# Patient Record
Sex: FEMALE | Race: BLACK OR AFRICAN AMERICAN | NOT HISPANIC OR LATINO | Employment: OTHER | URBAN - METROPOLITAN AREA
[De-identification: names, ages, dates, MRNs, and addresses within clinical notes are randomized per-mention and may not be internally consistent; named-entity substitution may affect disease eponyms.]

---

## 2017-11-21 ENCOUNTER — OFFICE VISIT (OUTPATIENT)
Dept: INTERNAL MEDICINE | Facility: CLINIC | Age: 67
End: 2017-11-21
Payer: MEDICARE

## 2017-11-21 ENCOUNTER — HOSPITAL ENCOUNTER (OUTPATIENT)
Dept: RADIOLOGY | Facility: HOSPITAL | Age: 67
Discharge: HOME | End: 2017-11-21
Attending: NURSE PRACTITIONER
Payer: MEDICARE

## 2017-11-21 VITALS
DIASTOLIC BLOOD PRESSURE: 98 MMHG | HEART RATE: 108 BPM | BODY MASS INDEX: 44.12 KG/M2 | OXYGEN SATURATION: 97 % | TEMPERATURE: 101 F | WEIGHT: 233.69 LBS | HEIGHT: 61 IN | SYSTOLIC BLOOD PRESSURE: 169 MMHG

## 2017-11-21 DIAGNOSIS — R06.89 ABNORMAL BREATH SOUNDS: ICD-10-CM

## 2017-11-21 DIAGNOSIS — R50.9 FEVER, UNSPECIFIED FEVER CAUSE: ICD-10-CM

## 2017-11-21 DIAGNOSIS — R50.9 FEVER, UNSPECIFIED FEVER CAUSE: Primary | ICD-10-CM

## 2017-11-21 DIAGNOSIS — R05.9 COUGH: ICD-10-CM

## 2017-11-21 DIAGNOSIS — J32.9 SINUSITIS, UNSPECIFIED CHRONICITY, UNSPECIFIED LOCATION: ICD-10-CM

## 2017-11-21 DIAGNOSIS — I10 ESSENTIAL HYPERTENSION: Chronic | ICD-10-CM

## 2017-11-21 LAB
FLUAV AG SPEC QL IA: NEGATIVE
FLUBV AG SPEC QL IA: NEGATIVE
SPECIMEN SOURCE: NORMAL

## 2017-11-21 PROCEDURE — 99999 PR PBB SHADOW E&M-EST. PATIENT-LVL III: CPT | Mod: PBBFAC,,, | Performed by: NURSE PRACTITIONER

## 2017-11-21 PROCEDURE — 99213 OFFICE O/P EST LOW 20 MIN: CPT | Mod: PBBFAC,25,PO | Performed by: NURSE PRACTITIONER

## 2017-11-21 PROCEDURE — 71020 XR CHEST PA AND LATERAL: CPT | Mod: 26,,, | Performed by: RADIOLOGY

## 2017-11-21 PROCEDURE — 71020 XR CHEST PA AND LATERAL: CPT | Mod: TC,PO

## 2017-11-21 PROCEDURE — 87400 INFLUENZA A/B EACH AG IA: CPT | Mod: PO

## 2017-11-21 PROCEDURE — 99214 OFFICE O/P EST MOD 30 MIN: CPT | Mod: S$PBB,,, | Performed by: NURSE PRACTITIONER

## 2017-11-21 RX ORDER — PROMETHAZINE HYDROCHLORIDE AND DEXTROMETHORPHAN HYDROBROMIDE 6.25; 15 MG/5ML; MG/5ML
5 SYRUP ORAL
Qty: 118 ML | Refills: 0 | Status: SHIPPED | OUTPATIENT
Start: 2017-11-21 | End: 2017-12-01

## 2017-11-21 RX ORDER — ALBUTEROL SULFATE 90 UG/1
1-2 AEROSOL, METERED RESPIRATORY (INHALATION) EVERY 6 HOURS PRN
Qty: 1 INHALER | Refills: 0 | Status: SHIPPED | OUTPATIENT
Start: 2017-11-21 | End: 2018-06-11

## 2017-11-21 RX ORDER — FLUTICASONE PROPIONATE 50 MCG
2 SPRAY, SUSPENSION (ML) NASAL DAILY
Qty: 1 BOTTLE | Refills: 0 | Status: SHIPPED | OUTPATIENT
Start: 2017-11-21 | End: 2017-12-05

## 2017-11-21 RX ORDER — ACETAMINOPHEN 500 MG
1000 TABLET ORAL
Status: COMPLETED | OUTPATIENT
Start: 2017-11-21 | End: 2017-11-21

## 2017-11-21 RX ORDER — LOSARTAN POTASSIUM AND HYDROCHLOROTHIAZIDE 25; 100 MG/1; MG/1
1 TABLET ORAL DAILY
Qty: 30 TABLET | Refills: 1 | Status: SHIPPED | OUTPATIENT
Start: 2017-11-21 | End: 2018-03-27 | Stop reason: SDUPTHER

## 2017-11-21 RX ORDER — DOXYCYCLINE 100 MG/1
100 CAPSULE ORAL EVERY 12 HOURS
Qty: 20 CAPSULE | Refills: 0 | Status: SHIPPED | OUTPATIENT
Start: 2017-11-21 | End: 2017-12-01

## 2017-11-21 RX ORDER — LEVOCETIRIZINE DIHYDROCHLORIDE 5 MG/1
5 TABLET, FILM COATED ORAL NIGHTLY
Qty: 30 TABLET | Refills: 0 | COMMUNITY
Start: 2017-11-21 | End: 2018-09-25

## 2017-11-21 RX ORDER — ACETAMINOPHEN 325 MG/1
650 TABLET ORAL
Status: DISCONTINUED | OUTPATIENT
Start: 2017-11-21 | End: 2017-11-21

## 2017-11-21 RX ADMIN — Medication 1000 MG: at 03:11

## 2017-11-21 NOTE — PROGRESS NOTES
"CC:   Chief Complaint   Patient presents with    Chills    Fever    Cough    Sinus Problem    Sore Throat     HPI: This is a new problem.   Yoana Pardo is a 67 y.o. female with a complaint of URI.  The current episode started in the past 5 days.   The problem has been gradually worsening.   Associated symptoms included chills, nasal congestion, rhinorrhea, cough, dyspnea, fatigue.    Pertinent negatives include fever, wheezing, nausea, vomiting, diarrhea   Treatments tried: Tylenol Severe cold has been used and this has provided no relief.     Review of patient's allergies indicates:   Allergen Reactions    Bactrim [sulfamethoxazole-trimethoprim] Itching       Outpatient Medications Prior to Visit   Medication Sig Dispense Refill    aspirin (ECOTRIN) 81 MG EC tablet Take 81 mg by mouth once daily.      furosemide (LASIX) 20 MG tablet TAKE ONE TABLET BY MOUTH TWICE DAILY AS NEEDED 60 tablet 0    levocetirizine (XYZAL) 5 MG tablet Take 1 tablet (5 mg total) by mouth every evening. 30 tablet 11    losartan-hydrochlorothiazide 100-25 mg (HYZAAR) 100-25 mg per tablet TAKE ONE TABLET BY MOUTH ONCE DAILY 90 tablet 0    cranberry 400 mg Cap Take 1 tablet by mouth 2 (two) times daily.      fish oil-omega-3 fatty acids 300-1,000 mg capsule Take 2 g by mouth once daily.      fluticasone (FLONASE) 50 mcg/actuation nasal spray 2 sprays by Each Nare route once daily. 1 Bottle 11    multivitamin capsule Take 1 capsule by mouth once daily.      POTASSIUM ORAL Take by mouth.      vitamin E 100 UNIT capsule Take 100 Units by mouth once daily.       No facility-administered medications prior to visit.         Physical Exam   BP (!) 169/98 (BP Location: Right arm, Patient Position: Sitting, BP Method: Large (Automatic))   Pulse 108   Temp (!) 101 °F (38.3 °C) (Tympanic)   Ht 5' 1" (1.549 m)   Wt 106 kg (233 lb 11 oz)   SpO2 97%   BMI 44.15 kg/m²   Constitutional: The patient appears well-developed and " well-nourished.   Head: Normocephalic and atraumatic.   Right Ear: Tympanic membrane and ear canal normal. No drainage, swelling or tenderness. Tympanic membrane is not injected, not erythematous and not bulging. Effusion noted.   Left Ear: Ear canal normal. No drainage, swelling or tenderness. Tympanic membrane is not injected, not erythematous and not bulging.   Nose: Mucosal edema and rhinorrhea present.   Mouth/Throat: Uvula is midline. Posterior oropharyngeal erythema present. No oropharyngeal exudate.        THE MUCOSA IS BOGGY AND ERYTHEMATOUS.     Cardiovascular: Normal rate, regular rhythm, S1 normal, S2 normal and normal heart sounds.  Exam reveals no gallop, no S3, no S4 and no friction rub.    No murmur heard.  Pulmonary/Chest: Effort normal and breath sounds course. No stridor. Not tachypneic. No respiratory distress. The patient has wheezes posterior. The patient has no rhonchi. The patient has no rales.   Skin: The patient is not diaphoretic.     Encounter Diagnoses   Name Primary?    Fever, unspecified fever cause Yes    Abnormal breath sounds     Sinusitis, unspecified chronicity, unspecified location     Essential hypertension     Cough        PLAN:    Yoana MILLER was seen today for chills, fever, cough, sinus problem and sore throat.    Diagnoses and all orders for this visit:    Fever, unspecified fever cause  -     Discontinue: acetaminophen tablet 650 mg; Take 2 tablets (650 mg total) by mouth one time.  -     Influenza antigen Nasopharyngeal Swab  -     X-Ray Chest PA And Lateral; Future  -     acetaminophen tablet 1,000 mg; Take 2 tablets (1,000 mg total) by mouth one time.    Abnormal breath sounds  -     X-Ray Chest PA And Lateral; Future  -     doxycycline (VIBRAMYCIN) 100 MG Cap; Take 1 capsule (100 mg total) by mouth every 12 (twelve) hours.  -     albuterol 90 mcg/actuation inhaler; Inhale 1-2 puffs into the lungs every 6 (six) hours as needed for Wheezing (Cough).  -      promethazine-dextromethorphan (PROMETHAZINE-DM) 6.25-15 mg/5 mL Syrp; Take 5 mLs by mouth every 6 to 8 hours as needed (cough).    Sinusitis, unspecified chronicity, unspecified location  -     doxycycline (VIBRAMYCIN) 100 MG Cap; Take 1 capsule (100 mg total) by mouth every 12 (twelve) hours.  -     levocetirizine (XYZAL) 5 MG tablet; Take 1 tablet (5 mg total) by mouth every evening.  -     fluticasone (FLONASE) 50 mcg/actuation nasal spray; 2 sprays by Each Nare route once daily.    Essential hypertension  -     losartan-hydrochlorothiazide 100-25 mg (HYZAAR) 100-25 mg per tablet; Take 1 tablet by mouth once daily.    Cough  -     promethazine-dextromethorphan (PROMETHAZINE-DM) 6.25-15 mg/5 mL Syrp; Take 5 mLs by mouth every 6 to 8 hours as needed (cough).        Medications Ordered This Encounter      acetaminophen tablet 1,000 mg      albuterol 90 mcg/actuation inhaler          Sig: Inhale 1-2 puffs into the lungs every 6 (six) hours as needed for Wheezing (Cough).          Dispense:  1 Inhaler          Refill:  0      doxycycline (VIBRAMYCIN) 100 MG Cap          Sig: Take 1 capsule (100 mg total) by mouth every 12 (twelve) hours.          Dispense:  20 capsule          Refill:  0      fluticasone (FLONASE) 50 mcg/actuation nasal spray          Si sprays by Each Nare route once daily.          Dispense:  1 Bottle          Refill:  0      levocetirizine (XYZAL) 5 MG tablet          Sig: Take 1 tablet (5 mg total) by mouth every evening.          Dispense:  30 tablet          Refill:  0      losartan-hydrochlorothiazide 100-25 mg (HYZAAR) 100-25 mg per tablet          Sig: Take 1 tablet by mouth once daily.          Dispense:  30 tablet          Refill:  1      promethazine-dextromethorphan (PROMETHAZINE-DM) 6.25-15 mg/5 mL Syrp          Sig: Take 5 mLs by mouth every 6 to 8 hours as needed (cough).          Dispense:  118 mL          Refill:  0  Orders Placed This Encounter   Procedures    X-Ray Chest PA  And Lateral     Standing Status:   Future     Standing Expiration Date:   11/21/2018     Order Specific Question:   May the Radiologist modify the order per protocol to meet the clinical needs of the patient?     Answer:   Yes    Influenza antigen Nasopharyngeal Swab     Order Specific Question:   Specimen Source     Answer:   Nasopharyngeal Swab     RTC if symptoms are worsening or changing significantly or if not improved by the end of therapy.      Will notify of lab reports.

## 2017-11-29 ENCOUNTER — OFFICE VISIT (OUTPATIENT)
Dept: INTERNAL MEDICINE | Facility: CLINIC | Age: 67
End: 2017-11-29
Payer: MEDICARE

## 2017-11-29 VITALS
DIASTOLIC BLOOD PRESSURE: 76 MMHG | HEART RATE: 65 BPM | SYSTOLIC BLOOD PRESSURE: 119 MMHG | TEMPERATURE: 97 F | HEIGHT: 61 IN | OXYGEN SATURATION: 97 % | WEIGHT: 236.75 LBS | BODY MASS INDEX: 44.7 KG/M2

## 2017-11-29 DIAGNOSIS — G47.33 OSA (OBSTRUCTIVE SLEEP APNEA): ICD-10-CM

## 2017-11-29 DIAGNOSIS — I10 ESSENTIAL HYPERTENSION: Chronic | ICD-10-CM

## 2017-11-29 DIAGNOSIS — R41.3 MEMORY DEFICITS: ICD-10-CM

## 2017-11-29 DIAGNOSIS — J40 BRONCHITIS: Primary | ICD-10-CM

## 2017-11-29 DIAGNOSIS — H93.13 TINNITUS OF BOTH EARS: ICD-10-CM

## 2017-11-29 DIAGNOSIS — I27.20 PULMONARY HTN: ICD-10-CM

## 2017-11-29 DIAGNOSIS — E66.01 MORBID OBESITY WITH BMI OF 40.0-44.9, ADULT: ICD-10-CM

## 2017-11-29 DIAGNOSIS — J06.9 URTI (ACUTE UPPER RESPIRATORY INFECTION): ICD-10-CM

## 2017-11-29 PROCEDURE — 99214 OFFICE O/P EST MOD 30 MIN: CPT | Mod: PBBFAC,PO,25 | Performed by: FAMILY MEDICINE

## 2017-11-29 PROCEDURE — 99214 OFFICE O/P EST MOD 30 MIN: CPT | Mod: 25,S$PBB,, | Performed by: FAMILY MEDICINE

## 2017-11-29 PROCEDURE — 99999 PR PBB SHADOW E&M-EST. PATIENT-LVL IV: CPT | Mod: PBBFAC,,, | Performed by: FAMILY MEDICINE

## 2017-11-29 PROCEDURE — 96372 THER/PROPH/DIAG INJ SC/IM: CPT | Mod: PBBFAC,PO

## 2017-11-29 RX ORDER — METHYLPREDNISOLONE ACETATE 80 MG/ML
80 INJECTION, SUSPENSION INTRA-ARTICULAR; INTRALESIONAL; INTRAMUSCULAR; SOFT TISSUE
Status: COMPLETED | OUTPATIENT
Start: 2017-11-29 | End: 2017-11-29

## 2017-11-29 RX ADMIN — METHYLPREDNISOLONE ACETATE 80 MG: 80 INJECTION, SUSPENSION INTRALESIONAL; INTRAMUSCULAR; INTRASYNOVIAL; SOFT TISSUE at 03:11

## 2017-11-29 NOTE — PROGRESS NOTES
Subjective:       Patient ID: Yoana Pardo is a 67 y.o. female.    Chief Complaint: Follow-up    67-year-old -American female patient with Patient Active Problem List:     Pulmonary HTN     MVP (mitral valve prolapse)     History of positive PPD, untreated     Hemorrhoids     Essential hypertension     Peripheral neuropathy     Primary osteoarthritis of both knees     Morbid obesity with BMI of 40.0-44.9, adult     [place] (obstructive sleep apnea)     Behaviorally induced insufficient sleep syndrome     Inadequate sleep hygiene     Psychophysiological insomnia  Here with complaint of productive cough, not getting any better in spite of taking antibiotics and cough syrup, was recently seen at urgent care, patient has been checked for flu which has been negative.   Patient denies of any fever, but has been having wheezing for which she has been using albuterol inhaler as needed  Denies of any chest pain or shortness of breath, palpitations.  Secondary to sleep apnea has not been using her CPAP machine.  Due to pulmonary hypertension has been followed by cardiology  Patient reports have been noticing memory loss and ringing sensation to the ears and minimal decreased hearing loss lately.         Review of Systems   Constitutional: Negative for chills, fatigue and fever.   HENT: Positive for congestion, hearing loss and tinnitus. Negative for sore throat.    Eyes: Negative for visual disturbance.   Respiratory: Positive for cough and wheezing. Negative for shortness of breath.    Cardiovascular: Negative for chest pain, palpitations and leg swelling.   Gastrointestinal: Negative for abdominal pain, nausea and vomiting.   Musculoskeletal: Negative for myalgias.   Skin: Negative for rash.   Neurological: Negative for light-headedness and headaches.   Psychiatric/Behavioral: Positive for decreased concentration. Negative for sleep disturbance.         /76 (BP Location: Left arm, Patient Position: Sitting)  "  Pulse 65   Temp 97.3 °F (36.3 °C) (Tympanic)   Ht 5' 1" (1.549 m)   Wt 107.4 kg (236 lb 12.4 oz)   SpO2 97%   BMI 44.74 kg/m²   Objective:      Physical Exam   Constitutional: She is oriented to person, place, and time. She appears well-developed and well-nourished.   HENT:   Head: Normocephalic and atraumatic.   Right Ear: External ear normal.   Left Ear: External ear normal.   Mouth/Throat: Oropharynx is clear and moist.   Cardiovascular: Normal rate, regular rhythm and normal heart sounds.    No murmur heard.  Pulmonary/Chest: Effort normal and breath sounds normal. No respiratory distress. She has no wheezes.   Abdominal: Soft. Bowel sounds are normal. There is no tenderness.   Musculoskeletal: She exhibits no edema.   Neurological: She is alert and oriented to person, place, and time.   Skin: Skin is warm and dry. No rash noted.   Psychiatric: She has a normal mood and affect.         Assessment:       1. URTI (acute upper respiratory infection)    2. Essential hypertension    3. Pulmonary HTN    4. Morbid obesity with BMI of 40.0-44.9, adult    5. [place] (obstructive sleep apnea)    6. Bronchitis    7. Memory deficits    8. Tinnitus of both ears        Plan:   Bronchitis  -     CT Chest Without Contrast; Future; Expected date: 11/29/2017  -     methylPREDNISolone acetate injection 80 mg; Inject 1 mL (80 mg total) into the muscle one time.  -     CBC auto differential; Future; Expected date: 11/29/2017    URTI (acute upper respiratory infection)  -     methylPREDNISolone acetate injection 80 mg; Inject 1 mL (80 mg total) into the muscle one time.  -     CBC auto differential; Future; Expected date: 11/29/2017    Reviewed chest x-ray which was normal.  Secondary to ongoing symptoms we'll get CT chest without contrast.      Depo-Medrol 80 mg IM ×1 given today in the clinic  Finish antibiotics doxycycline and continue promethazine cough syrup for now  Advised to continue using albuterol inhaler as needed for " wheezing  Encouraged to drink adequate fluids  If symptoms continue to persist or worsen patient to see pulmonology    Essential hypertension  -     Basic metabolic panel; Future; Expected date: 11/29/2017  -     TSH; Future; Expected date: 11/29/2017  -     Lipid panel; Future; Expected date: 11/29/2017  Pulmonary HTN  Followed by cardiology .  Blood pressure stable today currently taking losartan hydrochlorothiazide 10/25 mg daily     Morbid obesity with BMI of 40.0-44.9, adult-lifestyle modifications recommended to lose weight with diet and exercise with BMI 44     [place] (obstructive sleep apnea)-has not been using CPAP machine, encouraged to discuss further with pulmonology    Memory deficits  -     Vitamin B12; Future; Expected date: 11/29/2017   patient has been noticing memory deficits, advised to use flash cardts, will check B12 levels    Tinnitus of both ears  -     Ambulatory Referral to ENT  Secondary to tingling sensation to both the ears and minimal decreased hearing loss , will refer to ENT for further evaluation

## 2017-11-30 ENCOUNTER — HOSPITAL ENCOUNTER (OUTPATIENT)
Dept: RADIOLOGY | Facility: HOSPITAL | Age: 67
Discharge: HOME | End: 2017-11-30
Attending: FAMILY MEDICINE
Payer: MEDICARE

## 2017-11-30 DIAGNOSIS — J40 BRONCHITIS: ICD-10-CM

## 2017-11-30 PROCEDURE — 71250 CT THORAX DX C-: CPT | Mod: TC,PO

## 2017-11-30 PROCEDURE — 71250 CT THORAX DX C-: CPT | Mod: 26,,, | Performed by: RADIOLOGY

## 2017-12-05 ENCOUNTER — OFFICE VISIT (OUTPATIENT)
Dept: OPHTHALMOLOGY | Facility: CLINIC | Age: 67
End: 2017-12-05
Payer: MEDICARE

## 2017-12-05 DIAGNOSIS — H52.203 MYOPIA OF BOTH EYES WITH ASTIGMATISM AND PRESBYOPIA: ICD-10-CM

## 2017-12-05 DIAGNOSIS — H25.013 CORTICAL SENILE CATARACT OF BOTH EYES: ICD-10-CM

## 2017-12-05 DIAGNOSIS — I10 ESSENTIAL HYPERTENSION: Primary | ICD-10-CM

## 2017-12-05 DIAGNOSIS — H52.13 MYOPIA OF BOTH EYES WITH ASTIGMATISM AND PRESBYOPIA: ICD-10-CM

## 2017-12-05 DIAGNOSIS — Z13.5 GLAUCOMA SCREENING: ICD-10-CM

## 2017-12-05 DIAGNOSIS — H52.4 MYOPIA OF BOTH EYES WITH ASTIGMATISM AND PRESBYOPIA: ICD-10-CM

## 2017-12-05 PROCEDURE — 92014 COMPRE OPH EXAM EST PT 1/>: CPT | Mod: S$PBB,,, | Performed by: OPTOMETRIST

## 2017-12-05 PROCEDURE — 92015 DETERMINE REFRACTIVE STATE: CPT | Mod: ,,, | Performed by: OPTOMETRIST

## 2017-12-05 PROCEDURE — 99212 OFFICE O/P EST SF 10 MIN: CPT | Mod: PBBFAC,PO | Performed by: OPTOMETRIST

## 2017-12-05 PROCEDURE — 99999 PR PBB SHADOW E&M-EST. PATIENT-LVL II: CPT | Mod: PBBFAC,,, | Performed by: OPTOMETRIST

## 2017-12-05 NOTE — PROGRESS NOTES
HPI     Eye Exam    Additional comments: Yearly           Comments   Last seen by [facility] [place]29/15 for yearly eye exam. Patient here today for   yearly eye exam.   1. Dry Eyes OU  HPI    Any vision changes since last exam: Yes   Eye pain: No  Other ocular symptoms: Dryness    Do you wear currently wear glasses or contacts? Glasses    Interested in contacts today? No    Do you plan on getting new glasses today? Yes         Last edited by Colleen Drummond, OD on 12/5/2017  9:06 AM. (History)            Assessment /Plan     For exam results, see Encounter Report.    Essential hypertension    Cortical senile cataract of both eyes    Glaucoma screening    Myopia of both eyes with astigmatism and presbyopia    No hypertensive retinopathy.  Very early cataract development.  Glaucoma screening and fundus exam normal.  Updated glasses prescription.  Return to clinic 1 yr.

## 2018-03-26 ENCOUNTER — HOSPITAL ENCOUNTER (EMERGENCY)
Facility: HOSPITAL | Age: 68
Discharge: HOME | End: 2018-03-26
Attending: EMERGENCY MEDICINE
Payer: MEDICARE

## 2018-03-26 VITALS
HEIGHT: 61 IN | HEART RATE: 68 BPM | DIASTOLIC BLOOD PRESSURE: 100 MMHG | OXYGEN SATURATION: 100 % | TEMPERATURE: 99 F | SYSTOLIC BLOOD PRESSURE: 166 MMHG | RESPIRATION RATE: 20 BRPM

## 2018-03-26 DIAGNOSIS — R10.10 UPPER ABDOMINAL PAIN: Primary | ICD-10-CM

## 2018-03-26 DIAGNOSIS — R07.9 CHEST PAIN: ICD-10-CM

## 2018-03-26 DIAGNOSIS — K80.20 GALLSTONES: ICD-10-CM

## 2018-03-26 LAB
ALBUMIN SERPL BCP-MCNC: 4 G/DL
ALP SERPL-CCNC: 94 U/L
ALT SERPL W/O P-5'-P-CCNC: 10 U/L
ANION GAP SERPL CALC-SCNC: 10 MMOL/L
AST SERPL-CCNC: 14 U/L
BASOPHILS # BLD AUTO: 0.02 K/UL
BASOPHILS NFR BLD: 0.3 %
BILIRUB SERPL-MCNC: 0.3 MG/DL
BILIRUB UR QL STRIP: NEGATIVE
BNP SERPL-MCNC: 57 PG/ML
BUN SERPL-MCNC: 12 MG/DL
CALCIUM SERPL-MCNC: 9 MG/DL
CHLORIDE SERPL-SCNC: 102 MMOL/L
CLARITY UR: CLEAR
CO2 SERPL-SCNC: 30 MMOL/L
COLOR UR: YELLOW
CREAT SERPL-MCNC: 0.9 MG/DL
DIFFERENTIAL METHOD: NORMAL
EOSINOPHIL # BLD AUTO: 0.1 K/UL
EOSINOPHIL NFR BLD: 1.3 %
ERYTHROCYTE [DISTWIDTH] IN BLOOD BY AUTOMATED COUNT: 13.9 %
EST. GFR  (AFRICAN AMERICAN): >60 ML/MIN/1.73 M^2
EST. GFR  (NON AFRICAN AMERICAN): >60 ML/MIN/1.73 M^2
GLUCOSE SERPL-MCNC: 117 MG/DL
GLUCOSE UR QL STRIP: NEGATIVE
HCT VFR BLD AUTO: 41.1 %
HGB BLD-MCNC: 13.6 G/DL
HGB UR QL STRIP: NEGATIVE
KETONES UR QL STRIP: NEGATIVE
LEUKOCYTE ESTERASE UR QL STRIP: NEGATIVE
LIPASE SERPL-CCNC: 30 U/L
LYMPHOCYTES # BLD AUTO: 2.4 K/UL
LYMPHOCYTES NFR BLD: 33.8 %
MCH RBC QN AUTO: 30.3 PG
MCHC RBC AUTO-ENTMCNC: 33.1 G/DL
MCV RBC AUTO: 92 FL
MONOCYTES # BLD AUTO: 0.4 K/UL
MONOCYTES NFR BLD: 6.3 %
NEUTROPHILS # BLD AUTO: 4.1 K/UL
NEUTROPHILS NFR BLD: 58.3 %
NITRITE UR QL STRIP: NEGATIVE
PH UR STRIP: 8 [PH] (ref 5–8)
PLATELET # BLD AUTO: 269 K/UL
PMV BLD AUTO: 9.4 FL
POTASSIUM SERPL-SCNC: 3.6 MMOL/L
PROT SERPL-MCNC: 8.6 G/DL
PROT UR QL STRIP: NEGATIVE
RBC # BLD AUTO: 4.49 M/UL
SODIUM SERPL-SCNC: 142 MMOL/L
SP GR UR STRIP: 1.02 (ref 1–1.03)
TROPONIN I SERPL DL<=0.01 NG/ML-MCNC: 0.01 NG/ML
TROPONIN I SERPL DL<=0.01 NG/ML-MCNC: 0.01 NG/ML
URN SPEC COLLECT METH UR: NORMAL
UROBILINOGEN UR STRIP-ACNC: NEGATIVE EU/DL
WBC # BLD AUTO: 7.04 K/UL

## 2018-03-26 PROCEDURE — 96375 TX/PRO/DX INJ NEW DRUG ADDON: CPT

## 2018-03-26 PROCEDURE — 84484 ASSAY OF TROPONIN QUANT: CPT | Mod: 91

## 2018-03-26 PROCEDURE — C9113 INJ PANTOPRAZOLE SODIUM, VIA: HCPCS | Performed by: EMERGENCY MEDICINE

## 2018-03-26 PROCEDURE — 81003 URINALYSIS AUTO W/O SCOPE: CPT

## 2018-03-26 PROCEDURE — 96374 THER/PROPH/DIAG INJ IV PUSH: CPT

## 2018-03-26 PROCEDURE — 25500020 PHARM REV CODE 255: Performed by: EMERGENCY MEDICINE

## 2018-03-26 PROCEDURE — 83880 ASSAY OF NATRIURETIC PEPTIDE: CPT

## 2018-03-26 PROCEDURE — 85025 COMPLETE CBC W/AUTO DIFF WBC: CPT

## 2018-03-26 PROCEDURE — 93010 ELECTROCARDIOGRAM REPORT: CPT | Mod: ,,, | Performed by: INTERNAL MEDICINE

## 2018-03-26 PROCEDURE — 25000003 PHARM REV CODE 250: Performed by: EMERGENCY MEDICINE

## 2018-03-26 PROCEDURE — 99285 EMERGENCY DEPT VISIT HI MDM: CPT | Mod: 25

## 2018-03-26 PROCEDURE — 80053 COMPREHEN METABOLIC PANEL: CPT

## 2018-03-26 PROCEDURE — 93005 ELECTROCARDIOGRAM TRACING: CPT

## 2018-03-26 PROCEDURE — 63600175 PHARM REV CODE 636 W HCPCS: Performed by: EMERGENCY MEDICINE

## 2018-03-26 PROCEDURE — 96361 HYDRATE IV INFUSION ADD-ON: CPT

## 2018-03-26 PROCEDURE — 83690 ASSAY OF LIPASE: CPT

## 2018-03-26 RX ORDER — PANTOPRAZOLE SODIUM 40 MG/10ML
40 INJECTION, POWDER, LYOPHILIZED, FOR SOLUTION INTRAVENOUS
Status: DISCONTINUED | OUTPATIENT
Start: 2018-03-26 | End: 2018-03-26

## 2018-03-26 RX ORDER — HYDROCODONE BITARTRATE AND ACETAMINOPHEN 7.5; 325 MG/1; MG/1
1 TABLET ORAL EVERY 6 HOURS PRN
Qty: 15 TABLET | Refills: 0 | Status: ON HOLD | OUTPATIENT
Start: 2018-03-26 | End: 2018-04-04

## 2018-03-26 RX ORDER — ONDANSETRON 4 MG/1
4 TABLET, ORALLY DISINTEGRATING ORAL EVERY 8 HOURS PRN
Qty: 15 TABLET | Refills: 0 | Status: ON HOLD | OUTPATIENT
Start: 2018-03-26 | End: 2018-04-04

## 2018-03-26 RX ORDER — ONDANSETRON 2 MG/ML
4 INJECTION INTRAMUSCULAR; INTRAVENOUS
Status: COMPLETED | OUTPATIENT
Start: 2018-03-26 | End: 2018-03-26

## 2018-03-26 RX ADMIN — SODIUM CHLORIDE 1000 ML: 0.9 INJECTION, SOLUTION INTRAVENOUS at 03:03

## 2018-03-26 RX ADMIN — IOHEXOL 75 ML: 350 INJECTION, SOLUTION INTRAVENOUS at 06:03

## 2018-03-26 RX ADMIN — LIDOCAINE HYDROCHLORIDE 50 ML: 20 SOLUTION ORAL; TOPICAL at 04:03

## 2018-03-26 RX ADMIN — ONDANSETRON 4 MG: 2 INJECTION INTRAMUSCULAR; INTRAVENOUS at 04:03

## 2018-03-26 RX ADMIN — DEXTROSE MONOHYDRATE 40 MG: 5 INJECTION, SOLUTION INTRAVENOUS at 06:03

## 2018-03-26 NOTE — ED PROVIDER NOTES
SCRIBE #1 NOTE: IDereck am scribing for, and in the presence of, Lawrence Doyle Jr., MD. I have scribed the HPI, ROS, and PEX.     SCRIBE #2 NOTE: IKorin, am scribing for, and in the presence of, Wilian Snyder MD.     History      Chief Complaint   Patient presents with    Abdominal Pain     upper abdominal pain. +Nausea       Review of patient's allergies indicates:   Allergen Reactions    Bactrim [sulfamethoxazole-trimethoprim] Itching        HPI   HPI    3/26/2018, 3:08 AM   History obtained from the patient      History of Present Illness: Yoana Pardo is a 67 y.o. female patient who presents to the Emergency Department for abd pain which onset gradually five hours pta. Pt stated sxs started a few minutes after she ate dinner which was rice and gravy. Symptoms are constant and moderate in severity. No mitigating or exacerbating factors reported. No associated sxs reported. Patient denies any fever, chills, n/v/d, constipation, hematochezia, dysuria, hematuria, urinary frequency/urgency, and all other sxs at this time. No prior Tx reported. No further complaints or concerns at this time.         Arrival mode: Personal vehicle    PCP: Lynn Wiggins MD       Past Medical History:  Past Medical History:   Diagnosis Date    GERD (gastroesophageal reflux disease)     Hemorrhoids     History of positive PPD, untreated     Hx of cold sores     Hyperlipidemia     Hypertension     MVP (mitral valve prolapse)     Peripheral neuropathy     Pulmonary HTN     Dr Bailon    Sleep apnea     has  but not using CPAP       Past Surgical History:  Past Surgical History:   Procedure Laterality Date    DILATION AND CURETTAGE OF UTERUS           Family History:  Family History   Problem Relation Age of Onset    Heart disease Mother     Obesity Mother     Kidney disease Father     Hypertension Father     Obesity Father     Heart disease Father     Diabetes Sister     Aneurysm Sister       AAA       Social History:  Social History     Social History Main Topics    Smoking status: Never Smoker    Smokeless tobacco: Never Used    Alcohol use Yes      Comment: rarely    Drug use: No    Sexual activity: Not Currently       ROS   Review of Systems   Constitutional: Negative for fever.   HENT: Negative for sore throat.    Respiratory: Negative for shortness of breath.    Cardiovascular: Negative for chest pain.   Gastrointestinal: Positive for abdominal pain. Negative for blood in stool, constipation, diarrhea, nausea and vomiting.   Genitourinary: Negative for dysuria, frequency, hematuria and urgency.   Musculoskeletal: Negative for back pain.   Skin: Negative for rash.   Neurological: Negative for weakness and numbness.   Hematological: Does not bruise/bleed easily.   All other systems reviewed and are negative.    Physical Exam      Initial Vitals [03/26/18 0230]   BP Pulse Resp Temp SpO2   (!) 181/93 60 18 98.7 °F (37.1 °C) 98 %      MAP       122.33          Physical Exam  Nursing Notes and Vital Signs Reviewed.  Constitutional: Patient is in no acute distress. Well-developed and well-nourished.  Head: Atraumatic. Normocephalic.  Eyes: PERRL. EOM intact. Conjunctivae are not pale. No scleral icterus.  ENT: Mucous membranes are moist. Oropharynx is clear and symmetric.    Neck: Supple. Full ROM. No lymphadenopathy.  Cardiovascular: Regular rate. Regular rhythm. No murmurs, rubs, or gallops. Distal pulses are 2+ and symmetric.  Pulmonary/Chest: No respiratory distress. Clear to auscultation bilaterally. No wheezing or rales.  Abdominal: Soft and non-distended. Epigastric tenderness.  No rebound, guarding, or rigidity. Good bowel sounds.  Musculoskeletal: Moves all extremities. No obvious deformities. No edema. No calf tenderness.  Skin: Warm and dry.  Neurological:  Alert, awake, and appropriate.  Normal speech.  No acute focal neurological deficits are appreciated.  Psychiatric: Normal affect.  "Good eye contact. Appropriate in content.    ED Course    Procedures  ED Vital Signs:  Vitals:    03/26/18 0230 03/26/18 0419 03/26/18 0447 03/26/18 0641   BP: (!) 181/93 (!) 181/79 (!) 163/74 (!) 169/83   Pulse: 60 75 77 75   Resp: 18 18 15 (!) 24   Temp: 98.7 °F (37.1 °C)      TempSrc: Oral      SpO2: 98% 98% 100% 100%   Height: 5' 1" (1.549 m)       03/26/18 0731   BP:    Pulse: 74   Resp:    Temp:    TempSrc:    SpO2:    Height:        Abnormal Lab Results:  Labs Reviewed   COMPREHENSIVE METABOLIC PANEL - Abnormal; Notable for the following:        Result Value    CO2 30 (*)     Glucose 117 (*)     Total Protein 8.6 (*)     All other components within normal limits   CBC W/ AUTO DIFFERENTIAL   LIPASE   URINALYSIS   TROPONIN I   TROPONIN I   B-TYPE NATRIURETIC PEPTIDE      Lab Results:  Results for orders placed or performed during the hospital encounter of 03/26/18   CBC W/ AUTO DIFFERENTIAL   Result Value Ref Range    WBC 7.04 3.90 - 12.70 K/uL    RBC 4.49 4.00 - 5.40 M/uL    Hemoglobin 13.6 12.0 - 16.0 g/dL    Hematocrit 41.1 37.0 - 48.5 %    MCV 92 82 - 98 fL    MCH 30.3 27.0 - 31.0 pg    MCHC 33.1 32.0 - 36.0 g/dL    RDW 13.9 11.5 - 14.5 %    Platelets 269 150 - 350 K/uL    MPV 9.4 9.2 - 12.9 fL    Gran # (ANC) 4.1 1.8 - 7.7 K/uL    Lymph # 2.4 1.0 - 4.8 K/uL    Mono # 0.4 0.3 - 1.0 K/uL    Eos # 0.1 0.0 - 0.5 K/uL    Baso # 0.02 0.00 - 0.20 K/uL    Gran% 58.3 38.0 - 73.0 %    Lymph% 33.8 18.0 - 48.0 %    Mono% 6.3 4.0 - 15.0 %    Eosinophil% 1.3 0.0 - 8.0 %    Basophil% 0.3 0.0 - 1.9 %    Differential Method Automated    Comp. Metabolic Panel   Result Value Ref Range    Sodium 142 136 - 145 mmol/L    Potassium 3.6 3.5 - 5.1 mmol/L    Chloride 102 95 - 110 mmol/L    CO2 30 (H) 23 - 29 mmol/L    Glucose 117 (H) 70 - 110 mg/dL    BUN, Bld 12 8 - 23 mg/dL    Creatinine 0.9 0.5 - 1.4 mg/dL    Calcium 9.0 8.7 - 10.5 mg/dL    Total Protein 8.6 (H) 6.0 - 8.4 g/dL    Albumin 4.0 3.5 - 5.2 g/dL    Total Bilirubin 0.3 " 0.1 - 1.0 mg/dL    Alkaline Phosphatase 94 55 - 135 U/L    AST 14 10 - 40 U/L    ALT 10 10 - 44 U/L    Anion Gap 10 8 - 16 mmol/L    eGFR if African American >60 >60 mL/min/1.73 m^2    eGFR if non African American >60 >60 mL/min/1.73 m^2   Lipase   Result Value Ref Range    Lipase 30 4 - 60 U/L   Urinalysis - Clean Catch   Result Value Ref Range    Specimen UA Urine, Clean Catch     Color, UA Yellow Yellow, Straw, Aylin    Appearance, UA Clear Clear    pH, UA 8.0 5.0 - 8.0    Specific Gravity, UA 1.020 1.005 - 1.030    Protein, UA Negative Negative    Glucose, UA Negative Negative    Ketones, UA Negative Negative    Bilirubin (UA) Negative Negative    Occult Blood UA Negative Negative    Nitrite, UA Negative Negative    Urobilinogen, UA Negative <2.0 EU/dL    Leukocytes, UA Negative Negative   Troponin I #1   Result Value Ref Range    Troponin I 0.010 0.000 - 0.026 ng/mL   Troponin I #2   Result Value Ref Range    Troponin I 0.015 0.000 - 0.026 ng/mL   B-Type natriuretic peptide (BNP)   Result Value Ref Range    BNP 57 0 - 99 pg/mL         Imaging Results:  Imaging Results          X-Ray Abdomen Flat And Erect   Ovular opaque mass in pelvis seen in previous CT on September 2016.             X-Ray Chest AP Portable   NAF            The EKG was ordered, reviewed, and independently interpreted by the ED provider.  Interpretation time: 0348  Rate: 69 BPM  Rhythm: Sinus rhythm with PAC  Interpretation: Nonspecific T wave abnormality. No STEMI.         The Emergency Provider reviewed the vital signs and test results, which are outlined above.    ED Discussion     5:55 AM: Re-evaluated pt. Pt is resting comfortably and is in no acute distress.  Pt states she is still having epigastric abd pain. D/w pt all pertinent results. D/w pt any concerns expressed at this time. Answered all questions. Pt expresses understanding at this time.    6:00 AM: Dr. Doyle transfers care of pt to Dr. Snyder, pending labs results.    8:55  AM: Reassessed pt at this time.  Pt states her condition has improved at this time. Discussed with pt all pertinent ED information and results. Discussed pt dx and plan of tx. Advised pt to f/u outpatient with General Surgery. Gave pt all f/u and return to the ED instructions. All questions and concerns were addressed at this time. Pt expresses understanding of information and instructions, and is comfortable with plan to discharge. Pt is stable for discharge.    I discussed with patient and/or family/caretaker that evaluation in the ED does not suggest any emergent or life threatening medical conditions requiring immediate intervention beyond what was provided in the ED, and I believe patient is safe for discharge.  Regardless, an unremarkable evaluation in the ED does not preclude the development or presence of a serious of life threatening condition. As such, patient was instructed to return immediately for any worsening or change in current symptoms.    Driving or other activities under influence of medications - Patient and/or family/caretaker was given a prescription for, or instructed to use a medicine that may impair ability to drive, operate machinery, or participate in other potentially dangerous activities.  Patient was instructed not to participate in these activities while under the influence of these medications.        ED Medication(s):  Medications   sodium chloride 0.9% bolus 1,000 mL (0 mLs Intravenous Stopped 3/26/18 0603)   ondansetron injection 4 mg (4 mg Intravenous Given 3/26/18 0401)   GI cocktail (mylanta 30 mL, lidocaine 2 % viscous 10 mL, dicyclomine 10 mL) 50 mL (50 mLs Oral Given 3/26/18 0401)   pantoprazole (PROTONIX) 40 mg in dextrose 5 % 100 mL IVPB (40 mg Intravenous Given 3/26/18 0635)   omnipaque 350 iohexol 75 mL (75 mLs Intravenous Given 3/26/18 0655)       New Prescriptions    HYDROCODONE-ACETAMINOPHEN 7.5-325MG (NORCO) 7.5-325 MG PER TABLET    Take 1 tablet by mouth every 6  (six) hours as needed for Pain.    ONDANSETRON (ZOFRAN-ODT) 4 MG TBDL    Take 1 tablet (4 mg total) by mouth every 8 (eight) hours as needed (prn nausea).       Follow-up Information     Evangelista Erickson MD. Call today.    Specialties:  General Surgery, Bariatrics  Why:  Call to schedule appt to see Dr. Erickson or General surgeon of your choice for recheck and to discuss treatment options for gallstones causing pain  Contact information:  [address] [facility] [address] [phone]             [facility] - .    Specialty:  Emergency Medicine  Why:  return here if fever, if your upper abdominal pain returns and persists or if fever or intractabel nausea/vomitnig  Contact information:  [address] [facility] [address] [phone]                   Medical Decision Making    Medical Decision Making:   Clinical Tests:   Lab Tests: Ordered and Reviewed  Radiological Study: Ordered and Reviewed  Medical Tests: Ordered and Reviewed           Scribe Attestation:   Scribe #1: I performed the above scribed service and the documentation accurately describes the services I performed. I attest to the accuracy of the note.    Attending:   Physician Attestation Statement for Scribe #1: I, Lawrence Doyle Jr., MD, personally performed the services described in this documentation, as scribed by Dereck Vargas, in my presence, and it is both accurate and complete.       Scribe Attestation:   Scribe #2: I performed the above scribed service and the documentation accurately describes the services I performed. I attest to the accuracy of the note.    Attending Attestation:           Physician Attestation for Scribe:    Physician Attestation Statement for Scribe #2: I, Wilian Snyder MD, reviewed documentation, as scribed by Korin Jones in my presence, and it is both accurate and complete. I also acknowledge and confirm the content of the note done by Scribe #1.          Clinical Impression        ICD-10-CM ICD-9-CM   1. Upper abdominal pain R10.10 789.09   2. Chest pain R07.9 786.50   3. Gallstones K80.20 574.20       Disposition:   Disposition: Discharged  Condition: Stable         Wilian Snyder Jr., MD  03/26/18 5553

## 2018-03-26 NOTE — ED NOTES
Acknowledge transfer of care of patient. Patient resting in bed with no acute distress noted. Alert and oriented x 3, BUTTS and follows commands. Respirations easy and unlabored.  IV site clear and patent with Protonix infusing. And soft, nontender, bsx4, continues to complain of epigastric pain. 3/10. Able to make needs known, updated on plan of care. Cart in low position, side rails up x 2 and call bell in reach.

## 2018-03-27 ENCOUNTER — OFFICE VISIT (OUTPATIENT)
Dept: INTERNAL MEDICINE | Facility: CLINIC | Age: 68
DRG: 414 | End: 2018-03-27
Payer: MEDICARE

## 2018-03-27 VITALS
DIASTOLIC BLOOD PRESSURE: 82 MMHG | WEIGHT: 238.13 LBS | OXYGEN SATURATION: 98 % | BODY MASS INDEX: 44.96 KG/M2 | HEART RATE: 71 BPM | HEIGHT: 61 IN | SYSTOLIC BLOOD PRESSURE: 136 MMHG | TEMPERATURE: 98 F

## 2018-03-27 DIAGNOSIS — M17.0 PRIMARY OSTEOARTHRITIS OF BOTH KNEES: ICD-10-CM

## 2018-03-27 DIAGNOSIS — K21.9 HIATAL HERNIA WITH GERD: ICD-10-CM

## 2018-03-27 DIAGNOSIS — G47.33 OSA (OBSTRUCTIVE SLEEP APNEA): ICD-10-CM

## 2018-03-27 DIAGNOSIS — J30.2 CHRONIC SEASONAL ALLERGIC RHINITIS, UNSPECIFIED TRIGGER: ICD-10-CM

## 2018-03-27 DIAGNOSIS — E66.01 MORBID OBESITY WITH BMI OF 40.0-44.9, ADULT: ICD-10-CM

## 2018-03-27 DIAGNOSIS — I27.20 PULMONARY HTN: ICD-10-CM

## 2018-03-27 DIAGNOSIS — K80.20 GALL BLADDER STONES: Primary | ICD-10-CM

## 2018-03-27 DIAGNOSIS — I10 ESSENTIAL HYPERTENSION: Chronic | ICD-10-CM

## 2018-03-27 DIAGNOSIS — K44.9 HIATAL HERNIA WITH GERD: ICD-10-CM

## 2018-03-27 DIAGNOSIS — Z12.31 ENCOUNTER FOR SCREENING MAMMOGRAM FOR MALIGNANT NEOPLASM OF BREAST: ICD-10-CM

## 2018-03-27 DIAGNOSIS — I70.0 ATHEROSCLEROSIS OF AORTA: ICD-10-CM

## 2018-03-27 DIAGNOSIS — R41.3 MEMORY DEFICITS: ICD-10-CM

## 2018-03-27 PROCEDURE — 99214 OFFICE O/P EST MOD 30 MIN: CPT | Mod: S$PBB,,, | Performed by: FAMILY MEDICINE

## 2018-03-27 PROCEDURE — 99214 OFFICE O/P EST MOD 30 MIN: CPT | Mod: PBBFAC,PO | Performed by: FAMILY MEDICINE

## 2018-03-27 PROCEDURE — 99999 PR PBB SHADOW E&M-EST. PATIENT-LVL IV: CPT | Mod: PBBFAC,,, | Performed by: FAMILY MEDICINE

## 2018-03-27 RX ORDER — LOSARTAN POTASSIUM AND HYDROCHLOROTHIAZIDE 25; 100 MG/1; MG/1
1 TABLET ORAL DAILY
Qty: 30 TABLET | Refills: 1 | Status: SHIPPED | OUTPATIENT
Start: 2018-03-27 | End: 2018-03-29 | Stop reason: SDUPTHER

## 2018-03-27 NOTE — PROGRESS NOTES
Subjective:       Patient ID: Yonaa Pardo is a 67 y.o. female.    Chief Complaint: Follow-up (pt states having a hearing issue; broke out in red marks) and Medication Refill    67-year-old Afro-American female patient with Patient Active Problem List:     Pulmonary HTN     MVP (mitral valve prolapse)     Hiatal hernia with GERD     History of positive PPD, untreated     Hemorrhoids     Essential hypertension     Peripheral neuropathy     Primary osteoarthritis of both knees     Morbid obesity with BMI of 40.0-44.9, adult     [place] (obstructive sleep apnea)     Inadequate sleep hygiene     Psychophysiological insomnia     Chronic seasonal allergic rhinitis     Atherosclerosis of aorta  Reported that she went to ER yesterday secondary to right upper quadrant abdominal pain and nausea, patient was in significant pain 5/10, which made her blood pressures being elevated.  Patient has been taking her medications regularly.  Has appointment with general surgery next week.   Patient reports that her abdominal pain at this time is 5/10, took pain medication last night, also has minimal discomfort in the epigastric area.  Patient has been taking Zofran as needed  Reports acid reflex symptoms but not regularly.   Has been having minimal pressure to the right ear and would like to get checked.   Secondary to sleep apnea patient does not want to use CPAP machine.  Continues to have minimal sleep disturbances  Denies of any chest pain or difficulty breathing, fever with chills  Recently she had dental work done for which she was placed on penicillin, reported that she had red streaks to her arms but has subsided spontaneously 3 days ago  Reports occasional headaches and has been having decreased memory, would like to discuss further.        Review of Systems   Constitutional: Negative for chills, fatigue and fever.   HENT: Positive for congestion and ear pain.    Eyes: Negative for visual disturbance.   Respiratory:  "Negative for shortness of breath.    Cardiovascular: Negative for chest pain and leg swelling.   Gastrointestinal: Positive for abdominal pain. Negative for constipation, diarrhea, nausea and vomiting.   Genitourinary: Negative for dysuria, frequency and urgency.   Musculoskeletal: Negative for myalgias.   Skin: Negative for rash.   Neurological: Positive for headaches. Negative for light-headedness and numbness.   Psychiatric/Behavioral: Positive for decreased concentration. Negative for sleep disturbance.         /82 (BP Location: Right arm, Patient Position: Sitting)   Pulse 71   Temp 98 °F (36.7 °C) (Tympanic)   Ht 5' 1" (1.549 m)   Wt 108 kg (238 lb 1.6 oz)   SpO2 98%   BMI 44.99 kg/m²   Objective:      Physical Exam   Constitutional: She is oriented to person, place, and time. She appears well-developed and well-nourished.   HENT:   Head: Normocephalic and atraumatic.   Mouth/Throat: Oropharynx is clear and moist.   Positive for minimal dullness in bilateral tympanic membranes, no fluid noted   Cardiovascular: Normal rate, regular rhythm and normal heart sounds.    No murmur heard.  Pulmonary/Chest: Effort normal and breath sounds normal. She has no wheezes.   Abdominal: Soft. Bowel sounds are normal. There is tenderness.   Positive for tenderness in the epigastric and right upper quadrant on palpation   Musculoskeletal: She exhibits no edema.   Neurological: She is alert and oriented to person, place, and time.   Skin: Skin is warm and dry. No rash noted. No erythema.   Psychiatric: She has a normal mood and affect.       Admission on 03/26/2018, Discharged on 03/26/2018   Component Date Value Ref Range Status    WBC 03/26/2018 7.04  3.90 - 12.70 K/uL Final    RBC 03/26/2018 4.49  4.00 - 5.40 M/uL Final    Hemoglobin 03/26/2018 13.6  12.0 - 16.0 g/dL Final    Hematocrit 03/26/2018 41.1  37.0 - 48.5 % Final    MCV 03/26/2018 92  82 - 98 fL Final    MCH 03/26/2018 30.3  27.0 - 31.0 pg Final "    MCHC 03/26/2018 33.1  32.0 - 36.0 g/dL Final    RDW 03/26/2018 13.9  11.5 - 14.5 % Final    Platelets 03/26/2018 269  150 - 350 K/uL Final    MPV 03/26/2018 9.4  9.2 - 12.9 fL Final    Gran # (ANC) 03/26/2018 4.1  1.8 - 7.7 K/uL Final    Lymph # 03/26/2018 2.4  1.0 - 4.8 K/uL Final    Mono # 03/26/2018 0.4  0.3 - 1.0 K/uL Final    Eos # 03/26/2018 0.1  0.0 - 0.5 K/uL Final    Baso # 03/26/2018 0.02  0.00 - 0.20 K/uL Final    Gran% 03/26/2018 58.3  38.0 - 73.0 % Final    Lymph% 03/26/2018 33.8  18.0 - 48.0 % Final    Mono% 03/26/2018 6.3  4.0 - 15.0 % Final    Eosinophil% 03/26/2018 1.3  0.0 - 8.0 % Final    Basophil% 03/26/2018 0.3  0.0 - 1.9 % Final    Differential Method 03/26/2018 Automated   Final    Sodium 03/26/2018 142  136 - 145 mmol/L Final    Potassium 03/26/2018 3.6  3.5 - 5.1 mmol/L Final    Chloride 03/26/2018 102  95 - 110 mmol/L Final    CO2 03/26/2018 30* 23 - 29 mmol/L Final    Glucose 03/26/2018 117* 70 - 110 mg/dL Final    BUN, Bld 03/26/2018 12  8 - 23 mg/dL Final    Creatinine 03/26/2018 0.9  0.5 - 1.4 mg/dL Final    Calcium 03/26/2018 9.0  8.7 - 10.5 mg/dL Final    Total Protein 03/26/2018 8.6* 6.0 - 8.4 g/dL Final    Albumin 03/26/2018 4.0  3.5 - 5.2 g/dL Final    Total Bilirubin 03/26/2018 0.3  0.1 - 1.0 mg/dL Final    Comment: For infants and newborns, interpretation of results should be based  on gestational age, weight and in agreement with clinical  observations.  Premature Infant recommended reference ranges:  Up to 24 hours.............<8.0 mg/dL  Up to 48 hours............<12.0 mg/dL  3-5 days..................<15.0 mg/dL  6-29 days.................<15.0 mg/dL      Alkaline Phosphatase 03/26/2018 94  55 - 135 U/L Final    AST 03/26/2018 14  10 - 40 U/L Final    ALT 03/26/2018 10  10 - 44 U/L Final    Anion Gap 03/26/2018 10  8 - 16 mmol/L Final    eGFR if African American 03/26/2018 >60  >60 mL/min/1.73 m^2 Final    eGFR if non African American  03/26/2018 >60  >60 mL/min/1.73 m^2 Final    Comment: Calculation used to obtain the estimated glomerular filtration  rate (eGFR) is the CKD-EPI equation.       Lipase 03/26/2018 30  4 - 60 U/L Final    Specimen UA 03/26/2018 Urine, Clean Catch   Final    Color, UA 03/26/2018 Yellow  Yellow, Straw, Aylin Final    Appearance, UA 03/26/2018 Clear  Clear Final    pH, UA 03/26/2018 8.0  5.0 - 8.0 Final    Specific Gravity, UA 03/26/2018 1.020  1.005 - 1.030 Final    Protein, UA 03/26/2018 Negative  Negative Final    Comment: Recommend a 24 hour urine protein or a urine   protein/creatinine ratio if globulin induced proteinuria is  clinically suspected.      Glucose, UA 03/26/2018 Negative  Negative Final    Ketones, UA 03/26/2018 Negative  Negative Final    Bilirubin (UA) 03/26/2018 Negative  Negative Final    Occult Blood UA 03/26/2018 Negative  Negative Final    Nitrite, UA 03/26/2018 Negative  Negative Final    Urobilinogen, UA 03/26/2018 Negative  <2.0 EU/dL Final    Leukocytes, UA 03/26/2018 Negative  Negative Final    Troponin I 03/26/2018 0.010  0.000 - 0.026 ng/mL Final    Comment: The reference interval for Troponin I represents the 99th percentile   cutoff   for our facility and is consistent with 3rd generation assay   performance.      Troponin I 03/26/2018 0.015  0.000 - 0.026 ng/mL Final    Comment: The reference interval for Troponin I represents the 99th percentile   cutoff   for our facility and is consistent with 3rd generation assay   performance.      BNP 03/26/2018 57  0 - 99 pg/mL Final       Assessment:       1. Gall bladder stones    2. Hiatal hernia with GERD    3. Essential hypertension    4. Pulmonary HTN    5. Atherosclerosis of aorta    6. [place] (obstructive sleep apnea)    7. Morbid obesity with BMI of 40.0-44.9, adult    8. Encounter for screening mammogram for malignant neoplasm of breast    9. Chronic seasonal allergic rhinitis, unspecified trigger    10. Primary  osteoarthritis of both knees    11. Memory deficits        Plan:   Gall bladder stones  Hiatal hernia with GERD  Reviewed CT scan of the abdomen and pelvis done in the ER yesterday showing  .  The gallbladder is mildly distended and there are gallstones present.  The patient's symptoms be related to gallbladder disease?  Clinical correlation is advised.  2. Negative for acute process involving the lower chest, abdomen or pelvis otherwise.  The appendages.  Normal appendix.  Negative for renal stone disease or hydronephrosis.  3.  Again seen is a large hiatal hernia, without definite evidence for obstruction.  4.  Numerous stable findings as noted above    Patient clinically doing well, has minimal discomfort.   Was advised to discuss further with general surgery regarding cholelithiasis probably requiring elective cholecystectomy for ongoing discomfort in the right upper quadrant from the gallstones  And also discuss about height and hernia repair if needed  Patient was encouraged to eat small frequent meals and avoid spicy diet    Essential hypertension-blood pressure stable today currently on losartan hydrochlorothiazide 100/25 mg daily    Pulmonary HTN  Atherosclerosis of aorta  Stable and asymptomatic    [place] (obstructive sleep apnea)-currently not using CPAP machine    Morbid obesity with BMI of 40.0-44.9, adult-lifestyle modifications recommended to lose weight with BMI 44  Restrict carbohydrates    Encounter for screening mammogram for malignant neoplasm of breast  -     Mammo Digital Screening Bilat with CAD; Future; Expected date: 03/27/2018  Due for mammogram    Chronic seasonal allergic rhinitis, unspecified trigger-stable on Xyzal as needed  Advised to add over-the-counter Mucinex to prevent pressure    Primary osteoarthritis of both knees-stable    Memory deficits  -     Ambulatory referral to Neurology  Patient concerned about memory deficits lately and occasionally causing headaches, reviewed recent  labs showing normal B12.   Advised to try taking over-the-counter B12 supplements 1000 units daily and discuss further with neurology

## 2018-03-29 ENCOUNTER — OFFICE VISIT (OUTPATIENT)
Dept: URGENT CARE | Facility: CLINIC | Age: 68
DRG: 414 | End: 2018-03-29
Payer: MEDICARE

## 2018-03-29 ENCOUNTER — HOSPITAL ENCOUNTER (INPATIENT)
Facility: HOSPITAL | Age: 68
LOS: 6 days | Discharge: HOME | DRG: 414 | End: 2018-04-05
Attending: EMERGENCY MEDICINE | Admitting: HOSPITALIST
Payer: MEDICARE

## 2018-03-29 VITALS
BODY MASS INDEX: 46.75 KG/M2 | HEART RATE: 84 BPM | WEIGHT: 238.13 LBS | OXYGEN SATURATION: 88 % | HEIGHT: 60 IN | RESPIRATION RATE: 20 BRPM | TEMPERATURE: 100 F

## 2018-03-29 DIAGNOSIS — R10.11 RUQ PAIN: ICD-10-CM

## 2018-03-29 DIAGNOSIS — R09.02 HYPOXIA: ICD-10-CM

## 2018-03-29 DIAGNOSIS — R10.11 RUQ PAIN: Primary | ICD-10-CM

## 2018-03-29 DIAGNOSIS — K81.0 CHOLECYSTITIS, ACUTE: ICD-10-CM

## 2018-03-29 DIAGNOSIS — I10 ESSENTIAL HYPERTENSION: Chronic | ICD-10-CM

## 2018-03-29 DIAGNOSIS — K80.10 CALCULUS OF GALLBLADDER WITH CHRONIC CHOLECYSTITIS WITHOUT OBSTRUCTION: ICD-10-CM

## 2018-03-29 DIAGNOSIS — K80.20 GALLSTONES: ICD-10-CM

## 2018-03-29 DIAGNOSIS — I27.20 PULMONARY HTN: ICD-10-CM

## 2018-03-29 DIAGNOSIS — R50.9 FEVER, UNSPECIFIED FEVER CAUSE: Primary | ICD-10-CM

## 2018-03-29 DIAGNOSIS — R06.02 SOB (SHORTNESS OF BREATH): ICD-10-CM

## 2018-03-29 DIAGNOSIS — G47.33 OSA (OBSTRUCTIVE SLEEP APNEA): ICD-10-CM

## 2018-03-29 DIAGNOSIS — R09.02 HYPOXIA: Primary | ICD-10-CM

## 2018-03-29 LAB
ALBUMIN SERPL BCP-MCNC: 3.4 G/DL
ALLENS TEST: ABNORMAL
ALP SERPL-CCNC: 85 U/L
ALT SERPL W/O P-5'-P-CCNC: 8 U/L
ANION GAP SERPL CALC-SCNC: 11 MMOL/L
AST SERPL-CCNC: 14 U/L
BASOPHILS # BLD AUTO: 0.02 K/UL
BASOPHILS NFR BLD: 0.2 %
BILIRUB SERPL-MCNC: 0.6 MG/DL
BNP SERPL-MCNC: 110 PG/ML
BUN SERPL-MCNC: 8 MG/DL
CALCIUM SERPL-MCNC: 8.8 MG/DL
CHLORIDE SERPL-SCNC: 102 MMOL/L
CO2 SERPL-SCNC: 24 MMOL/L
CREAT SERPL-MCNC: 0.7 MG/DL
DELSYS: ABNORMAL
DIFFERENTIAL METHOD: ABNORMAL
EOSINOPHIL # BLD AUTO: 0 K/UL
EOSINOPHIL NFR BLD: 0 %
ERYTHROCYTE [DISTWIDTH] IN BLOOD BY AUTOMATED COUNT: 13.3 %
EST. GFR  (AFRICAN AMERICAN): >60 ML/MIN/1.73 M^2
EST. GFR  (NON AFRICAN AMERICAN): >60 ML/MIN/1.73 M^2
FIO2: 21
GLUCOSE SERPL-MCNC: 117 MG/DL
HCO3 UR-SCNC: 29.5 MMOL/L (ref 24–28)
HCT VFR BLD AUTO: 41.9 %
HGB BLD-MCNC: 13.9 G/DL
LIPASE SERPL-CCNC: 9 U/L
LYMPHOCYTES # BLD AUTO: 1 K/UL
LYMPHOCYTES NFR BLD: 8.2 %
MCH RBC QN AUTO: 29.9 PG
MCHC RBC AUTO-ENTMCNC: 33.2 G/DL
MCV RBC AUTO: 90 FL
MODE: ABNORMAL
MONOCYTES # BLD AUTO: 1.2 K/UL
MONOCYTES NFR BLD: 9.5 %
NEUTROPHILS # BLD AUTO: 10.2 K/UL
NEUTROPHILS NFR BLD: 82.1 %
PCO2 BLDA: 50.5 MMHG (ref 35–45)
PH SMN: 7.37 [PH] (ref 7.35–7.45)
PLATELET # BLD AUTO: 248 K/UL
PMV BLD AUTO: 9.5 FL
PO2 BLDA: 42 MMHG (ref 80–100)
POC BE: 4 MMOL/L
POC SATURATED O2: 75 % (ref 95–100)
POTASSIUM SERPL-SCNC: 3.7 MMOL/L
PROT SERPL-MCNC: 7.8 G/DL
RBC # BLD AUTO: 4.65 M/UL
SAMPLE: ABNORMAL
SITE: ABNORMAL
SODIUM SERPL-SCNC: 137 MMOL/L
TROPONIN I SERPL DL<=0.01 NG/ML-MCNC: 0.01 NG/ML
WBC # BLD AUTO: 12.39 K/UL

## 2018-03-29 PROCEDURE — 80053 COMPREHEN METABOLIC PANEL: CPT

## 2018-03-29 PROCEDURE — 84484 ASSAY OF TROPONIN QUANT: CPT

## 2018-03-29 PROCEDURE — 93010 ELECTROCARDIOGRAM REPORT: CPT | Mod: ,,, | Performed by: INTERNAL MEDICINE

## 2018-03-29 PROCEDURE — G0378 HOSPITAL OBSERVATION PER HR: HCPCS

## 2018-03-29 PROCEDURE — 99213 OFFICE O/P EST LOW 20 MIN: CPT | Mod: PBBFAC,PO,25 | Performed by: FAMILY MEDICINE

## 2018-03-29 PROCEDURE — 96375 TX/PRO/DX INJ NEW DRUG ADDON: CPT

## 2018-03-29 PROCEDURE — 96374 THER/PROPH/DIAG INJ IV PUSH: CPT

## 2018-03-29 PROCEDURE — 99215 OFFICE O/P EST HI 40 MIN: CPT | Mod: S$PBB,,, | Performed by: FAMILY MEDICINE

## 2018-03-29 PROCEDURE — 36415 COLL VENOUS BLD VENIPUNCTURE: CPT

## 2018-03-29 PROCEDURE — 99999 PR PBB SHADOW E&M-EST. PATIENT-LVL III: CPT | Mod: PBBFAC,,, | Performed by: FAMILY MEDICINE

## 2018-03-29 PROCEDURE — 83690 ASSAY OF LIPASE: CPT

## 2018-03-29 PROCEDURE — 36600 WITHDRAWAL OF ARTERIAL BLOOD: CPT

## 2018-03-29 PROCEDURE — 83880 ASSAY OF NATRIURETIC PEPTIDE: CPT

## 2018-03-29 PROCEDURE — 99900035 HC TECH TIME PER 15 MIN (STAT)

## 2018-03-29 PROCEDURE — 85025 COMPLETE CBC W/AUTO DIFF WBC: CPT

## 2018-03-29 PROCEDURE — 82803 BLOOD GASES ANY COMBINATION: CPT

## 2018-03-29 PROCEDURE — 93005 ELECTROCARDIOGRAM TRACING: CPT

## 2018-03-29 PROCEDURE — 63600175 PHARM REV CODE 636 W HCPCS: Performed by: EMERGENCY MEDICINE

## 2018-03-29 PROCEDURE — 99285 EMERGENCY DEPT VISIT HI MDM: CPT | Mod: 25,27

## 2018-03-29 PROCEDURE — 25500020 PHARM REV CODE 255: Performed by: EMERGENCY MEDICINE

## 2018-03-29 RX ORDER — MORPHINE SULFATE 4 MG/ML
6 INJECTION, SOLUTION INTRAMUSCULAR; INTRAVENOUS
Status: COMPLETED | OUTPATIENT
Start: 2018-03-29 | End: 2018-03-29

## 2018-03-29 RX ORDER — ASPIRIN 81 MG/1
81 TABLET ORAL DAILY
Status: DISCONTINUED | OUTPATIENT
Start: 2018-03-30 | End: 2018-04-05 | Stop reason: HOSPADM

## 2018-03-29 RX ORDER — ONDANSETRON 2 MG/ML
4 INJECTION INTRAMUSCULAR; INTRAVENOUS EVERY 8 HOURS PRN
Status: DISCONTINUED | OUTPATIENT
Start: 2018-03-30 | End: 2018-04-05 | Stop reason: HOSPADM

## 2018-03-29 RX ORDER — IBUPROFEN 200 MG
16 TABLET ORAL
Status: DISCONTINUED | OUTPATIENT
Start: 2018-03-30 | End: 2018-04-05 | Stop reason: HOSPADM

## 2018-03-29 RX ORDER — SODIUM CHLORIDE 0.9 % (FLUSH) 0.9 %
5 SYRINGE (ML) INJECTION
Status: DISCONTINUED | OUTPATIENT
Start: 2018-03-30 | End: 2018-04-05 | Stop reason: HOSPADM

## 2018-03-29 RX ORDER — IBUPROFEN 200 MG
24 TABLET ORAL
Status: DISCONTINUED | OUTPATIENT
Start: 2018-03-30 | End: 2018-04-05 | Stop reason: HOSPADM

## 2018-03-29 RX ORDER — GLUCAGON 1 MG
1 KIT INJECTION
Status: DISCONTINUED | OUTPATIENT
Start: 2018-03-30 | End: 2018-04-05 | Stop reason: HOSPADM

## 2018-03-29 RX ORDER — ONDANSETRON 2 MG/ML
4 INJECTION INTRAMUSCULAR; INTRAVENOUS
Status: COMPLETED | OUTPATIENT
Start: 2018-03-29 | End: 2018-03-29

## 2018-03-29 RX ORDER — LOSARTAN POTASSIUM AND HYDROCHLOROTHIAZIDE 25; 100 MG/1; MG/1
1 TABLET ORAL DAILY
Qty: 30 TABLET | Refills: 1 | Status: SHIPPED | OUTPATIENT
Start: 2018-03-29 | End: 2018-09-25 | Stop reason: SDUPTHER

## 2018-03-29 RX ORDER — FUROSEMIDE 10 MG/ML
40 INJECTION INTRAMUSCULAR; INTRAVENOUS
Status: COMPLETED | OUTPATIENT
Start: 2018-03-29 | End: 2018-03-29

## 2018-03-29 RX ORDER — ENOXAPARIN SODIUM 100 MG/ML
40 INJECTION SUBCUTANEOUS EVERY 24 HOURS
Status: DISCONTINUED | OUTPATIENT
Start: 2018-03-30 | End: 2018-04-05 | Stop reason: HOSPADM

## 2018-03-29 RX ORDER — ACETAMINOPHEN 325 MG/1
650 TABLET ORAL
Status: DISCONTINUED | OUTPATIENT
Start: 2018-03-29 | End: 2018-03-29 | Stop reason: HOSPADM

## 2018-03-29 RX ORDER — HYDROCODONE BITARTRATE AND ACETAMINOPHEN 7.5; 325 MG/1; MG/1
1 TABLET ORAL EVERY 6 HOURS PRN
Status: DISPENSED | OUTPATIENT
Start: 2018-03-30 | End: 2018-03-31

## 2018-03-29 RX ORDER — FUROSEMIDE 10 MG/ML
40 INJECTION INTRAMUSCULAR; INTRAVENOUS 2 TIMES DAILY
Status: DISCONTINUED | OUTPATIENT
Start: 2018-03-30 | End: 2018-03-30

## 2018-03-29 RX ADMIN — ACETAMINOPHEN 650 MG: 325 TABLET ORAL at 03:03

## 2018-03-29 RX ADMIN — MORPHINE SULFATE 6 MG: 4 INJECTION INTRAVENOUS at 03:03

## 2018-03-29 RX ADMIN — IOHEXOL 100 ML: 350 INJECTION, SOLUTION INTRAVENOUS at 06:03

## 2018-03-29 RX ADMIN — FUROSEMIDE 40 MG: 10 INJECTION, SOLUTION INTRAMUSCULAR; INTRAVENOUS at 07:03

## 2018-03-29 RX ADMIN — ONDANSETRON 4 MG: 2 INJECTION INTRAMUSCULAR; INTRAVENOUS at 03:03

## 2018-03-29 NOTE — PROGRESS NOTES
Subjective:       Patient ID: Yoana Pardo is a 67 y.o. female.    Chief Complaint: Abdominal Pain    Pulse 84   Temp 99.9 °F (37.7 °C) (Tympanic)   Resp 20   Ht 5' (1.524 m)   Wt 108 kg (238 lb 1.6 oz)   SpO2 (!) 88%   BMI 46.50 kg/m²     HPI  Patient presented with worsening RUQ pain for the past few days. States can't take deep breath due to pain. Low grade fever.   She went to ER 3/26 for same, had abdominal CT and other workups, CT showed gallstones and enlarged gall ballder. Pt is vomiting in the exam room of [facility]  Notes and lab from 3/26 ER visit reviewed.    Review of Systems   Constitutional: Positive for activity change, chills and fever.   Respiratory: Positive for shortness of breath.    Gastrointestinal: Positive for abdominal pain, nausea and vomiting.       Objective:      Physical Exam   Constitutional: She is oriented to person, place, and time. She appears well-developed and well-nourished. She appears distressed.   HENT:   Head: Normocephalic and atraumatic.   Eyes: EOM are normal. Pupils are equal, round, and reactive to light. Right eye exhibits no discharge. Left eye exhibits no discharge.   Neck: Normal range of motion. Neck supple.   Cardiovascular: Normal rate.    Pulmonary/Chest: Effort normal. No respiratory distress. She has no wheezes. She has no rales.   Poor respiratory effort. So2 88%, repeated and verified   Abdominal: Soft.   Soft. Quiet BS. Tender to palpation right subcostal area   Alves's sign (+), (+) rebound   Musculoskeletal: Normal range of motion.   Neurological: She is alert and oriented to person, place, and time. No cranial nerve deficit.   Skin: Skin is warm and dry. Rash noted. She is not diaphoretic.   Nursing note and vitals reviewed.      Assessment:       1. RUQ pain    2. Gallstones    3. Hypoxia        Plan:     Yoana MILLER was seen today for abdominal pain.    Diagnoses and all orders for this visit:    RUQ pain    Gallstones - per CT 3 days  ago in ER    Hypoxia     A/P  To ER for further evaluation, acute cholecystitis rule out sepsis - report gave to Dr. Kerr at [facility].  Will transport via EMS          Discussed with pt/family all information and results pertaining to this visit. Discussed diagnosis and plan of treatment.  All questions and concerns were addressed at this time. Pt/family expresses understanding of information and instructions.  Care and follow up instruction provided.

## 2018-03-29 NOTE — ED PROVIDER NOTES
"SCRIBE #1 NOTE: I, Korin Jones, am scribing for, and in the presence of, Rosa Elena Anderson MD. I have scribed the HPI, ROS, and PEx.    SCRIBE #2 NOTE: I, Dru Dejesus, am scribing for, and in the presence of,  Gary Beard MD. I have scribed the remaining portions of the note not scribed by Scribe #1.     History      Chief Complaint   Patient presents with    Abdominal Pain     " sent [facility]"    Shortness of Breath       Review of patient's allergies indicates:   Allergen Reactions    Bactrim [sulfamethoxazole-trimethoprim] Itching        HPI   HPI    3/29/2018, 3:32 PM   History obtained from the patient      History of Present Illness: Yoana Pardo is a 67 y.o. female patient who presents to the Emergency Department for RUQ abd pain which onset gradually 3 days PTA. Symptoms are worsening and moderate in severity. No mitigating or exacerbating factors reported. Associated sxs include n/v and SOB. Pt was evaluated in the ED on 3/27 for RUQ pain. Pt had a CT abd and pelvis which showed gall stones and mild gallbladder distention. Pt was evaluated at an urgent care clinic PTA where she began vomiting yellow bile and O2 sat was 88%. Pt was referred to the ED for evaluation of acute Cholecystitis and sepsis r/o. Patient denies any fever, chills, diarrhea, CP, BLE edema/pain, dizziness, recent travel/long car rides, cough, constipation, hematochezia, dysuria, hematuria, frequency, vaginal bleeding/discharge, and all other sxs at this time. No further complaints or concerns at this time.       Arrival mode: AASI    PCP: Lynn Wiggins MD       Past Medical History:  Past Medical History:   Diagnosis Date    GERD (gastroesophageal reflux disease)     Hemorrhoids     History of positive PPD, untreated     Hx of cold sores     Hyperlipidemia     Hypertension     MVP (mitral valve prolapse)     Peripheral neuropathy     Pulmonary HTN     Dr Bailon    Sleep apnea     has  but not " using CPAP       Past Surgical History:  Past Surgical History:   Procedure Laterality Date    DILATION AND CURETTAGE OF UTERUS           Family History:  Family History   Problem Relation Age of Onset    Heart disease Mother     Obesity Mother     Kidney disease Father     Hypertension Father     Obesity Father     Heart disease Father     Diabetes Sister     Aneurysm Sister      AAA       Social History:  Social History     Social History Main Topics    Smoking status: Never Smoker    Smokeless tobacco: Never Used    Alcohol use Yes      Comment: rarely    Drug use: No    Sexual activity: Not Currently       ROS   Review of Systems   Constitutional: Negative for chills and fever.   HENT: Negative for sore throat.    Respiratory: Positive for shortness of breath. Negative for cough.    Cardiovascular: Negative for chest pain and leg swelling.   Gastrointestinal: Positive for abdominal pain (RUQ), nausea and vomiting. Negative for blood in stool, constipation and diarrhea.   Genitourinary: Negative for dysuria, frequency, hematuria, vaginal bleeding and vaginal discharge.   Musculoskeletal: Negative for back pain and myalgias.   Skin: Negative for rash.   Neurological: Negative for dizziness, weakness and numbness.   Hematological: Does not bruise/bleed easily.   All other systems reviewed and are negative.    Physical Exam      Initial Vitals   BP Pulse Resp Temp SpO2   -- -- -- -- --      MAP       --          Physical Exam  Nursing Notes and Vital Signs Reviewed.  Constitutional: Patient is in mild distress. Well-developed and well-nourished. Obese.  Head: Atraumatic. Normocephalic.  Eyes: PERRL. EOM intact. Conjunctivae are not pale. No scleral icterus.  ENT: Mucous membranes are moist. Oropharynx is clear and symmetric.    Neck: Supple. Full ROM. No lymphadenopathy.  Cardiovascular: Regular rate. Regular rhythm. No murmurs, rubs, or gallops. Distal pulses are 2+ and symmetric.  Pulmonary/Chest: No  respiratory distress. Clear to auscultation bilaterally. No wheezing or rales.  Abdominal: Soft and non-distended.  There is RUQ tenderness.  No rebound, guarding, or rigidity.   Musculoskeletal: Moves all extremities. No obvious deformities. No edema. No calf tenderness.  Skin: Warm and dry.  Neurological:  Alert, awake, and appropriate.  Normal speech.  No acute focal neurological deficits are appreciated.  Psychiatric: Normal affect. Good eye contact. Appropriate in content.    ED Course    Procedures  ED Vital Signs:  Vitals:    03/29/18 1534 03/29/18 1935 03/29/18 1946 03/29/18 2022   BP: (!) 170/95 (!) 189/77 (!) 175/84 (!) 177/82   Pulse: 88 91 78 73   Resp: 18 19 14 16   Temp: 99.7 °F (37.6 °C)      TempSrc: Oral      SpO2: 95% (!) 83% 98% 100%   Weight: 109.3 kg (241 lb)      Height: 5' (1.524 m)       03/29/18 2051 03/29/18 2141   BP: (!) 165/82    Pulse: 71    Resp: 14    Temp:     TempSrc:     SpO2: 100% (!) 89%   Weight:     Height:         Abnormal Lab Results:  Labs Reviewed   CBC W/ AUTO DIFFERENTIAL - Abnormal; Notable for the following:        Result Value    Gran # (ANC) 10.2 (*)     Mono # 1.2 (*)     Gran% 82.1 (*)     Lymph% 8.2 (*)     All other components within normal limits   COMPREHENSIVE METABOLIC PANEL - Abnormal; Notable for the following:     Glucose 117 (*)     Albumin 3.4 (*)     ALT 8 (*)     All other components within normal limits   B-TYPE NATRIURETIC PEPTIDE - Abnormal; Notable for the following:      (*)     All other components within normal limits   ISTAT PROCEDURE - Abnormal; Notable for the following:     POC PCO2 50.5 (*)     POC PO2 42 (*)     POC HCO3 29.5 (*)     POC SATURATED O2 75 (*)     All other components within normal limits   LIPASE   B-TYPE NATRIURETIC PEPTIDE   TROPONIN I   TROPONIN I   URINALYSIS        All Lab Results:  Results for orders placed or performed during the hospital encounter of 03/29/18   CBC W/ AUTO DIFFERENTIAL   Result Value Ref Range     WBC 12.39 3.90 - 12.70 K/uL    RBC 4.65 4.00 - 5.40 M/uL    Hemoglobin 13.9 12.0 - 16.0 g/dL    Hematocrit 41.9 37.0 - 48.5 %    MCV 90 82 - 98 fL    MCH 29.9 27.0 - 31.0 pg    MCHC 33.2 32.0 - 36.0 g/dL    RDW 13.3 11.5 - 14.5 %    Platelets 248 150 - 350 K/uL    MPV 9.5 9.2 - 12.9 fL    Gran # (ANC) 10.2 (H) 1.8 - 7.7 K/uL    Lymph # 1.0 1.0 - 4.8 K/uL    Mono # 1.2 (H) 0.3 - 1.0 K/uL    Eos # 0.0 0.0 - 0.5 K/uL    Baso # 0.02 0.00 - 0.20 K/uL    Gran% 82.1 (H) 38.0 - 73.0 %    Lymph% 8.2 (L) 18.0 - 48.0 %    Mono% 9.5 4.0 - 15.0 %    Eosinophil% 0.0 0.0 - 8.0 %    Basophil% 0.2 0.0 - 1.9 %    Differential Method Automated    Comp. Metabolic Panel   Result Value Ref Range    Sodium 137 136 - 145 mmol/L    Potassium 3.7 3.5 - 5.1 mmol/L    Chloride 102 95 - 110 mmol/L    CO2 24 23 - 29 mmol/L    Glucose 117 (H) 70 - 110 mg/dL    BUN, Bld 8 8 - 23 mg/dL    Creatinine 0.7 0.5 - 1.4 mg/dL    Calcium 8.8 8.7 - 10.5 mg/dL    Total Protein 7.8 6.0 - 8.4 g/dL    Albumin 3.4 (L) 3.5 - 5.2 g/dL    Total Bilirubin 0.6 0.1 - 1.0 mg/dL    Alkaline Phosphatase 85 55 - 135 U/L    AST 14 10 - 40 U/L    ALT 8 (L) 10 - 44 U/L    Anion Gap 11 8 - 16 mmol/L    eGFR if African American >60 >60 mL/min/1.73 m^2    eGFR if non African American >60 >60 mL/min/1.73 m^2   Lipase   Result Value Ref Range    Lipase 9 4 - 60 U/L   Troponin I   Result Value Ref Range    Troponin I 0.006 0.000 - 0.026 ng/mL   Brain natriuretic peptide   Result Value Ref Range     (H) 0 - 99 pg/mL   ISTAT PROCEDURE   Result Value Ref Range    POC PH 7.374 7.35 - 7.45    POC PCO2 50.5 (H) 35 - 45 mmHg    POC PO2 42 (LL) 80 - 100 mmHg    POC HCO3 29.5 (H) 24 - 28 mmol/L    POC BE 4 -2 to 2 mmol/L    POC SATURATED O2 75 (L) 95 - 100 %    Sample ARTERIAL     Site LB     Allens Test Pass     DelSys Room Air     Mode SPONT     FiO2 21        Imaging Results:  Imaging Results          CTA Chest Non-Coronary - PE Study (Final result)  Result time 03/29/18  18:47:01    Final result by Vinny Tillman MD (03/29/18 18:47:01)                 Impression:         CTA of the chest demonstrates no signs of pulmonary embolism.  Some atelectatic changes are present in the lung bases.  There is a large hiatal hernia.        All CT scans at this facility use dose modulation, iterative reconstruction and/or weight based dosing when appropriate to reduce radiation dose to as low as reasonably achievable.       Electronically signed by: VINNY TILLMAN MD  Date:     03/29/18  Time:    18:47              Narrative:    Procedure: CTA CHEST NON CORONARY, Thursday, March 29, 2018    Clinical history: Shortness of breath. Dyspnea, cardiac origin suspected     Technique: Standard CTA of the chest performed with 100 cc Omnipaque 350 utilizing 3-D MIP reformations.  Comparison is made with previous studies including recent chest x-ray.    Findings: There is moderate enlargement of the heart.  The pulmonary arteries enhance well with no signs of pulmonary embolism.    There is a prominent hiatal hernia present posterior to the heart measuring up to 6.8 cm in diameter.  The mediastinum appears free of any suspicious mass or adenopathy.  There is atherosclerosis of the aorta and coronary vessels.    Some ground glass increased attenuation is seen within the right upper lobe diffusely.  Some minimal consolidation and atelectasis is seen in the right lung base with some atelectatic change along the major fissure on the left.  No pneumothorax or effusion is demonstrated.    Images of the upper abdomen again demonstrate a pattern gallstones with inflammation the gallbladder.                             US Abdomen Limited (Gallbladder) (Final result)  Result time 03/29/18 17:28:32    Final result by Vinny Tillman MD (03/29/18 17:28:32)                 Impression:      Multiple mobile gallstones are present with gallbladder wall thickening or biliary sludge present.      Electronically signed  by: VINNY TILLMAN MD  Date:     03/29/18  Time:    17:28              Narrative:    Exam: US ABDOMEN LIMITED,    Date:  03/29/18 16:47:00    History: Abdominal pain, right upper quadrant.    Technique: Standard technique including Doppler imaging was utilized.    Comparison:  Comparison is made with previous studies including CT scan of 03/26/2018    Findings: The liver is normal in size and configuration with no focal lesion demonstrated.  The gallbladder is distended and contains biliary sludge as well as shadowing, mobile stones.  Alves sign is negative.  Gallbladder wall measures up to 0.36 cm.    The common bile duct measures are 0.56 cm.    Right kidney measures 9.7 cm appears free of focal abnormality.  The main portal vein has hepatofugal flow.  The aorta and inferior vena cava as visualized on these images appear free of acute abnormality.                             X-Ray Chest PA And Lateral (Final result)  Result time 03/29/18 16:44:21    Final result by Woody Cooper MD (03/29/18 16:44:21)                 Impression:       No acute process seen.      Electronically signed by: WOODY COOPER MD  Date:     03/29/18  Time:    16:44              Narrative:    Clinical Data:Shortness of breath    Comparison:  3/26/18    Findings:  Two view of the chest.   Study is limited by poor inspiration.    Cardiac silhouette is normal.  The lungs demonstrate no evidence of active disease.  No evidence of pleural effusion or pneumothorax.  Bones demonstrates moderate spondylosis of the thoracic spine. Moderate hiatal hernia seen within the posterior mediastinum                             The EKG was ordered, reviewed, and independently interpreted by the ED provider.  Interpretation time: 17:01  Rate: 68 BPM  Rhythm: normal sinus rhythm  Interpretation: Minimal voltage criteria for LVH. T wave abnormality. No STEMI.           The Emergency Provider reviewed the vital signs and test results, which are outlined  above.    ED Discussion     7:45 PM: Discussed case with Dr. Castelan ([facility]) who recommends giving pt lasix and rechecking O2 after.     8:00 PM: Dr. Kerr transfers care of pt to Dr. Beard, pending pt's condition after Lasix.     9:42 PM: Discussed case with MARYELLEN Montgomery ([facility]) who agrees with current care and management of pt and accepts admission.   Admitting Service: Hospital medicine   Admitting Physician: Dr. Castelan  Admit to: Observation    9:42 PM: Re-evaluated pt. I have discussed test results, shared treatment plan, and the need for admission with patient and family at bedside. Pt and family express understanding at this time and agree with all information. All questions answered. Pt and family have no further questions or concerns at this time. Pt is ready for admit.        ED Medication(s):  Medications   morphine injection 6 mg (6 mg Intravenous Given 3/29/18 154)   ondansetron injection 4 mg (4 mg Intravenous Given 3/29/18 1548)   omnipaque 350 iohexol 100 mL (100 mLs Intravenous Given 3/29/18 1839)   furosemide injection 40 mg (40 mg Intravenous Given 3/29/18 1958)       New Prescriptions    No medications on file             Medical Decision Making    Medical Decision Making:   History:   Old Medical Records: I decided to obtain old medical records.  Old Records Summarized: records from clinic visits and other records.       <> Summary of Records:    [facility]  [id number] [place] [id number] [phone]     Radiology Result     Name:   :  Patient MRN:  Yoana Pardo 1950 [id number]  Account Number: Room & Bed Accession Number:  [id number]   [id number]  Authorizing Physician: Patient Class: Diagnosis:  [facility]    Procedure:  Exam Date: Reason for Exam:  CT Abdomen Pelvis With Contrast 2018 epigastric abd pain           RESULTS:  CT scan of the abdomen and pelvis with contrast,  multiplanar  reconstructions     History:    epigastric abd pain .     Technique:  Axial images through the abdomen and pelvis were obtained with the use of IV contrast.  Sagittal and coronal reconstructions are provided for review.  Oral contrast was not utilized.     Findings:   Comparisons are made to 09/13/2016.      LUNG BASES:   Lung bases are clear.  Negative for pleural or pericardial effusions. The distal esophagus is normal.  Large hiatal hernia.     ABDOMEN: There are gallstones, and the gallbladder is mildly distended.  Phrygian cap noted.  The liver and spleen appear normal.  The pancreas is unremarkable.  Kidneys and adrenal glands are normal.  The biliary tree is normal.  Mild fatty infiltration of the pancreas.     Negative for adenopathy, free fluid or inflammatory change noted within the abdomen or pelvis.  Vascular calcifications are present without aneurysmal changes. Portal vein is patent.     The bowel appears normal. Normal appendix.     PELVIS: The urinary bladder is unremarkable.  Again seen is a large calcified fibroid in the posterior uterus. The female pelvic organs are otherwise normal. There are pelvic phleboliths.     No significant osseous abnormality is identified.  Negative for significant spinal canal stenosis. There is a similar degree of mild marginal spondylosis throughout the thoracic and lumbar region, moderate degenerative arthropathy at the mid and lower lumbar spine and osteopenia.     Negative for groin adenopathy.     Small fat filled umbilical hernia.  Stable induration of the lateral abdominal wall fat.  The abdominal wall is intact.  IMPRESSION:          1.  The gallbladder is mildly distended and there are gallstones present.  The patient's symptoms be related to gallbladder disease?  Clinical correlation is advised.  2. Negative for acute process involving the lower chest, abdomen or pelvis otherwise.  The appendages.  Normal appendix.  Negative for renal stone disease or  hydronephrosis.  3.  Again seen is a large hiatal hernia, without definite evidence for obstruction.  4.  Numerous stable findings as noted above.     All CT scans at this facility used dose modulation, iterative reconstruction, and/or weight based dosing when appropriate to reduce radiation dose to as low as reasonably achievable.        Electronically signed by: CISCO RILEY MD  Date:                                            03/26/18  Time:                                           08:00                   Signed By:  Cisco Riley MD on 3/26/2018  8:00 AM      Clinical Tests:   Lab Tests: Ordered and Reviewed  Radiological Study: Reviewed and Ordered  Medical Tests: Reviewed and Ordered           Scribe Attestation:   Scribe #1: I performed the above scribed service and the documentation accurately describes the services I performed. I attest to the accuracy of the note.    Attending:   Physician Attestation Statement for Scribe #1: I, oRsa Elena Anderson MD, personally performed the services described in this documentation, as scribed by Korin Jones, in my presence, and it is both accurate and complete.       Scribe Attestation:   Scribe #2: I performed the above scribed service and the documentation accurately describes the services I performed. I attest to the accuracy of the note.    Attending Attestation:           Physician Attestation for Scribe:    Physician Attestation Statement for Scribe #2: I, Gary Beard MD, reviewed documentation, as scribed by Dru Dejesus in my presence, and it is both accurate and complete. I also acknowledge and confirm the content of the note done by Scribe #1.          Clinical Impression       ICD-10-CM ICD-9-CM   1. Hypoxia R09.02 799.02   2. SOB (shortness of breath) R06.02 786.05   3. RUQ pain R10.11 789.01       Disposition:   Disposition: Placed in Observation  Condition: Stable         Gary Beard MD  03/29/18 2146

## 2018-03-30 PROBLEM — D72.829 LEUKOCYTOSIS: Status: ACTIVE | Noted: 2018-03-30

## 2018-03-30 LAB
ALBUMIN SERPL BCP-MCNC: 3.1 G/DL
ANION GAP SERPL CALC-SCNC: 8 MMOL/L
BASOPHILS # BLD AUTO: 0.02 K/UL
BASOPHILS NFR BLD: 0.1 %
BILIRUB UR QL STRIP: NEGATIVE
BUN SERPL-MCNC: 9 MG/DL
CALCIUM SERPL-MCNC: 8.9 MG/DL
CHLORIDE SERPL-SCNC: 101 MMOL/L
CLARITY UR: CLEAR
CO2 SERPL-SCNC: 31 MMOL/L
COLOR UR: YELLOW
CREAT SERPL-MCNC: 0.8 MG/DL
DIFFERENTIAL METHOD: ABNORMAL
EOSINOPHIL # BLD AUTO: 0 K/UL
EOSINOPHIL NFR BLD: 0.1 %
ERYTHROCYTE [DISTWIDTH] IN BLOOD BY AUTOMATED COUNT: 13.4 %
EST. GFR  (AFRICAN AMERICAN): >60 ML/MIN/1.73 M^2
EST. GFR  (NON AFRICAN AMERICAN): >60 ML/MIN/1.73 M^2
GLUCOSE SERPL-MCNC: 118 MG/DL
GLUCOSE UR QL STRIP: NEGATIVE
HCT VFR BLD AUTO: 40.1 %
HGB BLD-MCNC: 13.1 G/DL
HGB UR QL STRIP: ABNORMAL
KETONES UR QL STRIP: NEGATIVE
LACTATE SERPL-SCNC: 1.9 MMOL/L
LEUKOCYTE ESTERASE UR QL STRIP: NEGATIVE
LYMPHOCYTES # BLD AUTO: 2.3 K/UL
LYMPHOCYTES NFR BLD: 13.7 %
MAGNESIUM SERPL-MCNC: 1.8 MG/DL
MCH RBC QN AUTO: 29.8 PG
MCHC RBC AUTO-ENTMCNC: 32.7 G/DL
MCV RBC AUTO: 91 FL
MONOCYTES # BLD AUTO: 2.2 K/UL
MONOCYTES NFR BLD: 12.9 %
NEUTROPHILS # BLD AUTO: 12.4 K/UL
NEUTROPHILS NFR BLD: 73.8 %
NITRITE UR QL STRIP: NEGATIVE
PH UR STRIP: 5 [PH] (ref 5–8)
PHOSPHATE SERPL-MCNC: 3.5 MG/DL
PHOSPHATE SERPL-MCNC: 3.5 MG/DL
PLATELET # BLD AUTO: 258 K/UL
PMV BLD AUTO: 9.4 FL
POTASSIUM SERPL-SCNC: 3.5 MMOL/L
PROCALCITONIN SERPL IA-MCNC: 0.57 NG/ML
PROT UR QL STRIP: NEGATIVE
RBC # BLD AUTO: 4.4 M/UL
SODIUM SERPL-SCNC: 140 MMOL/L
SP GR UR STRIP: 1.01 (ref 1–1.03)
URN SPEC COLLECT METH UR: ABNORMAL
UROBILINOGEN UR STRIP-ACNC: NEGATIVE EU/DL
WBC # BLD AUTO: 17.01 K/UL

## 2018-03-30 PROCEDURE — 36415 COLL VENOUS BLD VENIPUNCTURE: CPT

## 2018-03-30 PROCEDURE — 87040 BLOOD CULTURE FOR BACTERIA: CPT

## 2018-03-30 PROCEDURE — 84145 PROCALCITONIN (PCT): CPT

## 2018-03-30 PROCEDURE — 63600175 PHARM REV CODE 636 W HCPCS: Performed by: HOSPITALIST

## 2018-03-30 PROCEDURE — 80069 RENAL FUNCTION PANEL: CPT

## 2018-03-30 PROCEDURE — 93306 TTE W/DOPPLER COMPLETE: CPT | Mod: 26,,, | Performed by: INTERNAL MEDICINE

## 2018-03-30 PROCEDURE — 25000003 PHARM REV CODE 250: Performed by: NURSE PRACTITIONER

## 2018-03-30 PROCEDURE — C8929 TTE W OR WO FOL WCON,DOPPLER: HCPCS

## 2018-03-30 PROCEDURE — 11000001 HC ACUTE MED/SURG PRIVATE ROOM

## 2018-03-30 PROCEDURE — 85025 COMPLETE CBC W/AUTO DIFF WBC: CPT

## 2018-03-30 PROCEDURE — 83735 ASSAY OF MAGNESIUM: CPT

## 2018-03-30 PROCEDURE — 25000003 PHARM REV CODE 250: Performed by: HOSPITALIST

## 2018-03-30 PROCEDURE — 81003 URINALYSIS AUTO W/O SCOPE: CPT

## 2018-03-30 PROCEDURE — 83605 ASSAY OF LACTIC ACID: CPT

## 2018-03-30 RX ORDER — LOSARTAN POTASSIUM 25 MG/1
25 TABLET ORAL DAILY
Status: DISCONTINUED | OUTPATIENT
Start: 2018-03-30 | End: 2018-03-31

## 2018-03-30 RX ORDER — ACETAMINOPHEN 325 MG/1
650 TABLET ORAL EVERY 8 HOURS PRN
Status: DISCONTINUED | OUTPATIENT
Start: 2018-03-30 | End: 2018-04-01

## 2018-03-30 RX ORDER — FUROSEMIDE 10 MG/ML
40 INJECTION INTRAMUSCULAR; INTRAVENOUS DAILY
Status: DISCONTINUED | OUTPATIENT
Start: 2018-03-31 | End: 2018-04-03

## 2018-03-30 RX ORDER — HYDROCHLOROTHIAZIDE 25 MG/1
25 TABLET ORAL DAILY
Status: DISCONTINUED | OUTPATIENT
Start: 2018-03-30 | End: 2018-03-31

## 2018-03-30 RX ORDER — LOSARTAN POTASSIUM AND HYDROCHLOROTHIAZIDE 25; 100 MG/1; MG/1
1 TABLET ORAL DAILY
Status: DISCONTINUED | OUTPATIENT
Start: 2018-03-30 | End: 2018-03-30

## 2018-03-30 RX ORDER — ONDANSETRON 2 MG/ML
4 INJECTION INTRAMUSCULAR; INTRAVENOUS EVERY 12 HOURS PRN
Status: DISCONTINUED | OUTPATIENT
Start: 2018-03-30 | End: 2018-03-30

## 2018-03-30 RX ORDER — FUROSEMIDE 10 MG/ML
40 INJECTION INTRAMUSCULAR; INTRAVENOUS 2 TIMES DAILY
Status: DISCONTINUED | OUTPATIENT
Start: 2018-03-30 | End: 2018-03-30

## 2018-03-30 RX ORDER — SODIUM CHLORIDE 9 MG/ML
INJECTION, SOLUTION INTRAVENOUS CONTINUOUS
Status: DISCONTINUED | OUTPATIENT
Start: 2018-03-30 | End: 2018-03-30

## 2018-03-30 RX ADMIN — FUROSEMIDE 40 MG: 10 INJECTION, SOLUTION INTRAMUSCULAR; INTRAVENOUS at 10:03

## 2018-03-30 RX ADMIN — ENOXAPARIN SODIUM 40 MG: 100 INJECTION SUBCUTANEOUS at 09:03

## 2018-03-30 RX ADMIN — HYDROCODONE BITARTRATE AND ACETAMINOPHEN 1 TABLET: 7.5; 325 TABLET ORAL at 08:03

## 2018-03-30 RX ADMIN — ASPIRIN 81 MG: 81 TABLET, COATED ORAL at 09:03

## 2018-03-30 NOTE — ED NOTES
Pt received asleep on stretcher. Pt TBA - awaiting bed assignment.  O2 2L/NC in place. Pt cont on monitor with NSR.   at bedside. SR up x 2.  HOB elevated 75.

## 2018-03-30 NOTE — HPI
67F h/o cholelithiasis, pHTN and [place] presents with RUQ ab pain that started 3 days ago.  Intermittent pain to RUQ. Associated with n/v and sob.  US RUQ negative for acute cholecystitis.  Labs wnl.   O2 sat 83% on room air.  I spoke to ER physician and recommended giving patient lasix 40mg IV x 1 for pulmonary htn.  Afterwards, O2 sat improved to 89% on room air.  However, patient is still tachypneic and nauseated.  Hospital medicine was called for observation for hypoxia.   She presented 3 days ago, US RUQ was also negative for acute cholecystitis at that time.  No other exacerbating or alleviating factors.  She reports she has a follow up with general surgery for cholecystectomy on 4/2.

## 2018-03-30 NOTE — H&P
"[facility] Hospital Medicine  History & Physical    Patient Name: Yoana Pardo  MRN: [id number]  Admission Date: 3/29/2018  Attending Physician: Rich Castelan MD  Primary Care Provider: Lynn Wiggins MD         Patient information was obtained from patient, spouse/SO and ER records.     Subjective:     Principal Problem:Hypoxia    Chief Complaint:   Chief Complaint   Patient presents with    Abdominal Pain     " sent from [facility]"    Shortness of Breath        HPI: 67F h/o cholelithiasis, pHTN and [place] presents with RUQ ab pain that started 3 days ago.  Intermittent pain to RUQ. Associated with n/v and sob.  US RUQ negative for acute cholecystitis.  Labs wnl.   O2 sat 83% on room air.  I spoke to ER physician and recommended giving patient lasix 40mg IV x 1 for pulmonary htn.  Afterwards, O2 sat improved to 89% on room air.  However, patient is still tachypneic and nauseated.  Hospital medicine was called for observation for hypoxia.   She presented 3 days ago, US RUQ was also negative for acute cholecystitis at that time.  No other exacerbating or alleviating factors.  She reports she has a follow up with general surgery for cholecystectomy on 4/2.       Past Medical History:   Diagnosis Date    GERD (gastroesophageal reflux disease)     Hemorrhoids     History of positive PPD, untreated     Hx of cold sores     Hyperlipidemia     Hypertension     MVP (mitral valve prolapse)     Peripheral neuropathy     Pulmonary HTN     Dr Bailon    Sleep apnea     has  but not using CPAP       Past Surgical History:   Procedure Laterality Date    DILATION AND CURETTAGE OF UTERUS         Review of patient's allergies indicates:   Allergen Reactions    Bactrim [sulfamethoxazole-trimethoprim] Itching       Current Facility-Administered Medications on File Prior to Encounter   Medication    [COMPLETED] acetaminophen tablet 650 mg     Current Outpatient Prescriptions on File Prior to " Encounter   Medication Sig    albuterol 90 mcg/actuation inhaler Inhale 1-2 puffs into the lungs every 6 (six) hours as needed for Wheezing (Cough).    aspirin (ECOTRIN) 81 MG EC tablet Take 81 mg by mouth once daily.    furosemide (LASIX) 20 MG tablet TAKE ONE TABLET BY MOUTH TWICE DAILY AS NEEDED    hydrocodone-acetaminophen 7.5-325mg (NORCO) 7.5-325 mg per tablet Take 1 tablet by mouth every 6 (six) hours as needed for Pain.    levocetirizine (XYZAL) 5 MG tablet Take 1 tablet (5 mg total) by mouth every evening.    losartan-hydrochlorothiazide 100-25 mg (HYZAAR) 100-25 mg per tablet Take 1 tablet by mouth once daily.    ondansetron (ZOFRAN-ODT) 4 MG TbDL Take 1 tablet (4 mg total) by mouth every 8 (eight) hours as needed (prn nausea).    [DISCONTINUED] losartan-hydrochlorothiazide 100-25 mg (HYZAAR) 100-25 mg per tablet Take 1 tablet by mouth once daily.     Family History     Problem Relation (Age of Onset)    Aneurysm Sister    Diabetes Sister    Heart disease Mother, Father    Hypertension Father    Kidney disease Father    Obesity Mother, Father        Social History Main Topics    Smoking status: Never Smoker    Smokeless tobacco: Never Used    Alcohol use Yes      Comment: rarely    Drug use: No    Sexual activity: Not Currently     Review of Systems   Constitutional: Negative for chills, diaphoresis, fatigue, fever and unexpected weight change.   HENT: Negative for congestion, facial swelling, sore throat and trouble swallowing.    Eyes: Negative for photophobia, redness and visual disturbance.   Respiratory: Positive for shortness of breath. Negative for apnea, cough, chest tightness and wheezing.    Cardiovascular: Negative for chest pain, palpitations and leg swelling.   Gastrointestinal: Positive for abdominal pain, nausea and vomiting. Negative for abdominal distention, blood in stool, constipation and diarrhea.   Endocrine: Negative for polydipsia, polyphagia and polyuria.    Genitourinary: Negative for difficulty urinating, dysuria, flank pain, frequency, hematuria and urgency.   Musculoskeletal: Negative for arthralgias, back pain, joint swelling, myalgias and neck stiffness.   Skin: Negative for pallor and rash.   Allergic/Immunologic: Negative for immunocompromised state.   Neurological: Negative for dizziness, tremors, syncope, weakness, light-headedness and headaches.   Psychiatric/Behavioral: Negative for agitation, confusion and suicidal ideas.   All other systems reviewed and are negative.    Objective:     Vital Signs (Most Recent):  Temp: 99.7 °F (37.6 °C) (03/29/18 1534)  Pulse: 71 (03/29/18 2051)  Resp: 14 (03/29/18 2051)  BP: (!) 165/82 (03/29/18 2051)  SpO2: (!) 89 % (ambulating in bass way) (03/29/18 2141) Vital Signs (24h Range):  Temp:  [99.7 °F (37.6 °C)-99.9 °F (37.7 °C)] 99.7 °F (37.6 °C)  Pulse:  [71-91] 71  Resp:  [14-20] 14  SpO2:  [83 %-100 %] 89 %  BP: (165-189)/(77-95) 165/82     Weight: 109.3 kg (241 lb)  Body mass index is 47.07 kg/m².    Physical Exam   Constitutional: She is oriented to person, place, and time. She appears well-developed and well-nourished. No distress.   HENT:   Head: Normocephalic and atraumatic.   Eyes: Conjunctivae and EOM are normal. Pupils are equal, round, and reactive to light. No scleral icterus.   Neck: Normal range of motion. Neck supple. No JVD present. No thyromegaly present.   Cardiovascular: Normal rate, regular rhythm and intact distal pulses.  Exam reveals no gallop and no friction rub.    No murmur heard.  Pulmonary/Chest: Effort normal and breath sounds normal. No respiratory distress. She has no wheezes. She has no rales. She exhibits no tenderness.   Abdominal: Soft. Bowel sounds are normal. She exhibits no distension and no mass. There is tenderness (mild tenderness of RUQ). There is no guarding.   Musculoskeletal: Normal range of motion. She exhibits no tenderness.   Neurological: She is alert and oriented to  person, place, and time. No cranial nerve deficit.   Skin: Skin is warm and dry. Capillary refill takes 2 to 3 seconds. No rash noted. She is not diaphoretic. No erythema.   Psychiatric: She has a normal mood and affect.   Nursing note and vitals reviewed.        CRANIAL NERVES     CN III, IV, VI   Pupils are equal, round, and reactive to light.  Extraocular motions are normal.        Significant Labs:   ABGs:   Recent Labs  Lab 03/29/18  1939   PH 7.374   PCO2 50.5*   HCO3 29.5*   POCSATURATED 75*   BE 4     Bilirubin:   Recent Labs  Lab 03/26/18  0339 03/29/18  1610   BILITOT 0.3 0.6     Blood Culture: No results for input(s): LABBLOO in the last 48 hours.  BMP:   Recent Labs  Lab 03/29/18  1610   *      K 3.7      CO2 24   BUN 8   CREATININE 0.7   CALCIUM 8.8     CBC:   Recent Labs  Lab 03/29/18  1609   WBC 12.39   HGB 13.9   HCT 41.9        CMP:   Recent Labs  Lab 03/29/18  1610      K 3.7      CO2 24   *   BUN 8   CREATININE 0.7   CALCIUM 8.8   PROT 7.8   ALBUMIN 3.4*   BILITOT 0.6   ALKPHOS 85   AST 14   ALT 8*   ANIONGAP 11   EGFRNONAA >60     Lipid Panel: No results for input(s): CHOL, HDL, LDLCALC, TRIG, CHOLHDL in the last 48 hours.  Magnesium: No results for input(s): MG in the last 48 hours.  TSH:   Recent Labs  Lab 11/30/17  0818   TSH 0.641     Urine Studies: No results for input(s): COLORU, APPEARANCEUA, PHUR, SPECGRAV, PROTEINUA, GLUCUA, KETONESU, BILIRUBINUA, OCCULTUA, NITRITE, UROBILINOGEN, LEUKOCYTESUR, RBCUA, WBCUA, BACTERIA, SQUAMEPITHEL, HYALINECASTS in the last 48 hours.    Invalid input(s): WRIGHTSUR  All pertinent labs within the past 24 hours have been reviewed.    Significant Imaging: I have reviewed and interpreted all pertinent imaging results/findings within the past 24 hours.   Imaging Results          CTA Chest Non-Coronary - PE Study (Final result)  Result time 03/29/18 18:47:01    Final result by Derek Cowart MD (03/29/18 18:47:01)                  Impression:         CTA of the chest demonstrates no signs of pulmonary embolism.  Some atelectatic changes are present in the lung bases.  There is a large hiatal hernia.        All CT scans at this facility use dose modulation, iterative reconstruction and/or weight based dosing when appropriate to reduce radiation dose to as low as reasonably achievable.       Electronically signed by: VINNY TILLMAN MD  Date:     03/29/18  Time:    18:47              Narrative:    Procedure: CTA CHEST NON CORONARY, Thursday, March 29, 2018    Clinical history: Shortness of breath. Dyspnea, cardiac origin suspected     Technique: Standard CTA of the chest performed with 100 cc Omnipaque 350 utilizing 3-D MIP reformations.  Comparison is made with previous studies including recent chest x-ray.    Findings: There is moderate enlargement of the heart.  The pulmonary arteries enhance well with no signs of pulmonary embolism.    There is a prominent hiatal hernia present posterior to the heart measuring up to 6.8 cm in diameter.  The mediastinum appears free of any suspicious mass or adenopathy.  There is atherosclerosis of the aorta and coronary vessels.    Some ground glass increased attenuation is seen within the right upper lobe diffusely.  Some minimal consolidation and atelectasis is seen in the right lung base with some atelectatic change along the major fissure on the left.  No pneumothorax or effusion is demonstrated.    Images of the upper abdomen again demonstrate a pattern gallstones with inflammation the gallbladder.                             US Abdomen Limited (Gallbladder) (Final result)  Result time 03/29/18 17:28:32    Final result by Vinny Tillman MD (03/29/18 17:28:32)                 Impression:      Multiple mobile gallstones are present with gallbladder wall thickening or biliary sludge present.      Electronically signed by: VINNY TILLMAN MD  Date:     03/29/18  Time:    17:28               Narrative:    Exam: US ABDOMEN LIMITED,    Date:  03/29/18 16:47:00    History: Abdominal pain, right upper quadrant.    Technique: Standard technique including Doppler imaging was utilized.    Comparison:  Comparison is made with previous studies including CT scan of 03/26/2018    Findings: The liver is normal in size and configuration with no focal lesion demonstrated.  The gallbladder is distended and contains biliary sludge as well as shadowing, mobile stones.  Alves sign is negative.  Gallbladder wall measures up to 0.36 cm.    The common bile duct measures are 0.56 cm.    Right kidney measures 9.7 cm appears free of focal abnormality.  The main portal vein has hepatofugal flow.  The aorta and inferior vena cava as visualized on these images appear free of acute abnormality.                             X-Ray Chest PA And Lateral (Final result)  Result time 03/29/18 16:44:21    Final result by Woody Cooper MD (03/29/18 16:44:21)                 Impression:       No acute process seen.      Electronically signed by: WOODY COOPER MD  Date:     03/29/18  Time:    16:44              Narrative:    Clinical Data:Shortness of breath    Comparison:  3/26/18    Findings:  Two view of the chest.   Study is limited by poor inspiration.    Cardiac silhouette is normal.  The lungs demonstrate no evidence of active disease.  No evidence of pleural effusion or pneumothorax.  Bones demonstrates moderate spondylosis of the thoracic spine. Moderate hiatal hernia seen within the posterior mediastinum                                Assessment/Plan:     * Hypoxia    2/2 pulmonary htn +/- [place]  O2 sat 83% on room air  CXR negative for vascular congestion/acute process  H/o pulmonary HTN with last echo showing PASP 39mmHg  Is on lasix 20mg PO bid at home but hasn't taken since Sunday  Lasix 40mg IV x 1 given in ER  O2 sat 89% afterwards  Will continue 40mg IV bid  Monitor I/O  Will need outpatient sleep study             Cholelithiasis    US RUQ negative for acute cholecystitis  Keep Npo for now  Advance diet as tolerated  zofran prn nausea  Percocet prn ab pain  Follow up outpatient surgery for cholecystectomy        Essential hypertension    Continue home medications losartan 100mg daily, hctz 25mg daily          Pulmonary HTN    Refer to hypoxia            VTE Risk Mitigation         Ordered     enoxaparin injection 40 mg  Daily     Route:  Subcutaneous        03/29/18 2307     IP VTE HIGH RISK PATIENT  Once      03/29/18 2307             Rich Castelan MD  Department of Hospital Medicine   [facility]

## 2018-03-30 NOTE — NURSING
Patient arrived to room 232 via stretcher from ER accompanied by nurse. Transferred to bed . Patient is aaox4 with no distress noted. Patient has been orientated to room. Heart monitor in place, vital signs taken and stable, no questions or concerns at this time. Personal belongings and call light within reach. Pt reminded to call for assistance. Bed in lowest position and locked in place. Patient rates pain at a 5 out of 10 at this time.

## 2018-03-30 NOTE — PLAN OF CARE
Problem: Patient Care Overview  Goal: Plan of Care Review  Outcome: Ongoing (interventions implemented as appropriate)  Pt AAO x 4   VSS.  Pt remained free of falls this shift.   Plan of care reviewed. Patient verbalizes understanding.    Patient NSR on monitor.   Bed low, side rails up x 2, wheels locked, call light in reach.   Patient instructed to call for assistance.   Hourly rounding completed.   Will continue to monitor.   Pt has gen surgery scheduled for lap sharmila already on 4/2/18. Will need outpt sleep study.   Continue Lasix 40mg BID. Echo pending.

## 2018-03-30 NOTE — PROGRESS NOTES
[facility] Medicine  Progress Note    Patient Name: Yoana Pardo  MRN: [id number]  Patient Class: IP- Inpatient   Admission Date: 3/29/2018  Length of Stay: 0 days  Attending Physician: Jo Ann Moon MD  Primary Care Provider: Lynn Wiggins MD        Subjective:     Principal Problem:Hypoxia    HPI:  67F h/o cholelithiasis, pHTN and [place] presents with RUQ ab pain that started 3 days ago.  Intermittent pain to RUQ. Associated with n/v and sob.  US RUQ negative for acute cholecystitis.  Labs wnl.   O2 sat 83% on room air.  I spoke to ER physician and recommended giving patient lasix 40mg IV x 1 for pulmonary htn.  Afterwards, O2 sat improved to 89% on room air.  However, patient is still tachypneic and nauseated.  Hospital medicine was called for observation for hypoxia.   She presented 3 days ago, US RUQ was also negative for acute cholecystitis at that time.  No other exacerbating or alleviating factors.  She reports she has a follow up with general surgery for cholecystectomy on 4/2.       Hospital Course:  Patient was kept for OBS for hypoxia under the care of Hospital Medicine. CXR showed no acute process, Chest CTA showed CTA of the chest demonstrates no signs of pulmonary embolism. Some atelectatic changes are present in the lung bases. There is a large hiatal hernia. PCo2 50.5, PO2 42, HCO3 29.5  3/30: WBC count increased to 17K today    Interval History: Patient with increase WBC overnight - now elevated temperature. BCX2, Lactic, procalcitonin drawn. Hida scan ordered - NPO after MN - pts PO2 on admit 42 - changed to inpatient. Pt has appt with Gen Surg on Tuesday to discuss surgery options for gall bladder.     Review of Systems   Constitutional: Negative for chills, diaphoresis and fever.   HENT: Negative for congestion, rhinorrhea and sore throat.    Eyes: Negative for pain and visual disturbance.   Respiratory: Negative for cough, shortness of breath and wheezing.     Cardiovascular: Negative for chest pain, palpitations and leg swelling.   Gastrointestinal: Positive for abdominal pain (Ruq). Negative for abdominal distention, blood in stool, constipation, diarrhea, nausea and vomiting.   Endocrine: Negative for polyphagia and polyuria.   Genitourinary: Negative for difficulty urinating, dysuria and hematuria.   Musculoskeletal: Negative for arthralgias, back pain and myalgias.   Skin: Negative for color change and wound.   Allergic/Immunologic: Negative.    Neurological: Negative for dizziness, tremors, syncope, weakness, light-headedness and headaches.   Hematological: Negative.    Psychiatric/Behavioral: Negative for agitation, behavioral problems, confusion and suicidal ideas.   All other systems reviewed and are negative.    Objective:     Vital Signs (Most Recent):  Temp: 100.2 °F (37.9 °C) (recheck after shower) (03/30/18 1657)  Pulse: 75 (03/30/18 1611)  Resp: 18 (03/30/18 1611)  BP: (!) 161/77 (03/30/18 1611)  SpO2: 98 % (03/30/18 1611) Vital Signs (24h Range):  Temp:  [98.6 °F (37 °C)-100.2 °F (37.9 °C)] 100.2 °F (37.9 °C)  Pulse:  [71-91] 75  Resp:  [0-34] 18  SpO2:  [83 %-100 %] 98 %  BP: (141-189)/(77-98) 161/77     Weight: 108.6 kg (239 lb 6.7 oz)  Body mass index is 46.76 kg/m².    Intake/Output Summary (Last 24 hours) at 03/30/18 1828  Last data filed at 03/30/18 1100   Gross per 24 hour   Intake              120 ml   Output              250 ml   Net             -130 ml      Physical Exam   Constitutional: She is oriented to person, place, and time. She appears well-developed and well-nourished. No distress.   HENT:   Head: Normocephalic and atraumatic.   Nose: Nose normal.   Mouth/Throat: Oropharynx is clear and moist.   Eyes: Conjunctivae and EOM are normal. Pupils are equal, round, and reactive to light.   Neck: Normal range of motion. Neck supple. No JVD present.   Cardiovascular: Normal rate, regular rhythm, normal heart sounds and intact distal pulses.     No murmur heard.  Pulmonary/Chest: Effort normal and breath sounds normal. No respiratory distress. She has no wheezes.   Abdominal: Soft. Bowel sounds are normal. She exhibits no distension and no mass. There is tenderness (mild tenderness of RUQ). There is no guarding.   Genitourinary:   Genitourinary Comments: deferred   Musculoskeletal: Normal range of motion. She exhibits no edema.   Neurological: She is alert and oriented to person, place, and time. No cranial nerve deficit. Coordination normal.   Skin: Skin is warm and dry. Capillary refill takes 2 to 3 seconds. No rash noted. She is not diaphoretic. No erythema.   Psychiatric: She has a normal mood and affect. Her behavior is normal.   Nursing note and vitals reviewed.      Significant Labs:   Recent Lab Results       03/30/18  0608 03/29/18  1939      Albumin 3.1(L)      Allens Test  Pass     Anion Gap 8      Appearance, UA Clear      Baso # 0.02      Basophil% 0.1      Bilirubin (UA) Negative      Site  LB     BUN, Bld 9      Calcium 8.9      Chloride 101      CO2 31(H)      Color, UA Yellow      Creatinine 0.8      Dels  Room Air     Differential Method Automated      eGFR if  >60      eGFR if non  >60  Comment:  Calculation used to obtain the estimated glomerular filtration  rate (eGFR) is the CKD-EPI equation.         Eos # 0.0      Eosinophil% 0.1      FiO2  21     Glucose 118(H)      Glucose, UA Negative      Gran # (ANC) 12.4(H)      Gran% 73.8(H)      Hematocrit 40.1      Hemoglobin 13.1      Ketones, UA Negative      Leukocytes, UA Negative      Lymph # 2.3      Lymph% 13.7(L)      Magnesium 1.8      MCH 29.8      MCHC 32.7      MCV 91      Mode  SPONT     Mono # 2.2(H)      Mono% 12.9      MPV 9.4      Nitrite, UA Negative      Occult Blood UA Trace(A)      pH, UA 5.0      Phosphorus 3.5       3.5      Platelets 258      POC BE  4     POC HCO3  29.5(H)     POC PCO2  50.5(H)     POC PH  7.374     POC PO2  42(LL)      POC SATURATED O2  75(L)     Potassium 3.5      Protein, UA Negative  Comment:  Recommend a 24 hour urine protein or a urine   protein/creatinine ratio if globulin induced proteinuria is  clinically suspected.        RBC 4.40      RDW 13.4      Sample  ARTERIAL     Sodium 140      Specific Gravity, UA 1.010      Specimen UA Urine, Clean Catch      Urobilinogen, UA Negative      WBC 17.01(H)          All pertinent labs within the past 24 hours have been reviewed.    Significant Imaging: I have reviewed all pertinent imaging results/findings within the past 24 hours.    Assessment/Plan:      * Hypoxia    2/2 pulmonary htn +/- [place]  O2 sat 83% on room air  CXR negative for vascular congestion/acute process  H/o pulmonary HTN with last echo showing PASP 39mmHg  Is on lasix 20mg PO bid at home but hasn't taken since Sunday  Lasix 40mg IV x 1 given in ER  O2 sat 89% afterwards  Will continue 40mg IV bid  Monitor I/O  Will need outpatient sleep study            Leukocytosis    --likely 2/2 cholecystitis  --BCX2 ordered  --Lactic & procalcitonin pending  --HIDA scan in AM  --no fluids 2/2 pulm htn          Cholelithiasis    US RUQ negative for acute cholecystitis  Keep Npo for now  Advance diet as tolerated  zofran prn nausea  Percocet prn ab pain  Follow up outpatient surgery for cholecystectomy    3/30:  --Hida scan in AM  --monitor cbc (WBC)  --NPO after midnight   --no narcotics after midnight        Essential hypertension    Continue home medications losartan 100mg daily, hctz 25mg daily          Pulmonary HTN    Refer to hypoxia            VTE Risk Mitigation         Ordered     enoxaparin injection 40 mg  Daily     Route:  Subcutaneous        03/29/18 2307     IP VTE HIGH RISK PATIENT  Once      03/30/18 0837     Place sequential compression device  Until discontinued      03/30/18 0837              CHARLENE Santamaria  Department of Hospital Medicine   [facility]

## 2018-03-30 NOTE — SUBJECTIVE & OBJECTIVE
Interval History: Patient with increase WBC overnight - now elevated temperature. BCX2, Lactic, procalcitonin drawn. Hida scan ordered - NPO after MN - pts PO2 on admit 42 - changed to inpatient. Pt has appt with Gen Surg on Tuesday to discuss surgery options for gall bladder.     Review of Systems   Constitutional: Negative for chills, diaphoresis and fever.   HENT: Negative for congestion, rhinorrhea and sore throat.    Eyes: Negative for pain and visual disturbance.   Respiratory: Negative for cough, shortness of breath and wheezing.    Cardiovascular: Negative for chest pain, palpitations and leg swelling.   Gastrointestinal: Positive for abdominal pain (Ruq). Negative for abdominal distention, blood in stool, constipation, diarrhea, nausea and vomiting.   Endocrine: Negative for polyphagia and polyuria.   Genitourinary: Negative for difficulty urinating, dysuria and hematuria.   Musculoskeletal: Negative for arthralgias, back pain and myalgias.   Skin: Negative for color change and wound.   Allergic/Immunologic: Negative.    Neurological: Negative for dizziness, tremors, syncope, weakness, light-headedness and headaches.   Hematological: Negative.    Psychiatric/Behavioral: Negative for agitation, behavioral problems, confusion and suicidal ideas.   All other systems reviewed and are negative.    Objective:     Vital Signs (Most Recent):  Temp: 100.2 °F (37.9 °C) (recheck after shower) (03/30/18 1657)  Pulse: 75 (03/30/18 1611)  Resp: 18 (03/30/18 1611)  BP: (!) 161/77 (03/30/18 1611)  SpO2: 98 % (03/30/18 1611) Vital Signs (24h Range):  Temp:  [98.6 °F (37 °C)-100.2 °F (37.9 °C)] 100.2 °F (37.9 °C)  Pulse:  [71-91] 75  Resp:  [0-34] 18  SpO2:  [83 %-100 %] 98 %  BP: (141-189)/(77-98) 161/77     Weight: 108.6 kg (239 lb 6.7 oz)  Body mass index is 46.76 kg/m².    Intake/Output Summary (Last 24 hours) at 03/30/18 1828  Last data filed at 03/30/18 1100   Gross per 24 hour   Intake              120 ml   Output               250 ml   Net             -130 ml      Physical Exam   Constitutional: She is oriented to person, place, and time. She appears well-developed and well-nourished. No distress.   HENT:   Head: Normocephalic and atraumatic.   Nose: Nose normal.   Mouth/Throat: Oropharynx is clear and moist.   Eyes: Conjunctivae and EOM are normal. Pupils are equal, round, and reactive to light.   Neck: Normal range of motion. Neck supple. No JVD present.   Cardiovascular: Normal rate, regular rhythm, normal heart sounds and intact distal pulses.    No murmur heard.  Pulmonary/Chest: Effort normal and breath sounds normal. No respiratory distress. She has no wheezes.   Abdominal: Soft. Bowel sounds are normal. She exhibits no distension and no mass. There is tenderness (mild tenderness of RUQ). There is no guarding.   Genitourinary:   Genitourinary Comments: deferred   Musculoskeletal: Normal range of motion. She exhibits no edema.   Neurological: She is alert and oriented to person, place, and time. No cranial nerve deficit. Coordination normal.   Skin: Skin is warm and dry. Capillary refill takes 2 to 3 seconds. No rash noted. She is not diaphoretic. No erythema.   Psychiatric: She has a normal mood and affect. Her behavior is normal.   Nursing note and vitals reviewed.      Significant Labs:   Recent Lab Results       03/30/18  0608 03/29/18  1939      Albumin 3.1(L)      Allens Test  Pass     Anion Gap 8      Appearance, UA Clear      Baso # 0.02      Basophil% 0.1      Bilirubin (UA) Negative      Site  LB     BUN, Bld 9      Calcium 8.9      Chloride 101      CO2 31(H)      Color, UA Yellow      Creatinine 0.8      [facility]  Room Air     Differential Method Automated      eGFR if  >60      eGFR if non  >60  Comment:  Calculation used to obtain the estimated glomerular filtration  rate (eGFR) is the CKD-EPI equation.         Eos # 0.0      Eosinophil% 0.1      FiO2  21     Glucose 118(H)       Glucose, UA Negative      Gran # (ANC) 12.4(H)      Gran% 73.8(H)      Hematocrit 40.1      Hemoglobin 13.1      Ketones, UA Negative      Leukocytes, UA Negative      Lymph # 2.3      Lymph% 13.7(L)      Magnesium 1.8      MCH 29.8      MCHC 32.7      MCV 91      Mode  SPONT     Mono # 2.2(H)      Mono% 12.9      MPV 9.4      Nitrite, UA Negative      Occult Blood UA Trace(A)      pH, UA 5.0      Phosphorus 3.5       3.5      Platelets 258      POC BE  4     POC HCO3  29.5(H)     POC PCO2  50.5(H)     POC PH  7.374     POC PO2  42(LL)     POC SATURATED O2  75(L)     Potassium 3.5      Protein, UA Negative  Comment:  Recommend a 24 hour urine protein or a urine   protein/creatinine ratio if globulin induced proteinuria is  clinically suspected.        RBC 4.40      RDW 13.4      Sample  ARTERIAL     Sodium 140      Specific Gravity, UA 1.010      Specimen UA Urine, Clean Catch      Urobilinogen, UA Negative      WBC 17.01(H)          All pertinent labs within the past 24 hours have been reviewed.    Significant Imaging: I have reviewed all pertinent imaging results/findings within the past 24 hours.

## 2018-03-30 NOTE — ED NOTES
Pt placed in hospital bed for comfort. No available beds for admission until after 7am.   at bedside. SR up x 2.  HOB elevated 45.  No complaints at this time.

## 2018-03-30 NOTE — ASSESSMENT & PLAN NOTE
2/2 pulmonary htn +/- [place]  O2 sat 83% on room air  CXR negative for vascular congestion/acute process  H/o pulmonary HTN with last echo showing PASP 39mmHg  Is on lasix 20mg PO bid at home but hasn't taken since Sunday  Lasix 40mg IV x 1 given in ER  O2 sat 89% afterwards  Will continue 40mg IV bid  Monitor I/O  Will need outpatient sleep study

## 2018-03-30 NOTE — ED NOTES
Blood drawn for am lab. Pt cont asleep with no complaints. O2 sat remains % on 2L/nc.  Awaiting bed assignment for admission.   at bedside.

## 2018-03-30 NOTE — PROGRESS NOTES
Patient is drowsy, sleep in bed with spouse at bedside. She is resting comfortably on 2L oxygen via NC with oxygen sat at 98%. Patient continues to receive furosemide 40mg BID IV - 2D echo pending. Discussed need to use cpap at night for [place] and problems that can arise from not using it with pt and spouse. Will continue to diurese and watch for echo results.

## 2018-03-30 NOTE — ASSESSMENT & PLAN NOTE
--likely 2/2 cholecystitis  --BCX2 ordered  --Lactic & procalcitonin pending  --HIDA scan in AM  --no fluids 2/2 pulm htn

## 2018-03-30 NOTE — SUBJECTIVE & OBJECTIVE
Past Medical History:   Diagnosis Date    GERD (gastroesophageal reflux disease)     Hemorrhoids     History of positive PPD, untreated     Hx of cold sores     Hyperlipidemia     Hypertension     MVP (mitral valve prolapse)     Peripheral neuropathy     Pulmonary HTN     Dr Bailon    Sleep apnea     has  but not using CPAP       Past Surgical History:   Procedure Laterality Date    DILATION AND CURETTAGE OF UTERUS         Review of patient's allergies indicates:   Allergen Reactions    Bactrim [sulfamethoxazole-trimethoprim] Itching       Current Facility-Administered Medications on File Prior to Encounter   Medication    [COMPLETED] acetaminophen tablet 650 mg     Current Outpatient Prescriptions on File Prior to Encounter   Medication Sig    albuterol 90 mcg/actuation inhaler Inhale 1-2 puffs into the lungs every 6 (six) hours as needed for Wheezing (Cough).    aspirin (ECOTRIN) 81 MG EC tablet Take 81 mg by mouth once daily.    furosemide (LASIX) 20 MG tablet TAKE ONE TABLET BY MOUTH TWICE DAILY AS NEEDED    hydrocodone-acetaminophen 7.5-325mg (NORCO) 7.5-325 mg per tablet Take 1 tablet by mouth every 6 (six) hours as needed for Pain.    levocetirizine (XYZAL) 5 MG tablet Take 1 tablet (5 mg total) by mouth every evening.    losartan-hydrochlorothiazide 100-25 mg (HYZAAR) 100-25 mg per tablet Take 1 tablet by mouth once daily.    ondansetron (ZOFRAN-ODT) 4 MG TbDL Take 1 tablet (4 mg total) by mouth every 8 (eight) hours as needed (prn nausea).    [DISCONTINUED] losartan-hydrochlorothiazide 100-25 mg (HYZAAR) 100-25 mg per tablet Take 1 tablet by mouth once daily.     Family History     Problem Relation (Age of Onset)    Aneurysm Sister    Diabetes Sister    Heart disease Mother, Father    Hypertension Father    Kidney disease Father    Obesity Mother, Father        Social History Main Topics    Smoking status: Never Smoker    Smokeless tobacco: Never Used    Alcohol use Yes       Comment: rarely    Drug use: No    Sexual activity: Not Currently     Review of Systems   Constitutional: Negative for chills, diaphoresis, fatigue, fever and unexpected weight change.   HENT: Negative for congestion, facial swelling, sore throat and trouble swallowing.    Eyes: Negative for photophobia, redness and visual disturbance.   Respiratory: Positive for shortness of breath. Negative for apnea, cough, chest tightness and wheezing.    Cardiovascular: Negative for chest pain, palpitations and leg swelling.   Gastrointestinal: Positive for abdominal pain, nausea and vomiting. Negative for abdominal distention, blood in stool, constipation and diarrhea.   Endocrine: Negative for polydipsia, polyphagia and polyuria.   Genitourinary: Negative for difficulty urinating, dysuria, flank pain, frequency, hematuria and urgency.   Musculoskeletal: Negative for arthralgias, back pain, joint swelling, myalgias and neck stiffness.   Skin: Negative for pallor and rash.   Allergic/Immunologic: Negative for immunocompromised state.   Neurological: Negative for dizziness, tremors, syncope, weakness, light-headedness and headaches.   Psychiatric/Behavioral: Negative for agitation, confusion and suicidal ideas.   All other systems reviewed and are negative.    Objective:     Vital Signs (Most Recent):  Temp: 99.7 °F (37.6 °C) (03/29/18 1534)  Pulse: 71 (03/29/18 2051)  Resp: 14 (03/29/18 2051)  BP: (!) 165/82 (03/29/18 2051)  SpO2: (!) 89 % (ambulating in bass way) (03/29/18 2141) Vital Signs (24h Range):  Temp:  [99.7 °F (37.6 °C)-99.9 °F (37.7 °C)] 99.7 °F (37.6 °C)  Pulse:  [71-91] 71  Resp:  [14-20] 14  SpO2:  [83 %-100 %] 89 %  BP: (165-189)/(77-95) 165/82     Weight: 109.3 kg (241 lb)  Body mass index is 47.07 kg/m².    Physical Exam   Constitutional: She is oriented to person, place, and time. She appears well-developed and well-nourished. No distress.   HENT:   Head: Normocephalic and atraumatic.   Eyes: Conjunctivae  and EOM are normal. Pupils are equal, round, and reactive to light. No scleral icterus.   Neck: Normal range of motion. Neck supple. No JVD present. No thyromegaly present.   Cardiovascular: Normal rate, regular rhythm and intact distal pulses.  Exam reveals no gallop and no friction rub.    No murmur heard.  Pulmonary/Chest: Effort normal and breath sounds normal. No respiratory distress. She has no wheezes. She has no rales. She exhibits no tenderness.   Abdominal: Soft. Bowel sounds are normal. She exhibits no distension and no mass. There is tenderness (mild tenderness of RUQ). There is no guarding.   Musculoskeletal: Normal range of motion. She exhibits no tenderness.   Neurological: She is alert and oriented to person, place, and time. No cranial nerve deficit.   Skin: Skin is warm and dry. Capillary refill takes 2 to 3 seconds. No rash noted. She is not diaphoretic. No erythema.   Psychiatric: She has a normal mood and affect.   Nursing note and vitals reviewed.        CRANIAL NERVES     CN III, IV, VI   Pupils are equal, round, and reactive to light.  Extraocular motions are normal.        Significant Labs:   ABGs:   Recent Labs  Lab 03/29/18  1939   PH 7.374   PCO2 50.5*   HCO3 29.5*   POCSATURATED 75*   BE 4     Bilirubin:   Recent Labs  Lab 03/26/18  0339 03/29/18  1610   BILITOT 0.3 0.6     Blood Culture: No results for input(s): LABBLOO in the last 48 hours.  BMP:   Recent Labs  Lab 03/29/18  1610   *      K 3.7      CO2 24   BUN 8   CREATININE 0.7   CALCIUM 8.8     CBC:   Recent Labs  Lab 03/29/18  1609   WBC 12.39   HGB 13.9   HCT 41.9        CMP:   Recent Labs  Lab 03/29/18  1610      K 3.7      CO2 24   *   BUN 8   CREATININE 0.7   CALCIUM 8.8   PROT 7.8   ALBUMIN 3.4*   BILITOT 0.6   ALKPHOS 85   AST 14   ALT 8*   ANIONGAP 11   EGFRNONAA >60     Lipid Panel: No results for input(s): CHOL, HDL, LDLCALC, TRIG, CHOLHDL in the last 48 hours.  Magnesium: No  results for input(s): MG in the last 48 hours.  TSH:   Recent Labs  Lab 11/30/17  0818   TSH 0.641     Urine Studies: No results for input(s): COLORU, APPEARANCEUA, PHUR, SPECGRAV, PROTEINUA, GLUCUA, KETONESU, BILIRUBINUA, OCCULTUA, NITRITE, UROBILINOGEN, LEUKOCYTESUR, RBCUA, WBCUA, BACTERIA, SQUAMEPITHEL, HYALINECASTS in the last 48 hours.    Invalid input(s): WRIGHTSUR  All pertinent labs within the past 24 hours have been reviewed.    Significant Imaging: I have reviewed and interpreted all pertinent imaging results/findings within the past 24 hours.   Imaging Results          CTA Chest Non-Coronary - PE Study (Final result)  Result time 03/29/18 18:47:01    Final result by Vinny Tillman MD (03/29/18 18:47:01)                 Impression:         CTA of the chest demonstrates no signs of pulmonary embolism.  Some atelectatic changes are present in the lung bases.  There is a large hiatal hernia.        All CT scans at this facility use dose modulation, iterative reconstruction and/or weight based dosing when appropriate to reduce radiation dose to as low as reasonably achievable.       Electronically signed by: VINNY TILLMAN MD  Date:     03/29/18  Time:    18:47              Narrative:    Procedure: CTA CHEST NON CORONARY, Thursday, March 29, 2018    Clinical history: Shortness of breath. Dyspnea, cardiac origin suspected     Technique: Standard CTA of the chest performed with 100 cc Omnipaque 350 utilizing 3-D MIP reformations.  Comparison is made with previous studies including recent chest x-ray.    Findings: There is moderate enlargement of the heart.  The pulmonary arteries enhance well with no signs of pulmonary embolism.    There is a prominent hiatal hernia present posterior to the heart measuring up to 6.8 cm in diameter.  The mediastinum appears free of any suspicious mass or adenopathy.  There is atherosclerosis of the aorta and coronary vessels.    Some ground glass increased attenuation is seen  within the right upper lobe diffusely.  Some minimal consolidation and atelectasis is seen in the right lung base with some atelectatic change along the major fissure on the left.  No pneumothorax or effusion is demonstrated.    Images of the upper abdomen again demonstrate a pattern gallstones with inflammation the gallbladder.                             US Abdomen Limited (Gallbladder) (Final result)  Result time 03/29/18 17:28:32    Final result by Vinny Tillman MD (03/29/18 17:28:32)                 Impression:      Multiple mobile gallstones are present with gallbladder wall thickening or biliary sludge present.      Electronically signed by: VINNY TILLMAN MD  Date:     03/29/18  Time:    17:28              Narrative:    Exam: US ABDOMEN LIMITED,    Date:  03/29/18 16:47:00    History: Abdominal pain, right upper quadrant.    Technique: Standard technique including Doppler imaging was utilized.    Comparison:  Comparison is made with previous studies including CT scan of 03/26/2018    Findings: The liver is normal in size and configuration with no focal lesion demonstrated.  The gallbladder is distended and contains biliary sludge as well as shadowing, mobile stones.  Alves sign is negative.  Gallbladder wall measures up to 0.36 cm.    The common bile duct measures are 0.56 cm.    Right kidney measures 9.7 cm appears free of focal abnormality.  The main portal vein has hepatofugal flow.  The aorta and inferior vena cava as visualized on these images appear free of acute abnormality.                             X-Ray Chest PA And Lateral (Final result)  Result time 03/29/18 16:44:21    Final result by Woody Redmond MD (03/29/18 16:44:21)                 Impression:       No acute process seen.      Electronically signed by: WOODY REDMOND MD  Date:     03/29/18  Time:    16:44              Narrative:    Clinical Data:Shortness of breath    Comparison:  3/26/18    Findings:  Two view of the chest.    Study is limited by poor inspiration.    Cardiac silhouette is normal.  The lungs demonstrate no evidence of active disease.  No evidence of pleural effusion or pneumothorax.  Bones demonstrates moderate spondylosis of the thoracic spine. Moderate hiatal hernia seen within the posterior mediastinum

## 2018-03-30 NOTE — ASSESSMENT & PLAN NOTE
US RUQ negative for acute cholecystitis  Keep Npo for now  Advance diet as tolerated  zofran prn nausea  Percocet prn ab pain  Follow up outpatient surgery for cholecystectomy

## 2018-03-30 NOTE — ASSESSMENT & PLAN NOTE
US RUQ negative for acute cholecystitis  Keep Npo for now  Advance diet as tolerated  zofran prn nausea  Percocet prn ab pain  Follow up outpatient surgery for cholecystectomy    3/30:  --Hida scan in AM  --monitor cbc (WBC)  --NPO after midnight   --no narcotics after midnight

## 2018-03-30 NOTE — HOSPITAL COURSE
Patient was kept for OBS for hypoxia under the care of [facility]. CXR showed no acute process, Chest CTA showed CTA of the chest demonstrates no signs of pulmonary embolism. Some atelectatic changes are present in the lung bases. There is a large hiatal hernia. PCo2 50.5, PO2 42, HCO3 29.5  3/30: WBC count increased to 17K today  3/31/18: Procalcitonin mildly elevated and low grade temp of 100.6 evening of 3/30/18. Hida scanned discontinued by General Surgery who states if patient declines will proceed with cholecystectomy. Currently, RUQ abdominal pain controlled with hydrocodone, pt has no nausea or vomiting. Hypoxia continues and pt receiving IV lasix and supplemental oxygen - has pulmonary HTN with atelectasis - incentive spirometer ordered. IV Zosyn given.  4/1/18 - Pulmonology saw the patient and feels pulmonary HTN group 3 and sleep apnea not wearing CPAP at night. CPAP was ordered by Dr. Mcintosh. Dr. Erickson reordered HIDA scan for Monday 4/1/2018 - pt NPO and pain meds on hold. She still has RUQ pain that is controlled with Norco. WBC count trending upward.  IS and Acapella in progress.   4/2/18-HIDA scan showed Nonvisualization of the gallbladder consistent with cystic duct obstruction.This is consistent with patient's diagnosis of acute cholecystitis. Pt is S/P Robotic assisted lap sharmila performed 4/2/18 by Dr. Erickson.   Post op day 1 pt is S/P lap sharmila. She states she is feeling better today. Has post surgical abdominal pain - is mildly nauseated. PT/OT noted no functional immobility. IV Zosyn in process.     Continues to improve, Dr. Erickson cleared pt for discharge, she is up and about but her O2 sats were low, hence discharge cancelled. She had resp failure upon presentation, CTA Chest Neg for any PE but had atelectasis, hence discharge put off at present and O2 being arranged. She denies any hx of COPD, pneumonia, smoking, PE, CHF, edema etc. Started on IS and nebs and switched to oral Abx and  corticosteroids given. Condition improved and feeling a little better and doing nebs, acapela and IS. Home oxygen was arranged and delivered to room prior to discharge.  Pt has a documented Sp O2 99 % on 2 L oxygen. Pt to receive CPAP machine at home. She was seen and examined and determined to be safe and stable for discharge. Pt to follow up with General Surgery and Pulmonology. Also, she is to complete a prescription of Augmentin.

## 2018-03-31 LAB
ALBUMIN SERPL BCP-MCNC: 2.9 G/DL
ANION GAP SERPL CALC-SCNC: 13 MMOL/L
BASOPHILS # BLD AUTO: 0.01 K/UL
BASOPHILS NFR BLD: 0.1 %
BUN SERPL-MCNC: 11 MG/DL
CALCIUM SERPL-MCNC: 9 MG/DL
CHLORIDE SERPL-SCNC: 99 MMOL/L
CO2 SERPL-SCNC: 26 MMOL/L
CREAT SERPL-MCNC: 0.7 MG/DL
DIASTOLIC DYSFUNCTION: NO
DIFFERENTIAL METHOD: ABNORMAL
EOSINOPHIL # BLD AUTO: 0.1 K/UL
EOSINOPHIL NFR BLD: 0.7 %
ERYTHROCYTE [DISTWIDTH] IN BLOOD BY AUTOMATED COUNT: 13.4 %
EST. GFR  (AFRICAN AMERICAN): >60 ML/MIN/1.73 M^2
EST. GFR  (NON AFRICAN AMERICAN): >60 ML/MIN/1.73 M^2
ESTIMATED PA SYSTOLIC PRESSURE: 46.51
GLUCOSE SERPL-MCNC: 100 MG/DL
HCT VFR BLD AUTO: 38.9 %
HGB BLD-MCNC: 12.7 G/DL
LYMPHOCYTES # BLD AUTO: 1.8 K/UL
LYMPHOCYTES NFR BLD: 13.5 %
MAGNESIUM SERPL-MCNC: 2 MG/DL
MCH RBC QN AUTO: 29.5 PG
MCHC RBC AUTO-ENTMCNC: 32.6 G/DL
MCV RBC AUTO: 91 FL
MONOCYTES # BLD AUTO: 1.6 K/UL
MONOCYTES NFR BLD: 12.3 %
NEUTROPHILS # BLD AUTO: 9.8 K/UL
NEUTROPHILS NFR BLD: 73.4 %
PHOSPHATE SERPL-MCNC: 2.8 MG/DL
PHOSPHATE SERPL-MCNC: 2.8 MG/DL
PLATELET # BLD AUTO: 230 K/UL
PMV BLD AUTO: 10.5 FL
POTASSIUM SERPL-SCNC: 3.4 MMOL/L
RBC # BLD AUTO: 4.3 M/UL
RETIRED EF AND QEF - SEE NOTES: 60 (ref 55–65)
SODIUM SERPL-SCNC: 138 MMOL/L
WBC # BLD AUTO: 13.32 K/UL

## 2018-03-31 PROCEDURE — 94799 UNLISTED PULMONARY SVC/PX: CPT

## 2018-03-31 PROCEDURE — 25000003 PHARM REV CODE 250: Performed by: INTERNAL MEDICINE

## 2018-03-31 PROCEDURE — 83735 ASSAY OF MAGNESIUM: CPT

## 2018-03-31 PROCEDURE — 80069 RENAL FUNCTION PANEL: CPT

## 2018-03-31 PROCEDURE — 36415 COLL VENOUS BLD VENIPUNCTURE: CPT

## 2018-03-31 PROCEDURE — 63600175 PHARM REV CODE 636 W HCPCS: Performed by: NURSE PRACTITIONER

## 2018-03-31 PROCEDURE — 63600175 PHARM REV CODE 636 W HCPCS: Performed by: INTERNAL MEDICINE

## 2018-03-31 PROCEDURE — 27000221 HC OXYGEN, UP TO 24 HOURS

## 2018-03-31 PROCEDURE — 11000001 HC ACUTE MED/SURG PRIVATE ROOM

## 2018-03-31 PROCEDURE — 99223 1ST HOSP IP/OBS HIGH 75: CPT | Mod: ,,, | Performed by: SURGERY

## 2018-03-31 PROCEDURE — 25000003 PHARM REV CODE 250: Performed by: NURSE PRACTITIONER

## 2018-03-31 PROCEDURE — 85025 COMPLETE CBC W/AUTO DIFF WBC: CPT

## 2018-03-31 RX ORDER — HYDROCODONE BITARTRATE AND ACETAMINOPHEN 7.5; 325 MG/1; MG/1
1 TABLET ORAL ONCE
Status: COMPLETED | OUTPATIENT
Start: 2018-03-31 | End: 2018-03-31

## 2018-03-31 RX ORDER — HYDROCODONE BITARTRATE AND ACETAMINOPHEN 7.5; 325 MG/1; MG/1
1 TABLET ORAL EVERY 4 HOURS PRN
Status: DISCONTINUED | OUTPATIENT
Start: 2018-03-31 | End: 2018-04-01

## 2018-03-31 RX ORDER — LOSARTAN POTASSIUM 50 MG/1
50 TABLET ORAL DAILY
Status: DISCONTINUED | OUTPATIENT
Start: 2018-03-31 | End: 2018-04-05 | Stop reason: HOSPADM

## 2018-03-31 RX ORDER — HYDRALAZINE HYDROCHLORIDE 20 MG/ML
10 INJECTION INTRAMUSCULAR; INTRAVENOUS EVERY 6 HOURS PRN
Status: DISCONTINUED | OUTPATIENT
Start: 2018-03-31 | End: 2018-03-31

## 2018-03-31 RX ORDER — POTASSIUM CHLORIDE 20 MEQ/1
60 TABLET, EXTENDED RELEASE ORAL ONCE
Status: COMPLETED | OUTPATIENT
Start: 2018-03-31 | End: 2018-03-31

## 2018-03-31 RX ORDER — SODIUM CHLORIDE 9 MG/ML
INJECTION, SOLUTION INTRAVENOUS CONTINUOUS
Status: DISCONTINUED | OUTPATIENT
Start: 2018-03-31 | End: 2018-03-31

## 2018-03-31 RX ORDER — DIPHENHYDRAMINE HYDROCHLORIDE 50 MG/ML
25 INJECTION INTRAMUSCULAR; INTRAVENOUS ONCE
Status: COMPLETED | OUTPATIENT
Start: 2018-03-31 | End: 2018-03-31

## 2018-03-31 RX ADMIN — HYDROCODONE BITARTRATE AND ACETAMINOPHEN 1 TABLET: 7.5; 325 TABLET ORAL at 11:03

## 2018-03-31 RX ADMIN — PIPERACILLIN AND TAZOBACTAM 4.5 G: 4; .5 INJECTION, POWDER, LYOPHILIZED, FOR SOLUTION INTRAVENOUS; PARENTERAL at 08:03

## 2018-03-31 RX ADMIN — FUROSEMIDE 40 MG: 10 INJECTION, SOLUTION INTRAMUSCULAR; INTRAVENOUS at 09:03

## 2018-03-31 RX ADMIN — HYDROCODONE BITARTRATE AND ACETAMINOPHEN 1 TABLET: 7.5; 325 TABLET ORAL at 04:03

## 2018-03-31 RX ADMIN — PIPERACILLIN AND TAZOBACTAM 4.5 G: 4; .5 INJECTION, POWDER, LYOPHILIZED, FOR SOLUTION INTRAVENOUS; PARENTERAL at 02:03

## 2018-03-31 RX ADMIN — LOSARTAN POTASSIUM 50 MG: 50 TABLET, FILM COATED ORAL at 09:03

## 2018-03-31 RX ADMIN — HYDRALAZINE HYDROCHLORIDE 10 MG: 20 INJECTION INTRAMUSCULAR; INTRAVENOUS at 04:03

## 2018-03-31 RX ADMIN — SODIUM CHLORIDE: 0.9 INJECTION, SOLUTION INTRAVENOUS at 01:03

## 2018-03-31 RX ADMIN — DIPHENHYDRAMINE HYDROCHLORIDE 25 MG: 50 INJECTION, SOLUTION INTRAMUSCULAR; INTRAVENOUS at 09:03

## 2018-03-31 RX ADMIN — POTASSIUM CHLORIDE 60 MEQ: 1500 TABLET, EXTENDED RELEASE ORAL at 02:03

## 2018-03-31 RX ADMIN — HYDROCODONE BITARTRATE AND ACETAMINOPHEN 1 TABLET: 7.5; 325 TABLET ORAL at 07:03

## 2018-03-31 NOTE — ASSESSMENT & PLAN NOTE
2/2 pulmonary htn +/- [place]  O2 sat 83% on room air  CXR negative for vascular congestion/acute process  H/o pulmonary HTN with last echo showing PASP 39mmHg  Is on lasix 20mg PO bid at home but hasn't taken since Sunday  O2 sat 89% afterwards  IV lasix  Monitor I/O  Will need outpatient sleep study

## 2018-03-31 NOTE — SUBJECTIVE & OBJECTIVE
No current facility-administered medications on file prior to encounter.      Current Outpatient Prescriptions on File Prior to Encounter   Medication Sig    albuterol 90 mcg/actuation inhaler Inhale 1-2 puffs into the lungs every 6 (six) hours as needed for Wheezing (Cough).    aspirin (ECOTRIN) 81 MG EC tablet Take 81 mg by mouth once daily.    furosemide (LASIX) 20 MG tablet TAKE ONE TABLET BY MOUTH TWICE DAILY AS NEEDED    hydrocodone-acetaminophen 7.5-325mg (NORCO) 7.5-325 mg per tablet Take 1 tablet by mouth every 6 (six) hours as needed for Pain.    levocetirizine (XYZAL) 5 MG tablet Take 1 tablet (5 mg total) by mouth every evening.    losartan-hydrochlorothiazide 100-25 mg (HYZAAR) 100-25 mg per tablet Take 1 tablet by mouth once daily.    ondansetron (ZOFRAN-ODT) 4 MG TbDL Take 1 tablet (4 mg total) by mouth every 8 (eight) hours as needed (prn nausea).       Review of patient's allergies indicates:   Allergen Reactions    Bactrim [sulfamethoxazole-trimethoprim] Itching       Past Medical History:   Diagnosis Date    GERD (gastroesophageal reflux disease)     Hemorrhoids     History of positive PPD, untreated     Hx of cold sores     Hyperlipidemia     Hypertension     MVP (mitral valve prolapse)     Peripheral neuropathy     Pulmonary HTN     Dr Bailon    Sleep apnea     has  but not using CPAP     Past Surgical History:   Procedure Laterality Date    DILATION AND CURETTAGE OF UTERUS       Family History     Problem Relation (Age of Onset)    Aneurysm Sister    Diabetes Sister    Heart disease Mother, Father    Hypertension Father    Kidney disease Father    Obesity Mother, Father        Social History Main Topics    Smoking status: Never Smoker    Smokeless tobacco: Never Used    Alcohol use Yes      Comment: rarely    Drug use: No    Sexual activity: Not Currently     Review of Systems   Constitutional: Negative for chills and fever.   HENT: Negative for sore throat and  trouble swallowing.    Eyes: Negative.    Respiratory: Negative for cough and shortness of breath.    Cardiovascular: Negative.    Gastrointestinal: Positive for abdominal pain. Negative for abdominal distention, nausea and vomiting.   Endocrine: Negative.    Genitourinary: Negative.    Musculoskeletal: Negative.    Skin: Negative.    Allergic/Immunologic: Negative.    Neurological: Negative.    Hematological: Does not bruise/bleed easily.   Psychiatric/Behavioral: Negative.      Objective:     Vital Signs (Most Recent):  Temp: 97.9 °F (36.6 °C) (03/31/18 0725)  Pulse: 64 (03/31/18 0725)  Resp: 18 (03/31/18 0725)  BP: (!) 153/72 (03/31/18 0725)  SpO2: 97 % (03/31/18 0800) Vital Signs (24h Range):  Temp:  [97.9 °F (36.6 °C)-100.6 °F (38.1 °C)] 97.9 °F (36.6 °C)  Pulse:  [64-82] 64  Resp:  [16-18] 18  SpO2:  [96 %-99 %] 97 %  BP: (150-165)/(72-83) 153/72     Weight: 108.6 kg (239 lb 6.7 oz)  Body mass index is 46.76 kg/m².    Physical Exam   Constitutional: She is oriented to person, place, and time. She appears well-developed and well-nourished.   HENT:   Head: Normocephalic.   Right Ear: External ear normal.   Left Ear: External ear normal.   Nose: Nose normal.   Eyes: Pupils are equal, round, and reactive to light. No scleral icterus.   Neck: Normal range of motion. Neck supple. No thyromegaly present.   Cardiovascular: Normal rate, regular rhythm and normal heart sounds.    No murmur heard.  Pulmonary/Chest: Effort normal and breath sounds normal.   Abdominal: Soft. Bowel sounds are normal. She exhibits no distension. There is no tenderness. There is no guarding.   Minimal tenderness on the right upper quadrant.   Musculoskeletal: Normal range of motion.   Lymphadenopathy:     She has no cervical adenopathy.   Neurological: She is alert and oriented to person, place, and time.   Skin: Skin is warm and dry.       Significant Labs:  CBC:   Recent Labs  Lab 03/31/18  0500   WBC 13.32*   RBC 4.30   HGB 12.7   HCT 38.9       MCV 91   MCH 29.5   MCHC 32.6     CMP:   Recent Labs  Lab 03/29/18  1610  03/31/18  0500   *  < > 100   CALCIUM 8.8  < > 9.0   ALBUMIN 3.4*  < > 2.9*   PROT 7.8  --   --      < > 138   K 3.7  < > 3.4*   CO2 24  < > 26     < > 99   BUN 8  < > 11   CREATININE 0.7  < > 0.7   ALKPHOS 85  --   --    ALT 8*  --   --    AST 14  --   --    BILITOT 0.6  --   --    < > = values in this interval not displayed.    Significant Diagnostics:  I have reviewed and interpreted all pertinent imaging results/findings within the past 24 hours.

## 2018-03-31 NOTE — HPI
Yoana is 67-year-old -American female patient who is being seen at the request of the hospital medicine service for the evaluation of right upper quadrant abdominal pain.  According to the hospital medicine team, the patient has been admitted 2 days ago with no shortness of breath associated with the right upper quadrant discomfort.  Prior to the admission, the patient was determined to have gallstones and was supposed to be seen by me in my clinic for possible elective gallbladder procedure.  She denies of any fever except right upper quadrant tenderness without any nausea and vomiting episodes.

## 2018-03-31 NOTE — SUBJECTIVE & OBJECTIVE
Review of Systems   Constitutional: Negative for chills, diaphoresis and fever.   HENT: Negative for congestion, rhinorrhea and sore throat.    Eyes: Negative for pain and visual disturbance.   Respiratory: Positive for shortness of breath. Negative for cough and wheezing.    Cardiovascular: Negative for chest pain, palpitations and leg swelling.   Gastrointestinal: Positive for abdominal pain (Ruq). Negative for abdominal distention, blood in stool, constipation, diarrhea, nausea and vomiting.   Endocrine: Negative for polyphagia and polyuria.   Genitourinary: Negative for difficulty urinating, dysuria and hematuria.   Musculoskeletal: Negative for arthralgias, back pain and myalgias.   Skin: Negative for color change and wound.   Allergic/Immunologic: Negative.    Neurological: Negative for dizziness, tremors, syncope, weakness, light-headedness and headaches.   Hematological: Negative.    Psychiatric/Behavioral: Negative for agitation, behavioral problems, confusion and suicidal ideas.   All other systems reviewed and are negative.    Objective:     Vital Signs (Most Recent):  Temp: 98.6 °F (37 °C) (03/31/18 1626)  Pulse: 80 (03/31/18 1626)  Resp: 18 (03/31/18 1626)  BP: (!) 171/77 (03/31/18 1644)  SpO2: (!) 92 % (03/31/18 1626) Vital Signs (24h Range):  Temp:  [97.4 °F (36.3 °C)-100.6 °F (38.1 °C)] 98.6 °F (37 °C)  Pulse:  [64-82] 80  Resp:  [16-18] 18  SpO2:  [84 %-99 %] 92 %  BP: (150-186)/(72-84) 171/77     Weight: 108.6 kg (239 lb 6.7 oz)  Body mass index is 46.76 kg/m².    Intake/Output Summary (Last 24 hours) at 03/31/18 1701  Last data filed at 03/31/18 0433   Gross per 24 hour   Intake                0 ml   Output              250 ml   Net             -250 ml      Physical Exam   Constitutional: She is oriented to person, place, and time. She appears well-developed and well-nourished. No distress.   HENT:   Head: Normocephalic and atraumatic.   Eyes: Conjunctivae are normal.   Neck: Normal range of  motion. Neck supple. No JVD present.   Cardiovascular: Normal rate, regular rhythm, normal heart sounds and intact distal pulses.    No murmur heard.  Pulmonary/Chest: Effort normal. No respiratory distress. She has decreased breath sounds. She has no wheezes.   Abdominal: Soft. Bowel sounds are normal. She exhibits no distension and no mass. There is tenderness (mild tenderness of RUQ). There is no guarding.   Genitourinary:   Genitourinary Comments: deferred   Musculoskeletal: Normal range of motion. She exhibits no edema.   Neurological: She is alert and oriented to person, place, and time. No cranial nerve deficit. Coordination normal.   Skin: Skin is warm and dry. Capillary refill takes 2 to 3 seconds. No rash noted. She is not diaphoretic. No erythema.   Psychiatric: She has a normal mood and affect. Her behavior is normal.   Nursing note and vitals reviewed.      Significant Labs:   CBC:   Recent Labs  Lab 03/30/18  0608 03/31/18  0500   WBC 17.01* 13.32*   HGB 13.1 12.7   HCT 40.1 38.9    230     CMP:   Recent Labs  Lab 03/30/18  0608 03/31/18  0500    138   K 3.5 3.4*    99   CO2 31* 26   * 100   BUN 9 11   CREATININE 0.8 0.7   CALCIUM 8.9 9.0   ALBUMIN 3.1* 2.9*   ANIONGAP 8 13   EGFRNONAA >60 >60     All pertinent labs within the past 24 hours have been reviewed.    Significant Imaging: I have reviewed all pertinent imaging results/findings within the past 24 hours.

## 2018-03-31 NOTE — HOSPITAL COURSE
She was admitted for shortness of breath and observation.  She has shown increase in leukocytosis over a day associated with that upper quadrant tenderness worsening.  The surgical consultation was made.

## 2018-03-31 NOTE — CONSULTS
[facility] -   General Surgery  Consult Note    Patient Name: Yoana Pardo  MRN: [id number]  Code Status: Full Code  Admission Date: 3/29/2018  Hospital Length of Stay: 1 days  Attending Physician: Jo Ann Moon MD  Primary Care Provider: Lynn Wiggins MD    Patient information was obtained from patient, spouse/SO and ER records.     Consults  Subjective:     Principal Problem: Hypoxia    History of Present Illness: Yoana is 67-year-old -American female patient who is being seen at the request of the hospital medicine service for the evaluation of right upper quadrant abdominal pain.  According to the hospital medicine team, the patient has been admitted 2 days ago with no shortness of breath associated with the right upper quadrant discomfort.  Prior to the admission, the patient was determined to have gallstones and was supposed to be seen by me in my clinic for possible elective gallbladder procedure.  She denies of any fever except right upper quadrant tenderness without any nausea and vomiting episodes.    No current facility-administered medications on file prior to encounter.      Current Outpatient Prescriptions on File Prior to Encounter   Medication Sig    albuterol 90 mcg/actuation inhaler Inhale 1-2 puffs into the lungs every 6 (six) hours as needed for Wheezing (Cough).    aspirin (ECOTRIN) 81 MG EC tablet Take 81 mg by mouth once daily.    furosemide (LASIX) 20 MG tablet TAKE ONE TABLET BY MOUTH TWICE DAILY AS NEEDED    hydrocodone-acetaminophen 7.5-325mg (NORCO) 7.5-325 mg per tablet Take 1 tablet by mouth every 6 (six) hours as needed for Pain.    levocetirizine (XYZAL) 5 MG tablet Take 1 tablet (5 mg total) by mouth every evening.    losartan-hydrochlorothiazide 100-25 mg (HYZAAR) 100-25 mg per tablet Take 1 tablet by mouth once daily.    ondansetron (ZOFRAN-ODT) 4 MG TbDL Take 1 tablet (4 mg total) by mouth every 8 (eight) hours as needed (prn nausea).        Review of patient's allergies indicates:   Allergen Reactions    Bactrim [sulfamethoxazole-trimethoprim] Itching       Past Medical History:   Diagnosis Date    GERD (gastroesophageal reflux disease)     Hemorrhoids     History of positive PPD, untreated     Hx of cold sores     Hyperlipidemia     Hypertension     MVP (mitral valve prolapse)     Peripheral neuropathy     Pulmonary HTN     Dr Bailon    Sleep apnea     has  but not using CPAP     Past Surgical History:   Procedure Laterality Date    DILATION AND CURETTAGE OF UTERUS       Family History     Problem Relation (Age of Onset)    Aneurysm Sister    Diabetes Sister    Heart disease Mother, Father    Hypertension Father    Kidney disease Father    Obesity Mother, Father        Social History Main Topics    Smoking status: Never Smoker    Smokeless tobacco: Never Used    Alcohol use Yes      Comment: rarely    Drug use: No    Sexual activity: Not Currently     Review of Systems   Constitutional: Negative for chills and fever.   HENT: Negative for sore throat and trouble swallowing.    Eyes: Negative.    Respiratory: Negative for cough and shortness of breath.    Cardiovascular: Negative.    Gastrointestinal: Positive for abdominal pain. Negative for abdominal distention, nausea and vomiting.   Endocrine: Negative.    Genitourinary: Negative.    Musculoskeletal: Negative.    Skin: Negative.    Allergic/Immunologic: Negative.    Neurological: Negative.    Hematological: Does not bruise/bleed easily.   Psychiatric/Behavioral: Negative.      Objective:     Vital Signs (Most Recent):  Temp: 97.9 °F (36.6 °C) (03/31/18 0725)  Pulse: 64 (03/31/18 0725)  Resp: 18 (03/31/18 0725)  BP: (!) 153/72 (03/31/18 0725)  SpO2: 97 % (03/31/18 0800) Vital Signs (24h Range):  Temp:  [97.9 °F (36.6 °C)-100.6 °F (38.1 °C)] 97.9 °F (36.6 °C)  Pulse:  [64-82] 64  Resp:  [16-18] 18  SpO2:  [96 %-99 %] 97 %  BP: (150-165)/(72-83) 153/72     Weight: 108.6 kg  (239 lb 6.7 oz)  Body mass index is 46.76 kg/m².    Physical Exam   Constitutional: She is oriented to person, place, and time. She appears well-developed and well-nourished.   HENT:   Head: Normocephalic.   Right Ear: External ear normal.   Left Ear: External ear normal.   Nose: Nose normal.   Eyes: Pupils are equal, round, and reactive to light. No scleral icterus.   Neck: Normal range of motion. Neck supple. No thyromegaly present.   Cardiovascular: Normal rate, regular rhythm and normal heart sounds.    No murmur heard.  Pulmonary/Chest: Effort normal and breath sounds normal.   Abdominal: Soft. Bowel sounds are normal. She exhibits no distension. There is no tenderness. There is no guarding.   Minimal tenderness on the right upper quadrant.   Musculoskeletal: Normal range of motion.   Lymphadenopathy:     She has no cervical adenopathy.   Neurological: She is alert and oriented to person, place, and time.   Skin: Skin is warm and dry.       Significant Labs:  CBC:   Recent Labs  Lab 03/31/18  0500   WBC 13.32*   RBC 4.30   HGB 12.7   HCT 38.9      MCV 91   MCH 29.5   MCHC 32.6     CMP:   Recent Labs  Lab 03/29/18  1610  03/31/18  0500   *  < > 100   CALCIUM 8.8  < > 9.0   ALBUMIN 3.4*  < > 2.9*   PROT 7.8  --   --      < > 138   K 3.7  < > 3.4*   CO2 24  < > 26     < > 99   BUN 8  < > 11   CREATININE 0.7  < > 0.7   ALKPHOS 85  --   --    ALT 8*  --   --    AST 14  --   --    BILITOT 0.6  --   --    < > = values in this interval not displayed.    Significant Diagnostics:  I have reviewed and interpreted all pertinent imaging results/findings within the past 24 hours.         Assessment/Plan:     Cholelithiasis    The plan at this time is hydration and pain control.  If the condition worsens over time, we will proceed with emergent cholecystectomy.  However, if this situation is stable, the patient will be discharged and have her follow-up as an outpatient for an elective  cholecystectomy.  This plan was discussed with the patient.          VTE Risk Mitigation         Ordered     enoxaparin injection 40 mg  Daily     Route:  Subcutaneous        03/29/18 2307     IP VTE HIGH RISK PATIENT  Once      03/30/18 0837     Place sequential compression device  Until discontinued      03/30/18 0837          Thank you for your consult. I will follow-up with patient. Please contact us if you have any additional questions.    Evangelista Erickson MD  General Surgery  [facility] [place]

## 2018-03-31 NOTE — PROGRESS NOTES
[facility] Hospital Medicine  Progress Note    Patient Name: Yoana Pardo  MRN: [id number]  Patient Class: IP- Inpatient   Admission Date: 3/29/2018  Length of Stay: 1 days  Attending Physician: Jo Ann Moon MD  Primary Care Provider: Lynn Wiggins MD        Subjective:     Principal Problem:Hypoxia    HPI:  67F h/o cholelithiasis, pHTN and [place] presents with RUQ ab pain that started 3 days ago.  Intermittent pain to RUQ. Associated with n/v and sob.  US RUQ negative for acute cholecystitis.  Labs wnl.   O2 sat 83% on room air.  I spoke to ER physician and recommended giving patient lasix 40mg IV x 1 for pulmonary htn.  Afterwards, O2 sat improved to 89% on room air.  However, patient is still tachypneic and nauseated.  Hospital medicine was called for observation for hypoxia.   She presented 3 days ago, US RUQ was also negative for acute cholecystitis at that time.  No other exacerbating or alleviating factors.  She reports she has a follow up with general surgery for cholecystectomy on 4/2.       Hospital Course:  Patient was kept for OBS for hypoxia under the care of Hospital Medicine. CXR showed no acute process, Chest CTA showed CTA of the chest demonstrates no signs of pulmonary embolism. Some atelectatic changes are present in the lung bases. There is a large hiatal hernia. PCo2 50.5, PO2 42, HCO3 29.5  3/30: WBC count increased to 17K today  3/31/18: Procalcitonin mildly elevated and low grade temp of 100.6 evening of 3/30/18. Hida scanned discontinued by General Surgery who states if patient declines will proceed with cholecystectomy. Currently, RUQ abdominal pain controlled with hydrocodone, pt has no nausea or vomiting. Hypoxia continues and pt receiving IV lasix and supplemental oxygen - has pulmonary HTN with atelectasis - incentive spirometer ordered.         Review of Systems   Constitutional: Negative for chills, diaphoresis and fever.   HENT: Negative for congestion,  rhinorrhea and sore throat.    Eyes: Negative for pain and visual disturbance.   Respiratory: Positive for shortness of breath. Negative for cough and wheezing.    Cardiovascular: Negative for chest pain, palpitations and leg swelling.   Gastrointestinal: Positive for abdominal pain (Ruq). Negative for abdominal distention, blood in stool, constipation, diarrhea, nausea and vomiting.   Endocrine: Negative for polyphagia and polyuria.   Genitourinary: Negative for difficulty urinating, dysuria and hematuria.   Musculoskeletal: Negative for arthralgias, back pain and myalgias.   Skin: Negative for color change and wound.   Allergic/Immunologic: Negative.    Neurological: Negative for dizziness, tremors, syncope, weakness, light-headedness and headaches.   Hematological: Negative.    Psychiatric/Behavioral: Negative for agitation, behavioral problems, confusion and suicidal ideas.   All other systems reviewed and are negative.    Objective:     Vital Signs (Most Recent):  Temp: 98.6 °F (37 °C) (03/31/18 1626)  Pulse: 80 (03/31/18 1626)  Resp: 18 (03/31/18 1626)  BP: (!) 171/77 (03/31/18 1644)  SpO2: (!) 92 % (03/31/18 1626) Vital Signs (24h Range):  Temp:  [97.4 °F (36.3 °C)-100.6 °F (38.1 °C)] 98.6 °F (37 °C)  Pulse:  [64-82] 80  Resp:  [16-18] 18  SpO2:  [84 %-99 %] 92 %  BP: (150-186)/(72-84) 171/77     Weight: 108.6 kg (239 lb 6.7 oz)  Body mass index is 46.76 kg/m².    Intake/Output Summary (Last 24 hours) at 03/31/18 1701  Last data filed at 03/31/18 0433   Gross per 24 hour   Intake                0 ml   Output              250 ml   Net             -250 ml      Physical Exam   Constitutional: She is oriented to person, place, and time. She appears well-developed and well-nourished. No distress.   HENT:   Head: Normocephalic and atraumatic.   Eyes: Conjunctivae are normal.   Neck: Normal range of motion. Neck supple. No JVD present.   Cardiovascular: Normal rate, regular rhythm, normal heart sounds and intact  distal pulses.    No murmur heard.  Pulmonary/Chest: Effort normal. No respiratory distress. She has decreased breath sounds. She has no wheezes.   Abdominal: Soft. Bowel sounds are normal. She exhibits no distension and no mass. There is tenderness (mild tenderness of RUQ). There is no guarding.   Genitourinary:   Genitourinary Comments: deferred   Musculoskeletal: Normal range of motion. She exhibits no edema.   Neurological: She is alert and oriented to person, place, and time. No cranial nerve deficit. Coordination normal.   Skin: Skin is warm and dry. Capillary refill takes 2 to 3 seconds. No rash noted. She is not diaphoretic. No erythema.   Psychiatric: She has a normal mood and affect. Her behavior is normal.   Nursing note and vitals reviewed.      Significant Labs:   CBC:   Recent Labs  Lab 03/30/18  0608 03/31/18  0500   WBC 17.01* 13.32*   HGB 13.1 12.7   HCT 40.1 38.9    230     CMP:   Recent Labs  Lab 03/30/18  0608 03/31/18  0500    138   K 3.5 3.4*    99   CO2 31* 26   * 100   BUN 9 11   CREATININE 0.8 0.7   CALCIUM 8.9 9.0   ALBUMIN 3.1* 2.9*   ANIONGAP 8 13   EGFRNONAA >60 >60     All pertinent labs within the past 24 hours have been reviewed.    Significant Imaging: I have reviewed all pertinent imaging results/findings within the past 24 hours.    Assessment/Plan:      * Hypoxia    2/2 pulmonary htn +/- [place]  O2 sat 83% on room air  CXR negative for vascular congestion/acute process  H/o pulmonary HTN with last echo showing PASP 39mmHg  Is on lasix 20mg PO bid at home but hasn't taken since Sunday  O2 sat 89% afterwards  IV lasix  Monitor I/O  Will need outpatient sleep study            Leukocytosis    --likely 2/2 cholecystitis - IV Zosyn started 3/31/18  --BCX2 ordered  --Lactic & procalcitonin pending  --no fluids 2/2 pulm htn  General Surgery saw the pateint          Cholelithiasis    US RUQ negative for acute cholecystitis  zofran prn nausea  Percocet prn ab  pain  General Surgery saw the patient and will perform surgery if no improvement in patient  Will start IV Zosyn for presumed cholelithiasis            Essential hypertension    Continue home medications losartan 100mg daily,          Pulmonary HTN    Refer to hypoxia  Will need Pulmonology follow up            VTE Risk Mitigation         Ordered     enoxaparin injection 40 mg  Daily     Route:  Subcutaneous        03/29/18 2307     IP VTE HIGH RISK PATIENT  Once      03/30/18 0837     Place sequential compression device  Until discontinued      03/30/18 0837              Colleen Nielsen NP  Department of Hospital Medicine   [facility]

## 2018-03-31 NOTE — PLAN OF CARE
Problem: Patient Care Overview  Goal: Plan of Care Review  Outcome: Ongoing (interventions implemented as appropriate)  Plan of care discussed with patient AAOX4 vital signs  On o2 2l nc stable afebrile this am was febrile at 2000 100.6 highest free from falls npo after midnight for hital scan pain to right abd prn given urine dark juan will cont to monitor

## 2018-03-31 NOTE — PLAN OF CARE
Problem: Patient Care Overview  Goal: Individualization & Mutuality  Outcome: Ongoing (interventions implemented as appropriate)  Pt AAO x 4  SR on tele  Pain adequately controled with PRN  IVF at 75  Zosyn on board.  Pt remains afebrile   Vs remain stable  Fall precautions in place

## 2018-03-31 NOTE — ASSESSMENT & PLAN NOTE
--likely 2/2 cholecystitis - IV Zosyn started 3/31/18  --BCX2 ordered  --Lactic & procalcitonin pending  --no fluids 2/2 pulm htn  General Surgery saw the pateint

## 2018-03-31 NOTE — PROGRESS NOTES
Home Oxygen Evaluation    Date Performed: 3/31/2018    1) Patient's Home O2 Sat on room air, while at rest: 87        If O2 sats on room air at rest are 88% or below, patient qualifies. No additional testing needed. Document N/A in steps 2 and 3. If 89% or above, complete steps 2.      2) Patient's O2 Sat on room air while exercising: na        If O2 sats on room air while exercising remain 89% or above patient does not qualify, no further testing needed Document N/A in step 3. If O2 sats on room air while exercising are 88% or below, continue to step 3.      3) Patient's O2 Sat while exercising on O2: na at na LPM         (Must show improvement from #2 for patients to qualify)    If O2 sats improve on oxygen, patient qualifies for portable oxygen. If not, the patient does not qualify.

## 2018-03-31 NOTE — ASSESSMENT & PLAN NOTE
The plan at this time is hydration and pain control.  If the condition worsens over time, we will proceed with emergent cholecystectomy.  However, if this situation is stable, the patient will be discharged and have her follow-up as an outpatient for an elective cholecystectomy.  This plan was discussed with the patient.

## 2018-03-31 NOTE — ASSESSMENT & PLAN NOTE
US RUQ negative for acute cholecystitis  zofran prn nausea  Percocet prn ab pain  General Surgery saw the patient and will perform surgery if no improvement in patient  Will start IV Zosyn for presumed cholelithiasis

## 2018-04-01 PROBLEM — J98.11 ATELECTASIS OF BOTH LUNGS: Status: ACTIVE | Noted: 2018-04-01

## 2018-04-01 LAB
ALBUMIN SERPL BCP-MCNC: 2.6 G/DL
ALLENS TEST: ABNORMAL
ANION GAP SERPL CALC-SCNC: 9 MMOL/L
BASOPHILS # BLD AUTO: 0.01 K/UL
BASOPHILS NFR BLD: 0.1 %
BILIRUB UR QL STRIP: ABNORMAL
BUN SERPL-MCNC: 13 MG/DL
CALCIUM SERPL-MCNC: 8.8 MG/DL
CHLORIDE SERPL-SCNC: 96 MMOL/L
CLARITY UR: CLEAR
CO2 SERPL-SCNC: 34 MMOL/L
COLOR UR: YELLOW
CREAT SERPL-MCNC: 0.9 MG/DL
DELSYS: ABNORMAL
DIFFERENTIAL METHOD: ABNORMAL
EOSINOPHIL # BLD AUTO: 0.1 K/UL
EOSINOPHIL NFR BLD: 0.3 %
ERYTHROCYTE [DISTWIDTH] IN BLOOD BY AUTOMATED COUNT: 13.3 %
EST. GFR  (AFRICAN AMERICAN): >60 ML/MIN/1.73 M^2
EST. GFR  (NON AFRICAN AMERICAN): >60 ML/MIN/1.73 M^2
FIO2: 21
GLUCOSE SERPL-MCNC: 117 MG/DL
GLUCOSE UR QL STRIP: NEGATIVE
HCO3 UR-SCNC: 33 MMOL/L (ref 24–28)
HCT VFR BLD AUTO: 38 %
HGB BLD-MCNC: 12.1 G/DL
HGB UR QL STRIP: NEGATIVE
KETONES UR QL STRIP: NEGATIVE
LEUKOCYTE ESTERASE UR QL STRIP: NEGATIVE
LYMPHOCYTES # BLD AUTO: 1.5 K/UL
LYMPHOCYTES NFR BLD: 10.5 %
MAGNESIUM SERPL-MCNC: 1.8 MG/DL
MCH RBC QN AUTO: 29.2 PG
MCHC RBC AUTO-ENTMCNC: 31.8 G/DL
MCV RBC AUTO: 92 FL
MODE: ABNORMAL
MONOCYTES # BLD AUTO: 1.9 K/UL
MONOCYTES NFR BLD: 13.2 %
NEUTROPHILS # BLD AUTO: 10.9 K/UL
NEUTROPHILS NFR BLD: 75.9 %
NITRITE UR QL STRIP: NEGATIVE
PCO2 BLDA: 50 MMHG (ref 35–45)
PH SMN: 7.43 [PH] (ref 7.35–7.45)
PH UR STRIP: 6 [PH] (ref 5–8)
PHOSPHATE SERPL-MCNC: 3.3 MG/DL
PHOSPHATE SERPL-MCNC: 3.3 MG/DL
PLATELET # BLD AUTO: 290 K/UL
PMV BLD AUTO: 10.1 FL
PO2 BLDA: 45 MMHG (ref 80–100)
POC BE: 9 MMOL/L
POC SATURATED O2: 81 % (ref 95–100)
POTASSIUM SERPL-SCNC: 3.2 MMOL/L
PROT UR QL STRIP: ABNORMAL
RBC # BLD AUTO: 4.14 M/UL
SAMPLE: ABNORMAL
SITE: ABNORMAL
SODIUM SERPL-SCNC: 139 MMOL/L
SP GR UR STRIP: 1.02 (ref 1–1.03)
URN SPEC COLLECT METH UR: ABNORMAL
UROBILINOGEN UR STRIP-ACNC: ABNORMAL EU/DL
WBC # BLD AUTO: 14.36 K/UL

## 2018-04-01 PROCEDURE — 25000003 PHARM REV CODE 250: Performed by: NURSE PRACTITIONER

## 2018-04-01 PROCEDURE — 27000221 HC OXYGEN, UP TO 24 HOURS

## 2018-04-01 PROCEDURE — 80069 RENAL FUNCTION PANEL: CPT

## 2018-04-01 PROCEDURE — 25000242 PHARM REV CODE 250 ALT 637 W/ HCPCS: Performed by: NURSE PRACTITIONER

## 2018-04-01 PROCEDURE — 83735 ASSAY OF MAGNESIUM: CPT

## 2018-04-01 PROCEDURE — 94664 DEMO&/EVAL PT USE INHALER: CPT

## 2018-04-01 PROCEDURE — 99900035 HC TECH TIME PER 15 MIN (STAT)

## 2018-04-01 PROCEDURE — 27000173 HC ACAPELLA DEVICE DH OR DM

## 2018-04-01 PROCEDURE — 36415 COLL VENOUS BLD VENIPUNCTURE: CPT

## 2018-04-01 PROCEDURE — 94761 N-INVAS EAR/PLS OXIMETRY MLT: CPT

## 2018-04-01 PROCEDURE — 94640 AIRWAY INHALATION TREATMENT: CPT

## 2018-04-01 PROCEDURE — 94799 UNLISTED PULMONARY SVC/PX: CPT

## 2018-04-01 PROCEDURE — 25000003 PHARM REV CODE 250: Performed by: INTERNAL MEDICINE

## 2018-04-01 PROCEDURE — 63600175 PHARM REV CODE 636 W HCPCS: Performed by: NURSE PRACTITIONER

## 2018-04-01 PROCEDURE — 63600175 PHARM REV CODE 636 W HCPCS: Performed by: HOSPITALIST

## 2018-04-01 PROCEDURE — 82803 BLOOD GASES ANY COMBINATION: CPT

## 2018-04-01 PROCEDURE — 25000003 PHARM REV CODE 250: Performed by: HOSPITALIST

## 2018-04-01 PROCEDURE — 99223 1ST HOSP IP/OBS HIGH 75: CPT | Mod: ,,, | Performed by: INTERNAL MEDICINE

## 2018-04-01 PROCEDURE — 94760 N-INVAS EAR/PLS OXIMETRY 1: CPT

## 2018-04-01 PROCEDURE — 36600 WITHDRAWAL OF ARTERIAL BLOOD: CPT

## 2018-04-01 PROCEDURE — 21400001 HC TELEMETRY ROOM

## 2018-04-01 PROCEDURE — 99231 SBSQ HOSP IP/OBS SF/LOW 25: CPT | Mod: ,,, | Performed by: SURGERY

## 2018-04-01 PROCEDURE — 85025 COMPLETE CBC W/AUTO DIFF WBC: CPT

## 2018-04-01 PROCEDURE — 81003 URINALYSIS AUTO W/O SCOPE: CPT

## 2018-04-01 RX ORDER — ALBUTEROL SULFATE 2.5 MG/.5ML
2.5 SOLUTION RESPIRATORY (INHALATION) EVERY 6 HOURS
Status: DISCONTINUED | OUTPATIENT
Start: 2018-04-01 | End: 2018-04-05 | Stop reason: HOSPADM

## 2018-04-01 RX ORDER — POTASSIUM CHLORIDE 20 MEQ/1
40 TABLET, EXTENDED RELEASE ORAL 2 TIMES DAILY
Status: DISPENSED | OUTPATIENT
Start: 2018-04-01 | End: 2018-04-03

## 2018-04-01 RX ORDER — ACETAMINOPHEN 325 MG/1
650 TABLET ORAL EVERY 6 HOURS PRN
Status: DISCONTINUED | OUTPATIENT
Start: 2018-04-01 | End: 2018-04-02

## 2018-04-01 RX ORDER — ALBUTEROL SULFATE 2.5 MG/.5ML
2.5 SOLUTION RESPIRATORY (INHALATION) EVERY 6 HOURS PRN
Status: DISCONTINUED | OUTPATIENT
Start: 2018-04-01 | End: 2018-04-01

## 2018-04-01 RX ADMIN — PIPERACILLIN AND TAZOBACTAM 4.5 G: 4; .5 INJECTION, POWDER, LYOPHILIZED, FOR SOLUTION INTRAVENOUS; PARENTERAL at 04:04

## 2018-04-01 RX ADMIN — POTASSIUM CHLORIDE 40 MEQ: 1500 TABLET, EXTENDED RELEASE ORAL at 08:04

## 2018-04-01 RX ADMIN — HYDROCODONE BITARTRATE AND ACETAMINOPHEN 1 TABLET: 7.5; 325 TABLET ORAL at 09:04

## 2018-04-01 RX ADMIN — ASPIRIN 81 MG: 81 TABLET, COATED ORAL at 08:04

## 2018-04-01 RX ADMIN — POTASSIUM CHLORIDE 40 MEQ: 1500 TABLET, EXTENDED RELEASE ORAL at 10:04

## 2018-04-01 RX ADMIN — HYDROCODONE BITARTRATE AND ACETAMINOPHEN 1 TABLET: 7.5; 325 TABLET ORAL at 05:04

## 2018-04-01 RX ADMIN — LOSARTAN POTASSIUM 50 MG: 50 TABLET, FILM COATED ORAL at 08:04

## 2018-04-01 RX ADMIN — HYDROCODONE BITARTRATE AND ACETAMINOPHEN 1 TABLET: 7.5; 325 TABLET ORAL at 04:04

## 2018-04-01 RX ADMIN — ALBUTEROL SULFATE 2.5 MG: 2.5 SOLUTION RESPIRATORY (INHALATION) at 01:04

## 2018-04-01 RX ADMIN — PIPERACILLIN AND TAZOBACTAM 4.5 G: 4; .5 INJECTION, POWDER, LYOPHILIZED, FOR SOLUTION INTRAVENOUS; PARENTERAL at 08:04

## 2018-04-01 RX ADMIN — ALBUTEROL SULFATE 2.5 MG: 2.5 SOLUTION RESPIRATORY (INHALATION) at 08:04

## 2018-04-01 RX ADMIN — PIPERACILLIN AND TAZOBACTAM 4.5 G: 4; .5 INJECTION, POWDER, LYOPHILIZED, FOR SOLUTION INTRAVENOUS; PARENTERAL at 02:04

## 2018-04-01 RX ADMIN — ENOXAPARIN SODIUM 40 MG: 100 INJECTION SUBCUTANEOUS at 08:04

## 2018-04-01 NOTE — ASSESSMENT & PLAN NOTE
Likely Group 3: [place], Obesity related  Diuresis  Adherence with PAP  Bronchodilators  Office PFT

## 2018-04-01 NOTE — NURSING
Patient giving one time dose of Benadryl and Zosyn restarted at this time, per NP orders, explained to patient to notify nursing staff if she experiences any more symptoms or an increase in SOB. Patient and family verbalized understanding. Will continue to monitor.

## 2018-04-01 NOTE — HPI
67 year old female Ms Edvin Lees  Asked to see for Hypoxemia  Admitted 03/29 ER SpO2 83%. Had pain right side  Cholelithiasis.  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

## 2018-04-01 NOTE — ASSESSMENT & PLAN NOTE
2/2 pulmonary htn +/- [place]  O2 sat 83% on room air  CXR negative for vascular congestion/acute process  H/o pulmonary HTN with last echo showing PASP 39mmHg  Is on lasix 20mg PO bid at home but hasn't taken since Sunday  O2 sat 89% afterwards  IV lasix  Monitor I/O  Dr. Mcintosh saw the patient and CPAP ordered - she had sleep study already

## 2018-04-01 NOTE — SUBJECTIVE & OBJECTIVE
Review of Systems   Constitutional: Negative for chills, diaphoresis and fever.   HENT: Negative for congestion, rhinorrhea and sore throat.    Eyes: Negative for pain and visual disturbance.   Respiratory: Positive for shortness of breath. Negative for cough and wheezing.    Cardiovascular: Negative for chest pain, palpitations and leg swelling.   Gastrointestinal: Positive for abdominal pain (Ruq). Negative for abdominal distention, blood in stool, constipation, diarrhea, nausea and vomiting.   Endocrine: Negative for polyphagia and polyuria.   Genitourinary: Negative for difficulty urinating, dysuria and hematuria.   Musculoskeletal: Negative for arthralgias, back pain and myalgias.   Skin: Negative for color change and wound.   Allergic/Immunologic: Negative.    Neurological: Negative for dizziness, tremors, syncope, weakness, light-headedness and headaches.   Hematological: Negative.    Psychiatric/Behavioral: Negative for agitation, behavioral problems, confusion and suicidal ideas.   All other systems reviewed and are negative.    Objective:     Vital Signs (Most Recent):  Temp: 98.7 °F (37.1 °C) (04/01/18 1050)  Pulse: 70 (04/01/18 1306)  Resp: 18 (04/01/18 1306)  BP: 126/65 (04/01/18 1050)  SpO2: 98 % (04/01/18 1306) Vital Signs (24h Range):  Temp:  [97.6 °F (36.4 °C)-100.7 °F (38.2 °C)] 98.7 °F (37.1 °C)  Pulse:  [] 70  Resp:  [18-20] 18  SpO2:  [92 %-100 %] 98 %  BP: (126-186)/(56-84) 126/65     Weight: 108.6 kg (239 lb 6.7 oz)  Body mass index is 46.76 kg/m².    Intake/Output Summary (Last 24 hours) at 04/01/18 1426  Last data filed at 04/01/18 1100   Gross per 24 hour   Intake              800 ml   Output              700 ml   Net              100 ml      Physical Exam   Constitutional: She is oriented to person, place, and time. She appears well-developed and well-nourished. No distress.   HENT:   Head: Normocephalic and atraumatic.   Eyes: Conjunctivae are normal.   Neck: Normal range of  motion. Neck supple. No JVD present.   Cardiovascular: Normal rate, regular rhythm, normal heart sounds and intact distal pulses.    No murmur heard.  Pulmonary/Chest: Effort normal. No respiratory distress. She has decreased breath sounds. She has no wheezes.   Abdominal: Soft. Bowel sounds are normal. She exhibits no distension and no mass. There is tenderness (mild tenderness of RUQ). There is no guarding.   Genitourinary:   Genitourinary Comments: deferred   Musculoskeletal: Normal range of motion. She exhibits no edema.   Neurological: She is alert and oriented to person, place, and time. No cranial nerve deficit. Coordination normal.   Skin: Skin is warm and dry. Capillary refill takes 2 to 3 seconds. No rash noted. She is not diaphoretic. No erythema.   Psychiatric: She has a normal mood and affect. Her behavior is normal.   Nursing note and vitals reviewed.      Significant Labs:   CBC:   Recent Labs  Lab 03/31/18  0500 04/01/18  0346   WBC 13.32* 14.36*   HGB 12.7 12.1   HCT 38.9 38.0    290     CMP:   Recent Labs  Lab 03/31/18  0500 04/01/18  0345    139   K 3.4* 3.2*   CL 99 96   CO2 26 34*    117*   BUN 11 13   CREATININE 0.7 0.9   CALCIUM 9.0 8.8   ALBUMIN 2.9* 2.6*   ANIONGAP 13 9   EGFRNONAA >60 >60     All pertinent labs within the past 24 hours have been reviewed.    Significant Imaging: I have reviewed all pertinent imaging results/findings within the past 24 hours.

## 2018-04-01 NOTE — PLAN OF CARE
Problem: Patient Care Overview  Goal: Plan of Care Review  Outcome: Ongoing (interventions implemented as appropriate)  Fall precautions maintained. Pt free from falls/injuries  Pt repositions and ambulates with 1 assist  Pt has no c/o pain  Patient turns every 2 hours  POC and meds reviewed   Patient verbalized understanding  Side rails up x2. Bed Low and locked  Personal items and call light within reach  No signs/symptoms of acute distress  Chart check done. Will monitor

## 2018-04-01 NOTE — PLAN OF CARE
Problem: Patient Care Overview  Goal: Plan of Care Review  Outcome: Ongoing (interventions implemented as appropriate)  Patient stable. Plan of care reviewed. Patient verbalizes understanding. Patient continues to c/o R abd pain. IV abx administered as ordered. Bed low, wheels locked, bed alarm on, call light in reach. Patient instructed to call for assistance. Will continue to monitor.

## 2018-04-01 NOTE — CONSULTS
[facility] -   Critical Care Medicine  Consult Note    Patient Name: Yoana Pardo  MRN: [id number]  Admission Date: 3/29/2018  Hospital Length of Stay: 2 days  Code Status: Full Code  Attending Physician: Jo Ann Moon MD   Primary Care Provider: Lynn Wiggins MD   Principal Problem: Hypoxia      Subjective:     HPI:  67 year old female Ms Edvin Lees  Asked to see for Hypoxemia  Admitted 03/29 ER SpO2 83%. Had pain right side  Cholelithiasis.  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  PSG 10/2016: AHI 27.3/hr Severe [place], Never got CPAP  No prior PFT  Records reviewed SpO2 was normal until 03/27 when RUQ symptoms began  Reviewed Chest CXR and CTA      Hospital/ICU Course:  04/01: Instruction on Acapella and IS  Will order CPAP    Past Medical History:   Diagnosis Date    GERD (gastroesophageal reflux disease)     Hemorrhoids     History of positive PPD, untreated     Hx of cold sores     Hyperlipidemia     Hypertension     MVP (mitral valve prolapse)     Peripheral neuropathy     Pulmonary HTN     Dr Bailon    Sleep apnea     has  but not using CPAP       Past Surgical History:   Procedure Laterality Date    DILATION AND CURETTAGE OF UTERUS         Review of patient's allergies indicates:   Allergen Reactions    Bactrim [sulfamethoxazole-trimethoprim] Itching       Family History     Problem Relation (Age of Onset)    Aneurysm Sister    Diabetes Sister    Heart disease Mother, Father    Hypertension Father    Kidney disease Father    Obesity Mother, Father        Social History Main Topics    Smoking status: Never Smoker    Smokeless tobacco: Never Used    Alcohol use Yes      Comment: rarely    Drug use: No    Sexual activity: Not Currently         Review of Systems   Constitutional: Negative.    Eyes: Negative.    Respiratory: Positive for apnea and shortness of breath.    Cardiovascular: Negative.    Gastrointestinal: Positive for abdominal  pain.   Endocrine: Negative.    Genitourinary: Negative.    Musculoskeletal: Negative.    Allergic/Immunologic: Negative.    Neurological: Negative.    Hematological: Negative.    Psychiatric/Behavioral: Negative.      Objective:     Vital Signs (Most Recent):  Temp: 97.6 °F (36.4 °C) (04/01/18 0726)  Pulse: 77 (04/01/18 0906)  Resp: 18 (04/01/18 0726)  BP: (!) 128/56 (04/01/18 0726)  SpO2: 97 % (04/01/18 0800) Vital Signs (24h Range):  Temp:  [97.4 °F (36.3 °C)-100.7 °F (38.2 °C)] 97.6 °F (36.4 °C)  Pulse:  [] 77  Resp:  [18-19] 18  SpO2:  [84 %-100 %] 97 %  BP: (128-186)/(56-84) 128/56     Weight: 108.6 kg (239 lb 6.7 oz)  Body mass index is 46.76 kg/m².      Intake/Output Summary (Last 24 hours) at 04/01/18 0948  Last data filed at 04/01/18 0500   Gross per 24 hour   Intake              800 ml   Output              400 ml   Net              400 ml       Physical Exam   Cardiovascular: Normal rate and regular rhythm.    Pulmonary/Chest: Effort normal. No respiratory distress. She has no wheezes. She has no rales.   Nursing note and vitals reviewed.      Vents:  Oxygen Concentration (%): 28 (04/01/18 0800)    Lines/Drains/Airways     Peripheral Intravenous Line                 Peripheral IV - Single Lumen 04/01/18 Left Hand less than 1 day                Significant Labs:    CBC/Anemia Profile:    Recent Labs  Lab 03/31/18  0500 04/01/18  0346   WBC 13.32* 14.36*   HGB 12.7 12.1   HCT 38.9 38.0    290   MCV 91 92   RDW 13.4 13.3        Chemistries:    Recent Labs  Lab 03/31/18  0500 04/01/18  0345    139   K 3.4* 3.2*   CL 99 96   CO2 26 34*   BUN 11 13   CREATININE 0.7 0.9   CALCIUM 9.0 8.8   ALBUMIN 2.9* 2.6*   MG 2.0 1.8   PHOS 2.8  2.8 3.3  3.3       ABGs: No results for input(s): PH, PCO2, HCO3, POCSATURATED, BE in the last 48 hours.  All pertinent labs within the past 24 hours have been reviewed.    Significant Imaging:   I have reviewed all pertinent imaging results/findings within the  past 24 hours.  I have reviewed and interpreted all pertinent imaging results/findings within the past 24 hours.     PSG  REASONS FOR REFERRAL: Ms Pardo is a 66 year old female, referred for CPAP titration polysomnography by Elizabeth LeJeune, APRN, based on her home sleep apnea test of 9-27-16, which revealed Apnea + Hypopnea Index = 27.3 events / hr asleep, moderate  to severe obstructive sleep apnea / hypopnea syndrome (OSAHS). Note that the A + H I is only an estimated quantity as sleep was  not assessed in the HSAT, and an undetermined number of the scored respiratory events may not have occurred during sleep. CPAP  titration was recommended by the interpreting physician, Dr. Ernie Mcintosh, and Elizabeth LeJeune, APRN, ordered it. Ms LeJeune was the referring provider, and Dr. Josey Wiggins is the patient?s primary care physician.  STUDY PARAMETERS: This study involved analysis of the patient's sleep pattern while breathing was assisted. The study was  performed with a sleep technologist in attendance for the entire test period, with video monitoring throughout the study, and routine  laboratory clinical parameters recorded: NOTE: The polysomnography electrophysiological record for the patient has been reviewed in  its entirety by Dr. Leach.  SUMMARY STATEMENTS  DIAGNOSTIC IMPRESSIONS  G47.33 / 327.23 Moderate to Severe Obstructive Sleep Apnea, Adult (OSAHS)  F51.04 / 307.42 Psychophysiological Insomnia (stress - related and conditioned)  F51.12 / 307.44 Insufficient Sleep Syndrome  Z72.821 / V69.4 Inadequate Sleep Hygiene  G47.01 / 327.01 r/o Insomnia due to Medical Condition (pain, discomfort)  G25.81 / 333.99 r/o Restless Legs Syndrome (RLS)  G47.21 / 327.31 r/o Delayed Sleep - Wake Phase Disorder  PRIMARY TREATMENT RECOMMENDATIONS Treat, or refer to [facility].  1. The CPAP titration polysomnography revealed that 12 cm H2O pressure (C - Flex 3, humidity 2), the most effective pressure  most  completely tested, was optimal, as it reduced the rate of respiratory events 83 % to A + H Index = 4.4 events / hr asleep in 4.3 hrs  sleep (1.2 hrs REM sleep). 16 of 22 events were central sleep apneas, but most were post - arousal. Snoring was eliminated at  12 cm and 14 cm. Higher pressure (14 cm) was nearly adequate AHI =7.7), but lower pressures were ineffective. AutoCPAP (4  cm - 20 cm) could be tried if necessary.    ABD US  Multiple mobile gallstones are present with gallbladder wall thickening or biliary sludge present.        CHEST CTA  Findings: There is moderate enlargement of the heart.  The pulmonary arteries enhance well with no signs of pulmonary embolism.    There is a prominent hiatal hernia present posterior to the heart measuring up to 6.8 cm in diameter.  The mediastinum appears free of any suspicious mass or adenopathy.  There is atherosclerosis of the aorta and coronary vessels.    Some ground glass increased attenuation is seen within the right upper lobe diffusely.  Some minimal consolidation and atelectasis is seen in the right lung base with some atelectatic change along the major fissure on the left.  No pneumothorax or effusion is demonstrated.    Images of the upper abdomen again demonstrate a pattern gallstones with inflammation the gallbladder.  Impression          CTA of the chest demonstrates no signs of pulmonary embolism.  Some atelectatic changes are present in the lung bases.  There is a large hiatal hernia.        Assessment/Plan:     Pulmonary   * Hypoxia    RA ABG  Keep Sats>92%        Atelectasis of both lungs    Prophylaxis  Bronchodilators  Incentive spirometry          Pulmonary HTN    Likely Group 3: [place], Obesity related  Diuresis  Adherence with PAP  Bronchodilators  Office PFT          GI   Cholelithiasis    Timing of surgery per Dr Georgina Pinzon   [place] (obstructive sleep apnea)    [place]   DID not get CPAP  Ordered             I have reviewed all labs and imaging  studies and compared to previous results. I have also discussed labs with all the teams in the medical care of the patient and my plan is outlined below     Thank you for your consult. I will follow-up with patient. Please contact us if you have any additional questions.     Ernie Mcintosh MD  Critical Care Medicine  [facility]

## 2018-04-01 NOTE — PROGRESS NOTES
The patient was seen this morning and examined.  She has no particular complaints except moderate discomfortness in the right upper quadrant.  Her vital signs stable.  The labs indicates her white blood cell count is 14.6 which is slightly increased compared to the previous day.  She is Nothing by mouth.  We will continue with this regimen.  We will obtain nuclear medicine HIDA scan tomorrow to determine if she has an acute cholecystitis.  If she does, we will intervene surgically during this hospitalization.  This was discussed with the patient and her spouse.    Evangelista Erickson

## 2018-04-01 NOTE — ASSESSMENT & PLAN NOTE
Refer to hypoxia  Pulmonology saw the patient and he documents likely group 3 Pulmonary HTN - follow up in clinic

## 2018-04-01 NOTE — ASSESSMENT & PLAN NOTE
--likely 2/2 cholecystitis - IV Zosyn started 3/31/18  --BCX2 NGTD  --Lactic acid normal, procalcitonin slightly elevated  --no fluids 2/2 pulm htn  General Surgery saw the patient

## 2018-04-01 NOTE — ASSESSMENT & PLAN NOTE
US RUQ negative for acute cholecystitis  zofran prn nausea  Percocet prn ab pain  General Surgery saw the patient and will perform surgery if no improvement in patient - HIDA scan pending  Will start IV Zosyn for presumed cholelithiasis

## 2018-04-01 NOTE — PLAN OF CARE
Problem: Patient Care Overview  Goal: Plan of Care Review  Outcome: Ongoing (interventions implemented as appropriate)  Plan of care reviewed with patient. Patient stable. Aaox3. Patient free from falls fall precautions in place. Assist x 1 to the bathroom. Call be;; and belongings with in reach reminded to call for assistance. IV antibiotics. Prn Pain medication controlled pain, as no complaints of pain at this time. Turn independently in bed. NPO per orders. NSR on monitor. IV lasics. Potassium replacement. Will cont to monitor

## 2018-04-01 NOTE — HOSPITAL COURSE
04/01: Instruction on Acapella and IS. Will order CPAP    04/02: DoIng better.     04/03: ROBOTIC ALLI yesterday. Doing better. On oxygen supplementation at 1 L /min. START CPAP 12 CMWP QHS.     04/04: Doing well. Oxygen supplemtation at 4 L /min. Nocturnal CPAP.     04/05: Doing well. Exercise hypoxemia demonstrated on lelia O2 eval. Needs home oxygen supplementation. Nocturnal CPAP.

## 2018-04-01 NOTE — NURSING
Patient transferred to 4. Bedside report given.  at bedside. No signs of distress or complaints of pain.Wheeled up via wheel chiar

## 2018-04-01 NOTE — PROGRESS NOTES
[facility] Medicine  Progress Note    Patient Name: Yoana Pardo  MRN: [id number]  Patient Class: IP- Inpatient   Admission Date: 3/29/2018  Length of Stay: 2 days  Attending Physician: Jo Ann Moon MD  Primary Care Provider: Lynn Wiggins MD        Subjective:     Principal Problem:Hypoxia    HPI:  67F h/o cholelithiasis, pHTN and [place] presents with RUQ ab pain that started 3 days ago.  Intermittent pain to RUQ. Associated with n/v and sob.  US RUQ negative for acute cholecystitis.  Labs wnl.   O2 sat 83% on room air.  I spoke to ER physician and recommended giving patient lasix 40mg IV x 1 for pulmonary htn.  Afterwards, O2 sat improved to 89% on room air.  However, patient is still tachypneic and nauseated.  Hospital medicine was called for observation for hypoxia.   She presented 3 days ago, US RUQ was also negative for acute cholecystitis at that time.  No other exacerbating or alleviating factors.  She reports she has a follow up with general surgery for cholecystectomy on 4/2.       Hospital Course:  Patient was kept for OBS for hypoxia under the care of Hospital Medicine. CXR showed no acute process, Chest CTA showed CTA of the chest demonstrates no signs of pulmonary embolism. Some atelectatic changes are present in the lung bases. There is a large hiatal hernia. PCo2 50.5, PO2 42, HCO3 29.5  3/30: WBC count increased to 17K today  3/31/18: Procalcitonin mildly elevated and low grade temp of 100.6 evening of 3/30/18. Hida scanned discontinued by General Surgery who states if patient declines will proceed with cholecystectomy. Currently, RUQ abdominal pain controlled with hydrocodone, pt has no nausea or vomiting. Hypoxia continues and pt receiving IV lasix and supplemental oxygen - has pulmonary HTN with atelectasis - incentive spirometer ordered. IV Zosyn given.  4/1/18 - Pulmonology saw the patient and feels pulmonary HTN group 3 and sleep apnea not wearing CPAP at  night. CPAP was ordered by Dr. Mcintosh. Dr. Erickson reordered HIDA scan for Monday 4/1/2018 - pt NPO and pain meds on hold. She still has RUQ pain that is controlled with Norco. WBC count trending upward.  IS and Acapella in progress.         Review of Systems   Constitutional: Negative for chills, diaphoresis and fever.   HENT: Negative for congestion, rhinorrhea and sore throat.    Eyes: Negative for pain and visual disturbance.   Respiratory: Positive for shortness of breath. Negative for cough and wheezing.    Cardiovascular: Negative for chest pain, palpitations and leg swelling.   Gastrointestinal: Positive for abdominal pain (Ruq). Negative for abdominal distention, blood in stool, constipation, diarrhea, nausea and vomiting.   Endocrine: Negative for polyphagia and polyuria.   Genitourinary: Negative for difficulty urinating, dysuria and hematuria.   Musculoskeletal: Negative for arthralgias, back pain and myalgias.   Skin: Negative for color change and wound.   Allergic/Immunologic: Negative.    Neurological: Negative for dizziness, tremors, syncope, weakness, light-headedness and headaches.   Hematological: Negative.    Psychiatric/Behavioral: Negative for agitation, behavioral problems, confusion and suicidal ideas.   All other systems reviewed and are negative.    Objective:     Vital Signs (Most Recent):  Temp: 98.7 °F (37.1 °C) (04/01/18 1050)  Pulse: 70 (04/01/18 1306)  Resp: 18 (04/01/18 1306)  BP: 126/65 (04/01/18 1050)  SpO2: 98 % (04/01/18 1306) Vital Signs (24h Range):  Temp:  [97.6 °F (36.4 °C)-100.7 °F (38.2 °C)] 98.7 °F (37.1 °C)  Pulse:  [] 70  Resp:  [18-20] 18  SpO2:  [92 %-100 %] 98 %  BP: (126-186)/(56-84) 126/65     Weight: 108.6 kg (239 lb 6.7 oz)  Body mass index is 46.76 kg/m².    Intake/Output Summary (Last 24 hours) at 04/01/18 1426  Last data filed at 04/01/18 1100   Gross per 24 hour   Intake              800 ml   Output              700 ml   Net              100 ml       Physical Exam   Constitutional: She is oriented to person, place, and time. She appears well-developed and well-nourished. No distress.   HENT:   Head: Normocephalic and atraumatic.   Eyes: Conjunctivae are normal.   Neck: Normal range of motion. Neck supple. No JVD present.   Cardiovascular: Normal rate, regular rhythm, normal heart sounds and intact distal pulses.    No murmur heard.  Pulmonary/Chest: Effort normal. No respiratory distress. She has decreased breath sounds. She has no wheezes.   Abdominal: Soft. Bowel sounds are normal. She exhibits no distension and no mass. There is tenderness (mild tenderness of RUQ). There is no guarding.   Genitourinary:   Genitourinary Comments: deferred   Musculoskeletal: Normal range of motion. She exhibits no edema.   Neurological: She is alert and oriented to person, place, and time. No cranial nerve deficit. Coordination normal.   Skin: Skin is warm and dry. Capillary refill takes 2 to 3 seconds. No rash noted. She is not diaphoretic. No erythema.   Psychiatric: She has a normal mood and affect. Her behavior is normal.   Nursing note and vitals reviewed.      Significant Labs:   CBC:   Recent Labs  Lab 03/31/18  0500 04/01/18  0346   WBC 13.32* 14.36*   HGB 12.7 12.1   HCT 38.9 38.0    290     CMP:   Recent Labs  Lab 03/31/18  0500 04/01/18  0345    139   K 3.4* 3.2*   CL 99 96   CO2 26 34*    117*   BUN 11 13   CREATININE 0.7 0.9   CALCIUM 9.0 8.8   ALBUMIN 2.9* 2.6*   ANIONGAP 13 9   EGFRNONAA >60 >60     All pertinent labs within the past 24 hours have been reviewed.    Significant Imaging: I have reviewed all pertinent imaging results/findings within the past 24 hours.    Assessment/Plan:      * Hypoxia    2/2 pulmonary htn +/- [place]  O2 sat 83% on room air  CXR negative for vascular congestion/acute process  H/o pulmonary HTN with last echo showing PASP 39mmHg  Is on lasix 20mg PO bid at home but hasn't taken since Sunday  O2 sat 89%  afterwards  IV lasix  Monitor I/O  Dr. Mcintosh saw the patient and CPAP ordered - she had sleep study already            Atelectasis of both lungs    IS and Acapella          Leukocytosis    --likely 2/2 cholecystitis - IV Zosyn started 3/31/18  --BCX2 NGTD  --Lactic acid normal, procalcitonin slightly elevated  --no fluids 2/2 pulm htn  General Surgery saw the patient          Cholelithiasis    US RUQ negative for acute cholecystitis  zofran prn nausea  Percocet prn ab pain  General Surgery saw the patient and will perform surgery if no improvement in patient - HIDA scan pending  Will start IV Zosyn for presumed cholelithiasis            [place] (obstructive sleep apnea)              Essential hypertension    Continue home medications losartan 100mg daily,          Pulmonary HTN    Refer to hypoxia  Pulmonology saw the patient and he documents likely group 3 Pulmonary HTN - follow up in clinic            VTE Risk Mitigation         Ordered     enoxaparin injection 40 mg  Daily     Route:  Subcutaneous        03/29/18 2307     IP VTE HIGH RISK PATIENT  Once      03/30/18 0837     Place sequential compression device  Until discontinued      03/30/18 0837              Colleen Nielsen NP  Department of Hospital Medicine   [facility]

## 2018-04-01 NOTE — SUBJECTIVE & OBJECTIVE
Past Medical History:   Diagnosis Date    GERD (gastroesophageal reflux disease)     Hemorrhoids     History of positive PPD, untreated     Hx of cold sores     Hyperlipidemia     Hypertension     MVP (mitral valve prolapse)     Peripheral neuropathy     Pulmonary HTN     Dr Bailon    Sleep apnea     has  but not using CPAP       Past Surgical History:   Procedure Laterality Date    DILATION AND CURETTAGE OF UTERUS         Review of patient's allergies indicates:   Allergen Reactions    Bactrim [sulfamethoxazole-trimethoprim] Itching       Family History     Problem Relation (Age of Onset)    Aneurysm Sister    Diabetes Sister    Heart disease Mother, Father    Hypertension Father    Kidney disease Father    Obesity Mother, Father        Social History Main Topics    Smoking status: Never Smoker    Smokeless tobacco: Never Used    Alcohol use Yes      Comment: rarely    Drug use: No    Sexual activity: Not Currently         Review of Systems   Constitutional: Negative.    Eyes: Negative.    Respiratory: Positive for apnea and shortness of breath.    Cardiovascular: Negative.    Gastrointestinal: Positive for abdominal pain.   Endocrine: Negative.    Genitourinary: Negative.    Musculoskeletal: Negative.    Allergic/Immunologic: Negative.    Neurological: Negative.    Hematological: Negative.    Psychiatric/Behavioral: Negative.      Objective:     Vital Signs (Most Recent):  Temp: 97.6 °F (36.4 °C) (04/01/18 0726)  Pulse: 77 (04/01/18 0906)  Resp: 18 (04/01/18 0726)  BP: (!) 128/56 (04/01/18 0726)  SpO2: 97 % (04/01/18 0800) Vital Signs (24h Range):  Temp:  [97.4 °F (36.3 °C)-100.7 °F (38.2 °C)] 97.6 °F (36.4 °C)  Pulse:  [] 77  Resp:  [18-19] 18  SpO2:  [84 %-100 %] 97 %  BP: (128-186)/(56-84) 128/56     Weight: 108.6 kg (239 lb 6.7 oz)  Body mass index is 46.76 kg/m².      Intake/Output Summary (Last 24 hours) at 04/01/18 0948  Last data filed at 04/01/18 0500   Gross per 24 hour   Intake               800 ml   Output              400 ml   Net              400 ml       Physical Exam   Cardiovascular: Normal rate and regular rhythm.    Pulmonary/Chest: Effort normal. No respiratory distress. She has no wheezes. She has no rales.   Nursing note and vitals reviewed.      Vents:  Oxygen Concentration (%): 28 (04/01/18 0800)    Lines/Drains/Airways     Peripheral Intravenous Line                 Peripheral IV - Single Lumen 04/01/18 Left Hand less than 1 day                Significant Labs:    CBC/Anemia Profile:    Recent Labs  Lab 03/31/18  0500 04/01/18  0346   WBC 13.32* 14.36*   HGB 12.7 12.1   HCT 38.9 38.0    290   MCV 91 92   RDW 13.4 13.3        Chemistries:    Recent Labs  Lab 03/31/18  0500 04/01/18  0345    139   K 3.4* 3.2*   CL 99 96   CO2 26 34*   BUN 11 13   CREATININE 0.7 0.9   CALCIUM 9.0 8.8   ALBUMIN 2.9* 2.6*   MG 2.0 1.8   PHOS 2.8  2.8 3.3  3.3       ABGs: No results for input(s): PH, PCO2, HCO3, POCSATURATED, BE in the last 48 hours.  All pertinent labs within the past 24 hours have been reviewed.    Significant Imaging:   I have reviewed all pertinent imaging results/findings within the past 24 hours.  I have reviewed and interpreted all pertinent imaging results/findings within the past 24 hours.     PSG  REASONS FOR REFERRAL: Ms Pardo is a 66 year old female, referred for CPAP titration polysomnography by Elizabeth LeJeune, APRN, based on her home sleep apnea test of 9-27-16, which revealed Apnea + Hypopnea Index = 27.3 events / hr asleep, moderate  to severe obstructive sleep apnea / hypopnea syndrome (OSAHS). Note that the A + H I is only an estimated quantity as sleep was  not assessed in the HSAT, and an undetermined number of the scored respiratory events may not have occurred during sleep. CPAP  titration was recommended by the interpreting physician, Dr. Ernie Mcintosh, and Elizabeth LeJeune, APRN, ordered it. Ms  LeJeune was the referring provider,  and Dr. Josey Wiggins is the patient?s primary care physician.  STUDY PARAMETERS: This study involved analysis of the patient's sleep pattern while breathing was assisted. The study was  performed with a sleep technologist in attendance for the entire test period, with video monitoring throughout the study, and routine  laboratory clinical parameters recorded: NOTE: The polysomnography electrophysiological record for the patient has been reviewed in  its entirety by Dr. Leach.  SUMMARY STATEMENTS  DIAGNOSTIC IMPRESSIONS  G47.33 / 327.23 Moderate to Severe Obstructive Sleep Apnea, Adult (OSAHS)  F51.04 / 307.42 Psychophysiological Insomnia (stress - related and conditioned)  F51.12 / 307.44 Insufficient Sleep Syndrome  Z72.821 / V69.4 Inadequate Sleep Hygiene  G47.01 / 327.01 r/o Insomnia due to Medical Condition (pain, discomfort)  G25.81 / 333.99 r/o Restless Legs Syndrome (RLS)  G47.21 / 327.31 r/o Delayed Sleep - Wake Phase Disorder  PRIMARY TREATMENT RECOMMENDATIONS Treat, or refer to [facility].  1. The CPAP titration polysomnography revealed that 12 cm H2O pressure (C - Flex 3, humidity 2), the most effective pressure most  completely tested, was optimal, as it reduced the rate of respiratory events 83 % to A + H Index = 4.4 events / hr asleep in 4.3 hrs  sleep (1.2 hrs REM sleep). 16 of 22 events were central sleep apneas, but most were post - arousal. Snoring was eliminated at  12 cm and 14 cm. Higher pressure (14 cm) was nearly adequate AHI =7.7), but lower pressures were ineffective. AutoCPAP (4  cm - 20 cm) could be tried if necessary.    ABD US  Multiple mobile gallstones are present with gallbladder wall thickening or biliary sludge present.        CHEST CTA  Findings: There is moderate enlargement of the heart.  The pulmonary arteries enhance well with no signs of pulmonary embolism.    There is a prominent hiatal hernia present posterior to the heart measuring up to 6.8 cm in diameter.  The  mediastinum appears free of any suspicious mass or adenopathy.  There is atherosclerosis of the aorta and coronary vessels.    Some ground glass increased attenuation is seen within the right upper lobe diffusely.  Some minimal consolidation and atelectasis is seen in the right lung base with some atelectatic change along the major fissure on the left.  No pneumothorax or effusion is demonstrated.    Images of the upper abdomen again demonstrate a pattern gallstones with inflammation the gallbladder.  Impression          CTA of the chest demonstrates no signs of pulmonary embolism.  Some atelectatic changes are present in the lung bases.  There is a large hiatal hernia.

## 2018-04-01 NOTE — NURSING
Patient called c/o hives on both arms. Pt unsure if hives appeared before or after  Zosyn administration. Patient denies any other c/o, pt also denies worsening shortness of breath. Valentina BOJORQUEZ notified,NP stated she would discontinue hydralazine, order a one time dose of benadryl and continue the zosyn dose once pt receives benadryl. Will continue to monitor.

## 2018-04-02 ENCOUNTER — ANESTHESIA (OUTPATIENT)
Dept: SURGERY | Facility: HOSPITAL | Age: 68
DRG: 414 | End: 2018-04-02
Payer: MEDICARE

## 2018-04-02 ENCOUNTER — ANESTHESIA EVENT (OUTPATIENT)
Dept: SURGERY | Facility: HOSPITAL | Age: 68
DRG: 414 | End: 2018-04-02
Payer: MEDICARE

## 2018-04-02 PROBLEM — K81.0 ACUTE GANGRENOUS CHOLECYSTITIS: Status: ACTIVE | Noted: 2018-04-02

## 2018-04-02 LAB
ALBUMIN SERPL BCP-MCNC: 2.6 G/DL
ANION GAP SERPL CALC-SCNC: 13 MMOL/L
BASOPHILS # BLD AUTO: 0.02 K/UL
BASOPHILS NFR BLD: 0.2 %
BUN SERPL-MCNC: 16 MG/DL
CALCIUM SERPL-MCNC: 9.6 MG/DL
CHLORIDE SERPL-SCNC: 98 MMOL/L
CO2 SERPL-SCNC: 32 MMOL/L
CREAT SERPL-MCNC: 1 MG/DL
DIFFERENTIAL METHOD: ABNORMAL
EOSINOPHIL # BLD AUTO: 0.1 K/UL
EOSINOPHIL NFR BLD: 0.8 %
ERYTHROCYTE [DISTWIDTH] IN BLOOD BY AUTOMATED COUNT: 13.6 %
EST. GFR  (AFRICAN AMERICAN): >60 ML/MIN/1.73 M^2
EST. GFR  (NON AFRICAN AMERICAN): 59 ML/MIN/1.73 M^2
GLUCOSE SERPL-MCNC: 106 MG/DL
HCT VFR BLD AUTO: 39.9 %
HGB BLD-MCNC: 12.6 G/DL
LYMPHOCYTES # BLD AUTO: 2.1 K/UL
LYMPHOCYTES NFR BLD: 16.9 %
MAGNESIUM SERPL-MCNC: 2 MG/DL
MCH RBC QN AUTO: 29.4 PG
MCHC RBC AUTO-ENTMCNC: 31.6 G/DL
MCV RBC AUTO: 93 FL
MONOCYTES # BLD AUTO: 1.5 K/UL
MONOCYTES NFR BLD: 12.6 %
NEUTROPHILS # BLD AUTO: 8.4 K/UL
NEUTROPHILS NFR BLD: 69.5 %
PHOSPHATE SERPL-MCNC: 3.6 MG/DL
PHOSPHATE SERPL-MCNC: 3.6 MG/DL
PLATELET # BLD AUTO: 305 K/UL
PMV BLD AUTO: 9.6 FL
POTASSIUM SERPL-SCNC: 4 MMOL/L
RBC # BLD AUTO: 4.29 M/UL
SODIUM SERPL-SCNC: 143 MMOL/L
WBC # BLD AUTO: 12.13 K/UL

## 2018-04-02 PROCEDURE — 94761 N-INVAS EAR/PLS OXIMETRY MLT: CPT

## 2018-04-02 PROCEDURE — 71000033 HC RECOVERY, INTIAL HOUR: Performed by: SURGERY

## 2018-04-02 PROCEDURE — 37000008 HC ANESTHESIA 1ST 15 MINUTES: Performed by: SURGERY

## 2018-04-02 PROCEDURE — 94664 DEMO&/EVAL PT USE INHALER: CPT

## 2018-04-02 PROCEDURE — 63600175 PHARM REV CODE 636 W HCPCS: Performed by: NURSE ANESTHETIST, CERTIFIED REGISTERED

## 2018-04-02 PROCEDURE — 21400001 HC TELEMETRY ROOM

## 2018-04-02 PROCEDURE — 63600175 PHARM REV CODE 636 W HCPCS: Performed by: NURSE PRACTITIONER

## 2018-04-02 PROCEDURE — 36415 COLL VENOUS BLD VENIPUNCTURE: CPT

## 2018-04-02 PROCEDURE — 0FT40ZZ RESECTION OF GALLBLADDER, OPEN APPROACH: ICD-10-PCS | Performed by: SURGERY

## 2018-04-02 PROCEDURE — 63600175 PHARM REV CODE 636 W HCPCS: Performed by: SURGERY

## 2018-04-02 PROCEDURE — 25000003 PHARM REV CODE 250: Performed by: NURSE PRACTITIONER

## 2018-04-02 PROCEDURE — 80069 RENAL FUNCTION PANEL: CPT

## 2018-04-02 PROCEDURE — 88304 TISSUE EXAM BY PATHOLOGIST: CPT | Performed by: PATHOLOGY

## 2018-04-02 PROCEDURE — 63600175 PHARM REV CODE 636 W HCPCS: Performed by: EMERGENCY MEDICINE

## 2018-04-02 PROCEDURE — 71000039 HC RECOVERY, EACH ADD'L HOUR: Performed by: SURGERY

## 2018-04-02 PROCEDURE — 94799 UNLISTED PULMONARY SVC/PX: CPT

## 2018-04-02 PROCEDURE — 25000003 PHARM REV CODE 250: Performed by: EMERGENCY MEDICINE

## 2018-04-02 PROCEDURE — S0030 INJECTION, METRONIDAZOLE: HCPCS | Performed by: SURGERY

## 2018-04-02 PROCEDURE — 36000710: Performed by: SURGERY

## 2018-04-02 PROCEDURE — 25000003 PHARM REV CODE 250: Performed by: NURSE ANESTHETIST, CERTIFIED REGISTERED

## 2018-04-02 PROCEDURE — 27201423 OPTIME MED/SURG SUP & DEVICES STERILE SUPPLY: Performed by: SURGERY

## 2018-04-02 PROCEDURE — 94640 AIRWAY INHALATION TREATMENT: CPT

## 2018-04-02 PROCEDURE — 99900035 HC TECH TIME PER 15 MIN (STAT)

## 2018-04-02 PROCEDURE — 37000009 HC ANESTHESIA EA ADD 15 MINS: Performed by: SURGERY

## 2018-04-02 PROCEDURE — 25000003 PHARM REV CODE 250: Performed by: SURGERY

## 2018-04-02 PROCEDURE — 63600175 PHARM REV CODE 636 W HCPCS: Performed by: INTERNAL MEDICINE

## 2018-04-02 PROCEDURE — 36000711: Performed by: SURGERY

## 2018-04-02 PROCEDURE — 25000242 PHARM REV CODE 250 ALT 637 W/ HCPCS: Performed by: NURSE PRACTITIONER

## 2018-04-02 PROCEDURE — 63600175 PHARM REV CODE 636 W HCPCS: Performed by: HOSPITALIST

## 2018-04-02 PROCEDURE — 47563 LAPARO CHOLECYSTECTOMY/GRAPH: CPT | Mod: ,,, | Performed by: SURGERY

## 2018-04-02 PROCEDURE — 8E0W0CZ ROBOTIC ASSISTED PROCEDURE OF TRUNK REGION, OPEN APPROACH: ICD-10-PCS | Performed by: SURGERY

## 2018-04-02 PROCEDURE — 83735 ASSAY OF MAGNESIUM: CPT

## 2018-04-02 PROCEDURE — C1729 CATH, DRAINAGE: HCPCS | Performed by: SURGERY

## 2018-04-02 PROCEDURE — 63600175 PHARM REV CODE 636 W HCPCS: Performed by: ANESTHESIOLOGY

## 2018-04-02 PROCEDURE — 27000221 HC OXYGEN, UP TO 24 HOURS

## 2018-04-02 PROCEDURE — 88304 TISSUE EXAM BY PATHOLOGIST: CPT | Mod: 26,,, | Performed by: PATHOLOGY

## 2018-04-02 PROCEDURE — 85025 COMPLETE CBC W/AUTO DIFF WBC: CPT

## 2018-04-02 PROCEDURE — 99233 SBSQ HOSP IP/OBS HIGH 50: CPT | Mod: ,,, | Performed by: INTERNAL MEDICINE

## 2018-04-02 PROCEDURE — S0020 INJECTION, BUPIVICAINE HYDRO: HCPCS | Performed by: SURGERY

## 2018-04-02 RX ORDER — SODIUM CHLORIDE, SODIUM LACTATE, POTASSIUM CHLORIDE, CALCIUM CHLORIDE 600; 310; 30; 20 MG/100ML; MG/100ML; MG/100ML; MG/100ML
INJECTION, SOLUTION INTRAVENOUS CONTINUOUS
Status: DISCONTINUED | OUTPATIENT
Start: 2018-04-02 | End: 2018-04-04

## 2018-04-02 RX ORDER — PROMETHAZINE HYDROCHLORIDE 25 MG/1
25 SUPPOSITORY RECTAL EVERY 6 HOURS PRN
Status: DISCONTINUED | OUTPATIENT
Start: 2018-04-02 | End: 2018-04-05 | Stop reason: HOSPADM

## 2018-04-02 RX ORDER — HYDROCODONE BITARTRATE AND ACETAMINOPHEN 5; 325 MG/1; MG/1
1 TABLET ORAL
Status: DISCONTINUED | OUTPATIENT
Start: 2018-04-02 | End: 2018-04-02 | Stop reason: HOSPADM

## 2018-04-02 RX ORDER — RAMELTEON 8 MG/1
8 TABLET ORAL NIGHTLY PRN
Status: DISCONTINUED | OUTPATIENT
Start: 2018-04-02 | End: 2018-04-05 | Stop reason: HOSPADM

## 2018-04-02 RX ORDER — MIDAZOLAM HYDROCHLORIDE 1 MG/ML
INJECTION, SOLUTION INTRAMUSCULAR; INTRAVENOUS
Status: DISCONTINUED | OUTPATIENT
Start: 2018-04-02 | End: 2018-04-02

## 2018-04-02 RX ORDER — PHENYLEPHRINE HYDROCHLORIDE 10 MG/ML
INJECTION INTRAVENOUS
Status: DISCONTINUED | OUTPATIENT
Start: 2018-04-02 | End: 2018-04-02

## 2018-04-02 RX ORDER — HYDROCODONE BITARTRATE AND ACETAMINOPHEN 5; 325 MG/1; MG/1
1 TABLET ORAL EVERY 4 HOURS PRN
Status: DISCONTINUED | OUTPATIENT
Start: 2018-04-02 | End: 2018-04-05 | Stop reason: HOSPADM

## 2018-04-02 RX ORDER — CEFAZOLIN SODIUM 2 G/50ML
2 SOLUTION INTRAVENOUS
Status: COMPLETED | OUTPATIENT
Start: 2018-04-02 | End: 2018-04-03

## 2018-04-02 RX ORDER — DIPHENHYDRAMINE HYDROCHLORIDE 50 MG/ML
25 INJECTION INTRAMUSCULAR; INTRAVENOUS EVERY 4 HOURS PRN
Status: DISCONTINUED | OUTPATIENT
Start: 2018-04-02 | End: 2018-04-05 | Stop reason: HOSPADM

## 2018-04-02 RX ORDER — MEPERIDINE HYDROCHLORIDE 50 MG/ML
12.5 INJECTION INTRAMUSCULAR; INTRAVENOUS; SUBCUTANEOUS ONCE AS NEEDED
Status: COMPLETED | OUTPATIENT
Start: 2018-04-02 | End: 2018-04-02

## 2018-04-02 RX ORDER — ROCURONIUM BROMIDE 10 MG/ML
INJECTION, SOLUTION INTRAVENOUS
Status: DISCONTINUED | OUTPATIENT
Start: 2018-04-02 | End: 2018-04-02

## 2018-04-02 RX ORDER — ONDANSETRON 8 MG/1
8 TABLET, ORALLY DISINTEGRATING ORAL EVERY 8 HOURS PRN
Status: DISCONTINUED | OUTPATIENT
Start: 2018-04-02 | End: 2018-04-05 | Stop reason: HOSPADM

## 2018-04-02 RX ORDER — ACETAMINOPHEN 325 MG/1
650 TABLET ORAL EVERY 6 HOURS PRN
Status: DISCONTINUED | OUTPATIENT
Start: 2018-04-02 | End: 2018-04-05 | Stop reason: HOSPADM

## 2018-04-02 RX ORDER — ACETAMINOPHEN 10 MG/ML
INJECTION, SOLUTION INTRAVENOUS
Status: DISCONTINUED | OUTPATIENT
Start: 2018-04-02 | End: 2018-04-02

## 2018-04-02 RX ORDER — CLONIDINE HYDROCHLORIDE 0.1 MG/1
0.1 TABLET ORAL EVERY 6 HOURS PRN
Status: DISCONTINUED | OUTPATIENT
Start: 2018-04-02 | End: 2018-04-05 | Stop reason: HOSPADM

## 2018-04-02 RX ORDER — CHLORHEXIDINE GLUCONATE ORAL RINSE 1.2 MG/ML
10 SOLUTION DENTAL 2 TIMES DAILY
Status: DISCONTINUED | OUTPATIENT
Start: 2018-04-02 | End: 2018-04-05 | Stop reason: HOSPADM

## 2018-04-02 RX ORDER — METRONIDAZOLE 500 MG/100ML
500 INJECTION, SOLUTION INTRAVENOUS
Status: COMPLETED | OUTPATIENT
Start: 2018-04-02 | End: 2018-04-03

## 2018-04-02 RX ORDER — FENTANYL CITRATE 50 UG/ML
25 INJECTION, SOLUTION INTRAMUSCULAR; INTRAVENOUS EVERY 5 MIN PRN
Status: DISCONTINUED | OUTPATIENT
Start: 2018-04-02 | End: 2018-04-02 | Stop reason: HOSPADM

## 2018-04-02 RX ORDER — ONDANSETRON 2 MG/ML
4 INJECTION INTRAMUSCULAR; INTRAVENOUS DAILY PRN
Status: DISCONTINUED | OUTPATIENT
Start: 2018-04-02 | End: 2018-04-02 | Stop reason: HOSPADM

## 2018-04-02 RX ORDER — BUPIVACAINE HYDROCHLORIDE 5 MG/ML
INJECTION, SOLUTION EPIDURAL; INTRACAUDAL
Status: DISCONTINUED | OUTPATIENT
Start: 2018-04-02 | End: 2018-04-02 | Stop reason: HOSPADM

## 2018-04-02 RX ORDER — GLYCOPYRROLATE 0.2 MG/ML
INJECTION INTRAMUSCULAR; INTRAVENOUS
Status: DISCONTINUED | OUTPATIENT
Start: 2018-04-02 | End: 2018-04-02

## 2018-04-02 RX ORDER — INDOCYANINE GREEN AND WATER 25 MG
2.5 KIT INJECTION ONCE
Status: COMPLETED | OUTPATIENT
Start: 2018-04-02 | End: 2018-04-02

## 2018-04-02 RX ORDER — FENTANYL CITRATE 50 UG/ML
INJECTION, SOLUTION INTRAMUSCULAR; INTRAVENOUS
Status: DISCONTINUED | OUTPATIENT
Start: 2018-04-02 | End: 2018-04-02

## 2018-04-02 RX ORDER — DIPHENHYDRAMINE HYDROCHLORIDE 50 MG/ML
25 INJECTION INTRAMUSCULAR; INTRAVENOUS EVERY 6 HOURS PRN
Status: DISCONTINUED | OUTPATIENT
Start: 2018-04-02 | End: 2018-04-02 | Stop reason: HOSPADM

## 2018-04-02 RX ORDER — LIDOCAINE HYDROCHLORIDE 10 MG/ML
INJECTION INFILTRATION; PERINEURAL
Status: DISCONTINUED | OUTPATIENT
Start: 2018-04-02 | End: 2018-04-02

## 2018-04-02 RX ORDER — LACTULOSE 10 G/15ML
20 SOLUTION ORAL EVERY 6 HOURS PRN
Status: DISCONTINUED | OUTPATIENT
Start: 2018-04-02 | End: 2018-04-05 | Stop reason: HOSPADM

## 2018-04-02 RX ORDER — HYDROMORPHONE HYDROCHLORIDE 1 MG/ML
0.5 INJECTION, SOLUTION INTRAMUSCULAR; INTRAVENOUS; SUBCUTANEOUS
Status: DISCONTINUED | OUTPATIENT
Start: 2018-04-02 | End: 2018-04-05 | Stop reason: HOSPADM

## 2018-04-02 RX ORDER — BISACODYL 10 MG
10 SUPPOSITORY, RECTAL RECTAL DAILY PRN
Status: DISCONTINUED | OUTPATIENT
Start: 2018-04-02 | End: 2018-04-05 | Stop reason: HOSPADM

## 2018-04-02 RX ORDER — NEOSTIGMINE METHYLSULFATE 1 MG/ML
INJECTION, SOLUTION INTRAVENOUS
Status: DISCONTINUED | OUTPATIENT
Start: 2018-04-02 | End: 2018-04-02

## 2018-04-02 RX ORDER — AMOXICILLIN 250 MG
1 CAPSULE ORAL 2 TIMES DAILY
Status: DISCONTINUED | OUTPATIENT
Start: 2018-04-02 | End: 2018-04-05 | Stop reason: HOSPADM

## 2018-04-02 RX ORDER — PROPOFOL 10 MG/ML
VIAL (ML) INTRAVENOUS
Status: DISCONTINUED | OUTPATIENT
Start: 2018-04-02 | End: 2018-04-02

## 2018-04-02 RX ORDER — SODIUM CHLORIDE, SODIUM LACTATE, POTASSIUM CHLORIDE, CALCIUM CHLORIDE 600; 310; 30; 20 MG/100ML; MG/100ML; MG/100ML; MG/100ML
INJECTION, SOLUTION INTRAVENOUS CONTINUOUS PRN
Status: DISCONTINUED | OUTPATIENT
Start: 2018-04-02 | End: 2018-04-02

## 2018-04-02 RX ADMIN — PROPOFOL 200 MG: 10 INJECTION, EMULSION INTRAVENOUS at 12:04

## 2018-04-02 RX ADMIN — FENTANYL CITRATE 50 MCG: 50 INJECTION, SOLUTION INTRAMUSCULAR; INTRAVENOUS at 03:04

## 2018-04-02 RX ADMIN — FENTANYL CITRATE 50 MCG: 50 INJECTION, SOLUTION INTRAMUSCULAR; INTRAVENOUS at 02:04

## 2018-04-02 RX ADMIN — ALBUTEROL SULFATE 2.5 MG: 2.5 SOLUTION RESPIRATORY (INHALATION) at 07:04

## 2018-04-02 RX ADMIN — HYDROCODONE BITARTRATE AND ACETAMINOPHEN 1 TABLET: 5; 325 TABLET ORAL at 11:04

## 2018-04-02 RX ADMIN — MIDAZOLAM 2 MG: 1 INJECTION INTRAMUSCULAR; INTRAVENOUS at 12:04

## 2018-04-02 RX ADMIN — PHENYLEPHRINE HYDROCHLORIDE 100 MCG: 10 INJECTION INTRAVENOUS at 01:04

## 2018-04-02 RX ADMIN — NEOSTIGMINE METHYLSULFATE 5 MG: 1 INJECTION INTRAVENOUS at 02:04

## 2018-04-02 RX ADMIN — PROMETHAZINE HYDROCHLORIDE 6.25 MG: 25 INJECTION INTRAMUSCULAR; INTRAVENOUS at 04:04

## 2018-04-02 RX ADMIN — STANDARDIZED SENNA CONCENTRATE AND DOCUSATE SODIUM 1 TABLET: 8.6; 5 TABLET, FILM COATED ORAL at 09:04

## 2018-04-02 RX ADMIN — FENTANYL CITRATE 25 MCG: 50 INJECTION, SOLUTION INTRAMUSCULAR; INTRAVENOUS at 03:04

## 2018-04-02 RX ADMIN — PIPERACILLIN AND TAZOBACTAM 4.5 G: 4; .5 INJECTION, POWDER, LYOPHILIZED, FOR SOLUTION INTRAVENOUS; PARENTERAL at 09:04

## 2018-04-02 RX ADMIN — SODIUM CHLORIDE, SODIUM LACTATE, POTASSIUM CHLORIDE, AND CALCIUM CHLORIDE: 600; 310; 30; 20 INJECTION, SOLUTION INTRAVENOUS at 12:04

## 2018-04-02 RX ADMIN — SODIUM CHLORIDE, SODIUM LACTATE, POTASSIUM CHLORIDE, AND CALCIUM CHLORIDE: 600; 310; 30; 20 INJECTION, SOLUTION INTRAVENOUS at 02:04

## 2018-04-02 RX ADMIN — ROBINUL 0.8 MG: 0.2 INJECTION INTRAMUSCULAR; INTRAVENOUS at 02:04

## 2018-04-02 RX ADMIN — ONDANSETRON 4 MG: 2 INJECTION, SOLUTION INTRAMUSCULAR; INTRAVENOUS at 01:04

## 2018-04-02 RX ADMIN — INDOCYANINE GREEN 2.5 MG: KIT INTRAVENOUS at 01:04

## 2018-04-02 RX ADMIN — ROBINUL 0.2 MG: 0.2 INJECTION INTRAMUSCULAR; INTRAVENOUS at 01:04

## 2018-04-02 RX ADMIN — PIPERACILLIN AND TAZOBACTAM 4.5 G: 4; .5 INJECTION, POWDER, LYOPHILIZED, FOR SOLUTION INTRAVENOUS; PARENTERAL at 05:04

## 2018-04-02 RX ADMIN — METRONIDAZOLE 500 MG: 500 INJECTION, SOLUTION INTRAVENOUS at 05:04

## 2018-04-02 RX ADMIN — POTASSIUM CHLORIDE 40 MEQ: 1500 TABLET, EXTENDED RELEASE ORAL at 08:04

## 2018-04-02 RX ADMIN — CEFAZOLIN SODIUM 2 G: 2 SOLUTION INTRAVENOUS at 06:04

## 2018-04-02 RX ADMIN — FUROSEMIDE 40 MG: 10 INJECTION, SOLUTION INTRAMUSCULAR; INTRAVENOUS at 08:04

## 2018-04-02 RX ADMIN — FENTANYL CITRATE 100 MCG: 50 INJECTION, SOLUTION INTRAMUSCULAR; INTRAVENOUS at 12:04

## 2018-04-02 RX ADMIN — CHLORHEXIDINE GLUCONATE 10 ML: 1.2 RINSE ORAL at 09:04

## 2018-04-02 RX ADMIN — PIPERACILLIN AND TAZOBACTAM 4.5 G: 4; .5 INJECTION, POWDER, LYOPHILIZED, FOR SOLUTION INTRAVENOUS; PARENTERAL at 01:04

## 2018-04-02 RX ADMIN — PHENYLEPHRINE HYDROCHLORIDE 100 MCG: 10 INJECTION INTRAVENOUS at 02:04

## 2018-04-02 RX ADMIN — LIDOCAINE HYDROCHLORIDE 100 MG: 10 INJECTION, SOLUTION INFILTRATION; PERINEURAL at 12:04

## 2018-04-02 RX ADMIN — MEPERIDINE HYDROCHLORIDE 12.5 MG: 50 INJECTION INTRAMUSCULAR; INTRAVENOUS; SUBCUTANEOUS at 03:04

## 2018-04-02 RX ADMIN — HYDROCODONE BITARTRATE AND ACETAMINOPHEN 1 TABLET: 5; 325 TABLET ORAL at 06:04

## 2018-04-02 RX ADMIN — LOSARTAN POTASSIUM 50 MG: 50 TABLET, FILM COATED ORAL at 08:04

## 2018-04-02 RX ADMIN — ACETAMINOPHEN 1000 MG: 10 INJECTION, SOLUTION INTRAVENOUS at 01:04

## 2018-04-02 RX ADMIN — SODIUM CHLORIDE, POTASSIUM CHLORIDE, SODIUM LACTATE AND CALCIUM CHLORIDE: 600; 310; 30; 20 INJECTION, SOLUTION INTRAVENOUS at 04:04

## 2018-04-02 RX ADMIN — ROCURONIUM BROMIDE 50 MG: 10 INJECTION, SOLUTION INTRAVENOUS at 12:04

## 2018-04-02 NOTE — PLAN OF CARE
Problem: Patient Care Overview  Goal: Plan of Care Review  Outcome: Ongoing (interventions implemented as appropriate)  Pt had a lap sharmila today. Pt has a shaina drain. Clear liquid diet. Family at .

## 2018-04-02 NOTE — SUBJECTIVE & OBJECTIVE
Interval History: Seen and examined at bedside. Hospital chart reviewed. No acute interval detrimental events noted. She reports that she  has moderately improved. HIDA scan today      Review of Systems   Constitutional: Negative for malaise/fatigue and weight loss.   HENT: Negative for ear discharge and ear pain.    Eyes: Negative for photophobia and pain.   Respiratory: Positive for shortness of breath. Negative for hemoptysis.    Cardiovascular: Negative for chest pain and palpitations.   Gastrointestinal: Negative for diarrhea and nausea.   Genitourinary: Negative for dysuria and urgency.   Musculoskeletal: Negative for myalgias and neck pain.   Skin: Negative for rash.   Neurological: Negative for tremors and headaches.   Endo/Heme/Allergies: Negative for environmental allergies.   Psychiatric/Behavioral: Negative for hallucinations and substance abuse.     Objective:     Vital Signs (Most Recent):  Temp: 98.5 °F (36.9 °C) (04/02/18 0728)  Pulse: 73 (04/02/18 0728)  Resp: 16 (04/02/18 0728)  BP: (!) 158/92 (04/02/18 0728)  SpO2: 100 % (04/02/18 0728) Vital Signs (24h Range):  Temp:  [98.3 °F (36.8 °C)-99.6 °F (37.6 °C)] 98.5 °F (36.9 °C)  Pulse:  [65-82] 73  Resp:  [15-20] 16  SpO2:  [94 %-100 %] 100 %  BP: (126-158)/(60-92) 158/92     Weight: 108.6 kg (239 lb 6.7 oz)  Body mass index is 46.76 kg/m².      Intake/Output Summary (Last 24 hours) at 04/02/18 0915  Last data filed at 04/02/18 0800   Gross per 24 hour   Intake              300 ml   Output              600 ml   Net             -300 ml       Physical Exam   Constitutional: She is oriented to person, place, and time. She appears well-developed and well-nourished.   HENT:   Head: Normocephalic.   Right Ear: External ear normal.   Left Ear: External ear normal.   Nose: Nose normal.   Eyes: Pupils are equal, round, and reactive to light. No scleral icterus.   Neck: Normal range of motion. Neck supple. No thyromegaly present.   Cardiovascular: Normal rate,  regular rhythm and normal heart sounds.    No murmur heard.  Pulmonary/Chest: Effort normal and breath sounds normal.   Abdominal: Soft. Bowel sounds are normal. She exhibits no distension. There is no tenderness. There is no guarding.   Musculoskeletal: Normal range of motion.   Lymphadenopathy:     She has no cervical adenopathy.   Neurological: She is alert and oriented to person, place, and time.   Skin: Skin is warm and dry.       Vents:  Oxygen Concentration (%): 28 (04/02/18 0712)    Lines/Drains/Airways     Peripheral Intravenous Line                 Peripheral IV - Single Lumen 04/01/18 Left Hand 1 day                Significant Labs:    CBC/Anemia Profile:    Recent Labs  Lab 04/01/18  0346 04/02/18  0424   WBC 14.36* 12.13   HGB 12.1 12.6   HCT 38.0 39.9    305   MCV 92 93   RDW 13.3 13.6        Chemistries:    Recent Labs  Lab 04/01/18  0345 04/02/18  0424    143   K 3.2* 4.0   CL 96 98   CO2 34* 32*   BUN 13 16   CREATININE 0.9 1.0   CALCIUM 8.8 9.6   ALBUMIN 2.6* 2.6*   MG 1.8 2.0   PHOS 3.3  3.3 3.6  3.6           Significant Imaging:    CTA Chest Non-Coronary - PE Study       There is moderate enlargement of the heart.  The pulmonary arteries enhance well with no signs of pulmonary embolism.    There is a prominent hiatal hernia present posterior to the heart measuring up to 6.8 cm in diameter.  The mediastinum appears free of any suspicious mass or adenopathy.  There is atherosclerosis of the aorta and coronary vessels.    Some ground glass increased attenuation is seen within the right upper lobe diffusely.  Some minimal consolidation and atelectasis is seen in the right lung base with some atelectatic change along the major fissure on the left.  No pneumothorax or effusion is demonstrated.    Images of the upper abdomen again demonstrate a pattern gallstones with inflammation the gallbladder.

## 2018-04-02 NOTE — ASSESSMENT & PLAN NOTE
MPAP FROM PASP:    mPAP = 0.6146.56  + 2 = 30.4 mmHg.    Mild pulmonary hypertension at best likely secondary to hypoxia.     Recommend diuresis. Repeat ECHO after appropriate diuresis.   Treat [place] with nocturnal PAP.

## 2018-04-02 NOTE — CONSULTS
Reattempted to see patient for wound care consult. Patient is now in surgery. Discussed with primary nurse APRIL Vincent RN.  Recommend paint heels with cavilon and float off mattress.  Will follow up tomorrow.

## 2018-04-02 NOTE — PLAN OF CARE
Problem: Patient Care Overview  Goal: Plan of Care Review  Outcome: Ongoing (interventions implemented as appropriate)  Fall prevention/precaution in place. Call bell and personal belongings within reach. Patient doing well this shift.  IVF and antibiotics infusing as ordered.  Vital signs stable.  Pain in right upper abdomen comes and goes, no narcotic in use d/t expected HIDA scan in am.  No acute distress noted.  Will continue to monitor. 24 hour chart check performed.

## 2018-04-02 NOTE — ASSESSMENT & PLAN NOTE
US RUQ negative for acute cholecystitis  Will start IV Zosyn for presumed cholelithiasis  zofran prn nausea  Percocet prn ab pain  General Surgery saw the patient and will perform surgery if no improvement in patient - HIDA scan showed Nonvisualization of the gallbladder consistent with cystic duct obstruction.  This is consistent with patient's diagnosis of acute cholecystitis.  CHOLECYSTECTOMY planned for today

## 2018-04-02 NOTE — ASSESSMENT & PLAN NOTE
2/2 pulmonary htn +/- [place]  O2 sat 83% on room air  CXR negative for vascular congestion/acute process  H/o pulmonary HTN with last echo showing PASP 39mmHg  Is on lasix 20mg PO bid at home but hasn't taken since Sunday  O2 sat 89% afterwards  Supplemental oxygen   IV lasix  Monitor I/O  Dr. Mcintosh saw the patient and CPAP ordered - she had sleep study already

## 2018-04-02 NOTE — SUBJECTIVE & OBJECTIVE
Interval History: No acute events overnight. Cholecystectomy planned for today. General surgery following.      Review of Systems   Constitutional: Negative for chills, diaphoresis and fever.   HENT: Negative for congestion, rhinorrhea and sore throat.    Eyes: Negative for pain and visual disturbance.   Respiratory: Positive for shortness of breath. Negative for cough and wheezing.    Cardiovascular: Negative for chest pain, palpitations and leg swelling.   Gastrointestinal: Positive for abdominal pain (Ruq). Negative for abdominal distention, blood in stool, constipation, diarrhea, nausea and vomiting.   Endocrine: Negative for polyphagia and polyuria.   Genitourinary: Negative for difficulty urinating, dysuria and hematuria.   Musculoskeletal: Negative for arthralgias, back pain and myalgias.   Skin: Negative for color change and wound.   Allergic/Immunologic: Negative.    Neurological: Negative for dizziness, tremors, syncope, weakness, light-headedness and headaches.   Hematological: Negative.    Psychiatric/Behavioral: Negative for agitation, behavioral problems, confusion and suicidal ideas.   All other systems reviewed and are negative.    Objective:     Vital Signs (Most Recent):  Temp: 98.2 °F (36.8 °C) (04/02/18 1504)  Pulse: 84 (04/02/18 1515)  Resp: 17 (04/02/18 1515)  BP: (!) 101/54 (04/02/18 1515)  SpO2: 100 % (04/02/18 1515) Vital Signs (24h Range):  Temp:  [98.2 °F (36.8 °C)-99.6 °F (37.6 °C)] 98.2 °F (36.8 °C)  Pulse:  [65-91] 84  Resp:  [11-18] 17  SpO2:  [94 %-100 %] 100 %  BP: (101-158)/(51-92) 101/54     Weight: 108.6 kg (239 lb 6.7 oz)  Body mass index is 46.76 kg/m².    Intake/Output Summary (Last 24 hours) at 04/02/18 1521  Last data filed at 04/02/18 1445   Gross per 24 hour   Intake             1400 ml   Output              500 ml   Net              900 ml      Physical Exam   Constitutional: She is oriented to person, place, and time. She appears well-developed and well-nourished. No  distress.   HENT:   Head: Normocephalic and atraumatic.   Eyes: Conjunctivae are normal.   Neck: Normal range of motion. Neck supple. No JVD present.   Cardiovascular: Normal rate, regular rhythm, normal heart sounds and intact distal pulses.    No murmur heard.  Pulmonary/Chest: Effort normal. No respiratory distress. She has decreased breath sounds. She has no wheezes.   Abdominal: Soft. Bowel sounds are normal. She exhibits no distension and no mass. There is tenderness (mild tenderness of RUQ). There is no guarding.   Genitourinary:   Genitourinary Comments: deferred   Musculoskeletal: Normal range of motion. She exhibits no edema.   Neurological: She is alert and oriented to person, place, and time. No cranial nerve deficit. Coordination normal.   Skin: Skin is warm and dry. Capillary refill takes 2 to 3 seconds. No rash noted. She is not diaphoretic. No erythema.   Psychiatric: She has a normal mood and affect. Her behavior is normal.   Nursing note and vitals reviewed.      Significant Labs:   BMP:   Recent Labs  Lab 04/02/18  0424         K 4.0   CL 98   CO2 32*   BUN 16   CREATININE 1.0   CALCIUM 9.6   MG 2.0     CBC:   Recent Labs  Lab 04/01/18  0346 04/02/18  0424   WBC 14.36* 12.13   HGB 12.1 12.6   HCT 38.0 39.9    305     CMP:   Recent Labs  Lab 04/01/18  0345 04/02/18  0424    143   K 3.2* 4.0   CL 96 98   CO2 34* 32*   * 106   BUN 13 16   CREATININE 0.9 1.0   CALCIUM 8.8 9.6   ALBUMIN 2.6* 2.6*   ANIONGAP 9 13   EGFRNONAA >60 59*     All pertinent labs within the past 24 hours have been reviewed.    Significant Imaging:   Imaging Results          CTA Chest Non-Coronary - PE Study (Final result)  Result time 03/29/18 18:47:01    Final result by Derek Cowart MD (03/29/18 18:47:01)                 Impression:         CTA of the chest demonstrates no signs of pulmonary embolism.  Some atelectatic changes are present in the lung bases.  There is a large hiatal  hernia.        All CT scans at this facility use dose modulation, iterative reconstruction and/or weight based dosing when appropriate to reduce radiation dose to as low as reasonably achievable.       Electronically signed by: VINNY TILLMAN MD  Date:     03/29/18  Time:    18:47              Narrative:    Procedure: CTA CHEST NON CORONARY, Thursday, March 29, 2018    Clinical history: Shortness of breath. Dyspnea, cardiac origin suspected     Technique: Standard CTA of the chest performed with 100 cc Omnipaque 350 utilizing 3-D MIP reformations.  Comparison is made with previous studies including recent chest x-ray.    Findings: There is moderate enlargement of the heart.  The pulmonary arteries enhance well with no signs of pulmonary embolism.    There is a prominent hiatal hernia present posterior to the heart measuring up to 6.8 cm in diameter.  The mediastinum appears free of any suspicious mass or adenopathy.  There is atherosclerosis of the aorta and coronary vessels.    Some ground glass increased attenuation is seen within the right upper lobe diffusely.  Some minimal consolidation and atelectasis is seen in the right lung base with some atelectatic change along the major fissure on the left.  No pneumothorax or effusion is demonstrated.    Images of the upper abdomen again demonstrate a pattern gallstones with inflammation the gallbladder.                             US Abdomen Limited (Gallbladder) (Final result)  Result time 03/29/18 17:28:32    Final result by Vinny Tillman MD (03/29/18 17:28:32)                 Impression:      Multiple mobile gallstones are present with gallbladder wall thickening or biliary sludge present.      Electronically signed by: VINNY TILLMAN MD  Date:     03/29/18  Time:    17:28              Narrative:    Exam: US ABDOMEN LIMITED,    Date:  03/29/18 16:47:00    History: Abdominal pain, right upper quadrant.    Technique: Standard technique including Doppler imaging  was utilized.    Comparison:  Comparison is made with previous studies including CT scan of 03/26/2018    Findings: The liver is normal in size and configuration with no focal lesion demonstrated.  The gallbladder is distended and contains biliary sludge as well as shadowing, mobile stones.  Alves sign is negative.  Gallbladder wall measures up to 0.36 cm.    The common bile duct measures are 0.56 cm.    Right kidney measures 9.7 cm appears free of focal abnormality.  The main portal vein has hepatofugal flow.  The aorta and inferior vena cava as visualized on these images appear free of acute abnormality.                             X-Ray Chest PA And Lateral (Final result)  Result time 03/29/18 16:44:21    Final result by Woody Cooper MD (03/29/18 16:44:21)                 Impression:       No acute process seen.      Electronically signed by: WOODY COOPER MD  Date:     03/29/18  Time:    16:44              Narrative:    Clinical Data:Shortness of breath    Comparison:  3/26/18    Findings:  Two view of the chest.   Study is limited by poor inspiration.    Cardiac silhouette is normal.  The lungs demonstrate no evidence of active disease.  No evidence of pleural effusion or pneumothorax.  Bones demonstrates moderate spondylosis of the thoracic spine. Moderate hiatal hernia seen within the posterior mediastinum

## 2018-04-02 NOTE — PROGRESS NOTES
Discussed possible med reaction on sat with pharmacist. Hydralazine and zosyn was given in that time frame. jacob hassan crna and dr kendal abebe aware of rash and possible drugs involved. Hydralazine d/c'd and pt given subsequent doses of zosyn with benadryl without any further increase in symptoms. No additional ordres at this time.

## 2018-04-02 NOTE — PROGRESS NOTES
[facility] -   Pulmonology  Progress Note    Patient Name: Yoana Pardo  MRN: [id number]  Admission Date: 3/29/2018  Hospital Length of Stay: 3 days  Code Status: Full Code  Attending Provider: Jo Ann Moon MD  Primary Care Provider: Lynn Wiggins MD   Principal Problem: Hypoxia    Subjective:     Interval History: Seen and examined at bedside. Hospital chart reviewed. No acute interval detrimental events noted. She reports that she  has moderately improved. HIDA scan today      Review of Systems   Constitutional: Negative for malaise/fatigue and weight loss.   HENT: Negative for ear discharge and ear pain.    Eyes: Negative for photophobia and pain.   Respiratory: Positive for shortness of breath. Negative for hemoptysis.    Cardiovascular: Negative for chest pain and palpitations.   Gastrointestinal: Negative for diarrhea and nausea.   Genitourinary: Negative for dysuria and urgency.   Musculoskeletal: Negative for myalgias and neck pain.   Skin: Negative for rash.   Neurological: Negative for tremors and headaches.   Endo/Heme/Allergies: Negative for environmental allergies.   Psychiatric/Behavioral: Negative for hallucinations and substance abuse.     Objective:     Vital Signs (Most Recent):  Temp: 98.5 °F (36.9 °C) (04/02/18 0728)  Pulse: 73 (04/02/18 0728)  Resp: 16 (04/02/18 0728)  BP: (!) 158/92 (04/02/18 0728)  SpO2: 100 % (04/02/18 0728) Vital Signs (24h Range):  Temp:  [98.3 °F (36.8 °C)-99.6 °F (37.6 °C)] 98.5 °F (36.9 °C)  Pulse:  [65-82] 73  Resp:  [15-20] 16  SpO2:  [94 %-100 %] 100 %  BP: (126-158)/(60-92) 158/92     Weight: 108.6 kg (239 lb 6.7 oz)  Body mass index is 46.76 kg/m².      Intake/Output Summary (Last 24 hours) at 04/02/18 0915  Last data filed at 04/02/18 0800   Gross per 24 hour   Intake              300 ml   Output              600 ml   Net             -300 ml       Physical Exam   Constitutional: She is oriented to person, place, and time. She appears  well-developed and well-nourished.   HENT:   Head: Normocephalic.   Right Ear: External ear normal.   Left Ear: External ear normal.   Nose: Nose normal.   Eyes: Pupils are equal, round, and reactive to light. No scleral icterus.   Neck: Normal range of motion. Neck supple. No thyromegaly present.   Cardiovascular: Normal rate, regular rhythm and normal heart sounds.    No murmur heard.  Pulmonary/Chest: Effort normal and breath sounds normal.   Abdominal: Soft. Bowel sounds are normal. She exhibits no distension. There is no tenderness. There is no guarding.   Musculoskeletal: Normal range of motion.   Lymphadenopathy:     She has no cervical adenopathy.   Neurological: She is alert and oriented to person, place, and time.   Skin: Skin is warm and dry.       Vents:  Oxygen Concentration (%): 28 (04/02/18 0712)    Lines/Drains/Airways     Peripheral Intravenous Line                 Peripheral IV - Single Lumen 04/01/18 Left Hand 1 day                Significant Labs:    CBC/Anemia Profile:    Recent Labs  Lab 04/01/18  0346 04/02/18  0424   WBC 14.36* 12.13   HGB 12.1 12.6   HCT 38.0 39.9    305   MCV 92 93   RDW 13.3 13.6        Chemistries:    Recent Labs  Lab 04/01/18  0345 04/02/18  0424    143   K 3.2* 4.0   CL 96 98   CO2 34* 32*   BUN 13 16   CREATININE 0.9 1.0   CALCIUM 8.8 9.6   ALBUMIN 2.6* 2.6*   MG 1.8 2.0   PHOS 3.3  3.3 3.6  3.6           Significant Imaging:    CTA Chest Non-Coronary - PE Study       There is moderate enlargement of the heart.  The pulmonary arteries enhance well with no signs of pulmonary embolism.    There is a prominent hiatal hernia present posterior to the heart measuring up to 6.8 cm in diameter.  The mediastinum appears free of any suspicious mass or adenopathy.  There is atherosclerosis of the aorta and coronary vessels.    Some ground glass increased attenuation is seen within the right upper lobe diffusely.  Some minimal consolidation and atelectasis is seen  in the right lung base with some atelectatic change along the major fissure on the left.  No pneumothorax or effusion is demonstrated.    Images of the upper abdomen again demonstrate a pattern gallstones with inflammation the gallbladder.      Assessment/Plan:     * Hypoxia    Supplement oxygenation. Keep SAO2 >= 92%. BIPAP 10/5 QHS and PRN. Bronchodilators per orders        Atelectasis of both lungs    Lung expansion maneuvers: IS with teaching;  ACAPELLA with bronchodilator treatments.           Cholelithiasis    Timing of surgery per Dr Erickson        [place] (obstructive sleep apnea)    PAP therapy: CPAP 12 CMWP QHS.        Morbid obesity with BMI of 40.0-44.9, adult    Weight loss encouraged.         Pulmonary HTN    MPAP FROM PASP:    mPAP = 0.6146.56  + 2 = 30.4 mmHg.    Mild pulmonary hypertension at best likely secondary to hypoxia.     Recommend diuresis. Repeat ECHO after appropriate diuresis.   Treat [place] with nocturnal PAP.               Counseling/Consultation: I discussed the case with primary care team, I discussed the patient's condition/prognosis with the family. Thank you for allowing me to participate in this patients care  We will continue to follow with you.     Emmanuel Dee MD  Pulmonology  [facility] -

## 2018-04-02 NOTE — CONSULTS
Attempted to see patient for wound care consult due to boggy heels.  Patient off unit for testing.  Will revisit later this morning.

## 2018-04-02 NOTE — ASSESSMENT & PLAN NOTE
Refer to hypoxia  Pulmonology saw the patient and he documents likely group 3 Pulmonary HTN - follow up in clinic  IV Lasix

## 2018-04-02 NOTE — PLAN OF CARE
Ambulated without difficulty in room from bed to stretcher. Red rash to bilateral arms noted. No rash noted anywhere else. Pt states rash is reaction to med given on telemetry. Unsure which medication it was. Pt also allergic to bactrim. Report received from santiago castano rn and order added to chart ok to transport off unit without telemetry monitor. Pt ready for surgery.

## 2018-04-02 NOTE — TRANSFER OF CARE
Anesthesia Transfer of Care Note    Patient: Yoana Pardo    Procedure(s) Performed: Procedure(s) (LRB):  [id number] (N/A)    Patient location: PACU    Anesthesia Type: MAC    Transport from OR: Transported from OR on room air with adequate spontaneous ventilation    Post pain: adequate analgesia    Post assessment: no apparent anesthetic complications and tolerated procedure well    Post vital signs: stable    Level of consciousness: awake, alert and oriented    Nausea/Vomiting: no nausea/vomiting    Complications: none    Transfer of care protocol was followed      Last vitals:   Visit Vitals  BP (!) 158/92 (BP Location: Right arm, Patient Position: Lying)   Pulse 73   Temp 36.9 °C (98.5 °F) (Oral)   Resp 16   Ht 5' (1.524 m)   Wt 108.6 kg (239 lb 6.7 oz)   SpO2 100%   Breastfeeding? No   BMI 46.76 kg/m²

## 2018-04-02 NOTE — ASSESSMENT & PLAN NOTE
--likely 2/2 cholecystitis - IV Zosyn started 3/31/18  --BCX2 NGTD  --Lactic acid normal, procalcitonin slightly elevated  --no fluids 2/2 pulm htn  General Surgery saw the patient  -Resolved

## 2018-04-02 NOTE — OP NOTE
"Operative Note       SURGERY DATE:  04/02/2018    PRE-OP DIAGNOSIS:  Calculus of gallbladder with chronic cholecystitis without obstruction [K80.10]    POST-OP DIAGNOSIS:  Calculus of gallbladder with chronic cholecystitis without obstruction [K80.10]   Gall bladder empyema    Active Hospital Problems    Diagnosis  POA    *Hypoxia [R09.02]  Yes    Atelectasis of both lungs [J98.11]  Yes    Leukocytosis [D72.829]  Yes    Cholelithiasis [K80.20]  Yes    NIDIANA (obstructive sleep apnea) [G47.33]  Yes    Morbid obesity with BMI of 40.0-44.9, adult [E66.01, Z68.41]  Not Applicable    Essential hypertension [I10]  Yes     Chronic    Pulmonary HTN [I27.20]  Yes      Resolved Hospital Problems    Diagnosis Date Resolved POA   No resolved problems to display.       Procedure(s) (LRB):  [id number] (N/A) with cholangiogram.    Surgeon(s) and Role:     * Evangelista Erickson MD - Primary    ASSISTANTS: None  ANESTHESIA: General    FINDINGS: The gall bladder was acutely inflamed and necrotic particularly at the proximal cystic duct off of the fundus where a large stone was impacted.     ESTIMATED BLOOD LOSS: 100 mL              COMPLICATIONS:  None    SPECIMEN:  gallbladder    Implants: Drain    INDICATION:  Acutely inflamed gall bladder with known stones.    DESCRIPTION OF PROCEDURE:    The patient was taken to the operating room and underwent general anesthesia with orotracheal intubation. Once the patient was sleep, a "time-out" was called, the patient's ID, the site of surgery, the names of participants, and the planned surgery were confirmed prior to making the incision. A Verres needle was inserted on the left upper quadrant, and achieved pneumoperitoneum. Once gained access, insufflation was achieved up to 15 mm Hg. Then four 8 mm metal trocars were positioned in a line on the abdomen toward the gall bladder. The patient was then positioned in reverse trendelenburg position, and docking was completed. The gall " bladder was retracted cephalad, and the Calot's triangle was exposed. With the indocyanine Green 2.5 mg injected prior to the surgery, the biliary tree was visualized. There was no filling defects in the biliary tree, and visualized the cystic duct. The cystic duct and artery were identified, and clipped twice distally and single proximally with Hemolock clips. The structures divided, and then gall bladder was then mobilized off the fossa. The gall bladder was then removed from the peritoneal cavity after placing in a 5 mm bag. With the help of Firefly, there was no evidence of bile leakage at the operative field. The port sites were all injected with 0.25% Marcaine and wound closed in the usual fashion with 4-0 Vicryl. The incisions were dressed with steristrips and band-aids. The patient was later awakened and extubated.           CONDITION: Good    DISPOSITION: PACU - hemodynamically stable.     Evangelista Erickson

## 2018-04-02 NOTE — PROGRESS NOTES
[facility] Hospital Medicine  Progress Note    Patient Name: Yoana Pardo  MRN: [id number]  Patient Class: IP- Inpatient   Admission Date: 3/29/2018  Length of Stay: 3 days  Attending Physician: Jo Ann Moon MD  Primary Care Provider: Lynn Wiggins MD        Subjective:     Principal Problem:Hypoxia    HPI:  67F h/o cholelithiasis, pHTN and [place] presents with RUQ ab pain that started 3 days ago.  Intermittent pain to RUQ. Associated with n/v and sob.  US RUQ negative for acute cholecystitis.  Labs wnl.   O2 sat 83% on room air.  I spoke to ER physician and recommended giving patient lasix 40mg IV x 1 for pulmonary htn.  Afterwards, O2 sat improved to 89% on room air.  However, patient is still tachypneic and nauseated.  Hospital medicine was called for observation for hypoxia.   She presented 3 days ago, US RUQ was also negative for acute cholecystitis at that time.  No other exacerbating or alleviating factors.  She reports she has a follow up with general surgery for cholecystectomy on 4/2.       Hospital Course:  Patient was kept for OBS for hypoxia under the care of Hospital Medicine. CXR showed no acute process, Chest CTA showed CTA of the chest demonstrates no signs of pulmonary embolism. Some atelectatic changes are present in the lung bases. There is a large hiatal hernia. PCo2 50.5, PO2 42, HCO3 29.5  3/30: WBC count increased to 17K today  3/31/18: Procalcitonin mildly elevated and low grade temp of 100.6 evening of 3/30/18. Hida scanned discontinued by General Surgery who states if patient declines will proceed with cholecystectomy. Currently, RUQ abdominal pain controlled with hydrocodone, pt has no nausea or vomiting. Hypoxia continues and pt receiving IV lasix and supplemental oxygen - has pulmonary HTN with atelectasis - incentive spirometer ordered. IV Zosyn given.  4/1/18 - Pulmonology saw the patient and feels pulmonary HTN group 3 and sleep apnea not wearing CPAP at  night. CPAP was ordered by Dr. Mcintosh. Dr. Erickson reordered HIDA scan for Monday 4/1/2018 - pt NPO and pain meds on hold. She still has RUQ pain that is controlled with Norco. WBC count trending upward.  IS and Acapella in progress.   4/2/18-HIDA scan showed Nonvisualization of the gallbladder consistent with cystic duct obstruction.This is consistent with patient's diagnosis of acute cholecystitis. Patient will undergo cholecystectomy today per Dr. Erickson.    Interval History: No acute events overnight. Cholecystectomy planned for today. General surgery following.      Review of Systems   Constitutional: Negative for chills, diaphoresis and fever.   HENT: Negative for congestion, rhinorrhea and sore throat.    Eyes: Negative for pain and visual disturbance.   Respiratory: Positive for shortness of breath. Negative for cough and wheezing.    Cardiovascular: Negative for chest pain, palpitations and leg swelling.   Gastrointestinal: Positive for abdominal pain (Ruq). Negative for abdominal distention, blood in stool, constipation, diarrhea, nausea and vomiting.   Endocrine: Negative for polyphagia and polyuria.   Genitourinary: Negative for difficulty urinating, dysuria and hematuria.   Musculoskeletal: Negative for arthralgias, back pain and myalgias.   Skin: Negative for color change and wound.   Allergic/Immunologic: Negative.    Neurological: Negative for dizziness, tremors, syncope, weakness, light-headedness and headaches.   Hematological: Negative.    Psychiatric/Behavioral: Negative for agitation, behavioral problems, confusion and suicidal ideas.   All other systems reviewed and are negative.    Objective:     Vital Signs (Most Recent):  Temp: 98.2 °F (36.8 °C) (04/02/18 1504)  Pulse: 84 (04/02/18 1515)  Resp: 17 (04/02/18 1515)  BP: (!) 101/54 (04/02/18 1515)  SpO2: 100 % (04/02/18 1515) Vital Signs (24h Range):  Temp:  [98.2 °F (36.8 °C)-99.6 °F (37.6 °C)] 98.2 °F (36.8 °C)  Pulse:  [65-91] 84  Resp:  [11-18]  17  SpO2:  [94 %-100 %] 100 %  BP: (101-158)/(51-92) 101/54     Weight: 108.6 kg (239 lb 6.7 oz)  Body mass index is 46.76 kg/m².    Intake/Output Summary (Last 24 hours) at 04/02/18 1521  Last data filed at 04/02/18 1445   Gross per 24 hour   Intake             1400 ml   Output              500 ml   Net              900 ml      Physical Exam   Constitutional: She is oriented to person, place, and time. She appears well-developed and well-nourished. No distress.   HENT:   Head: Normocephalic and atraumatic.   Eyes: Conjunctivae are normal.   Neck: Normal range of motion. Neck supple. No JVD present.   Cardiovascular: Normal rate, regular rhythm, normal heart sounds and intact distal pulses.    No murmur heard.  Pulmonary/Chest: Effort normal. No respiratory distress. She has decreased breath sounds. She has no wheezes.   Abdominal: Soft. Bowel sounds are normal. She exhibits no distension and no mass. There is tenderness (mild tenderness of RUQ). There is no guarding.   Genitourinary:   Genitourinary Comments: deferred   Musculoskeletal: Normal range of motion. She exhibits no edema.   Neurological: She is alert and oriented to person, place, and time. No cranial nerve deficit. Coordination normal.   Skin: Skin is warm and dry. Capillary refill takes 2 to 3 seconds. No rash noted. She is not diaphoretic. No erythema.   Psychiatric: She has a normal mood and affect. Her behavior is normal.   Nursing note and vitals reviewed.      Significant Labs:   BMP:   Recent Labs  Lab 04/02/18 0424         K 4.0   CL 98   CO2 32*   BUN 16   CREATININE 1.0   CALCIUM 9.6   MG 2.0     CBC:   Recent Labs  Lab 04/01/18  0346 04/02/18 0424   WBC 14.36* 12.13   HGB 12.1 12.6   HCT 38.0 39.9    305     CMP:   Recent Labs  Lab 04/01/18  0345 04/02/18 0424    143   K 3.2* 4.0   CL 96 98   CO2 34* 32*   * 106   BUN 13 16   CREATININE 0.9 1.0   CALCIUM 8.8 9.6   ALBUMIN 2.6* 2.6*   ANIONGAP 9 13    EGFRNONAA >60 59*     All pertinent labs within the past 24 hours have been reviewed.    Significant Imaging:   Imaging Results          CTA Chest Non-Coronary - PE Study (Final result)  Result time 03/29/18 18:47:01    Final result by Vinny Tillman MD (03/29/18 18:47:01)                 Impression:         CTA of the chest demonstrates no signs of pulmonary embolism.  Some atelectatic changes are present in the lung bases.  There is a large hiatal hernia.        All CT scans at this facility use dose modulation, iterative reconstruction and/or weight based dosing when appropriate to reduce radiation dose to as low as reasonably achievable.       Electronically signed by: VINNY TILLMAN MD  Date:     03/29/18  Time:    18:47              Narrative:    Procedure: CTA CHEST NON CORONARY, Thursday, March 29, 2018    Clinical history: Shortness of breath. Dyspnea, cardiac origin suspected     Technique: Standard CTA of the chest performed with 100 cc Omnipaque 350 utilizing 3-D MIP reformations.  Comparison is made with previous studies including recent chest x-ray.    Findings: There is moderate enlargement of the heart.  The pulmonary arteries enhance well with no signs of pulmonary embolism.    There is a prominent hiatal hernia present posterior to the heart measuring up to 6.8 cm in diameter.  The mediastinum appears free of any suspicious mass or adenopathy.  There is atherosclerosis of the aorta and coronary vessels.    Some ground glass increased attenuation is seen within the right upper lobe diffusely.  Some minimal consolidation and atelectasis is seen in the right lung base with some atelectatic change along the major fissure on the left.  No pneumothorax or effusion is demonstrated.    Images of the upper abdomen again demonstrate a pattern gallstones with inflammation the gallbladder.                             US Abdomen Limited (Gallbladder) (Final result)  Result time 03/29/18 17:28:32    Final  result by Vinny Tillman MD (03/29/18 17:28:32)                 Impression:      Multiple mobile gallstones are present with gallbladder wall thickening or biliary sludge present.      Electronically signed by: VINNY TILLMAN MD  Date:     03/29/18  Time:    17:28              Narrative:    Exam: US ABDOMEN LIMITED,    Date:  03/29/18 16:47:00    History: Abdominal pain, right upper quadrant.    Technique: Standard technique including Doppler imaging was utilized.    Comparison:  Comparison is made with previous studies including CT scan of 03/26/2018    Findings: The liver is normal in size and configuration with no focal lesion demonstrated.  The gallbladder is distended and contains biliary sludge as well as shadowing, mobile stones.  Alves sign is negative.  Gallbladder wall measures up to 0.36 cm.    The common bile duct measures are 0.56 cm.    Right kidney measures 9.7 cm appears free of focal abnormality.  The main portal vein has hepatofugal flow.  The aorta and inferior vena cava as visualized on these images appear free of acute abnormality.                             X-Ray Chest PA And Lateral (Final result)  Result time 03/29/18 16:44:21    Final result by Jason Redmond MD (03/29/18 16:44:21)                 Impression:       No acute process seen.      Electronically signed by: JASON REDMOND MD  Date:     03/29/18  Time:    16:44              Narrative:    Clinical Data:Shortness of breath    Comparison:  3/26/18    Findings:  Two view of the chest.   Study is limited by poor inspiration.    Cardiac silhouette is normal.  The lungs demonstrate no evidence of active disease.  No evidence of pleural effusion or pneumothorax.  Bones demonstrates moderate spondylosis of the thoracic spine. Moderate hiatal hernia seen within the posterior mediastinum                            Assessment/Plan:      * Hypoxia    2/2 pulmonary htn +/- [place]  O2 sat 83% on room air  CXR negative for vascular  congestion/acute process  H/o pulmonary HTN with last echo showing PASP 39mmHg  Is on lasix 20mg PO bid at home but hasn't taken since Sunday  O2 sat 89% afterwards  Supplemental oxygen   IV lasix  Monitor I/O  Dr. Mcintosh saw the patient and CPAP ordered - she had sleep study already            Atelectasis of both lungs    IS and Acapella          Leukocytosis    --likely 2/2 cholecystitis - IV Zosyn started 3/31/18  --BCX2 NGTD  --Lactic acid normal, procalcitonin slightly elevated  --no fluids 2/2 pulm htn  General Surgery saw the patient  -Resolved        Cholelithiasis    US RUQ negative for acute cholecystitis  Will start IV Zosyn for presumed cholelithiasis  zofran prn nausea  Percocet prn ab pain  General Surgery saw the patient and will perform surgery if no improvement in patient - HIDA scan showed Nonvisualization of the gallbladder consistent with cystic duct obstruction.  This is consistent with patient's diagnosis of acute cholecystitis.  CHOLECYSTECTOMY planned for today           [place] (obstructive sleep apnea)    CPAP qhs           Morbid obesity with BMI of 40.0-44.9, adult    Counseled on weight loss            Essential hypertension    Continue home medications losartan 100mg daily,          Pulmonary HTN    Refer to hypoxia  Pulmonology saw the patient and he documents likely group 3 Pulmonary HTN - follow up in clinic  IV Lasix             VTE Risk Mitigation         Ordered     enoxaparin injection 40 mg  Daily     Route:  Subcutaneous        03/29/18 2307     IP VTE HIGH RISK PATIENT  Once      03/30/18 0837     Place sequential compression device  Until discontinued      03/30/18 0837              Dana Grajeda NP  Department of Hospital Medicine   [facility]

## 2018-04-02 NOTE — ANESTHESIA PREPROCEDURE EVALUATION
04/02/2018  Yoana Pardo is a 67 y.o., female.    Anesthesia Evaluation    I have reviewed the Patient Summary Reports.    I have reviewed the Nursing Notes.   I have reviewed the Medications.     Review of Systems  Anesthesia Hx:  Denies Family Hx of Anesthesia complications.   Denies Personal Hx of Anesthesia complications.   Cardiovascular:   Hypertension CONCLUSIONS     1 - Concentric remodeling.     2 - No wall motion abnormalities.     3 - Normal left ventricular systolic function (EF 60-65%).     4 - Normal left ventricular diastolic function.     5 - Normal right ventricular systolic function .     6 - Pulmonary hypertension. The estimated PA systolic pressure is greater than 47 mmHg.  Valvular Heart Disease: Mitral Valve Prolapse   Hypertension    Pulmonary:   Sleep Apnea  Pulmonary Symptoms:  are shortness of breath with activity.  Obstructive Sleep Apnea ([place]). Pulmonary Hypertension    Renal/:  Renal/ Normal     Hepatic/GI:   GERD  Biliary Disease, Acute Cholecystitis    Neurological:   Peripheral neuropathy       Physical Exam  General:  Obesity    Airway/Jaw/Neck:  Airway Findings: Mouth Opening: Normal Tongue: Normal  Mallampati: II  TM Distance: Normal, at least 6 cm  Jaw/Neck Findings:  Neck ROM: Normal ROM       Chest/Lungs:  Chest/Lungs Findings: Clear to auscultation, Normal Respiratory Rate     Heart/Vascular:  Heart Findings: Rate: Normal  Rhythm: Regular Rhythm             Anesthesia Plan  Type of Anesthesia, risks & benefits discussed:  Anesthesia Type:  general  Patient's Preference:   Intra-op Monitoring Plan:   Intra-op Monitoring Plan Comments:   Post Op Pain Control Plan:   Post Op Pain Control Plan Comments:   Induction:   IV  Beta Blocker:  Patient is not currently on a Beta-Blocker (No further documentation required).       Informed Consent: Patient understands  risks and agrees with Anesthesia plan.  Questions answered. Anesthesia consent signed with patient.  ASA Score: 3     Day of Surgery Review of History & Physical: I have interviewed and examined the patient. I have reviewed the patient's H&P dated:  There are no significant changes.  H&P update referred to the surgeon.         Ready For Surgery From Anesthesia Perspective.

## 2018-04-02 NOTE — NURSING
Pt returned from nuclear med at this time. MARYELLEN Cortez at bedside.ok to remove heart monitor for shower. Pt assisted to bath to take sponge bath.

## 2018-04-02 NOTE — PLAN OF CARE
Patient in surgery at this time.  Assessment completed by medical record review.  CM will follow up tomorrow 4-3-2018 to complete bedside assessment     04/02/18 3356   Discharge Assessment   Assessment Type Discharge Planning Assessment   Assessment information obtained from? Medical Record   Prior to hospitilization cognitive status: Alert/Oriented   Prior to hospitalization functional status: Independent   Able to Return to Prior Arrangements yes   Is patient able to care for self after discharge? Unable to determine at this time (comments)   Readmission Within The Last 30 Days no previous admission in last 30 days   Patient currently being followed by outpatient case management? No   Patient currently receives any other outside agency services? No   Discharge Plan A Home with family   Patient/Family In Agreement With Plan unable to assess

## 2018-04-03 PROBLEM — K81.0 CHOLECYSTITIS, ACUTE: Status: ACTIVE | Noted: 2018-03-29

## 2018-04-03 PROBLEM — R09.02 HYPOXIA: Status: RESOLVED | Noted: 2018-03-29 | Resolved: 2018-04-03

## 2018-04-03 LAB
ALBUMIN SERPL BCP-MCNC: 2.4 G/DL
ALP SERPL-CCNC: 102 U/L
ALT SERPL W/O P-5'-P-CCNC: 14 U/L
ANION GAP SERPL CALC-SCNC: 11 MMOL/L
AST SERPL-CCNC: 29 U/L
BILIRUB SERPL-MCNC: 1 MG/DL
BUN SERPL-MCNC: 10 MG/DL
CALCIUM SERPL-MCNC: 8.9 MG/DL
CHLORIDE SERPL-SCNC: 100 MMOL/L
CO2 SERPL-SCNC: 30 MMOL/L
CREAT SERPL-MCNC: 0.8 MG/DL
ERYTHROCYTE [DISTWIDTH] IN BLOOD BY AUTOMATED COUNT: 13.6 %
EST. GFR  (AFRICAN AMERICAN): >60 ML/MIN/1.73 M^2
EST. GFR  (NON AFRICAN AMERICAN): >60 ML/MIN/1.73 M^2
GLUCOSE SERPL-MCNC: 107 MG/DL
HCT VFR BLD AUTO: 37.3 %
HGB BLD-MCNC: 11.8 G/DL
MCH RBC QN AUTO: 29.5 PG
MCHC RBC AUTO-ENTMCNC: 31.6 G/DL
MCV RBC AUTO: 93 FL
PHOSPHATE SERPL-MCNC: 3.2 MG/DL
PLATELET # BLD AUTO: 308 K/UL
PMV BLD AUTO: 10 FL
POTASSIUM SERPL-SCNC: 3.6 MMOL/L
PROT SERPL-MCNC: 7.1 G/DL
RBC # BLD AUTO: 4 M/UL
SODIUM SERPL-SCNC: 141 MMOL/L
WBC # BLD AUTO: 14.42 K/UL

## 2018-04-03 PROCEDURE — G8979 MOBILITY GOAL STATUS: HCPCS | Mod: CH

## 2018-04-03 PROCEDURE — 25000242 PHARM REV CODE 250 ALT 637 W/ HCPCS: Performed by: NURSE PRACTITIONER

## 2018-04-03 PROCEDURE — 97116 GAIT TRAINING THERAPY: CPT

## 2018-04-03 PROCEDURE — 21400001 HC TELEMETRY ROOM

## 2018-04-03 PROCEDURE — 99900035 HC TECH TIME PER 15 MIN (STAT)

## 2018-04-03 PROCEDURE — 84100 ASSAY OF PHOSPHORUS: CPT

## 2018-04-03 PROCEDURE — 94664 DEMO&/EVAL PT USE INHALER: CPT

## 2018-04-03 PROCEDURE — 97161 PT EVAL LOW COMPLEX 20 MIN: CPT

## 2018-04-03 PROCEDURE — G8978 MOBILITY CURRENT STATUS: HCPCS | Mod: CJ

## 2018-04-03 PROCEDURE — 96372 THER/PROPH/DIAG INJ SC/IM: CPT

## 2018-04-03 PROCEDURE — 99233 SBSQ HOSP IP/OBS HIGH 50: CPT | Mod: ,,, | Performed by: INTERNAL MEDICINE

## 2018-04-03 PROCEDURE — S0030 INJECTION, METRONIDAZOLE: HCPCS | Performed by: SURGERY

## 2018-04-03 PROCEDURE — 94640 AIRWAY INHALATION TREATMENT: CPT

## 2018-04-03 PROCEDURE — 63600175 PHARM REV CODE 636 W HCPCS: Performed by: NURSE PRACTITIONER

## 2018-04-03 PROCEDURE — 94668 MNPJ CHEST WALL SBSQ: CPT

## 2018-04-03 PROCEDURE — 63600175 PHARM REV CODE 636 W HCPCS: Performed by: HOSPITALIST

## 2018-04-03 PROCEDURE — 25000003 PHARM REV CODE 250: Performed by: HOSPITALIST

## 2018-04-03 PROCEDURE — 25000003 PHARM REV CODE 250: Performed by: NURSE PRACTITIONER

## 2018-04-03 PROCEDURE — 63600175 PHARM REV CODE 636 W HCPCS: Performed by: INTERNAL MEDICINE

## 2018-04-03 PROCEDURE — 11000001 HC ACUTE MED/SURG PRIVATE ROOM

## 2018-04-03 PROCEDURE — 85027 COMPLETE CBC AUTOMATED: CPT

## 2018-04-03 PROCEDURE — 80053 COMPREHEN METABOLIC PANEL: CPT

## 2018-04-03 PROCEDURE — 36415 COLL VENOUS BLD VENIPUNCTURE: CPT

## 2018-04-03 PROCEDURE — 94761 N-INVAS EAR/PLS OXIMETRY MLT: CPT

## 2018-04-03 PROCEDURE — 63600175 PHARM REV CODE 636 W HCPCS: Performed by: SURGERY

## 2018-04-03 PROCEDURE — 27000221 HC OXYGEN, UP TO 24 HOURS

## 2018-04-03 PROCEDURE — 25000003 PHARM REV CODE 250: Performed by: SURGERY

## 2018-04-03 PROCEDURE — 94799 UNLISTED PULMONARY SVC/PX: CPT

## 2018-04-03 RX ADMIN — PIPERACILLIN AND TAZOBACTAM 4.5 G: 4; .5 INJECTION, POWDER, LYOPHILIZED, FOR SOLUTION INTRAVENOUS; PARENTERAL at 08:04

## 2018-04-03 RX ADMIN — ALBUTEROL SULFATE 2.5 MG: 2.5 SOLUTION RESPIRATORY (INHALATION) at 07:04

## 2018-04-03 RX ADMIN — ALBUTEROL SULFATE 2.5 MG: 2.5 SOLUTION RESPIRATORY (INHALATION) at 12:04

## 2018-04-03 RX ADMIN — METRONIDAZOLE 500 MG: 500 INJECTION, SOLUTION INTRAVENOUS at 03:04

## 2018-04-03 RX ADMIN — ENOXAPARIN SODIUM 40 MG: 100 INJECTION SUBCUTANEOUS at 08:04

## 2018-04-03 RX ADMIN — STANDARDIZED SENNA CONCENTRATE AND DOCUSATE SODIUM 1 TABLET: 8.6; 5 TABLET, FILM COATED ORAL at 08:04

## 2018-04-03 RX ADMIN — CLONIDINE HYDROCHLORIDE 0.1 MG: 0.1 TABLET ORAL at 08:04

## 2018-04-03 RX ADMIN — CHLORHEXIDINE GLUCONATE 10 ML: 1.2 RINSE ORAL at 08:04

## 2018-04-03 RX ADMIN — HYDROCODONE BITARTRATE AND ACETAMINOPHEN 1 TABLET: 5; 325 TABLET ORAL at 05:04

## 2018-04-03 RX ADMIN — PIPERACILLIN AND TAZOBACTAM 4.5 G: 4; .5 INJECTION, POWDER, LYOPHILIZED, FOR SOLUTION INTRAVENOUS; PARENTERAL at 05:04

## 2018-04-03 RX ADMIN — CEFAZOLIN SODIUM 2 G: 2 SOLUTION INTRAVENOUS at 07:04

## 2018-04-03 RX ADMIN — PIPERACILLIN AND TAZOBACTAM 4.5 G: 4; .5 INJECTION, POWDER, LYOPHILIZED, FOR SOLUTION INTRAVENOUS; PARENTERAL at 01:04

## 2018-04-03 RX ADMIN — ASPIRIN 81 MG: 81 TABLET, COATED ORAL at 08:04

## 2018-04-03 RX ADMIN — FUROSEMIDE 40 MG: 10 INJECTION, SOLUTION INTRAMUSCULAR; INTRAVENOUS at 08:04

## 2018-04-03 RX ADMIN — SODIUM CHLORIDE, POTASSIUM CHLORIDE, SODIUM LACTATE AND CALCIUM CHLORIDE: 600; 310; 30; 20 INJECTION, SOLUTION INTRAVENOUS at 08:04

## 2018-04-03 RX ADMIN — LOSARTAN POTASSIUM 50 MG: 50 TABLET, FILM COATED ORAL at 08:04

## 2018-04-03 NOTE — PLAN OF CARE
CM met with patient at the bedside to assess for discharge needs.  Patient lives at home with her  and is independent with needs.  She is able to drive herself to medical appointments.  She states that she is supposed to use a CPAP, however she does not have one at this time.  She stated that Dr Mcintosh is attempting to get her set up with a CPAP.  She does not anticipate any discharge needs at this time.  CM provided a transitional care folder, information on advanced directives, information on pharmacy bedside delivery, and discharge planning begins on admission with contact information for any needs/questions.    D/C Plan:  Home with no needs     04/03/18 1013   Discharge Assessment   Assessment Type Discharge Planning Assessment   Confirmed/corrected address and phone number on facesheet? Yes   Assessment information obtained from? Patient;Medical Record   Expected Length of Stay (days) (TBD)   Communicated expected length of stay with patient/caregiver yes   Prior to hospitilization cognitive status: Alert/Oriented   Prior to hospitalization functional status: Independent   Current cognitive status: Alert/Oriented   Current Functional Status: Independent   Facility Arrived From: home   Lives With spouse   Able to Return to Prior Arrangements yes   Is patient able to care for self after discharge? Yes   Who are your caregiver(s) and their phone number(s)? Patient is independent.  Jamir Pardo, spouse [phone]   Patient's perception of discharge disposition home or selfcare   Readmission Within The Last 30 Days no previous admission in last 30 days   Patient currently being followed by outpatient case management? No   Patient currently receives any other outside agency services? No   Equipment Currently Used at Home none   Do you have any problems affording any of your prescribed medications? No   Is the patient taking medications as prescribed? yes   Does the patient have transportation home?  Yes   Dialysis Name and Scheduled days NA   Does the patient receive services at the [facility]? No   Discharge Plan A Home with family   Discharge Plan B Home with family   Patient/Family In Agreement With Plan yes

## 2018-04-03 NOTE — SUBJECTIVE & OBJECTIVE
Interval History: Sitting in chair at bedside. Reports mild nausea. Is S/P Robotic Lap Codie. IV antibiotics continued. Hypoxia improving.     Review of Systems   Constitutional: Negative for chills, diaphoresis and fever.   HENT: Negative for congestion, rhinorrhea and sore throat.    Eyes: Negative for pain and visual disturbance.   Respiratory: Positive for shortness of breath. Negative for cough and wheezing.    Cardiovascular: Negative for chest pain, palpitations and leg swelling.   Gastrointestinal: Positive for abdominal pain (Ruq). Negative for abdominal distention, blood in stool, constipation, diarrhea, nausea and vomiting.   Endocrine: Negative for polyphagia and polyuria.   Genitourinary: Negative for difficulty urinating, dysuria and hematuria.   Musculoskeletal: Negative for arthralgias, back pain and myalgias.   Skin: Negative for color change and wound.   Allergic/Immunologic: Negative.    Neurological: Negative for dizziness, tremors, syncope, weakness, light-headedness and headaches.   Hematological: Negative.    Psychiatric/Behavioral: Negative for agitation, behavioral problems, confusion and suicidal ideas.   All other systems reviewed and are negative.    Objective:     Vital Signs (Most Recent):  Temp: 98.6 °F (37 °C) (04/03/18 1306)  Pulse: 88 (04/03/18 1306)  Resp: 16 (04/03/18 1306)  BP: (!) 184/86 (04/03/18 1306)  SpO2: 95 % (04/03/18 1306) Vital Signs (24h Range):  Temp:  [97.7 °F (36.5 °C)-99.8 °F (37.7 °C)] 98.6 °F (37 °C)  Pulse:  [62-94] 88  Resp:  [11-20] 16  SpO2:  [91 %-100 %] 95 %  BP: (101-184)/(51-86) 184/86     Weight: 108.6 kg (239 lb 6.7 oz)  Body mass index is 46.76 kg/m².    Intake/Output Summary (Last 24 hours) at 04/03/18 1439  Last data filed at 04/03/18 1329   Gross per 24 hour   Intake          2561.66 ml   Output             1030 ml   Net          1531.66 ml      Physical Exam   Constitutional: She is oriented to person, place, and time. She appears well-developed  and well-nourished. No distress.   HENT:   Head: Normocephalic and atraumatic.   Eyes: Conjunctivae are normal.   Neck: Normal range of motion. Neck supple. No JVD present.   Cardiovascular: Normal rate, regular rhythm, normal heart sounds and intact distal pulses.    No murmur heard.  Pulmonary/Chest: Effort normal. No respiratory distress. She has decreased breath sounds in the right lower field. She has no wheezes.   Abdominal: Soft. Bowel sounds are normal. She exhibits no distension and no mass. There is tenderness (mild tenderness of RUQ). There is no guarding.   NASIR drain present with minimal drainage   Genitourinary:   Genitourinary Comments: deferred   Musculoskeletal: Normal range of motion. She exhibits no edema.   Neurological: She is alert and oriented to person, place, and time. No cranial nerve deficit. Coordination normal.   Skin: Skin is warm and dry. Capillary refill takes 2 to 3 seconds. No rash noted. She is not diaphoretic. No erythema.   Psychiatric: She has a normal mood and affect. Her behavior is normal.   Nursing note and vitals reviewed.      Significant Labs:   CBC:   Recent Labs  Lab 04/02/18  0424 04/03/18  0513   WBC 12.13 14.42*   HGB 12.6 11.8*   HCT 39.9 37.3    308     CMP:   Recent Labs  Lab 04/02/18  0424 04/03/18  0513    141   K 4.0 3.6   CL 98 100   CO2 32* 30*    107   BUN 16 10   CREATININE 1.0 0.8   CALCIUM 9.6 8.9   PROT  --  7.1   ALBUMIN 2.6* 2.4*   BILITOT  --  1.0   ALKPHOS  --  102   AST  --  29   ALT  --  14   ANIONGAP 13 11   EGFRNONAA 59* >60     All pertinent labs within the past 24 hours have been reviewed.    Significant Imaging: I have reviewed all pertinent imaging results/findings within the past 24 hours.

## 2018-04-03 NOTE — CONSULTS
"Consulted on this 68 y/o F patient due to boggy heels. Patient found sitting up in chair at this visit.  Bilateral heels assessed. No redness or breakdown noted.  No longer boggy to touch. Patient states her heels have been floated while in bed for past 2 days.  Recommend continued pressure offloading of heels and pressure injury prevention:    Skin Care Precautions / Pressure Injury Prevention:  1. Follow "Guidelines for Prevention of Pressure Ulcers in At Risk Patients"  These guidelines can be found on the [facility] Intr[employer]et by searching "Wound Care / Ostomy Resources"  2. Document wound assessment in [facility] using guidelines in Javi's "Assessment : Wound" procedure  3. Limit the amount of linen/underpad between patient and mattress surface to ONE fitted sheet and ONE covidien underpad - NO draw sheet/briefs.  4. Obtain Easi Cleans Foam Wipes for providing brayan care - avoid the use of wash cloths to areas affected by IAD.  5. Apply Clear Barrier Ointment to perineal / perirectal areas in a thin even layer to clean dry skin BID and after each episode of pericare  6. Apply sween 24 moisturizer cream to all dry skin after daily bath and prn  7. Obtain foam wedge from materials management to assist with maintaining proper position changes at least q 2hours and document actual position in EPIC q 2hours  8. Elevate heels off mattress on 2 separate pillows placed lengthwise under each leg supporting the leg from knee to ankle.  Document in EPIC flow sheet every 2 hours.  9. .Do NOT elevate HOB greater than 30 degrees unless contraindicated.  10. Remove SCD/Plexi Pulses/KAREEN's every 12 hours for 30 minutes and assess skin underneath these devices for breakdown          "

## 2018-04-03 NOTE — PLAN OF CARE
Problem: Patient Care Overview  Goal: Plan of Care Review  PT REQUIRES  SBA FOR GT X 240' WITH USE OF RAILING AT TIMES.   Outcome: Ongoing (interventions implemented as appropriate)  Pt remains free from injury. Fall precautions in place. Pt alert and oriented x4. Tolerating clear liquid diet.  Dressings to abdomen lap sites clean, dry, and intact. Pt receiving IV fluids and IV antibiotics. Denies pain and nausea at this time. Pt able to verbalize wants/needs. Bed in low, locked position. Call light and personal items within reach of pt. VSS. Will cont to monitor.

## 2018-04-03 NOTE — ASSESSMENT & PLAN NOTE
US RUQ negative for acute cholecystitis  Will start IV Zosyn for presumed cholelithiasis  zofran prn nausea  Percocet prn ab pain  General Surgery -  HIDA scan showed Nonvisualization of the gallbladder consistent with cystic duct obstruction.  This is consistent with patient's diagnosis of acute cholecystitis.  S/P Lap sharmila performed 4/2/18 by Dr. Erickson

## 2018-04-03 NOTE — PROGRESS NOTES
[facility] Hospital Medicine  Progress Note    Patient Name: Yoana Pardo  MRN: [id number]  Patient Class: IP- Inpatient   Admission Date: 3/29/2018  Length of Stay: 4 days  Attending Physician: Harmony Colunga MD  Primary Care Provider: Lynn Wiggins MD        Subjective:     Principal Problem:Cholecystitis, acute    HPI:  67F h/o cholelithiasis, pHTN and [place] presents with RUQ ab pain that started 3 days ago.  Intermittent pain to RUQ. Associated with n/v and sob.  US RUQ negative for acute cholecystitis.  Labs wnl.   O2 sat 83% on room air.  I spoke to ER physician and recommended giving patient lasix 40mg IV x 1 for pulmonary htn.  Afterwards, O2 sat improved to 89% on room air.  However, patient is still tachypneic and nauseated.  Hospital medicine was called for observation for hypoxia.   She presented 3 days ago, US RUQ was also negative for acute cholecystitis at that time.  No other exacerbating or alleviating factors.  She reports she has a follow up with general surgery for cholecystectomy on 4/2.       Hospital Course:  Patient was kept for OBS for hypoxia under the care of Hospital Medicine. CXR showed no acute process, Chest CTA showed CTA of the chest demonstrates no signs of pulmonary embolism. Some atelectatic changes are present in the lung bases. There is a large hiatal hernia. PCo2 50.5, PO2 42, HCO3 29.5  3/30: WBC count increased to 17K today  3/31/18: Procalcitonin mildly elevated and low grade temp of 100.6 evening of 3/30/18. Hida scanned discontinued by General Surgery who states if patient declines will proceed with cholecystectomy. Currently, RUQ abdominal pain controlled with hydrocodone, pt has no nausea or vomiting. Hypoxia continues and pt receiving IV lasix and supplemental oxygen - has pulmonary HTN with atelectasis - incentive spirometer ordered. IV Zosyn given.  4/1/18 - Pulmonology saw the patient and feels pulmonary HTN group 3 and sleep apnea not wearing CPAP  at night. CPAP was ordered by Dr. Mcintosh. Dr. Erickson reordered HIDA scan for Monday 4/1/2018 - pt NPO and pain meds on hold. She still has RUQ pain that is controlled with Norco. WBC count trending upward.  IS and Acapella in progress.   4/2/18-HIDA scan showed Nonvisualization of the gallbladder consistent with cystic duct obstruction.This is consistent with patient's diagnosis of acute cholecystitis. Pt is S/P Robotic assisted lap sharmila performed 4/2/18 by Dr. Erickson.   Post op day 1 pt is S/P lap sharmila. She states she is feeling better today. Has post surgical abdominal pain - is mildly nauseated. PT/OT noted no functional immobility. IV Zosyn in process.     Interval History: Sitting in chair at bedside. Reports mild nausea. Is S/P Robotic Lap Sharmila. IV antibiotics continued. Hypoxia improving.     Review of Systems   Constitutional: Negative for chills, diaphoresis and fever.   HENT: Negative for congestion, rhinorrhea and sore throat.    Eyes: Negative for pain and visual disturbance.   Respiratory: Positive for shortness of breath. Negative for cough and wheezing.    Cardiovascular: Negative for chest pain, palpitations and leg swelling.   Gastrointestinal: Positive for abdominal pain (Ruq). Negative for abdominal distention, blood in stool, constipation, diarrhea, nausea and vomiting.   Endocrine: Negative for polyphagia and polyuria.   Genitourinary: Negative for difficulty urinating, dysuria and hematuria.   Musculoskeletal: Negative for arthralgias, back pain and myalgias.   Skin: Negative for color change and wound.   Allergic/Immunologic: Negative.    Neurological: Negative for dizziness, tremors, syncope, weakness, light-headedness and headaches.   Hematological: Negative.    Psychiatric/Behavioral: Negative for agitation, behavioral problems, confusion and suicidal ideas.   All other systems reviewed and are negative.    Objective:     Vital Signs (Most Recent):  Temp: 98.6 °F (37 °C) (04/03/18  1306)  Pulse: 88 (04/03/18 1306)  Resp: 16 (04/03/18 1306)  BP: (!) 184/86 (04/03/18 1306)  SpO2: 95 % (04/03/18 1306) Vital Signs (24h Range):  Temp:  [97.7 °F (36.5 °C)-99.8 °F (37.7 °C)] 98.6 °F (37 °C)  Pulse:  [62-94] 88  Resp:  [11-20] 16  SpO2:  [91 %-100 %] 95 %  BP: (101-184)/(51-86) 184/86     Weight: 108.6 kg (239 lb 6.7 oz)  Body mass index is 46.76 kg/m².    Intake/Output Summary (Last 24 hours) at 04/03/18 1439  Last data filed at 04/03/18 1329   Gross per 24 hour   Intake          2561.66 ml   Output             1030 ml   Net          1531.66 ml      Physical Exam   Constitutional: She is oriented to person, place, and time. She appears well-developed and well-nourished. No distress.   HENT:   Head: Normocephalic and atraumatic.   Eyes: Conjunctivae are normal.   Neck: Normal range of motion. Neck supple. No JVD present.   Cardiovascular: Normal rate, regular rhythm, normal heart sounds and intact distal pulses.    No murmur heard.  Pulmonary/Chest: Effort normal. No respiratory distress. She has decreased breath sounds in the right lower field. She has no wheezes.   Abdominal: Soft. Bowel sounds are normal. She exhibits no distension and no mass. There is tenderness (mild tenderness of RUQ). There is no guarding.   NASIR drain present with minimal drainage   Genitourinary:   Genitourinary Comments: deferred   Musculoskeletal: Normal range of motion. She exhibits no edema.   Neurological: She is alert and oriented to person, place, and time. No cranial nerve deficit. Coordination normal.   Skin: Skin is warm and dry. Capillary refill takes 2 to 3 seconds. No rash noted. She is not diaphoretic. No erythema.   Psychiatric: She has a normal mood and affect. Her behavior is normal.   Nursing note and vitals reviewed.      Significant Labs:   CBC:   Recent Labs  Lab 04/02/18  0424 04/03/18  0513   WBC 12.13 14.42*   HGB 12.6 11.8*   HCT 39.9 37.3    308     CMP:   Recent Labs  Lab 04/02/18  0421  04/03/18  0513    141   K 4.0 3.6   CL 98 100   CO2 32* 30*    107   BUN 16 10   CREATININE 1.0 0.8   CALCIUM 9.6 8.9   PROT  --  7.1   ALBUMIN 2.6* 2.4*   BILITOT  --  1.0   ALKPHOS  --  102   AST  --  29   ALT  --  14   ANIONGAP 13 11   EGFRNONAA 59* >60     All pertinent labs within the past 24 hours have been reviewed.    Significant Imaging: I have reviewed all pertinent imaging results/findings within the past 24 hours.    Assessment/Plan:      * Cholecystitis, acute    US RUQ negative for acute cholecystitis  Will start IV Zosyn for presumed cholelithiasis  zofran prn nausea  Percocet prn ab pain  General Surgery -  HIDA scan showed Nonvisualization of the gallbladder consistent with cystic duct obstruction.  This is consistent with patient's diagnosis of acute cholecystitis.  S/P Lap sharmila performed 4/2/18 by Dr. Erickson          Leukocytosis    --likely 2/2 cholecystitis - IV Zosyn started 3/31/18  --BCX2 NGTD  --Lactic acid normal, procalcitonin slightly elevated  --no fluids 2/2 pulm htn  General Surgery following patient and Lap sharmila performed        Atelectasis of both lungs    IS and Acapella          [place] (obstructive sleep apnea)    CPAP qhs   CPAP ordered by Dayna - pt refusing at this time 4/3/18          Morbid obesity with BMI of 40.0-44.9, adult    Counseled on weight loss            Essential hypertension    Continue home medications losartan 100mg daily,          Pulmonary HTN    Refer to hypoxia  Pulmonology saw the patient and he documents likely group 3 Pulmonary HTN - follow up in clinic  IV Lasix             VTE Risk Mitigation         Ordered     enoxaparin injection 40 mg  Daily     Route:  Subcutaneous        03/29/18 7601     IP VTE HIGH RISK PATIENT  Once      03/30/18 0837     Place sequential compression device  Until discontinued      03/30/18 0837              Colleen Nielsen NP  Department of Hospital Medicine   [facility]

## 2018-04-03 NOTE — PLAN OF CARE
ANNALISE contacted [facility] regarding order for CPAP.  [phone]  ANNALISE spoke to Monica with [employer].  She received the order on yesterday (4-2-2018).  The CPAP order is in process and they are awaiting insurance approval.  Approval can take up to a week.  [facility] will contact patient when approved.

## 2018-04-03 NOTE — PROGRESS NOTES
[facility] -   Pulmonology  Progress Note    Patient Name: Yoana Pardo  MRN: [id number]  Admission Date: 3/29/2018  Hospital Length of Stay: 4 days  Code Status: Full Code  Attending Provider: Harmony Colunga MD  Primary Care Provider: Lynn Wiggins MD   Principal Problem: Hypoxia    Subjective:     Interval History: Seen and examined at bedside. Hospital chart reviewed. No acute interval detrimental events noted. She reports that she  has moderately improved. ROBOTIC CHOLECYSTECTOMY YESTERDAY by Dr. Erickson.       Review of Systems   Constitutional: Negative for malaise/fatigue and weight loss.   HENT: Negative for ear discharge and ear pain.    Eyes: Negative for photophobia and pain.   Respiratory: Negative for hemoptysis and shortness of breath.    Cardiovascular: Negative for chest pain and palpitations.   Gastrointestinal: Negative for diarrhea and nausea.   Genitourinary: Negative for dysuria and urgency.   Musculoskeletal: Negative for myalgias and neck pain.   Skin: Negative for rash.   Neurological: Negative for tremors and headaches.   Endo/Heme/Allergies: Negative for environmental allergies.   Psychiatric/Behavioral: Negative for hallucinations and substance abuse.     Objective:     Vital Signs (Most Recent):  Temp: 98.2 °F (36.8 °C) (04/03/18 0811)  Pulse: 89 (04/03/18 0811)  Resp: 20 (04/03/18 0811)  BP: 138/65 (04/03/18 0811)  SpO2: 100 % (04/03/18 0811) Vital Signs (24h Range):  Temp:  [97.7 °F (36.5 °C)-99.8 °F (37.7 °C)] 98.2 °F (36.8 °C)  Pulse:  [62-94] 89  Resp:  [11-20] 20  SpO2:  [91 %-100 %] 100 %  BP: (101-159)/(51-78) 138/65     Weight: 108.6 kg (239 lb 6.7 oz)  Body mass index is 46.76 kg/m².      Intake/Output Summary (Last 24 hours) at 04/03/18 0955  Last data filed at 04/03/18 0818   Gross per 24 hour   Intake          3201.66 ml   Output              730 ml   Net          2471.66 ml       Physical Exam   Constitutional: She is oriented to person, place, and time. She  appears well-developed and well-nourished.   HENT:   Head: Normocephalic.   Right Ear: External ear normal.   Left Ear: External ear normal.   Nose: Nose normal.   Eyes: Pupils are equal, round, and reactive to light. No scleral icterus.   Neck: Normal range of motion. Neck supple. No thyromegaly present.   Cardiovascular: Normal rate, regular rhythm and normal heart sounds.    No murmur heard.  Pulmonary/Chest: Effort normal and breath sounds normal.   Abdominal: Soft. Bowel sounds are normal. She exhibits no distension. There is no tenderness. There is no guarding.   Musculoskeletal: Normal range of motion.   Lymphadenopathy:     She has no cervical adenopathy.   Neurological: She is alert and oriented to person, place, and time.   Skin: Skin is warm and dry.       Vents:  Oxygen Concentration (%): 24 (04/03/18 0716)    Lines/Drains/Airways     Drain                 Drain/Device  04/02/18 1440 abdomen collapsible closed device less than 1 day          Peripheral Intravenous Line                 Peripheral IV - Single Lumen 04/01/18 Left Hand 2 days                Significant Labs:      Laboratory Data:  Reviewed recent labs. Appear stable other than abnormalities addressed in plan.     Radiology:  Reviewed recent imaging studies. Appear stable other than abnormalities addressed in plan.       Assessment/Plan:     Atelectasis of both lungs    Lung expansion maneuvers: IS with teaching;  ACAPELLA with bronchodilator treatments.           [place] (obstructive sleep apnea)    PAP therapy: CPAP 12 CMWP QHS.        Morbid obesity with BMI of 40.0-44.9, adult    Weight loss encouraged.         Pulmonary HTN    MPAP FROM [facility]:    mPAP = 0.6146.56  + 2 = 30.4 mmHg.    Mild pulmonary hypertension at best likely secondary to hypoxia.     Recommend diuresis. Repeat ECHO after appropriate diuresis.   Treat [place] with nocturnal PAP.               Counseling/Consultation:I discussed the case with primary care team, I discussed the  patient's condition/prognosis with the family. Thank you for allowing me to participate in this patients care  We will continue to follow with you.     Emmanuel Dee MD  Pulmonology  [facility]

## 2018-04-03 NOTE — PLAN OF CARE
04/03/18 1016   Medicare Message   Important Message from Medicare regarding Discharge Appeal Rights Given to patient/caregiver;Explained to patient/caregiver;Signed/date by patient/caregiver   Date IMM was signed 04/03/18   Time IMM was signed 1014

## 2018-04-03 NOTE — SUBJECTIVE & OBJECTIVE
Interval History: Seen and examined at bedside. Hospital chart reviewed. No acute interval detrimental events noted. She reports that she  has moderately improved. ROBOTIC CHOLECYSTECTOMY YESTERDAY by Dr. Erickson.       Review of Systems   Constitutional: Negative for malaise/fatigue and weight loss.   HENT: Negative for ear discharge and ear pain.    Eyes: Negative for photophobia and pain.   Respiratory: Negative for hemoptysis and shortness of breath.    Cardiovascular: Negative for chest pain and palpitations.   Gastrointestinal: Negative for diarrhea and nausea.   Genitourinary: Negative for dysuria and urgency.   Musculoskeletal: Negative for myalgias and neck pain.   Skin: Negative for rash.   Neurological: Negative for tremors and headaches.   Endo/Heme/Allergies: Negative for environmental allergies.   Psychiatric/Behavioral: Negative for hallucinations and substance abuse.     Objective:     Vital Signs (Most Recent):  Temp: 98.2 °F (36.8 °C) (04/03/18 0811)  Pulse: 89 (04/03/18 0811)  Resp: 20 (04/03/18 0811)  BP: 138/65 (04/03/18 0811)  SpO2: 100 % (04/03/18 0811) Vital Signs (24h Range):  Temp:  [97.7 °F (36.5 °C)-99.8 °F (37.7 °C)] 98.2 °F (36.8 °C)  Pulse:  [62-94] 89  Resp:  [11-20] 20  SpO2:  [91 %-100 %] 100 %  BP: (101-159)/(51-78) 138/65     Weight: 108.6 kg (239 lb 6.7 oz)  Body mass index is 46.76 kg/m².      Intake/Output Summary (Last 24 hours) at 04/03/18 0955  Last data filed at 04/03/18 0818   Gross per 24 hour   Intake          3201.66 ml   Output              730 ml   Net          2471.66 ml       Physical Exam   Constitutional: She is oriented to person, place, and time. She appears well-developed and well-nourished.   HENT:   Head: Normocephalic.   Right Ear: External ear normal.   Left Ear: External ear normal.   Nose: Nose normal.   Eyes: Pupils are equal, round, and reactive to light. No scleral icterus.   Neck: Normal range of motion. Neck supple. No thyromegaly present.    Cardiovascular: Normal rate, regular rhythm and normal heart sounds.    No murmur heard.  Pulmonary/Chest: Effort normal and breath sounds normal.   Abdominal: Soft. Bowel sounds are normal. She exhibits no distension. There is no tenderness. There is no guarding.   Musculoskeletal: Normal range of motion.   Lymphadenopathy:     She has no cervical adenopathy.   Neurological: She is alert and oriented to person, place, and time.   Skin: Skin is warm and dry.       Vents:  Oxygen Concentration (%): 24 (04/03/18 0716)    Lines/Drains/Airways     Drain                 Drain/Device  04/02/18 1440 abdomen collapsible closed device less than 1 day          Peripheral Intravenous Line                 Peripheral IV - Single Lumen 04/01/18 Left Hand 2 days                Significant Labs:      Laboratory Data:  Reviewed recent labs. Appear stable other than abnormalities addressed in plan.     Radiology:  Reviewed recent imaging studies. Appear stable other than abnormalities addressed in plan.

## 2018-04-03 NOTE — PLAN OF CARE
Problem: Patient Care Overview  Goal: Plan of Care Review  Outcome: Ongoing (interventions implemented as appropriate)  IV abx per MD order, Pt remains free of injury, pain managed adequately, no s/s of distress. 24 hour chart check completed. Will continue to monitor.

## 2018-04-03 NOTE — PT/OT/SLP EVAL
Physical Therapy Evaluation    Patient Name:  Yoana Pardo   MRN:  [id number]    Recommendations:     Discharge Recommendations:  home   Discharge Equipment Recommendations: none   Barriers to discharge: None    Assessment:     Yoana Pardo is a 67 y.o. female admitted with a medical diagnosis of Hypoxia.  She presents with the following impairments/functional limitations:  impaired balance, impaired endurance, gait instability, pain .    Rehab Prognosis:  EXCELLENT; patient would benefit from acute skilled PT services to address these deficits and reach maximum level of function.      Recent Surgery: Procedure(s) (LRB):  [id number] (N/A) 1 Day Post-Op    Plan:     During this hospitalization, patient to be seen   to address the above listed problems via gait training, therapeutic activities, therapeutic exercises  · Plan of Care Expires:  04/10/18   Plan of Care Reviewed with: patient, spouse    Subjective     Communicated with NURSE WYNNE AND [facility] CHART REVIEW prior to session.  Patient found SUP IN BED  upon PT entry to room, agreeable to evaluation.      Chief Complaint: PAIN  Patient comments/goals: WALK  Pain/Comfort:  · Pain Rating 1: 3/10  · Location 1: abdomen  · Pain Rating Post-Intervention 1: 3/10    Patients cultural, spiritual, Restorationism conflicts given the current situation:      Living Environment:  PT LIVES AT HOME WITH  AND HAS 1 STEP TO ENTER HOME  Prior to admission, patients level of function was IND.  Patient has the following equipment: none.  DME owned (not currently used): none.  Upon discharge, patient will have assistance from .    Objective:     Patient found with: peripheral IV     General Precautions: Standard,     Orthopedic Precautions:N/A   Braces: N/A     Exams:  · RLE ROM: WFL  · RLE Strength: WFL  · LLE ROM: WFL  · LLE Strength: WFL    Functional Mobility:  · PT SUP>SIT EOB KENNETH. PT STOOD WITH NO AD AND GT TRAINED X 250' WITH USE IF  RAILING AT TIMES. PT RETURNED TO RM T/F TO CHAIR WITH SBA. PT RETURNED TO RM TOILETED WITH SBA AND SEATED IN CHAIR WITH SBA. PT LEFT SEATED WITH ALL NEEDS MET.     AM-PAC 6 CLICK MOBILITY  Total Score:21    Patient left up in chair with call button in reach.    GOALS:    Physical Therapy Goals        Problem: Physical Therapy Goal    Goal Priority Disciplines Outcome Goal Variances Interventions   Physical Therapy Goal     PT/OT, PT      Description:  PT WILL BE SEEN FOR P.T. FOR A MIN OF 5 OUT OF 7 DAYS A WEEK  LT/10/18  1. PT WILL COMPLETE BED MOBILITY IND  2. PT WILL GT TRAIN X 250' IND  3. PT WILL T/F TO CHAIR IND.                    History:     Past Medical History:   Diagnosis Date    GERD (gastroesophageal reflux disease)     Hemorrhoids     History of positive PPD, untreated     Hx of cold sores     Hyperlipidemia     Hypertension     MVP (mitral valve prolapse)     Peripheral neuropathy     Pulmonary HTN     Dr Bailon    Sleep apnea     has  but not using CPAP       Past Surgical History:   Procedure Laterality Date    DILATION AND CURETTAGE OF UTERUS         Clinical Decision Making:     History  Co-morbidities and personal factors that may impact the plan of care Examination  Body Structures and Functions, activity limitations and participation restrictions that may impact the plan of care Clinical Presentation   Decision Making/ Complexity Score   Co-morbidities:   [] Time since onset of injury / illness / exacerbation  [] Status of current condition  []Patient's cognitive status and safety concerns    [] Multiple Medical Problems (see med hx)  Personal Factors:   [] Patient's age  [] Prior Level of function   [] Patient's home situation (environment and family support)  [] Patient's level of motivation  [] Expected progression of patient      HISTORY:(criteria)    [] 40370 - no personal factors/history    [] 84142 - has 1-2 personal factor/comorbidity     [] 74004 - has >3 personal  factor/comorbidity     Body Regions:  [] Objective examination findings  [] Head     []  Neck  [] Trunk   [] Upper Extremity  [] Lower Extremity    Body Systems:  [] For communication ability, affect, cognition, language, and learning style: the assessment of the ability to make needs known, consciousness, orientation (person, place, and time), expected emotional /behavioral responses, and learning preferences (eg, learning barriers, education  needs)  [] For the neuromuscular system: a general assessment of gross coordinated movement (eg, balance, gait, locomotion, transfers, and transitions) and motor function  (motor control and motor learning)  [] For the musculoskeletal system: the assessment of gross symmetry, gross range of motion, gross strength, height, and weight  [] For the integumentary system: the assessment of pliability(texture), presence of scar formation, skin color, and skin integrity  [] For cardiovascular/pulmonary system: the assessment of heart rate, respiratory rate, blood pressure, and edema     Activity limitations:    [] Patient's cognitive status and saf ety concerns          [] Status of current condition      [] Weight bearing restriction  [] Cardiopulmunary Restriction    Participation Restrictions:   [] Goals and goal agreement with the patient     [] Rehab potential (prognosis) and probable outcome      Examination of Body System: (criteria)    [] 19935 - addressing 1-2 elements    [] 42541 - addressing a total of 3 or more elements     [] 22447 -  Addressing a total of 4 or more elements         Clinical Presentation: (criteria)  Choose one     On examination of body system using standardized tests and measures patient presents with (CHOOSE ONE) elements from any of the following: body structures and functions, activity limitations, and/or participation restrictions.  Leading to a clinical presentation that is considered (CHOOSE ONE)                              Clinical Decision  Making  (Eval Complexity):  Choose One     Time Tracking:     PT Received On: 04/03/18  PT Start Time: 0814     PT Stop Time: 0839  PT Total Time (min): 25 min     Billable Minutes: Evaluation 15 and Gait Training 10      Cally Santos, PT  04/03/2018

## 2018-04-03 NOTE — ASSESSMENT & PLAN NOTE
--likely 2/2 cholecystitis - IV Zosyn started 3/31/18  --BCX2 NGTD  --Lactic acid normal, procalcitonin slightly elevated  --no fluids 2/2 pulm htn  General Surgery following patient and Lap sharmila performed

## 2018-04-04 PROBLEM — J96.21 ACUTE ON CHRONIC RESPIRATORY FAILURE WITH HYPOXIA: Status: ACTIVE | Noted: 2018-04-04

## 2018-04-04 PROBLEM — J96.02 ACUTE RESPIRATORY FAILURE WITH HYPOXIA AND HYPERCARBIA: Status: ACTIVE | Noted: 2018-04-04

## 2018-04-04 PROBLEM — Z90.49 S/P LAPAROSCOPIC CHOLECYSTECTOMY: Status: ACTIVE | Noted: 2018-04-04

## 2018-04-04 PROBLEM — K81.0 CHOLECYSTITIS, ACUTE: Status: RESOLVED | Noted: 2018-03-29 | Resolved: 2018-04-04

## 2018-04-04 PROBLEM — J96.01 ACUTE RESPIRATORY FAILURE WITH HYPOXIA: Status: ACTIVE | Noted: 2018-04-04

## 2018-04-04 PROBLEM — D72.829 LEUKOCYTOSIS: Status: RESOLVED | Noted: 2018-03-30 | Resolved: 2018-04-04

## 2018-04-04 PROBLEM — J98.11 ATELECTASIS OF BOTH LUNGS: Status: RESOLVED | Noted: 2018-04-01 | Resolved: 2018-04-04

## 2018-04-04 LAB
BASOPHILS # BLD AUTO: 0.03 K/UL
BASOPHILS NFR BLD: 0.3 %
DIFFERENTIAL METHOD: ABNORMAL
EOSINOPHIL # BLD AUTO: 0.1 K/UL
EOSINOPHIL NFR BLD: 0.6 %
ERYTHROCYTE [DISTWIDTH] IN BLOOD BY AUTOMATED COUNT: 13.7 %
HCT VFR BLD AUTO: 36.3 %
HGB BLD-MCNC: 11.5 G/DL
LYMPHOCYTES # BLD AUTO: 2.1 K/UL
LYMPHOCYTES NFR BLD: 17.7 %
MCH RBC QN AUTO: 29.3 PG
MCHC RBC AUTO-ENTMCNC: 31.7 G/DL
MCV RBC AUTO: 93 FL
MONOCYTES # BLD AUTO: 1.5 K/UL
MONOCYTES NFR BLD: 13.2 %
NEUTROPHILS # BLD AUTO: 7.9 K/UL
NEUTROPHILS NFR BLD: 68.2 %
PHOSPHATE SERPL-MCNC: 2.2 MG/DL
PLATELET # BLD AUTO: 291 K/UL
PMV BLD AUTO: 9.6 FL
RBC # BLD AUTO: 3.92 M/UL
WBC # BLD AUTO: 11.62 K/UL

## 2018-04-04 PROCEDURE — 85025 COMPLETE CBC W/AUTO DIFF WBC: CPT

## 2018-04-04 PROCEDURE — 97116 GAIT TRAINING THERAPY: CPT

## 2018-04-04 PROCEDURE — 99233 SBSQ HOSP IP/OBS HIGH 50: CPT | Mod: ,,, | Performed by: INTERNAL MEDICINE

## 2018-04-04 PROCEDURE — 84100 ASSAY OF PHOSPHORUS: CPT

## 2018-04-04 PROCEDURE — 94660 CPAP INITIATION&MGMT: CPT

## 2018-04-04 PROCEDURE — 25000003 PHARM REV CODE 250: Performed by: NURSE PRACTITIONER

## 2018-04-04 PROCEDURE — 25000003 PHARM REV CODE 250: Performed by: EMERGENCY MEDICINE

## 2018-04-04 PROCEDURE — 94640 AIRWAY INHALATION TREATMENT: CPT

## 2018-04-04 PROCEDURE — 94664 DEMO&/EVAL PT USE INHALER: CPT

## 2018-04-04 PROCEDURE — 97530 THERAPEUTIC ACTIVITIES: CPT

## 2018-04-04 PROCEDURE — 27000221 HC OXYGEN, UP TO 24 HOURS

## 2018-04-04 PROCEDURE — 99900035 HC TECH TIME PER 15 MIN (STAT)

## 2018-04-04 PROCEDURE — 63600175 PHARM REV CODE 636 W HCPCS: Performed by: HOSPITALIST

## 2018-04-04 PROCEDURE — 25000242 PHARM REV CODE 250 ALT 637 W/ HCPCS: Performed by: NURSE PRACTITIONER

## 2018-04-04 PROCEDURE — 27000190 HC CPAP FULL FACE MASK W/VALVE

## 2018-04-04 PROCEDURE — 25000003 PHARM REV CODE 250: Performed by: HOSPITALIST

## 2018-04-04 PROCEDURE — 63600175 PHARM REV CODE 636 W HCPCS: Performed by: NURSE PRACTITIONER

## 2018-04-04 PROCEDURE — 94761 N-INVAS EAR/PLS OXIMETRY MLT: CPT

## 2018-04-04 PROCEDURE — 94799 UNLISTED PULMONARY SVC/PX: CPT

## 2018-04-04 PROCEDURE — 25000003 PHARM REV CODE 250: Performed by: SURGERY

## 2018-04-04 PROCEDURE — 11000001 HC ACUTE MED/SURG PRIVATE ROOM

## 2018-04-04 PROCEDURE — 36415 COLL VENOUS BLD VENIPUNCTURE: CPT

## 2018-04-04 RX ORDER — HYDROCODONE BITARTRATE AND ACETAMINOPHEN 7.5; 325 MG/1; MG/1
1 TABLET ORAL EVERY 6 HOURS PRN
Qty: 12 TABLET | Refills: 0 | Status: SHIPPED | OUTPATIENT
Start: 2018-04-04 | End: 2018-04-13

## 2018-04-04 RX ORDER — AMOXICILLIN AND CLAVULANATE POTASSIUM 875; 125 MG/1; MG/1
1 TABLET, FILM COATED ORAL 2 TIMES DAILY
Qty: 16 TABLET | Refills: 0 | Status: SHIPPED | OUTPATIENT
Start: 2018-04-04 | End: 2018-04-13 | Stop reason: ALTCHOICE

## 2018-04-04 RX ORDER — MOXIFLOXACIN HYDROCHLORIDE 400 MG/1
400 TABLET ORAL DAILY
Status: DISCONTINUED | OUTPATIENT
Start: 2018-04-04 | End: 2018-04-05 | Stop reason: HOSPADM

## 2018-04-04 RX ORDER — ONDANSETRON 4 MG/1
4 TABLET, ORALLY DISINTEGRATING ORAL EVERY 8 HOURS PRN
Qty: 10 TABLET | Refills: 0 | Status: SHIPPED | OUTPATIENT
Start: 2018-04-04 | End: 2018-04-13

## 2018-04-04 RX ORDER — SODIUM,POTASSIUM PHOSPHATES 280-250MG
2 POWDER IN PACKET (EA) ORAL
Status: DISCONTINUED | OUTPATIENT
Start: 2018-04-04 | End: 2018-04-05 | Stop reason: HOSPADM

## 2018-04-04 RX ADMIN — ASPIRIN 81 MG: 81 TABLET, COATED ORAL at 08:04

## 2018-04-04 RX ADMIN — STANDARDIZED SENNA CONCENTRATE AND DOCUSATE SODIUM 1 TABLET: 8.6; 5 TABLET, FILM COATED ORAL at 09:04

## 2018-04-04 RX ADMIN — ENOXAPARIN SODIUM 40 MG: 100 INJECTION SUBCUTANEOUS at 08:04

## 2018-04-04 RX ADMIN — POTASSIUM & SODIUM PHOSPHATES POWDER PACK 280-160-250 MG 2 PACKET: 280-160-250 PACK at 05:04

## 2018-04-04 RX ADMIN — STANDARDIZED SENNA CONCENTRATE AND DOCUSATE SODIUM 1 TABLET: 8.6; 5 TABLET, FILM COATED ORAL at 08:04

## 2018-04-04 RX ADMIN — HYDROCODONE BITARTRATE AND ACETAMINOPHEN 1 TABLET: 5; 325 TABLET ORAL at 05:04

## 2018-04-04 RX ADMIN — POTASSIUM & SODIUM PHOSPHATES POWDER PACK 280-160-250 MG 2 PACKET: 280-160-250 PACK at 09:04

## 2018-04-04 RX ADMIN — CHLORHEXIDINE GLUCONATE 10 ML: 1.2 RINSE ORAL at 08:04

## 2018-04-04 RX ADMIN — CHLORHEXIDINE GLUCONATE 10 ML: 1.2 RINSE ORAL at 09:04

## 2018-04-04 RX ADMIN — LOSARTAN POTASSIUM 50 MG: 50 TABLET, FILM COATED ORAL at 08:04

## 2018-04-04 RX ADMIN — ALBUTEROL SULFATE 2.5 MG: 2.5 SOLUTION RESPIRATORY (INHALATION) at 11:04

## 2018-04-04 RX ADMIN — SODIUM CHLORIDE, POTASSIUM CHLORIDE, SODIUM LACTATE AND CALCIUM CHLORIDE: 600; 310; 30; 20 INJECTION, SOLUTION INTRAVENOUS at 03:04

## 2018-04-04 RX ADMIN — CLONIDINE HYDROCHLORIDE 0.1 MG: 0.1 TABLET ORAL at 05:04

## 2018-04-04 RX ADMIN — ALBUTEROL SULFATE 2.5 MG: 2.5 SOLUTION RESPIRATORY (INHALATION) at 07:04

## 2018-04-04 RX ADMIN — ALBUTEROL SULFATE 2.5 MG: 2.5 SOLUTION RESPIRATORY (INHALATION) at 08:04

## 2018-04-04 RX ADMIN — MOXIFLOXACIN HYDROCHLORIDE 400 MG: 400 TABLET, FILM COATED ORAL at 01:04

## 2018-04-04 RX ADMIN — POTASSIUM & SODIUM PHOSPHATES POWDER PACK 280-160-250 MG 2 PACKET: 280-160-250 PACK at 01:04

## 2018-04-04 RX ADMIN — PIPERACILLIN AND TAZOBACTAM 4.5 G: 4; .5 INJECTION, POWDER, LYOPHILIZED, FOR SOLUTION INTRAVENOUS; PARENTERAL at 05:04

## 2018-04-04 NOTE — PROGRESS NOTES
[facility] -   Pulmonology  Progress Note    Patient Name: Yoana Pardo  MRN: [id number]  Admission Date: 3/29/2018  Hospital Length of Stay: 5 days  Code Status: Full Code  Attending Provider: Harmony Colunga MD  Primary Care Provider: Lynn Wiggins MD   Principal Problem: Cholecystitis, acute    Subjective:     Interval History: Seen and examined at bedside. Hospital chart reviewed. No acute interval detrimental events noted. She reports that she  has moderately improved. ROBOTIC CHOLECYSTECTOMY YESTERDAY. CPAP ordered. Oxygen supplementation at 4 L /min. Keep SAO2 > 88%.       Review of Systems   Constitutional: Negative for malaise/fatigue and weight loss.   HENT: Negative for ear discharge and ear pain.    Eyes: Negative for photophobia and pain.   Respiratory: Negative for hemoptysis and shortness of breath.    Cardiovascular: Negative for chest pain and palpitations.   Gastrointestinal: Negative for diarrhea and nausea.   Genitourinary: Negative for dysuria and urgency.   Musculoskeletal: Negative for myalgias and neck pain.   Skin: Negative for rash.   Neurological: Negative for tremors and headaches.   Endo/Heme/Allergies: Negative for environmental allergies.   Psychiatric/Behavioral: Negative for hallucinations and substance abuse.     Objective:     Vital Signs (Most Recent):  Temp: 98.6 °F (37 °C) (04/04/18 1117)  Pulse: 74 (04/04/18 1117)  Resp: 18 (04/04/18 1117)  BP: (!) 149/85 (04/04/18 1117)  SpO2: (!) 93 % (04/04/18 1117) Vital Signs (24h Range):  Temp:  [98.4 °F (36.9 °C)-100.7 °F (38.2 °C)] 98.6 °F (37 °C)  Pulse:  [] 74  Resp:  [14-20] 18  SpO2:  [93 %-99 %] 93 %  BP: (149-184)/(71-89) 149/85     Weight: 108.6 kg (239 lb 6.7 oz)  Body mass index is 46.76 kg/m².      Intake/Output Summary (Last 24 hours) at 04/04/18 1136  Last data filed at 04/04/18 0800   Gross per 24 hour   Intake             1290 ml   Output              565 ml   Net              725 ml       Physical  Exam   Constitutional: She is oriented to person, place, and time. She appears well-developed and well-nourished.   HENT:   Head: Normocephalic.   Right Ear: External ear normal.   Left Ear: External ear normal.   Nose: Nose normal.   Eyes: Pupils are equal, round, and reactive to light. No scleral icterus.   Neck: Normal range of motion. Neck supple. No thyromegaly present.   Cardiovascular: Normal rate, regular rhythm and normal heart sounds.    No murmur heard.  Pulmonary/Chest: Effort normal and breath sounds normal.   Abdominal: Soft. Bowel sounds are normal. She exhibits no distension. There is no tenderness. There is no guarding.   Musculoskeletal: Normal range of motion.   Lymphadenopathy:     She has no cervical adenopathy.   Neurological: She is alert and oriented to person, place, and time.   Skin: Skin is warm and dry.       Vents:  Oxygen Concentration (%): 1 (04/04/18 0731)    Lines/Drains/Airways     Drain                 Drain/Device  04/02/18 1440 abdomen collapsible closed device 1 day                Significant Labs:      Laboratory Data:  Reviewed recent labs. Appear stable other than abnormalities addressed in plan.     Radiology:  Reviewed recent imaging studies. Appear stable other than abnormalities addressed in plan.       Assessment/Plan:     Acute respiratory failure with hypoxia    Supplement oxygenation. Keep SAO2 >= 88%. Bronchodilators per orders            [place] (obstructive sleep apnea)    PAP therapy: CPAP 12 CMWP QHS.        Morbid obesity with BMI of 40.0-44.9, adult    Weight loss encouraged.         Pulmonary HTN    MPAP FROM PASP:    mPAP = 0.6146.56  + 2 = 30.4 mmHg.    Mild pulmonary hypertension at best likely secondary to hypoxia.     Recommend diuresis. Repeat ECHO after appropriate diuresis.   Treat [place] with nocturnal PAP.               Counseling/Consultation:I discussed the case with primary care team, I discussed the patient's condition/prognosis with the family. Thank  you for allowing me to participate in this patients care  We will continue to follow with you.     Emmanuel Dee MD  Pulmonology  [facility]

## 2018-04-04 NOTE — PROGRESS NOTES
[facility] Medicine  Progress Note    Patient Name: Yoana Pardo  MRN: [id number]  Patient Class: IP- Inpatient   Admission Date: 3/29/2018  Length of Stay: 5 days  Attending Physician: Harmony Colunga MD  Primary Care Provider: Lynn Wiggins MD        Subjective:     Principal Problem:Cholecystitis, acute    HPI:  67F h/o cholelithiasis, pHTN and [place] presents with RUQ ab pain that started 3 days ago.  Intermittent pain to RUQ. Associated with n/v and sob.  US RUQ negative for acute cholecystitis.  Labs wnl.   O2 sat 83% on room air.  I spoke to ER physician and recommended giving patient lasix 40mg IV x 1 for pulmonary htn.  Afterwards, O2 sat improved to 89% on room air.  However, patient is still tachypneic and nauseated.  Hospital medicine was called for observation for hypoxia.   She presented 3 days ago, US RUQ was also negative for acute cholecystitis at that time.  No other exacerbating or alleviating factors.  She reports she has a follow up with general surgery for cholecystectomy on 4/2.       Hospital Course:  Patient was kept for OBS for hypoxia under the care of Hospital Medicine. CXR showed no acute process, Chest CTA showed CTA of the chest demonstrates no signs of pulmonary embolism. Some atelectatic changes are present in the lung bases. There is a large hiatal hernia. PCo2 50.5, PO2 42, HCO3 29.5  3/30: WBC count increased to 17K today  3/31/18: Procalcitonin mildly elevated and low grade temp of 100.6 evening of 3/30/18. Hida scanned discontinued by General Surgery who states if patient declines will proceed with cholecystectomy. Currently, RUQ abdominal pain controlled with hydrocodone, pt has no nausea or vomiting. Hypoxia continues and pt receiving IV lasix and supplemental oxygen - has pulmonary HTN with atelectasis - incentive spirometer ordered. IV Zosyn given.  4/1/18 - Pulmonology saw the patient and feels pulmonary HTN group 3 and sleep apnea not wearing CPAP  at night. CPAP was ordered by Dr. Mcintosh. Dr. Erickson reordered HIDA scan for Monday 4/1/2018 - pt NPO and pain meds on hold. She still has RUQ pain that is controlled with Norco. WBC count trending upward.  IS and Acapella in progress.   4/2/18-HIDA scan showed Nonvisualization of the gallbladder consistent with cystic duct obstruction.This is consistent with patient's diagnosis of acute cholecystitis. Pt is S/P Robotic assisted lap sharmila performed 4/2/18 by Dr. Erickson.   Post op day 1 pt is S/P lap sharmila. She states she is feeling better today. Has post surgical abdominal pain - is mildly nauseated. PT/OT noted no functional immobility. IV Zosyn in process.     Continues to improve, Dr. Erickson cleared pt for discharge, she is up and about but her O2 sats were low, hence discharge cancelled. She had resp failure upon presentation, CTA Chest Neg for any PE but had atelectasis, hence discharge put off at present and O2 being arranged. She denies any hx of COPD, pneumonia, smoking, PE, CHF, edema etc. Started on IS and nebs and switched to oral Abx and Steroids. Pt is feeling a little better and doing nebs, acapela and IS.     Interval History: Continues to improve, she was up and about but her O2 sats were low, hence discharge cancelled. Also spiked a temp to 100.8 FShe had resp failure upon presentation, CTA Chest Neg for any PE but had atelectasis, hence discharge put off at present and O2 being arranged. She denies any hx of COPD, pneumonia, smoking, PE, CHF, edema etc. Started on IS and nebs and switched to oral Abx and Steroids. Pt is feeling a little better and doing nebs, acapela and IS.     Review of Systems   Constitutional: Negative for chills, diaphoresis and fever.   HENT: Negative for congestion, rhinorrhea and sore throat.    Eyes: Negative for pain and visual disturbance.   Respiratory: Positive for shortness of breath. Negative for cough and wheezing.    Cardiovascular: Negative for chest pain, palpitations  and leg swelling.   Gastrointestinal: Positive for abdominal pain (Ruq). Negative for abdominal distention, blood in stool, constipation, diarrhea, nausea and vomiting.   Endocrine: Negative for polyphagia and polyuria.   Genitourinary: Negative for difficulty urinating, dysuria and hematuria.   Musculoskeletal: Negative for arthralgias, back pain and myalgias.   Skin: Negative for color change and wound.   Allergic/Immunologic: Negative.    Neurological: Negative for dizziness, tremors, syncope, weakness, light-headedness and headaches.   Hematological: Negative.    Psychiatric/Behavioral: Negative for agitation, behavioral problems, confusion and suicidal ideas.   All other systems reviewed and are negative.    Objective:     Vital Signs (Most Recent):  Temp: 98.6 °F (37 °C) (04/04/18 1117)  Pulse: 75 (04/04/18 1136)  Resp: 18 (04/04/18 1136)  BP: (!) 149/85 (04/04/18 1117)  SpO2: 98 % (04/04/18 1210) Vital Signs (24h Range):  Temp:  [98.4 °F (36.9 °C)-100.7 °F (38.2 °C)] 98.6 °F (37 °C)  Pulse:  [] 75  Resp:  [16-20] 18  SpO2:  [79 %-99 %] 98 %  BP: (149-180)/(71-89) 149/85     Weight: 108.6 kg (239 lb 6.7 oz)  Body mass index is 46.76 kg/m².    Intake/Output Summary (Last 24 hours) at 04/04/18 1455  Last data filed at 04/04/18 1200   Gross per 24 hour   Intake             1370 ml   Output              380 ml   Net              990 ml      Physical Exam   Constitutional: She is oriented to person, place, and time. She appears well-developed and well-nourished. No distress.   HENT:   Head: Normocephalic and atraumatic.   Eyes: Conjunctivae are normal.   Neck: Normal range of motion. Neck supple. No JVD present.   Cardiovascular: Normal rate, regular rhythm, normal heart sounds and intact distal pulses.    No murmur heard.  Pulmonary/Chest: Effort normal. No respiratory distress. She has decreased breath sounds in the right lower field. She has no wheezes.   Abdominal: Soft. Bowel sounds are normal. She  exhibits no distension and no mass. There is tenderness (mild tenderness of RUQ). There is no guarding.   NASIR drain present with minimal drainage   Genitourinary:   Genitourinary Comments: deferred   Musculoskeletal: Normal range of motion. She exhibits no edema.   Neurological: She is alert and oriented to person, place, and time. No cranial nerve deficit. Coordination normal.   Skin: Skin is warm and dry. Capillary refill takes 2 to 3 seconds. No rash noted. She is not diaphoretic. No erythema.   Psychiatric: She has a normal mood and affect. Her behavior is normal.   Nursing note and vitals reviewed.      Significant Labs:   ABGs:   Blood Culture:   BMP:   Recent Labs  Lab 04/03/18  0513         K 3.6      CO2 30*   BUN 10   CREATININE 0.8   CALCIUM 8.9     CBC:   Recent Labs  Lab 04/03/18  0513 04/04/18  0418   WBC 14.42* 11.62   HGB 11.8* 11.5*   HCT 37.3 36.3*    291     Coagulation:   Lactic Acid:   Respiratory Culture:   TSH:   Recent Labs  Lab 11/30/17  0818   TSH 0.641     All pertinent labs within the past 24 hours have been reviewed.    Significant Imaging: I have reviewed all pertinent imaging results/findings within the past 24 hours.    Assessment/Plan:      S/P laparoscopic cholecystectomy    Doing better  Surgery cleared her discharge          Acute on chronic respiratory failure with hypoxia    multifactorial etiology-- Atelectasis, [place], Pulm Htn  Will start nebs, IS, Acapela, ambulate   Arrange for home O2, start steroids        [place] (obstructive sleep apnea)    CPAP qhs   CPAP ordered by Dayna - pt refusing at this time 4/3/18    Will need CPAP as out pt.          Pulmonary HTN    Refer to hypoxia  Pulmonology saw the patient and he documents likely group 3 Pulmonary HTN - follow up in clinic  IV Lasix           Morbid obesity with BMI of 40.0-44.9, adult    Counseled on weight loss            Essential hypertension    Continue home medications losartan 100mg daily,             VTE Risk Mitigation         Ordered     enoxaparin injection 40 mg  Daily     Route:  Subcutaneous        03/29/18 4377     IP VTE HIGH RISK PATIENT  Once      03/30/18 0837     Place sequential compression device  Until discontinued      03/30/18 0837              Harmony Colunga MD  Department of Hospital Medicine   [facility]

## 2018-04-04 NOTE — PROGRESS NOTES
The patient was seen and examined. Doing well and no particular complaints.  Tolerates diet well, and the lab results reviewed.  Discontinue NASIR drain.  Discharge from the surgery stand point.    Evangelista Erickson

## 2018-04-04 NOTE — PLAN OF CARE
Apr17 Post OP with HUA Phillips Apr 17, 2018 1:00 PM  [facility] - General Surgery   [address] [facility] [place] [address]   [phone]         04/04/18 1224   Final Note   Assessment Type Final Discharge Note   Discharge Disposition Home   Hospital Follow Up  Appt(s) scheduled? Yes   Right Care Referral Info   Post Acute Recommendation No Care

## 2018-04-04 NOTE — PROGRESS NOTES
Home Oxygen Evaluation    Date Performed: 4/4/2018    1) Patient's Home O2 Sat on room air, while at rest: 78        If O2 sats on room air at rest are 88% or below, patient qualifies. No additional testing needed. Document N/A in steps 2 and 3. If 89% or above, complete steps 2.      2) Patient's O2 Sat on room air while exercising:         If O2 sats on room air while exercising remain 89% or above patient does not qualify, no further testing needed Document N/A in step 3. If O2 sats on room air while exercising are 88% or below, continue to step 3.      3) Patient's O2 Sat while exercising on O2: 98 at 3 LPM         (Must show improvement from #2 for patients to qualify)    If O2 sats improve on oxygen, patient qualifies for portable oxygen. If not, the patient does not qualify.

## 2018-04-04 NOTE — ANESTHESIA POSTPROCEDURE EVALUATION
Anesthesia Post Evaluation    Patient: Yoana Pardo    Procedure(s) Performed: Procedure(s) (LRB):  [id number] (N/A)    Final Anesthesia Type: general  Patient location during evaluation: PACU  Patient participation: Yes- Able to Participate  Level of consciousness: awake and alert  Post-procedure vital signs: reviewed and stable  Pain management: adequate  Airway patency: patent  PONV status at discharge: No PONV  Anesthetic complications: no      Cardiovascular status: blood pressure returned to baseline  Respiratory status: unassisted and spontaneous ventilation  Hydration status: euvolemic  Follow-up not needed.        Visit Vitals  BP (!) 149/85 (BP Location: Right arm, Patient Position: Lying)   Pulse 75   Temp 37 °C (98.6 °F) (Oral)   Resp 18   Ht 5' (1.524 m)   Wt 108.6 kg (239 lb 6.7 oz)   SpO2 97%   Breastfeeding? No   BMI 46.76 kg/m²       Pain/Fredi Score: Pain Assessment Performed: Yes (4/4/2018  9:00 AM)  Presence of Pain: complains of pain/discomfort (4/4/2018  9:00 AM)  Pain Rating Prior to Med Admin: 6 (4/4/2018  5:09 AM)  Pain Rating Post Med Admin: 2 (4/3/2018  6:23 AM)

## 2018-04-04 NOTE — PLAN OF CARE
Problem: Patient Care Overview  Goal: Plan of Care Review  PT REQUIRES  SBA FOR GT X 240' WITH USE OF RAILING AT TIMES.   Outcome: Outcome(s) achieved Date Met: 04/04/18  Fall precautions maintained, pt free from falls/injuries  PT repositions and ambulates independently  C/o pain, relieved by rest and relaxation  Pt will have soft diet for lunch, then be DC  POC and medications reviewed with pt, pt verbalized understanding.  Side rails x 2 up, bed locked and low.  No signs/symptoms of acute distress.  Chart check done. Adequate for DC.

## 2018-04-04 NOTE — ASSESSMENT & PLAN NOTE
multifactorial etiology-- Atelectasis, [place], Pulm Htn  Will start nebs, IS, Acapela, ambulate   Arrange for home O2, start steroids

## 2018-04-04 NOTE — PLAN OF CARE
Problem: Patient Care Overview  Goal: Plan of Care Review  PT REQUIRES  SBA FOR GT X 240' WITH USE OF RAILING AT TIMES.   Outcome: Ongoing (interventions implemented as appropriate)  Pt respirations are regular and unlabored. No acute distress noted. No complaints of pain, nausea or vomiting. Pt free of falls this shift. Will continue to monitor.

## 2018-04-04 NOTE — SUBJECTIVE & OBJECTIVE
Interval History: Continues to improve, she was up and about but her O2 sats were low, hence discharge cancelled. Also spiked a temp to 100.8 FShe had resp failure upon presentation, CTA Chest Neg for any PE but had atelectasis, hence discharge put off at present and O2 being arranged. She denies any hx of COPD, pneumonia, smoking, PE, CHF, edema etc. Started on IS and nebs and switched to oral Abx and Steroids. Pt is feeling a little better and doing nebs, acapela and IS.     Review of Systems   Constitutional: Negative for chills, diaphoresis and fever.   HENT: Negative for congestion, rhinorrhea and sore throat.    Eyes: Negative for pain and visual disturbance.   Respiratory: Positive for shortness of breath. Negative for cough and wheezing.    Cardiovascular: Negative for chest pain, palpitations and leg swelling.   Gastrointestinal: Positive for abdominal pain (Ruq). Negative for abdominal distention, blood in stool, constipation, diarrhea, nausea and vomiting.   Endocrine: Negative for polyphagia and polyuria.   Genitourinary: Negative for difficulty urinating, dysuria and hematuria.   Musculoskeletal: Negative for arthralgias, back pain and myalgias.   Skin: Negative for color change and wound.   Allergic/Immunologic: Negative.    Neurological: Negative for dizziness, tremors, syncope, weakness, light-headedness and headaches.   Hematological: Negative.    Psychiatric/Behavioral: Negative for agitation, behavioral problems, confusion and suicidal ideas.   All other systems reviewed and are negative.    Objective:     Vital Signs (Most Recent):  Temp: 98.6 °F (37 °C) (04/04/18 1117)  Pulse: 75 (04/04/18 1136)  Resp: 18 (04/04/18 1136)  BP: (!) 149/85 (04/04/18 1117)  SpO2: 98 % (04/04/18 1210) Vital Signs (24h Range):  Temp:  [98.4 °F (36.9 °C)-100.7 °F (38.2 °C)] 98.6 °F (37 °C)  Pulse:  [] 75  Resp:  [16-20] 18  SpO2:  [79 %-99 %] 98 %  BP: (149-180)/(71-89) 149/85     Weight: 108.6 kg (239 lb 6.7  oz)  Body mass index is 46.76 kg/m².    Intake/Output Summary (Last 24 hours) at 04/04/18 1455  Last data filed at 04/04/18 1200   Gross per 24 hour   Intake             1370 ml   Output              380 ml   Net              990 ml      Physical Exam   Constitutional: She is oriented to person, place, and time. She appears well-developed and well-nourished. No distress.   HENT:   Head: Normocephalic and atraumatic.   Eyes: Conjunctivae are normal.   Neck: Normal range of motion. Neck supple. No JVD present.   Cardiovascular: Normal rate, regular rhythm, normal heart sounds and intact distal pulses.    No murmur heard.  Pulmonary/Chest: Effort normal. No respiratory distress. She has decreased breath sounds in the right lower field. She has no wheezes.   Abdominal: Soft. Bowel sounds are normal. She exhibits no distension and no mass. There is tenderness (mild tenderness of RUQ). There is no guarding.   NASIR drain present with minimal drainage   Genitourinary:   Genitourinary Comments: deferred   Musculoskeletal: Normal range of motion. She exhibits no edema.   Neurological: She is alert and oriented to person, place, and time. No cranial nerve deficit. Coordination normal.   Skin: Skin is warm and dry. Capillary refill takes 2 to 3 seconds. No rash noted. She is not diaphoretic. No erythema.   Psychiatric: She has a normal mood and affect. Her behavior is normal.   Nursing note and vitals reviewed.      Significant Labs:   ABGs:   Blood Culture:   BMP:   Recent Labs  Lab 04/03/18  0513         K 3.6      CO2 30*   BUN 10   CREATININE 0.8   CALCIUM 8.9     CBC:   Recent Labs  Lab 04/03/18  0513 04/04/18  0418   WBC 14.42* 11.62   HGB 11.8* 11.5*   HCT 37.3 36.3*    291     Coagulation:   Lactic Acid:   Respiratory Culture:   TSH:   Recent Labs  Lab 11/30/17  0818   TSH 0.641     All pertinent labs within the past 24 hours have been reviewed.    Significant Imaging: I have reviewed all  pertinent imaging results/findings within the past 24 hours.

## 2018-04-04 NOTE — PT/OT/SLP PROGRESS
Physical Therapy  Treatment    Yoana Pardo   MRN: [id number]   Admitting Diagnosis: Cholecystitis, acute    PT Received On: 04/04/18  PT Start Time: 0935     PT Stop Time: 1000    PT Total Time (min): 25 min       Billable Minutes:  Gait Training 15 and Therapeutic Activity 10    Treatment Type: Treatment  PT/PTA: PT             General Precautions: Standard,    Orthopedic Precautions: N/A   Braces: N/A         Subjective:  Communicated with EPIC AND NURSE ZAYAS prior to session.  PT AGREES TO THERAPY, WANTS TO GET DRESSED    Pain/Comfort  Pain Rating 1: 0/10    Objective:   Patient found with: peripheral IV    Functional Mobility:  Bed Mobility: INDEPENDENT       Transfers: KENNETH WITH BED RAIL       Gait: 300 FEET WITH SBA, NO LOB, BUT PT DID GET WINDED,  INSTRUCTED IN PURSED LIP BREATHING       Therapeutic Activities and Exercises:  PT ASSISTED WITH GETTING DRESSED, WAS UNABLE TO DAMIAN HER UNDERWEAR AND PANTS WITHOUT ASSISTANCE.     AM-PAC 6 CLICK MOBILITY  How much help from another person does this patient currently need?   1 = Unable, Total/Dependent Assistance  2 = A lot, Maximum/Moderate Assistance  3 = A little, Minimum/Contact Guard/Supervision  4 = None, Modified [place]/Independent    Turning over in bed (including adjusting bedclothes, sheets and blankets)?: 4  Sitting down on and standing up from a chair with arms (e.g., wheelchair, bedside commode, etc.): 4  Moving from lying on back to sitting on the side of the bed?: 4  Moving to and from a bed to a chair (including a wheelchair)?: 4  Need to walk in hospital room?: 4  Climbing 3-5 steps with a railing?: 1  Total Score: 21    AM-PAC Raw Score CMS G-Code Modifier Level of Impairment Assistance   6 % Total / Unable   7 - 9 CM 80 - 100% Maximal Assist   10 - 14 CL 60 - 80% Moderate Assist   15 - 19 CK 40 - 60% Moderate Assist   20 - 22 CJ 20 - 40% Minimal Assist   23 CI 1-20% SBA / CGA   24 CH 0% Independent/ Mod I     Patient left  SITTING ON SIDE OF THE BED with NURSE notified and FAMILY present.    Assessment:  Yoana Pardo is a 67 y.o. female with a medical diagnosis of Cholecystitis, acute and presents with IMPAIRED ENDURANCE AS MAIN PROBLEM.  NO FURTHER P.T. NEEDS ANTICIPATED AT THIS TIME    Rehab identified problem list/impairments: Rehab identified problem list/impairments: impaired endurance        Activity tolerance: Good    Discharge recommendations: Discharge Facility/Level Of Care Needs: home     Barriers to discharge:  NONE    Equipment recommendations: Equipment Needed After Discharge: none     GOALS:    Physical Therapy Goals     Not on file          Multidisciplinary Problems (Resolved)        Problem: Physical Therapy Goal    Goal Priority Disciplines Outcome Goal Variances Interventions   Physical Therapy Goal   (Resolved)     PT/OT, PT Outcome(s) achieved     Description:  PT WILL BE SEEN FOR P.T. FOR A MIN OF 5 OUT OF 7 DAYS A WEEK  LT/10/18  1. PT WILL COMPLETE BED MOBILITY IND  2. PT WILL GT TRAIN X 250' IND  3. PT WILL T/F TO CHAIR IND.                    PLAN:    Patient to be seen 5 x/week  to address the above listed problems via gait training, therapeutic activities, therapeutic exercises  Plan of Care expires: 04/10/18  Plan of Care reviewed with: patient         Caritokit Biswasan, PT  2018

## 2018-04-05 VITALS
WEIGHT: 239.44 LBS | TEMPERATURE: 98 F | HEIGHT: 60 IN | RESPIRATION RATE: 18 BRPM | OXYGEN SATURATION: 97 % | HEART RATE: 78 BPM | DIASTOLIC BLOOD PRESSURE: 95 MMHG | BODY MASS INDEX: 47.01 KG/M2 | SYSTOLIC BLOOD PRESSURE: 147 MMHG

## 2018-04-05 LAB
ALBUMIN SERPL BCP-MCNC: 2.2 G/DL
ALP SERPL-CCNC: 87 U/L
ALT SERPL W/O P-5'-P-CCNC: 8 U/L
ANION GAP SERPL CALC-SCNC: 15 MMOL/L
AST SERPL-CCNC: 21 U/L
BACTERIA BLD CULT: NORMAL
BASOPHILS # BLD AUTO: 0.03 K/UL
BASOPHILS NFR BLD: 0.3 %
BILIRUB SERPL-MCNC: 0.4 MG/DL
BUN SERPL-MCNC: 7 MG/DL
CALCIUM SERPL-MCNC: 8.6 MG/DL
CHLORIDE SERPL-SCNC: 99 MMOL/L
CO2 SERPL-SCNC: 25 MMOL/L
CREAT SERPL-MCNC: 0.6 MG/DL
DIFFERENTIAL METHOD: ABNORMAL
EOSINOPHIL # BLD AUTO: 0.1 K/UL
EOSINOPHIL NFR BLD: 1.1 %
ERYTHROCYTE [DISTWIDTH] IN BLOOD BY AUTOMATED COUNT: 13.6 %
EST. GFR  (AFRICAN AMERICAN): >60 ML/MIN/1.73 M^2
EST. GFR  (NON AFRICAN AMERICAN): >60 ML/MIN/1.73 M^2
GLUCOSE SERPL-MCNC: 94 MG/DL
HCT VFR BLD AUTO: 33.6 %
HGB BLD-MCNC: 10.9 G/DL
HYPOCHROMIA BLD QL SMEAR: ABNORMAL
LYMPHOCYTES # BLD AUTO: 2.2 K/UL
LYMPHOCYTES NFR BLD: 21.3 %
MCH RBC QN AUTO: 30.1 PG
MCHC RBC AUTO-ENTMCNC: 32.4 G/DL
MCV RBC AUTO: 93 FL
MONOCYTES # BLD AUTO: 1.2 K/UL
MONOCYTES NFR BLD: 10.9 %
NEUTROPHILS # BLD AUTO: 7 K/UL
NEUTROPHILS NFR BLD: 67 %
PHOSPHATE SERPL-MCNC: 3.1 MG/DL
PLATELET # BLD AUTO: 293 K/UL
PMV BLD AUTO: 10.4 FL
POTASSIUM SERPL-SCNC: 3.6 MMOL/L
PROT SERPL-MCNC: 6.8 G/DL
RBC # BLD AUTO: 3.62 M/UL
SODIUM SERPL-SCNC: 139 MMOL/L
WBC # BLD AUTO: 10.52 K/UL

## 2018-04-05 PROCEDURE — 25000003 PHARM REV CODE 250: Performed by: HOSPITALIST

## 2018-04-05 PROCEDURE — 27000221 HC OXYGEN, UP TO 24 HOURS

## 2018-04-05 PROCEDURE — 94640 AIRWAY INHALATION TREATMENT: CPT

## 2018-04-05 PROCEDURE — 63600175 PHARM REV CODE 636 W HCPCS: Performed by: HOSPITALIST

## 2018-04-05 PROCEDURE — 94664 DEMO&/EVAL PT USE INHALER: CPT

## 2018-04-05 PROCEDURE — 80053 COMPREHEN METABOLIC PANEL: CPT

## 2018-04-05 PROCEDURE — 25000003 PHARM REV CODE 250: Performed by: NURSE PRACTITIONER

## 2018-04-05 PROCEDURE — 99900035 HC TECH TIME PER 15 MIN (STAT)

## 2018-04-05 PROCEDURE — 84100 ASSAY OF PHOSPHORUS: CPT

## 2018-04-05 PROCEDURE — 63600175 PHARM REV CODE 636 W HCPCS: Performed by: INTERNAL MEDICINE

## 2018-04-05 PROCEDURE — 85025 COMPLETE CBC W/AUTO DIFF WBC: CPT

## 2018-04-05 PROCEDURE — 25000242 PHARM REV CODE 250 ALT 637 W/ HCPCS: Performed by: NURSE PRACTITIONER

## 2018-04-05 PROCEDURE — 97110 THERAPEUTIC EXERCISES: CPT

## 2018-04-05 PROCEDURE — 25000003 PHARM REV CODE 250: Performed by: SURGERY

## 2018-04-05 PROCEDURE — 25000003 PHARM REV CODE 250: Performed by: EMERGENCY MEDICINE

## 2018-04-05 PROCEDURE — 99233 SBSQ HOSP IP/OBS HIGH 50: CPT | Mod: ,,, | Performed by: INTERNAL MEDICINE

## 2018-04-05 PROCEDURE — 97116 GAIT TRAINING THERAPY: CPT

## 2018-04-05 PROCEDURE — 94799 UNLISTED PULMONARY SVC/PX: CPT

## 2018-04-05 PROCEDURE — 96374 THER/PROPH/DIAG INJ IV PUSH: CPT

## 2018-04-05 PROCEDURE — 36415 COLL VENOUS BLD VENIPUNCTURE: CPT

## 2018-04-05 RX ORDER — CETIRIZINE HYDROCHLORIDE 10 MG/1
10 TABLET ORAL ONCE
Status: COMPLETED | OUTPATIENT
Start: 2018-04-05 | End: 2018-04-05

## 2018-04-05 RX ORDER — FUROSEMIDE 10 MG/ML
40 INJECTION INTRAMUSCULAR; INTRAVENOUS ONCE
Status: COMPLETED | OUTPATIENT
Start: 2018-04-05 | End: 2018-04-05

## 2018-04-05 RX ORDER — ACETAMINOPHEN 325 MG/1
650 TABLET ORAL ONCE
Status: COMPLETED | OUTPATIENT
Start: 2018-04-05 | End: 2018-04-05

## 2018-04-05 RX ADMIN — ASPIRIN 81 MG: 81 TABLET, COATED ORAL at 08:04

## 2018-04-05 RX ADMIN — POTASSIUM & SODIUM PHOSPHATES POWDER PACK 280-160-250 MG 2 PACKET: 280-160-250 PACK at 05:04

## 2018-04-05 RX ADMIN — POTASSIUM & SODIUM PHOSPHATES POWDER PACK 280-160-250 MG 2 PACKET: 280-160-250 PACK at 11:04

## 2018-04-05 RX ADMIN — CETIRIZINE HYDROCHLORIDE 10 MG: 10 TABLET, FILM COATED ORAL at 01:04

## 2018-04-05 RX ADMIN — ALBUTEROL SULFATE 2.5 MG: 2.5 SOLUTION RESPIRATORY (INHALATION) at 07:04

## 2018-04-05 RX ADMIN — ENOXAPARIN SODIUM 40 MG: 100 INJECTION SUBCUTANEOUS at 08:04

## 2018-04-05 RX ADMIN — CHLORHEXIDINE GLUCONATE 10 ML: 1.2 RINSE ORAL at 08:04

## 2018-04-05 RX ADMIN — STANDARDIZED SENNA CONCENTRATE AND DOCUSATE SODIUM 1 TABLET: 8.6; 5 TABLET, FILM COATED ORAL at 08:04

## 2018-04-05 RX ADMIN — ALBUTEROL SULFATE 2.5 MG: 2.5 SOLUTION RESPIRATORY (INHALATION) at 02:04

## 2018-04-05 RX ADMIN — ALBUTEROL SULFATE 2.5 MG: 2.5 SOLUTION RESPIRATORY (INHALATION) at 12:04

## 2018-04-05 RX ADMIN — MOXIFLOXACIN HYDROCHLORIDE 400 MG: 400 TABLET, FILM COATED ORAL at 08:04

## 2018-04-05 RX ADMIN — ACETAMINOPHEN 650 MG: 325 TABLET ORAL at 01:04

## 2018-04-05 RX ADMIN — FUROSEMIDE 40 MG: 10 INJECTION, SOLUTION INTRAMUSCULAR; INTRAVENOUS at 09:04

## 2018-04-05 RX ADMIN — LOSARTAN POTASSIUM 50 MG: 50 TABLET, FILM COATED ORAL at 08:04

## 2018-04-05 NOTE — DISCHARGE SUMMARY
[facility] Medicine  Discharge Summary      Patient Name: Yoana Pardo  MRN: [id number]  Admission Date: 3/29/2018  Hospital Length of Stay: 6 days  Discharge Date and Time:  04/05/2018 2:49 PM  Attending Physician: Harmony Colunga MD  Discharging Provider: Colleen Nielsen NP  Primary Care Provider: Lynn Wiggins MD      HPI:   67F h/o cholelithiasis, pHTN and [place] presents with RUQ ab pain that started 3 days ago.  Intermittent pain to RUQ. Associated with n/v and sob.  US RUQ negative for acute cholecystitis.  Labs wnl.   O2 sat 83% on room air.  I spoke to ER physician and recommended giving patient lasix 40mg IV x 1 for pulmonary htn.  Afterwards, O2 sat improved to 89% on room air.  However, patient is still tachypneic and nauseated.  Hospital medicine was called for observation for hypoxia.   She presented 3 days ago, US RUQ was also negative for acute cholecystitis at that time.  No other exacerbating or alleviating factors.  She reports she has a follow up with general surgery for cholecystectomy on 4/2.       Procedure(s) (LRB):  [id number] (N/A)      Hospital Course:   Patient was kept for OBS for hypoxia under the care of Hospital Medicine. CXR showed no acute process, Chest CTA showed CTA of the chest demonstrates no signs of pulmonary embolism. Some atelectatic changes are present in the lung bases. There is a large hiatal hernia. PCo2 50.5, PO2 42, HCO3 29.5  3/30: WBC count increased to 17K today  3/31/18: Procalcitonin mildly elevated and low grade temp of 100.6 evening of 3/30/18. Hida scanned discontinued by General Surgery who states if patient declines will proceed with cholecystectomy. Currently, RUQ abdominal pain controlled with hydrocodone, pt has no nausea or vomiting. Hypoxia continues and pt receiving IV lasix and supplemental oxygen - has pulmonary HTN with atelectasis - incentive spirometer ordered. IV Zosyn given.  4/1/18 - Pulmonology saw  the patient and feels pulmonary HTN group 3 and sleep apnea not wearing CPAP at night. CPAP was ordered by Dr. Mcintosh. Dr. Erickson reordered HIDA scan for Monday 4/1/2018 - pt NPO and pain meds on hold. She still has RUQ pain that is controlled with Norco. WBC count trending upward.  IS and Acapella in progress.   4/2/18-HIDA scan showed Nonvisualization of the gallbladder consistent with cystic duct obstruction.This is consistent with patient's diagnosis of acute cholecystitis. Pt is S/P Robotic assisted lap sharmila performed 4/2/18 by Dr. Erickson.   Post op day 1 pt is S/P lap sharmila. She states she is feeling better today. Has post surgical abdominal pain - is mildly nauseated. PT/OT noted no functional immobility. IV Zosyn in process.     Continues to improve, Dr. Erickson cleared pt for discharge, she is up and about but her O2 sats were low, hence discharge cancelled. She had resp failure upon presentation, CTA Chest Neg for any PE but had atelectasis, hence discharge put off at present and O2 being arranged. She denies any hx of COPD, pneumonia, smoking, PE, CHF, edema etc. Started on IS and nebs and switched to oral Abx and corticosteroids given. Condition improved and feeling a little better and doing nebs, acapela and IS. Home oxygen was arranged and delivered to room prior to discharge.  Pt has a documented Sp O2 99 % on 2 L oxygen. Pt to receive CPAP machine at home. She was seen and examined and determined to be safe and stable for discharge. Pt to follow up with General Surgery and Pulmonology. Also, she is to complete a prescription of Augmentin.      Consults:     No new Assessment & Plan notes have been filed under this hospital service since the last note was generated.  Service: Hospital Medicine    Final Active Diagnoses:    Diagnosis Date Noted POA    S/P laparoscopic cholecystectomy [Z90.49] 04/04/2018 Not Applicable    Acute on chronic respiratory failure with hypoxia [J96.21] 04/04/2018 Yes    [place]  (obstructive sleep apnea) [G47.33] 09/27/2016 Yes    Morbid obesity with BMI of 40.0-44.9, adult [E66.01, Z68.41] 07/20/2015 Not Applicable    Essential hypertension [I10] 07/08/2014 Yes     Chronic    Pulmonary HTN [I27.20]  Yes      Problems Resolved During this Admission:    Diagnosis Date Noted Date Resolved POA    PRINCIPAL PROBLEM:  Cholecystitis, acute [K81.0] 03/29/2018 04/04/2018 Yes    Leukocytosis [D72.829] 03/30/2018 04/04/2018 Yes    Atelectasis of both lungs [J98.11] 04/01/2018 04/04/2018 Yes    Hypoxia [R09.02] 03/29/2018 04/03/2018 Yes       Discharged Condition: stable    Disposition: Home or Self Care    Follow Up:  Follow-up Information     Jo Tafoya.    Specialty:  DME Provider  Why:  CPAP.  Ping's phone number is [phone]  Contact information:  [address] [facility]  [place] [address]  [phone]             Eve Lau PA-C In 2 weeks.    Specialty:  General Surgery  Why:  Hospital F/U - Lap Cholecytectomy  Contact information:  [address] [place] [address] [address] [place] [address]  [phone]             Schedule an appointment as soon as possible for a visit with Ernie Mcintosh MD.    Specialty:  Pulmonary Disease  Why:  [facility] F/U - Sleep Apnea and Pulmonary Hypertension  Contact information:  [address]  [place] [address]  [phone]             [facility].    Specialty:  DME Provider  Why:  home oxygen  Contact information:  [address] [facility]  [place] [address]  [phone]                 Patient Instructions:     [facility] AYADT PATIENT ENTERED CPAP USAGE   Order Specific Question Answer Comments   Patient consents to share Kayleen CPAP usage and settings data with [facility]? Yes      CPAP FOR HOME USE   Order Specific Question Answer Comments   Type: Auto CPAP    Auto CPAP pressure setting range (cmH20): 6-20    Length of need (1-99 months): 99    Humidification: Heated    Type of mask: Nasal    Headgear? Yes    Tubing? Yes     Humidifier chamber? Yes    Chin strap? Yes    Filters? Yes    Vendor: [facility]    Expected Date of Delivery: 4/9/2018      OXYGEN FOR HOME USE   Order Specific Question Answer Comments   Liter Flow 2    Duration Continuous    Qualifying SpO2: 78    Testing done at: Rest    Route nasal cannula    Portable mode: pulse dose acceptable    Device home concentrator with portable unit    Length of need (in months): 99 mos    Patient condition with qualifying saturation other chronic pulmonary condition    Height: 5' (1.524 m)    Weight: 108.6 kg (239 lb 6.7 oz)    Does patient have medical equipment at home? none    Alternative treatment measures have been tried or considered and deemed clinically ineffective. Yes      Activity as tolerated     Notify your health care provider if you experience any of the following:  temperature >100.4     Notify your health care provider if you experience any of the following:  persistent nausea and vomiting or diarrhea     Notify your health care provider if you experience any of the following:  severe uncontrolled pain     Notify your health care provider if you experience any of the following:  difficulty breathing or increased cough         Significant Diagnostic Studies: Labs:   CMP   Recent Labs  Lab 04/05/18  0616      K 3.6   CL 99   CO2 25   GLU 94   BUN 7*   CREATININE 0.6   CALCIUM 8.6*   PROT 6.8   ALBUMIN 2.2*   BILITOT 0.4   ALKPHOS 87   AST 21   ALT 8*   ANIONGAP 15   ESTGFRAFRICA >60   EGFRNONAA >60    and CBC   Recent Labs  Lab 04/04/18  0418 04/05/18  0616   WBC 11.62 10.52   HGB 11.5* 10.9*   HCT 36.3* 33.6*    293       Pending Diagnostic Studies:     None         Medications:  Reconciled Home Medications:      Medication List      START taking these medications    amoxicillin-clavulanate 875-125mg 875-125 mg per tablet  Commonly known as:  AUGMENTIN  Take 1 tablet by mouth 2 (two) times daily.     nozaseptin nasal   Commonly known as:   NOZIN  1 each by Each Nare route 2 (two) times daily. Complete supply provided by hospital        CONTINUE taking these medications    albuterol 90 mcg/actuation inhaler  Inhale 1-2 puffs into the lungs every 6 (six) hours as needed for Wheezing (Cough).     aspirin 81 MG EC tablet  Commonly known as:  ECOTRIN     furosemide 20 MG tablet  Commonly known as:  LASIX  TAKE ONE TABLET BY MOUTH TWICE DAILY AS NEEDED     hydrocodone-acetaminophen 7.5-325mg 7.5-325 mg per tablet  Commonly known as:  NORCO  Take 1 tablet by mouth every 6 (six) hours as needed for Pain.     levocetirizine 5 MG tablet  Commonly known as:  XYZAL  Take 1 tablet (5 mg total) by mouth every evening.     losartan-hydrochlorothiazide 100-25 mg 100-25 mg per tablet  Commonly known as:  HYZAAR  Take 1 tablet by mouth once daily.     ondansetron 4 MG Tbdl  Commonly known as:  ZOFRAN-ODT  Take 1 tablet (4 mg total) by mouth every 8 (eight) hours as needed (prn nausea).           Where to Get Your Medications      These medications were sent to [facility] [address] [facility] [address] [facility]  [address] [facility] [address]    Phone:  [phone]   · amoxicillin-clavulanate 875-125mg 875-125 mg per tablet     You can get these medications from any pharmacy    Bring a paper prescription for each of these medications  · hydrocodone-acetaminophen 7.5-325mg 7.5-325 mg per tablet  · ondansetron 4 MG Tbdl     Information about where to get these medications is not yet available    Ask your nurse or doctor about these medications  · nozaseptin nasal          Indwelling Lines/Drains at time of discharge:   Lines/Drains/Airways     Drain                 Drain/Device  04/02/18 1440 abdomen collapsible closed device 3 days                Time spent on the discharge of patient:   > 30 minutes  Patient was seen and examined on the date of discharge and determined to be suitable for discharge.         Colleen Nielsen NP  Department of  [facility] Medicine  [facility] -

## 2018-04-05 NOTE — NURSING
Discharge instructions, medications, and appointments reviewed with patient. Pt verbalized understanding. LDA removed. No further need/questions. Pt adequate for DC.  Portable oxygen in use, pt is to be wheeled down to

## 2018-04-05 NOTE — ASSESSMENT & PLAN NOTE
Supplement oxygenation. Keep SAO2 >= 88%. Bronchodilators per orders.  Arrange for home Oxygen.

## 2018-04-05 NOTE — SUBJECTIVE & OBJECTIVE
Interval History: Seen and examined at bedside. Hospital chart reviewed. No acute interval detrimental events noted. She reports that she  has moderately improved. ROBOTIC CHOLECYSTECTOMY POD # 2. Oxygen supplementation at 4 L /min. Keep SAO2 > 88%. Nocturnal CPAP 12 CMWP.       Review of Systems   Constitutional: Negative for malaise/fatigue and weight loss.   HENT: Negative for ear discharge and ear pain.    Eyes: Negative for photophobia and pain.   Respiratory: Negative for hemoptysis and shortness of breath.    Cardiovascular: Negative for chest pain and palpitations.   Gastrointestinal: Negative for diarrhea and nausea.   Genitourinary: Negative for dysuria and urgency.   Musculoskeletal: Negative for myalgias and neck pain.   Skin: Negative for rash.   Neurological: Negative for tremors and headaches.   Endo/Heme/Allergies: Negative for environmental allergies.   Psychiatric/Behavioral: Negative for hallucinations and substance abuse.       Scheduled Meds:   albuterol sulfate  2.5 mg Nebulization Q6H    aspirin  81 mg Oral Daily    chlorhexidine  10 mL Mouth/Throat BID    enoxaparin  40 mg Subcutaneous Daily    furosemide  40 mg Intravenous Once    losartan  50 mg Oral Daily    moxifloxacin  400 mg Oral Daily    nozaseptin   Each Nare BID    potassium, sodium phosphates  2 packet Oral QID (AC & HS)    senna-docusate 8.6-50 mg  1 tablet Oral BID     Continuous Infusions:  PRN Meds:.acetaminophen, bisacodyl, cloNIDine, dextrose 50%, dextrose 50%, diphenhydrAMINE, glucagon (human recombinant), glucose, glucose, hydrocodone-acetaminophen 5-325mg, HYDROmorphone, influenza, lactulose, ondansetron, ondansetron, promethazine (PHENERGAN) IVPB, promethazine, ramelteon, sodium chloride 0.9%       Objective:     Vital Signs (Most Recent):  Temp: 98.4 °F (36.9 °C) (04/05/18 0735)  Pulse: 74 (04/05/18 0735)  Resp: 16 (04/05/18 0735)  BP: (!) 166/77 (04/05/18 0735)  SpO2: (!) 94 % (04/05/18 0735) Vital Signs (24h  Range):  Temp:  [98.2 °F (36.8 °C)-98.9 °F (37.2 °C)] 98.4 °F (36.9 °C)  Pulse:  [72-87] 74  Resp:  [14-20] 16  SpO2:  [79 %-100 %] 94 %  BP: (139-166)/(73-85) 166/77     Weight: 108.6 kg (239 lb 6.7 oz)  Body mass index is 46.76 kg/m².      Intake/Output Summary (Last 24 hours) at 04/05/18 0911  Last data filed at 04/05/18 0800   Gross per 24 hour   Intake              960 ml   Output               15 ml   Net              945 ml       Physical Exam   Constitutional: She is oriented to person, place, and time. She appears well-developed and well-nourished.   HENT:   Head: Normocephalic.   Right Ear: External ear normal.   Left Ear: External ear normal.   Nose: Nose normal.   Eyes: Pupils are equal, round, and reactive to light. No scleral icterus.   Neck: Normal range of motion. Neck supple. No thyromegaly present.   Cardiovascular: Normal rate, regular rhythm and normal heart sounds.    No murmur heard.  Pulmonary/Chest: Effort normal and breath sounds normal.   Abdominal: Soft. Bowel sounds are normal. She exhibits no distension. There is no tenderness. There is no guarding.   Musculoskeletal: Normal range of motion.   Lymphadenopathy:     She has no cervical adenopathy.   Neurological: She is alert and oriented to person, place, and time.   Skin: Skin is warm and dry.       Vents:  Oxygen Concentration (%): 32 (04/05/18 0200)    Lines/Drains/Airways     Drain                 Drain/Device  04/02/18 1440 abdomen collapsible closed device 2 days                Significant Labs:      Laboratory Data:  Reviewed recent labs. Appear stable other than abnormalities addressed in plan.     Radiology:  Reviewed recent imaging studies. Appear stable other than abnormalities addressed in plan.

## 2018-04-05 NOTE — PT/OT/SLP PROGRESS
Physical Therapy  Treatment    Yoana Pardo   MRN: [id number]   Admitting Diagnosis: Cholecystitis, acute    PT Received On: 04/05/18  PT Start Time: 0914     PT Stop Time: 0939    PT Total Time (min): 25 min       Billable Minutes:  Gait Training 15 and Therapeutic Exercise 10    Treatment Type: Treatment  PT/PTA: PT             General Precautions: Standard,    Orthopedic Precautions: N/A   Braces: N/A         Subjective:  Communicated with NURSE AND EPIC CHART REVIEW prior to session.   PT AGREED TO TX     Pain/Comfort  Pain Rating 1: 0/10  Pain Rating Post-Intervention 1: 0/10    Objective:   Patient found with: peripheral IV    Functional Mobility:  PT SUP>SIT EOB IND. PT STOOD AND WENT TO BATHROOM TOILETING IND. PT GT TRAINED X 450' WITH NO AD IND WITH O2 IN TOW. PT RETURNED TO  T/F TO CHAIR FOR REST. PT COMPLETED B LE TE X 20 REPS OF MIP, TKE, AND AP. PT LEFT SEATED IN CHAIR WITH ALL NEEDS MET.     AM-PAC 6 CLICK MOBILITY  How much help from another person does this patient currently need?   1 = Unable, Total/Dependent Assistance  2 = A lot, Maximum/Moderate Assistance  3 = A little, Minimum/Contact Guard/Supervision  4 = None, Modified Squires/Independent    Turning over in bed (including adjusting bedclothes, sheets and blankets)?: 4  Sitting down on and standing up from a chair with arms (e.g., wheelchair, bedside commode, etc.): 4  Moving from lying on back to sitting on the side of the bed?: 4  Moving to and from a bed to a chair (including a wheelchair)?: 4  Need to walk in hospital room?: 4  Climbing 3-5 steps with a railing?: 1  Total Score: 21    AM-PAC Raw Score CMS G-Code Modifier Level of Impairment Assistance   6 % Total / Unable   7 - 9 CM 80 - 100% Maximal Assist   10 - 14 CL 60 - 80% Moderate Assist   15 - 19 CK 40 - 60% Moderate Assist   20 - 22 CJ 20 - 40% Minimal Assist   23 CI 1-20% SBA / CGA   24 CH 0% Independent/ Mod I     Patient left up in chair with call button in  reach.    Assessment:  PT IND WITH ALL GROSS FUNC MOBILITY AND WILL BE D/C FROM P.T.     Rehab identified problem list/impairments: Rehab identified problem list/impairments: impaired balance, impaired endurance, gait instability, pain    Discharge recommendations: Discharge Facility/Level Of Care Needs: home     Barriers to discharge:      Equipment recommendations: Equipment Needed After Discharge: none     PLAN:    PT WILL BE D/C FROM P.T.   Plan of Care reviewed with: patient         Cally Santos, PT  04/05/2018

## 2018-04-05 NOTE — PLAN OF CARE
Problem: Patient Care Overview  Goal: Plan of Care Review  PT REQUIRES  SBA FOR GT X 240' WITH USE OF RAILING AT TIMES.    Outcome: Outcome(s) achieved Date Met: 04/05/18  Fall precautions maintained, pt free from falls/injuries  PT repositions and ambulates independently  Pt has no c/o pain, only some discomfort  Pt has home O2 ready  POC and medications reviewed with pt, pt verbalized understanding.  Side rails x 2 up, bed locked and low.  No signs/symptoms of acute distress.  Chart check done. Adequate for Dc

## 2018-04-05 NOTE — PLAN OF CARE
Dr Dee requested that CPAP order be sent to [facility].  ANNALISE contacted [facility] and requested that [facility] be cancelled and re routed to [facility].  [facility] will handle this request.

## 2018-04-05 NOTE — PLAN OF CARE
Problem: Patient Care Overview  Goal: Plan of Care Review  PT REQUIRES  SBA FOR GT X 240' WITH USE OF RAILING AT TIMES.   Outcome: Ongoing (interventions implemented as appropriate)  Remains free from harm, no c/o pain, no acute distress noted. 24 hour chart check complete.

## 2018-04-05 NOTE — PROGRESS NOTES
Pt is not tolerating wearing NIV support very well. RT called to room per RN after pt complained about intolerance of CPAP and wished to remove mask. RN offered to place pt on NC or to discuss with RT about adjusting pressure. Pt asked for different mask. RN explained a different size of the same type of mask was an option. Pt requested to speak with RT. RT encouraged NIV use tonight and suggested lowering pressure to +10 to help with pt tolerance. Apologized for not having an option of humidity and explained that would most likely be available with a home device. Pt encouraged to attempt therapy for another hour and then we will reevaluate. Settings adjusted per RT and ramp of 20 min turned on at this time to help pt get to sleep comfortably. CPAP equipment assessments showing no sign of skin breakdown at this time. Pt instructed on how and when to call RT if needed.

## 2018-04-05 NOTE — PLAN OF CARE
Problem: Physical Therapy Goal  Goal: Physical Therapy Goal  PT WILL BE SEEN FOR P.T. FOR A MIN OF 5 OUT OF 7 DAYS A WEEK  LT/10/18  1. PT WILL COMPLETE BED MOBILITY IND  2. PT WILL GT TRAIN X 250' IND  3. PT WILL T/F TO CHAIR IND.   Outcome: Ongoing (interventions implemented as appropriate)  PT GT TRAINED IND X 450' WITH NO AD AND WILL BE D/C FROM P.T.

## 2018-04-05 NOTE — PROGRESS NOTES
[facility]  Pulmonology  Progress Note    Patient Name: Yoana Pardo  MRN: [id number]  Admission Date: 3/29/2018  Hospital Length of Stay: 6 days  Code Status: Full Code  Attending Provider: Harmony Colunga MD  Primary Care Provider: Lynn Wiggins MD   Principal Problem: Cholecystitis, acute    Subjective:     Interval History: Seen and examined at bedside. Hospital chart reviewed. No acute interval detrimental events noted. She reports that she  has moderately improved. ROBOTIC CHOLECYSTECTOMY POD # 2. Oxygen supplementation at 4 L /min. Keep SAO2 > 88%. Nocturnal CPAP 12 CMWP.       Review of Systems   Constitutional: Negative for malaise/fatigue and weight loss.   HENT: Negative for ear discharge and ear pain.    Eyes: Negative for photophobia and pain.   Respiratory: Negative for hemoptysis and shortness of breath.    Cardiovascular: Negative for chest pain and palpitations.   Gastrointestinal: Negative for diarrhea and nausea.   Genitourinary: Negative for dysuria and urgency.   Musculoskeletal: Negative for myalgias and neck pain.   Skin: Negative for rash.   Neurological: Negative for tremors and headaches.   Endo/Heme/Allergies: Negative for environmental allergies.   Psychiatric/Behavioral: Negative for hallucinations and substance abuse.       Scheduled Meds:   albuterol sulfate  2.5 mg Nebulization Q6H    aspirin  81 mg Oral Daily    chlorhexidine  10 mL Mouth/Throat BID    enoxaparin  40 mg Subcutaneous Daily    furosemide  40 mg Intravenous Once    losartan  50 mg Oral Daily    moxifloxacin  400 mg Oral Daily    nozaseptin   Each Nare BID    potassium, sodium phosphates  2 packet Oral QID (AC & HS)    senna-docusate 8.6-50 mg  1 tablet Oral BID     Continuous Infusions:  PRN Meds:.acetaminophen, bisacodyl, cloNIDine, dextrose 50%, dextrose 50%, diphenhydrAMINE, glucagon (human recombinant), glucose, glucose, hydrocodone-acetaminophen 5-325mg, HYDROmorphone, influenza,  lactulose, ondansetron, ondansetron, promethazine (PHENERGAN) IVPB, promethazine, ramelteon, sodium chloride 0.9%       Objective:     Vital Signs (Most Recent):  Temp: 98.4 °F (36.9 °C) (04/05/18 0735)  Pulse: 74 (04/05/18 0735)  Resp: 16 (04/05/18 0735)  BP: (!) 166/77 (04/05/18 0735)  SpO2: (!) 94 % (04/05/18 0735) Vital Signs (24h Range):  Temp:  [98.2 °F (36.8 °C)-98.9 °F (37.2 °C)] 98.4 °F (36.9 °C)  Pulse:  [72-87] 74  Resp:  [14-20] 16  SpO2:  [79 %-100 %] 94 %  BP: (139-166)/(73-85) 166/77     Weight: 108.6 kg (239 lb 6.7 oz)  Body mass index is 46.76 kg/m².      Intake/Output Summary (Last 24 hours) at 04/05/18 0911  Last data filed at 04/05/18 0800   Gross per 24 hour   Intake              960 ml   Output               15 ml   Net              945 ml       Physical Exam   Constitutional: She is oriented to person, place, and time. She appears well-developed and well-nourished.   HENT:   Head: Normocephalic.   Right Ear: External ear normal.   Left Ear: External ear normal.   Nose: Nose normal.   Eyes: Pupils are equal, round, and reactive to light. No scleral icterus.   Neck: Normal range of motion. Neck supple. No thyromegaly present.   Cardiovascular: Normal rate, regular rhythm and normal heart sounds.    No murmur heard.  Pulmonary/Chest: Effort normal and breath sounds normal.   Abdominal: Soft. Bowel sounds are normal. She exhibits no distension. There is no tenderness. There is no guarding.   Musculoskeletal: Normal range of motion.   Lymphadenopathy:     She has no cervical adenopathy.   Neurological: She is alert and oriented to person, place, and time.   Skin: Skin is warm and dry.       Vents:  Oxygen Concentration (%): 32 (04/05/18 0200)    Lines/Drains/Airways     Drain                 Drain/Device  04/02/18 1440 abdomen collapsible closed device 2 days                Significant Labs:      Laboratory Data:  Reviewed recent labs. Appear stable other than abnormalities addressed in plan.      Radiology:  Reviewed recent imaging studies. Appear stable other than abnormalities addressed in plan.       Assessment/Plan:     Acute on chronic respiratory failure with hypoxia    Supplement oxygenation. Keep SAO2 >= 88%. Bronchodilators per orders.  Arrange for home Oxygen.             INDIA[place] (obstructive sleep apnea)    PAP therapy: CPAP 12 CMWP QHS.  Arrange for home CPAP.        Morbid obesity with BMI of 40.0-44.9, adult    Weight loss encouraged.         Pulmonary HTN    MPAP FROM [facility]:    mPAP = 0.6146.56  + 2 = 30.4 mmHg.    Mild pulmonary hypertension at best likely secondary to hypoxia.     Recommend diuresis. Repeat ECHO after appropriate diuresis.   Treat INDIANA with nocturnal PAP.                  Counseling/Consultation:I discussed the case with primary care team, I discussed the patient's condition/prognosis with the family. Thank you for allowing me to participate in this patients care  We will continue to follow with you.      Emmanuel Dee MD  Pulmonology  [facility]

## 2018-04-06 ENCOUNTER — NURSE TRIAGE (OUTPATIENT)
Dept: ADMINISTRATIVE | Facility: CLINIC | Age: 68
End: 2018-04-06

## 2018-04-06 ENCOUNTER — HOSPITAL ENCOUNTER (OUTPATIENT)
Dept: RADIOLOGY | Facility: HOSPITAL | Age: 68
Discharge: HOME | End: 2018-04-06
Attending: INTERNAL MEDICINE
Payer: MEDICARE

## 2018-04-06 ENCOUNTER — OFFICE VISIT (OUTPATIENT)
Dept: PULMONOLOGY | Facility: CLINIC | Age: 68
End: 2018-04-06
Payer: MEDICARE

## 2018-04-06 VITALS
WEIGHT: 234.56 LBS | SYSTOLIC BLOOD PRESSURE: 154 MMHG | DIASTOLIC BLOOD PRESSURE: 82 MMHG | BODY MASS INDEX: 46.05 KG/M2 | RESPIRATION RATE: 18 BRPM | HEIGHT: 60 IN

## 2018-04-06 DIAGNOSIS — I27.20 PULMONARY HTN: Primary | ICD-10-CM

## 2018-04-06 DIAGNOSIS — J98.6 ELEVATED HEMIDIAPHRAGM: ICD-10-CM

## 2018-04-06 DIAGNOSIS — G47.33 OSA (OBSTRUCTIVE SLEEP APNEA): ICD-10-CM

## 2018-04-06 DIAGNOSIS — R03.0 ELEVATED BLOOD PRESSURE READING: ICD-10-CM

## 2018-04-06 DIAGNOSIS — R21 SKIN RASH: ICD-10-CM

## 2018-04-06 DIAGNOSIS — E66.01 MORBID OBESITY WITH BMI OF 40.0-44.9, ADULT: ICD-10-CM

## 2018-04-06 DIAGNOSIS — I27.20 PULMONARY HTN: ICD-10-CM

## 2018-04-06 PROBLEM — K81.0 ACUTE GANGRENOUS CHOLECYSTITIS: Status: RESOLVED | Noted: 2018-04-02 | Resolved: 2018-04-06

## 2018-04-06 PROCEDURE — 99213 OFFICE O/P EST LOW 20 MIN: CPT | Mod: PBBFAC,25,PO | Performed by: INTERNAL MEDICINE

## 2018-04-06 PROCEDURE — 99999 PR PBB SHADOW E&M-EST. PATIENT-LVL III: CPT | Mod: PBBFAC,,, | Performed by: INTERNAL MEDICINE

## 2018-04-06 PROCEDURE — 71046 X-RAY EXAM CHEST 2 VIEWS: CPT | Mod: 26,,, | Performed by: RADIOLOGY

## 2018-04-06 PROCEDURE — 71046 X-RAY EXAM CHEST 2 VIEWS: CPT | Mod: TC,FY,PO

## 2018-04-06 PROCEDURE — 99214 OFFICE O/P EST MOD 30 MIN: CPT | Mod: S$PBB,,, | Performed by: INTERNAL MEDICINE

## 2018-04-06 NOTE — ASSESSMENT & PLAN NOTE
MPAP FROM [facility]:     mPAP = 0.6146.56  + 2 = 30.4 mmHg.     Mild pulmonary hypertension at best likely secondary to hypoxia.      Recommend diuresis. Repeat ECHO after appropriate diuresis.   Treat IND[place] with nocturnal PAP

## 2018-04-06 NOTE — PROGRESS NOTES
Subjective:       Patient ID: Yoana Pardo is a 67 y.o. female.    Chief Complaint: Pulmonary Hypertension (hosp f/u) and Sleep Apnea    Ms. Yoana Pardo is 67 years old  She was recently discharged from the hospital.  She had a gangrenous gallbladder status post robotic laparoscopic cholecystectomy  She was treated with tazobactam piperacillin  She has tolerated Augmentin in 11/2016.  While in the hospital she had hypoxemia but now has improved.  Her room air oxygen saturation is about 94%  CAT chest was -ve  She was discharged home on oxygen  She has a historical diagnosis of pulmonary hypertension based on echocardiogram.    Likely group 3.  I she has not had any formal evaluation.    She had a sleep study which is positive and CPAP has been ordered for her.  She will need to see me in about 4-6 weeks once she starts his CPAP  Immunizations are up-to-date  Her blood pressure was slightly elevated on this visit 160/82 repeated 154/82.  Patient has also inquired to me as to whether she has asthma.  The previous past when she gets respiratory illness has been treated with bronchodilators  She'll get formal spirometry when she comes to see me  Patient has also highlighted to me that she has developed a slight maculopapular rash on the forearms bilaterally.    It isn't clear whether this just developed this afternoon or has been there since yesterday.  I documented this in the EMR and alerted patient that if this seems to be getting worse   she needs to get it evaluated   The only addition to her treatment is the oral Augmentin .  She has had this medication in the past on review of medcard.        Review of Systems   Constitutional: Positive for fatigue.   HENT: Negative.    Respiratory: Positive for apnea. Negative for snoring, sputum production, shortness of breath, wheezing, pleurisy and use of rescue inhaler.    Cardiovascular: Negative.    Genitourinary: Negative.    Endocrine: endocrine negative    Musculoskeletal: Negative.    Skin: Positive for rash.   Neurological: Negative.        Objective:       Vitals:    04/06/18 1504 04/06/18 1509   BP: (!) 160/92 (!) 154/82   Resp: 18    Weight: 106.4 kg (234 lb 9.1 oz)    Height: 5' (1.524 m)      Physical Exam   Constitutional: She is oriented to person, place, and time. She appears well-developed and well-nourished. She is obese.   HENT:   Head: Normocephalic.   Nose: Nose normal.   Mouth/Throat: Oropharynx is clear and moist. No oropharyngeal exudate. Mallampati Score: IV.   Neck: Normal range of motion. Neck supple. No tracheal deviation present. No thyromegaly present.   Cardiovascular: Normal rate and regular rhythm.    No murmur heard.  Pulmonary/Chest: Normal expansion, symmetric chest wall expansion, effort normal and breath sounds normal.   Abdominal: Soft. Bowel sounds are normal.   Musculoskeletal: Normal range of motion. She exhibits no edema.   Lymphadenopathy:     She has no cervical adenopathy.   Neurological: She is alert and oriented to person, place, and time. Gait normal.   Skin: Skin is warm and dry. No rash noted. No erythema. Nails show no clubbing.   Psychiatric: She has a normal mood and affect.   Nursing note and vitals reviewed.    Personal Diagnostic Review  HSAT    Assessment and Recommendations  Study duration was 6 hours 9 minutes. Excluded respiratory signal 7 minutes excluded O2 signal 8 minutes.  Lowest oxygen saturation was 74%. Oxygen saturation was below 90% for 13 minutes. Average heart rate was  73 bpm.  Snoring was recorded above 50 dB for 100% of the time.  Apnea-hypopnea index/respiratory event index was 33.3 events per hour. 205 events.  Obstructive sleep apnea is detected.  Treatment with CPAP is indicated.  In lab CPAP titration or auto titrating device may be considered. Follow-up to ensure correction of hypoxemia.  Weight loss and exercise  Follow-up with ordering sleep provider        CXR  FINDINGS:  Hiatal hernia is  noted.  The cardiomediastinal silhouette is stable.  Unchanged elevation of the right hemidiaphragm.  Bibasilar areas of atelectasis or scarring.  No new focal consolidation.  Thoracic spondylosis.    No flowsheet data found.      Assessment:       Problem List Items Addressed This Visit     [place] (obstructive sleep apnea)     PAP therapy: CPAP 12 CMWP QHS.  Arrange for home CPAP.  [facility]         Pulmonary HTN - Primary     MPAP FROM [facility]:     mPAP = 0.6146.56  + 2 = 30.4 mmHg.     Mild pulmonary hypertension at best likely secondary to hypoxia.      Recommend diuresis. Repeat ECHO after appropriate diuresis.   Treat [place] with nocturnal PAP         Relevant Orders    Stress test, pulmonary    Complete PFT without bronchodilator - Clinic    X-Ray Chest PA And Lateral (Completed)    JOSE    Morbid obesity with BMI of 40.0-44.9, adult     Weight loss encouraged         Elevated hemidiaphragm      Other Visit Diagnoses     Skin rash        Elevated blood pressure reading            Plan:       Adherence for oxygen  Follow-up after acclimation with CPAP     Follow-up in about 6 weeks (around 5/18/2018) for PFT, 6MWD test, JOSE, CXR today, Download, CPAP supplies, follow up BP reading with PCP.    This note was prepared using voice recognition system and is likely to have sound alike errors that may have been overlooked even after proof reading.  Please call me with any questions    Discussed diagnosis, its evaluation, treatment and usual course. All questions answered.    Thank you for the courtesy of participating in the care of this patient    Ernie Mcintosh MD

## 2018-04-07 NOTE — TELEPHONE ENCOUNTER
"3/29  Rashes in hospital and getting worse. DC yest. Thought is might be related to one of the meds. Eased up then came back yest or day before. Rash on legs, arms. was red splotches under skin. Now raised areas all over. Not painful, seems to be spreading    Reason for Disposition   Hives suspected   [1] MODERATE-SEVERE hives persist (i.e., hives interfere with normal activities or work) AND [2] taking antihistamine (e.g., Benadryl, Claritin) > 24 hours    Answer Assessment - Initial Assessment Questions  1. APPEARANCE of RASH: "Describe the rash. What color it is?" (e.g., spots, blisters, raised areas, skin peeling, scaly)     Raised splotches  2. SIZE: "How big are the spots?"    Denies SOB, denies swelling of throat   3. LOCATION: "Where is the rash located?"      Arms and legs, all running together, taking zyzal for it.   4. ONSET: "When did the rash begin?"      Ongoing off and on for a week   5. FEVER: "Do you have a fever?" If so, ask: "What is your temperature, how was it measured, and when did it start?"     afeb   6. ITCHING: "Does the rash itch?" If so, ask: "How bad is the itch?" (Scale 1-10; or mild, moderate, severe)     Mild   7. CAUSE: "What do you think is causing the rash?"     Unsure   8. MEDICATION FACTORS: "Have you started any new medications within the last 2 weeks?" (e.g., antibiotics)      No , took penicillin for the dentist couple weeks ago   9. OTHER SYMPTOMS: "Do you have any other symptoms?" (e.g., dizziness, headache, sore throat, joint pain)    Getting warm at times, afeb    Protocols used: ST RASH - WIDESPREAD AND CAUSE UNKNOWN-A-AH, ST HIVES-A-AH    rec UC within 24 hours due to hives ongoing x 1 week. Call back with questions.   "

## 2018-04-07 NOTE — TELEPHONE ENCOUNTER
LM x2 at 814pm at [phone]    Reason for Disposition   Message left on identified answering machine.    Protocols used: ST NO CONTACT OR DUPLICATE CONTACT CALL-A-AH

## 2018-04-09 ENCOUNTER — PATIENT OUTREACH (OUTPATIENT)
Dept: ADMINISTRATIVE | Facility: CLINIC | Age: 68
End: 2018-04-09

## 2018-04-09 ENCOUNTER — TELEPHONE (OUTPATIENT)
Dept: SURGERY | Facility: CLINIC | Age: 68
End: 2018-04-09

## 2018-04-09 NOTE — TELEPHONE ENCOUNTER
Pt states that hives are getting better but still itching. Appointment scheduled with Dr. Wiggins on 4/11/18 at 8 am.

## 2018-04-09 NOTE — PROGRESS NOTES
Discharge Information     Discharge Date:  4/5/18          Primary Discharge Diagnosis:  Cholecystitis, acute // CHOLECYSTECTOMY-ROBOTIC             Karissa Nevarez RN attempted to contact patient. No answer. The following message was left for the patient to return the call:  Good morning, I am a nurse calling on behalf of [facility] from the [facility].  This is a Transitional Care Call for Yoana Pardo. When you have a moment please contact us at [phone] or [phone] Monday through Friday, between the hours of 8 am to 4 pm. We look forward to speaking with you. On behalf of [facility] have a nice day.    The patient has a scheduled POSTOP appointment with Eve Lau on 4/17/18 @ 1300hrs. Message sent to Physician staff.

## 2018-04-09 NOTE — TELEPHONE ENCOUNTER
----- Message from Priscilla Torito sent at 4/9/2018 11:22 AM CDT -----  Contact: Pt.  Please call pt re: recently released from hospital Thursday.  When she can go back to work?  Her work requires something stating when she can. Call pt at [phone].

## 2018-04-11 ENCOUNTER — HOSPITAL ENCOUNTER (OUTPATIENT)
Dept: RADIOLOGY | Facility: HOSPITAL | Age: 68
Discharge: HOME | End: 2018-04-11
Attending: FAMILY MEDICINE
Payer: MEDICARE

## 2018-04-11 VITALS — HEIGHT: 60 IN | WEIGHT: 234 LBS | BODY MASS INDEX: 45.94 KG/M2

## 2018-04-11 DIAGNOSIS — Z12.31 ENCOUNTER FOR SCREENING MAMMOGRAM FOR MALIGNANT NEOPLASM OF BREAST: ICD-10-CM

## 2018-04-11 PROCEDURE — 77067 SCR MAMMO BI INCL CAD: CPT | Mod: TC,PO

## 2018-04-11 PROCEDURE — 77067 SCR MAMMO BI INCL CAD: CPT | Mod: 26,,, | Performed by: RADIOLOGY

## 2018-04-12 ENCOUNTER — TELEPHONE (OUTPATIENT)
Dept: RADIOLOGY | Facility: HOSPITAL | Age: 68
End: 2018-04-12

## 2018-04-13 ENCOUNTER — OFFICE VISIT (OUTPATIENT)
Dept: INTERNAL MEDICINE | Facility: CLINIC | Age: 68
End: 2018-04-13
Payer: MEDICARE

## 2018-04-13 ENCOUNTER — LAB VISIT (OUTPATIENT)
Dept: LAB | Facility: HOSPITAL | Age: 68
End: 2018-04-13
Attending: FAMILY MEDICINE
Payer: MEDICARE

## 2018-04-13 ENCOUNTER — TELEPHONE (OUTPATIENT)
Dept: PULMONOLOGY | Facility: CLINIC | Age: 68
End: 2018-04-13

## 2018-04-13 VITALS
TEMPERATURE: 98 F | SYSTOLIC BLOOD PRESSURE: 122 MMHG | WEIGHT: 229.94 LBS | OXYGEN SATURATION: 97 % | BODY MASS INDEX: 45.14 KG/M2 | HEART RATE: 62 BPM | DIASTOLIC BLOOD PRESSURE: 76 MMHG | HEIGHT: 60 IN

## 2018-04-13 DIAGNOSIS — I10 ESSENTIAL HYPERTENSION: ICD-10-CM

## 2018-04-13 DIAGNOSIS — Z09 HOSPITAL DISCHARGE FOLLOW-UP: ICD-10-CM

## 2018-04-13 DIAGNOSIS — Z90.49 S/P LAPAROSCOPIC CHOLECYSTECTOMY: ICD-10-CM

## 2018-04-13 DIAGNOSIS — D72.829 LEUKOCYTOSIS, UNSPECIFIED TYPE: ICD-10-CM

## 2018-04-13 DIAGNOSIS — K21.9 GASTROESOPHAGEAL REFLUX DISEASE, ESOPHAGITIS PRESENCE NOT SPECIFIED: ICD-10-CM

## 2018-04-13 DIAGNOSIS — I27.20 PULMONARY HYPERTENSION: ICD-10-CM

## 2018-04-13 DIAGNOSIS — J96.01 ACUTE RESPIRATORY FAILURE WITH HYPOXIA: Primary | ICD-10-CM

## 2018-04-13 DIAGNOSIS — L50.9 URTICARIA: ICD-10-CM

## 2018-04-13 DIAGNOSIS — Z99.81 ON SUPPLEMENTAL OXYGEN THERAPY: ICD-10-CM

## 2018-04-13 DIAGNOSIS — Z99.89 CPAP (CONTINUOUS POSITIVE AIRWAY PRESSURE) DEPENDENCE: ICD-10-CM

## 2018-04-13 LAB
ALBUMIN SERPL BCP-MCNC: 3.2 G/DL
ALP SERPL-CCNC: 87 U/L
ALT SERPL W/O P-5'-P-CCNC: 10 U/L
ANION GAP SERPL CALC-SCNC: 9 MMOL/L
AST SERPL-CCNC: 16 U/L
BASOPHILS # BLD AUTO: 0.04 K/UL
BASOPHILS NFR BLD: 0.7 %
BILIRUB SERPL-MCNC: 0.3 MG/DL
BUN SERPL-MCNC: 17 MG/DL
CALCIUM SERPL-MCNC: 9.7 MG/DL
CHLORIDE SERPL-SCNC: 95 MMOL/L
CO2 SERPL-SCNC: 35 MMOL/L
CREAT SERPL-MCNC: 0.8 MG/DL
DIFFERENTIAL METHOD: ABNORMAL
EOSINOPHIL # BLD AUTO: 0.1 K/UL
EOSINOPHIL NFR BLD: 1.2 %
ERYTHROCYTE [DISTWIDTH] IN BLOOD BY AUTOMATED COUNT: 12.7 %
EST. GFR  (AFRICAN AMERICAN): >60 ML/MIN/1.73 M^2
EST. GFR  (NON AFRICAN AMERICAN): >60 ML/MIN/1.73 M^2
GLUCOSE SERPL-MCNC: 74 MG/DL
HCT VFR BLD AUTO: 40.6 %
HGB BLD-MCNC: 12.2 G/DL
IMM GRANULOCYTES # BLD AUTO: 0.02 K/UL
IMM GRANULOCYTES NFR BLD AUTO: 0.4 %
LYMPHOCYTES # BLD AUTO: 2.3 K/UL
LYMPHOCYTES NFR BLD: 41.1 %
MCH RBC QN AUTO: 28.8 PG
MCHC RBC AUTO-ENTMCNC: 30 G/DL
MCV RBC AUTO: 96 FL
MONOCYTES # BLD AUTO: 0.6 K/UL
MONOCYTES NFR BLD: 10.5 %
NEUTROPHILS # BLD AUTO: 2.6 K/UL
NEUTROPHILS NFR BLD: 46.1 %
NRBC BLD-RTO: 0 /100 WBC
PLATELET # BLD AUTO: 508 K/UL
PMV BLD AUTO: 10.3 FL
POTASSIUM SERPL-SCNC: 4.3 MMOL/L
PROT SERPL-MCNC: 8.7 G/DL
RBC # BLD AUTO: 4.24 M/UL
SODIUM SERPL-SCNC: 139 MMOL/L
WBC # BLD AUTO: 5.7 K/UL

## 2018-04-13 PROCEDURE — 99495 TRANSJ CARE MGMT MOD F2F 14D: CPT | Mod: S$PBB,,, | Performed by: FAMILY MEDICINE

## 2018-04-13 PROCEDURE — 36415 COLL VENOUS BLD VENIPUNCTURE: CPT | Mod: PO

## 2018-04-13 PROCEDURE — 99999 PR PBB SHADOW E&M-EST. PATIENT-LVL III: CPT | Mod: PBBFAC,,, | Performed by: FAMILY MEDICINE

## 2018-04-13 PROCEDURE — 85025 COMPLETE CBC W/AUTO DIFF WBC: CPT

## 2018-04-13 PROCEDURE — 99495 TRANSJ CARE MGMT MOD F2F 14D: CPT | Mod: PBBFAC,PO | Performed by: FAMILY MEDICINE

## 2018-04-13 PROCEDURE — 99213 OFFICE O/P EST LOW 20 MIN: CPT | Mod: PBBFAC,PO | Performed by: FAMILY MEDICINE

## 2018-04-13 PROCEDURE — 80053 COMPREHEN METABOLIC PANEL: CPT

## 2018-04-13 RX ORDER — OMEPRAZOLE 40 MG/1
40 CAPSULE, DELAYED RELEASE ORAL DAILY
Qty: 30 CAPSULE | Refills: 1 | Status: SHIPPED | OUTPATIENT
Start: 2018-04-13 | End: 2019-04-08

## 2018-04-13 RX ORDER — METHYLPREDNISOLONE 4 MG/1
TABLET ORAL
Qty: 1 PACKAGE | Refills: 0 | Status: SHIPPED | OUTPATIENT
Start: 2018-04-13 | End: 2018-06-11

## 2018-04-13 NOTE — PROGRESS NOTES
Transitional Care Note    Family and/or Caretaker present at visit?  Yes.  Diagnostic tests reviewed/disposition: No diagnosic tests pending after this hospitalization.  Disease/illness education: Patient was advised to continue using home oxygen until further seen by pulmonologist  Home health/community services discussion/referrals: Patient does not have home health established from hospital visit.  They do not need home health.  If needed, we will set up home health for the patient.   Establishment or re-establishment of referral orders for community resources: No other necessary community resources.   Discussion with other health care providers: Patient to keep appointment with pulmonologist as scheduled in 6 weeks..     Subjective:       Patient ID: Yoana Pardo is a 67 y.o. female.    Chief Complaint: Hospital Follow Up (pt also has a rash/hives on arms)    67-year-old -American female patient with Patient Active Problem List:     Pulmonary hypertension     MVP (mitral valve prolapse)     Hiatal hernia with GERD     History of positive PPD, untreated     Hemorrhoids     Essential hypertension     Peripheral neuropathy     Primary osteoarthritis of both knees     Morbid obesity with BMI of 40.0-44.9, adult     [place] (obstructive sleep apnea)     Inadequate sleep hygiene     Psychophysiological insomnia     Chronic seasonal allergic rhinitis     Atherosclerosis of aorta     S/P laparoscopic cholecystectomy     Acute on chronic respiratory failure with hypoxia     Elevated hemidiaphragm  Here for [facility] hospital discharge follow-up.   Reviewed hospital discharge summary in detail    67F h/o cholelithiasis, pHTN and [place] presents with RUQ ab pain that started 3 days ago.  Intermittent pain to RUQ. Associated with n/v and sob.  US RUQ negative for acute cholecystitis.  Labs wnl.   O2 sat 83% on room air.  I spoke to ER physician and recommended giving patient lasix 40mg IV x 1 for pulmonary htn.   Afterwards, O2 sat improved to 89% on room air.  However, patient is still tachypneic and nauseated.  Hospital medicine was called for observation for hypoxia.   She presented 3 days ago, US RUQ was also negative for acute cholecystitis at that time.  No other exacerbating or alleviating factors.  She reports she has a follow up with general surgery for cholecystectomy on 4/2.         Procedure(s) (LRB):  [id number] (N/A)       Hospital Course:   Patient was kept for OBS for hypoxia under the care of Hospital Medicine. CXR showed no acute process, Chest CTA showed CTA of the chest demonstrates no signs of pulmonary embolism. Some atelectatic changes are present in the lung bases. There is a large hiatal hernia. PCo2 50.5, PO2 42, HCO3 29.5  3/30: WBC count increased to 17K today  3/31/18: Procalcitonin mildly elevated and low grade temp of 100.6 evening of 3/30/18. Hida scanned discontinued by General Surgery who states if patient declines will proceed with cholecystectomy. Currently, RUQ abdominal pain controlled with hydrocodone, pt has no nausea or vomiting. Hypoxia continues and pt receiving IV lasix and supplemental oxygen - has pulmonary HTN with atelectasis - incentive spirometer ordered. IV Zosyn given.  4/1/18 - Pulmonology saw the patient and feels pulmonary HTN group 3 and sleep apnea not wearing CPAP at night. CPAP was ordered by Dr. Mcintosh. Dr. Erickson reordered HIDA scan for Monday 4/1/2018 - pt NPO and pain meds on hold. She still has RUQ pain that is controlled with Norco. WBC count trending upward.  IS and Acapella in progress.   4/2/18-HIDA scan showed Nonvisualization of the gallbladder consistent with cystic duct obstruction.This is consistent with patient's diagnosis of acute cholecystitis. Pt is S/P Robotic assisted lap sharmila performed 4/2/18 by Dr. Erickson.   Post op day 1 pt is S/P lap sharmila. She states she is feeling better today. Has post surgical abdominal pain - is mildly  nauseated. PT/OT noted no functional immobility. IV Zosyn in process.      Continues to improve, Dr. Erickson cleared pt for discharge, she is up and about but her O2 sats were low, hence discharge cancelled. She had resp failure upon presentation, CTA Chest Neg for any PE but had atelectasis, hence discharge put off at present and O2 being arranged. She denies any hx of COPD, pneumonia, smoking, PE, CHF, edema etc. Started on IS and nebs and switched to oral Abx and corticosteroids given. Condition improved and feeling a little better and doing nebs, acapela and IS. Home oxygen was arranged and delivered to room prior to discharge.  Pt has a documented Sp O2 99 % on 2 L oxygen. Pt to receive CPAP machine at home. She was seen and examined and determined to be safe and stable for discharge. Pt to follow up with General Surgery and Pulmonology. Also, she is to complete a prescription of Augmentin.        Patient finished antibiotics Augmentin as prescribed today.  Reports that she has been feeling a little bit better, denies of any abdominal discomfort nausea vomiting or fever.  Bowel movements have been loose and likely from antibiotics.  Patient has noticed rash for the past few days, which has been gradually getting better but has significant rash, occasionally keeping her up.   Patient has been using home oxygen 2 L.   Patient would like to know whether she can go back to work, has appointment with general surgery next Tuesday for follow-up on robotic cholecystectomy.   Patient has been out of work since 3/28/18.   Has been using her CPAP machine regularly  Patient had seen pulmonologist after hospitalization discharge and was advised to continue using her oxygen she would like to know whether she can get oxygen concentrator, other than the oxygen tanks  Patient reports acid reflex symptoms lately.             Review of Systems   Constitutional: Negative for chills, fatigue and fever.   Eyes: Negative for visual  disturbance.   Respiratory: Negative for shortness of breath and wheezing.    Cardiovascular: Negative for chest pain and leg swelling.   Gastrointestinal: Negative for abdominal pain, blood in stool, constipation, diarrhea, nausea and vomiting.   Genitourinary: Negative for dysuria.   Musculoskeletal: Negative for myalgias.   Skin: Positive for rash.   Neurological: Negative for light-headedness and headaches.   Psychiatric/Behavioral: Negative for sleep disturbance.         /76 (BP Location: Left arm, Patient Position: Sitting)   Pulse 62   Temp 97.9 °F (36.6 °C) (Tympanic)   Ht 5' (1.524 m)   Wt 104.3 kg (229 lb 15 oz)   SpO2 97%   BMI 44.91 kg/m²   Objective:      Physical Exam   Constitutional: She is oriented to person, place, and time. She appears well-developed and well-nourished.   HENT:   Head: Normocephalic and atraumatic.   Mouth/Throat: Oropharynx is clear and moist.   Cardiovascular: Normal rate, regular rhythm and normal heart sounds.    No murmur heard.  Pulmonary/Chest: Effort normal and breath sounds normal. No respiratory distress. She has no wheezes. She exhibits no tenderness.   On home oxygen 2 L via nasal cannula   Abdominal: Soft. Bowel sounds are normal. There is no tenderness. There is no guarding.   Scars from robotic surgery, healing well   Musculoskeletal: She exhibits no edema.   Neurological: She is alert and oriented to person, place, and time.   Skin: Skin is warm and dry. Rash noted. There is erythema.   Positive for macular papular erythematous rash to bilateral upper extremities.    Psychiatric: She has a normal mood and affect.         Assessment:       1. Acute respiratory failure with hypoxia    2. On supplemental oxygen therapy    3. Urticaria    4. Leukocytosis, unspecified type    5. CPAP (continuous positive airway pressure) dependence    6. S/P laparoscopic cholecystectomy    7. Essential hypertension    8. Pulmonary hypertension    9. Gastroesophageal reflux  disease, esophagitis presence not specified        Plan:   Acute respiratory failure with hypoxia  On supplemental oxygen therapy  Pulmonary hypertension  Leukocytosis, unspecified type  -     CBC auto differential; Future; Expected date: 04/13/2018  CPAP (continuous positive airway pressure) dependence  S/P laparoscopic cholecystectomy  Hospital discharge follow-up    Patient clinically doing well.  We will plan to repeat labs secondary to leukocytosis.   Patient has finished antibiotics Augmentin today.   Advised to continue using home oxygen 2 L and CPAP machine  Follow-up with general surgery next Tuesday as scheduled and discuss regarding return to work, patient has been out of work since 3/28/18.       Urticaria  -     methylPREDNISolone (MEDROL DOSEPACK) 4 mg tablet; use as directed  Dispense: 1 Package; Refill: 0  Could be related to medication antibiotic Augmentin, has discontinued taking Norco since the time of discharge  Medrol Dosepak prescribed today for rash  Advised to avoid scented soaps and lotions    Essential hypertension  -     Comprehensive metabolic panel; Future; Expected date: 04/13/2018  Blood pressure stable today currently on losartan hydrochlorothiazide 100/25 mg daily      Gastroesophageal reflux disease, esophagitis presence not specified  -     omeprazole (PRILOSEC) 40 MG capsule; Take 1 capsule (40 mg total) by mouth once daily.  Dispense: 30 capsule; Refill: 1  Will place on omeprazole 40 mg daily secondary to acid reflex symptoms.  Advised to eat small frequent meals and avoid spicy diet

## 2018-04-13 NOTE — TELEPHONE ENCOUNTER
----- Message from Bernardino Villarreal sent at 4/13/2018 12:31 PM CDT -----  Contact: pt   Pt called to get a new order for portable oxygen callback number to discuss is..[phone] (home)

## 2018-04-15 DIAGNOSIS — D75.839 THROMBOCYTOSIS: Primary | ICD-10-CM

## 2018-04-17 ENCOUNTER — OFFICE VISIT (OUTPATIENT)
Dept: SURGERY | Facility: CLINIC | Age: 68
End: 2018-04-17
Payer: MEDICARE

## 2018-04-17 VITALS
DIASTOLIC BLOOD PRESSURE: 84 MMHG | TEMPERATURE: 99 F | BODY MASS INDEX: 44.72 KG/M2 | WEIGHT: 229 LBS | SYSTOLIC BLOOD PRESSURE: 119 MMHG | HEART RATE: 67 BPM

## 2018-04-17 DIAGNOSIS — Z98.890 POST-OPERATIVE STATE: Primary | ICD-10-CM

## 2018-04-17 PROCEDURE — 99999 PR PBB SHADOW E&M-EST. PATIENT-LVL III: CPT | Mod: PBBFAC,,, | Performed by: PHYSICIAN ASSISTANT

## 2018-04-17 PROCEDURE — 99024 POSTOP FOLLOW-UP VISIT: CPT | Mod: POP,,, | Performed by: PHYSICIAN ASSISTANT

## 2018-04-17 PROCEDURE — 99213 OFFICE O/P EST LOW 20 MIN: CPT | Mod: PBBFAC,PO | Performed by: PHYSICIAN ASSISTANT

## 2018-04-17 NOTE — LETTER
[facility] General Surgery  [address] [place] [address]  Phone: [phone]  Fax: [phone] April 17, 2018     Patient: Yoana Pardo   YOB: 1950   Date of Visit: 4/17/2018       To Whom It May Concern:    It is my medical opinion that Yoana Pardo may return to full duty immediately with no restrictions.    If you have any questions or concerns, please don't hesitate to contact my office.    Sincerely,        Eve Lau PA-C

## 2018-04-18 NOTE — PROGRESS NOTES
Yoana Pardo is status post robotic cholecystectomy and presents today for follow-up care.  She is tolerating a regular diet and denies fevers, nausea or vomiting.    PE: Abdomen is soft, non-tender, non-distended.  Incisions clean, dry, and intact.    Pathology report was reviewed with the patient, which showed cholelithiasis, acute cholecystitis.    A/P:  Normal post-operative course.    The patient is instructed to avoid heavy lifting until 4 weeks after the surgery date.  Return to clinic as needed.

## 2018-04-20 ENCOUNTER — TELEPHONE (OUTPATIENT)
Dept: RADIOLOGY | Facility: HOSPITAL | Age: 68
End: 2018-04-20

## 2018-04-23 ENCOUNTER — HOSPITAL ENCOUNTER (OUTPATIENT)
Dept: RADIOLOGY | Facility: HOSPITAL | Age: 68
Discharge: HOME | End: 2018-04-23
Attending: FAMILY MEDICINE
Payer: MEDICARE

## 2018-04-23 DIAGNOSIS — R92.8 ABNORMAL MAMMOGRAM: ICD-10-CM

## 2018-04-23 PROCEDURE — 77065 DX MAMMO INCL CAD UNI: CPT | Mod: TC,PO,LT

## 2018-04-23 PROCEDURE — 77065 DX MAMMO INCL CAD UNI: CPT | Mod: 26,LT,, | Performed by: RADIOLOGY

## 2018-04-23 PROCEDURE — 77061 BREAST TOMOSYNTHESIS UNI: CPT | Mod: TC,PO,LT

## 2018-04-23 PROCEDURE — 77061 BREAST TOMOSYNTHESIS UNI: CPT | Mod: 26,LT,, | Performed by: RADIOLOGY

## 2018-04-25 ENCOUNTER — TELEPHONE (OUTPATIENT)
Dept: RADIOLOGY | Facility: HOSPITAL | Age: 68
End: 2018-04-25

## 2018-04-27 ENCOUNTER — INITIAL CONSULT (OUTPATIENT)
Dept: HEMATOLOGY/ONCOLOGY | Facility: CLINIC | Age: 68
End: 2018-04-27
Payer: MEDICARE

## 2018-04-27 ENCOUNTER — LAB VISIT (OUTPATIENT)
Dept: LAB | Facility: HOSPITAL | Age: 68
End: 2018-04-27
Attending: INTERNAL MEDICINE
Payer: MEDICARE

## 2018-04-27 VITALS
WEIGHT: 234.38 LBS | SYSTOLIC BLOOD PRESSURE: 134 MMHG | OXYGEN SATURATION: 98 % | DIASTOLIC BLOOD PRESSURE: 80 MMHG | HEIGHT: 60 IN | BODY MASS INDEX: 46.01 KG/M2 | TEMPERATURE: 98 F | RESPIRATION RATE: 18 BRPM | HEART RATE: 77 BPM

## 2018-04-27 DIAGNOSIS — D75.839 THROMBOCYTOSIS: ICD-10-CM

## 2018-04-27 DIAGNOSIS — D75.839 THROMBOCYTOSIS: Primary | ICD-10-CM

## 2018-04-27 LAB
BASOPHILS # BLD AUTO: 0.03 K/UL
BASOPHILS NFR BLD: 0.5 %
DIFFERENTIAL METHOD: ABNORMAL
EOSINOPHIL # BLD AUTO: 0.2 K/UL
EOSINOPHIL NFR BLD: 2.9 %
ERYTHROCYTE [DISTWIDTH] IN BLOOD BY AUTOMATED COUNT: 13.2 %
HCT VFR BLD AUTO: 36.7 %
HGB BLD-MCNC: 11.8 G/DL
LYMPHOCYTES # BLD AUTO: 3.4 K/UL
LYMPHOCYTES NFR BLD: 54.2 %
MCH RBC QN AUTO: 29.6 PG
MCHC RBC AUTO-ENTMCNC: 32.2 G/DL
MCV RBC AUTO: 92 FL
MONOCYTES # BLD AUTO: 0.5 K/UL
MONOCYTES NFR BLD: 8.3 %
NEUTROPHILS # BLD AUTO: 2.1 K/UL
NEUTROPHILS NFR BLD: 34.3 %
PLATELET # BLD AUTO: 259 K/UL
PMV BLD AUTO: 9.6 FL
RBC # BLD AUTO: 3.99 M/UL
WBC # BLD AUTO: 6.24 K/UL

## 2018-04-27 PROCEDURE — 99205 OFFICE O/P NEW HI 60 MIN: CPT | Mod: S$PBB,,, | Performed by: INTERNAL MEDICINE

## 2018-04-27 PROCEDURE — 36415 COLL VENOUS BLD VENIPUNCTURE: CPT | Mod: PO

## 2018-04-27 PROCEDURE — 85025 COMPLETE CBC W/AUTO DIFF WBC: CPT | Mod: PO

## 2018-04-27 PROCEDURE — 99213 OFFICE O/P EST LOW 20 MIN: CPT | Mod: PBBFAC,PO | Performed by: INTERNAL MEDICINE

## 2018-04-27 PROCEDURE — 99999 PR PBB SHADOW E&M-EST. PATIENT-LVL III: CPT | Mod: PBBFAC,,, | Performed by: INTERNAL MEDICINE

## 2018-04-27 NOTE — LETTER
April 27, 2018      Lynn Wiggins MD  [address]  [place] [address]           [facility] - Hematology Oncology  [address] [place] [place] [address]  Phone: [phone]  Fax: [phone]          Patient: Yoana Pardo   MR Number: [id number]   YOB: 1950   Date of Visit: 4/27/2018       Dear Dr. Lynn Wiggins:    Thank you for referring Yoana Pardo to me for evaluation. Attached you will find relevant portions of my assessment and plan of care.    If you have questions, please do not hesitate to call me. I look forward to following Yoana Pardo along with you.    Sincerely,    Raul Marie MD    Enclosure  CC:  No Recipients    If you would like to receive this communication electronically, please contact [email or web address] or [phone] to request more information on [employer] access.    For providers and/or their staff who would like to refer a patient to [facility], please contact us through our one-stop-shop provider referral line, [facility], at [phone].    If you feel you have received this communication in error or would no longer like to receive these types of communications, please e-mail [email or web address]

## 2018-04-27 NOTE — PROGRESS NOTES
Subjective:       Patient ID: Yoana Pardo is a 67 y.o. female.    Chief Complaint: Consult (Thrombocytosis) and Results    HPI 67-year-old female history of elevated platelet count status post recent gallbladder surgery was asked to see the patient for further evaluation    Past Medical History:   Diagnosis Date    GERD (gastroesophageal reflux disease)     Hemorrhoids     History of positive PPD, untreated     Hx of cold sores     Hyperlipidemia     Hypertension     MVP (mitral valve prolapse)     Peripheral neuropathy     Pulmonary HTN     Dr Bailon    Sleep apnea     has  but not using CPAP     Family History   Problem Relation Age of Onset    Heart disease Mother     Obesity Mother     Kidney disease Father     Hypertension Father     Obesity Father     Heart disease Father     Diabetes Sister     Aneurysm Sister      AAA     Social History     Social History    Marital status:      Spouse name: N/A    Number of children: 3    Years of education: N/A     Occupational History    [employer]     Social History Main Topics    Smoking status: Never Smoker    Smokeless tobacco: Never Used    Alcohol use Yes      Comment: rarely    Drug use: No    Sexual activity: Not Currently     Other Topics Concern    Not on file     Social History Narrative    No narrative on file     Past Surgical History:   Procedure Laterality Date    CHOLECYSTECTOMY      DILATION AND CURETTAGE OF UTERUS         Labs:  Lab Results   Component Value Date    WBC 5.70 04/13/2018    HGB 12.2 04/13/2018    HCT 40.6 04/13/2018    MCV 96 04/13/2018     (H) 04/13/2018     BMP  Lab Results   Component Value Date     04/13/2018    K 4.3 04/13/2018    CL 95 04/13/2018    CO2 35 (H) 04/13/2018    BUN 17 04/13/2018    CREATININE 0.8 04/13/2018    CALCIUM 9.7 04/13/2018    ANIONGAP 9 04/13/2018    ESTGFRAFRICA >60.0 04/13/2018    EGFRNONAA >60.0 04/13/2018      Lab Results   Component Value Date    ALT 10 04/13/2018    AST 16 04/13/2018    ALKPHOS 87 04/13/2018    BILITOT 0.3 04/13/2018       Lab Results   Component Value Date    IRON 83 09/17/2004    TIBC 467 (H) 09/17/2004     Lab Results   Component Value Date    USFBPSNI76 460 11/30/2017     Lab Results   Component Value Date    FOLATE 9.7 01/03/2008     Lab Results   Component Value Date    TSH 0.641 11/30/2017         Review of Systems   Constitutional: Negative for activity change, appetite change, chills, diaphoresis, fatigue, fever and unexpected weight change.   HENT: Negative for congestion, dental problem, drooling, ear discharge, ear pain, facial swelling, hearing loss, mouth sores, nosebleeds, postnasal drip, rhinorrhea, sinus pressure, sneezing, sore throat, tinnitus, trouble swallowing and voice change.    Eyes: Negative for photophobia, pain, discharge, redness, itching and visual disturbance.   Respiratory: Negative for cough, choking, chest tightness, shortness of breath, wheezing and stridor.    Cardiovascular: Negative for chest pain, palpitations and leg swelling.   Gastrointestinal: Negative for abdominal distention, abdominal pain, anal bleeding, blood in stool, constipation, diarrhea, nausea, rectal pain and vomiting.   Endocrine: Negative for cold intolerance, heat intolerance, polydipsia, polyphagia and polyuria.   Genitourinary: Negative for decreased urine volume, difficulty urinating, dyspareunia, dysuria, enuresis, flank pain, frequency, genital sores, hematuria, menstrual problem, pelvic pain, urgency, vaginal bleeding, vaginal discharge and vaginal pain.   Musculoskeletal: Negative for arthralgias, back pain, gait problem, joint swelling, myalgias, neck pain and neck stiffness.   Skin: Negative for color change, pallor and rash.   Allergic/Immunologic: Negative for environmental allergies, food allergies and immunocompromised state.   Neurological: Negative for dizziness, tremors,  seizures, syncope, facial asymmetry, speech difficulty, weakness, light-headedness, numbness and headaches.   Hematological: Negative for adenopathy. Does not bruise/bleed easily.   Psychiatric/Behavioral: Positive for dysphoric mood. Negative for agitation, behavioral problems, confusion, decreased concentration, hallucinations, self-injury, sleep disturbance and suicidal ideas. The patient is nervous/anxious. The patient is not hyperactive.        Objective:      Physical Exam   Constitutional: She is oriented to person, place, and time. She appears well-developed and well-nourished. She appears distressed.   HENT:   Head: Normocephalic and atraumatic.   Right Ear: Tympanic membrane and external ear normal.   Left Ear: Tympanic membrane and external ear normal.   Nose: Nose normal. Right sinus exhibits no maxillary sinus tenderness and no frontal sinus tenderness. Left sinus exhibits no maxillary sinus tenderness and no frontal sinus tenderness.   Mouth/Throat: Oropharynx is clear and moist. No oropharyngeal exudate.   Eyes: Conjunctivae, EOM and lids are normal. Pupils are equal, round, and reactive to light. Right eye exhibits no discharge. Left eye exhibits no discharge. Right conjunctiva is not injected. Right conjunctiva has no hemorrhage. Left conjunctiva is not injected. Left conjunctiva has no hemorrhage. No scleral icterus.   Neck: Normal range of motion. Neck supple. No JVD present. No tracheal deviation present. No thyromegaly present.   Cardiovascular: Normal rate, regular rhythm, normal heart sounds and intact distal pulses.    Pulmonary/Chest: Effort normal and breath sounds normal. No stridor. No respiratory distress. She exhibits no tenderness.   Abdominal: Soft. Bowel sounds are normal. She exhibits no distension and no mass. There is no splenomegaly or hepatomegaly. There is no tenderness. There is no rebound.   Musculoskeletal: Normal range of motion. She exhibits no edema or tenderness.    Lymphadenopathy:     She has no cervical adenopathy.     She has no axillary adenopathy.        Right: No supraclavicular adenopathy present.        Left: No supraclavicular adenopathy present.   Neurological: She is alert and oriented to person, place, and time. No cranial nerve deficit. Coordination normal.   Skin: Skin is dry. No rash noted. She is not diaphoretic. No erythema.   Psychiatric: She has a normal mood and affect. Her behavior is normal. Judgment and thought content normal.   Vitals reviewed.          Assessment:      1. Thrombocytosis           Plan:   We'll review and have CBC done today if thrombocytosis has revolve resolved most likely believe it's secondary to recent blood loss from gallbladder surgery if persistent seed with more extensive evaluation.  [id number] patient was sent to the lab with repeat CBC hemoglobin is 11.8 platelet count is normalized feel that thrombocytosis is reactive in nature reassurance given no further workup warranted

## 2018-06-11 ENCOUNTER — LAB VISIT (OUTPATIENT)
Dept: LAB | Facility: HOSPITAL | Age: 68
End: 2018-06-11
Attending: PSYCHIATRY & NEUROLOGY
Payer: MEDICARE

## 2018-06-11 ENCOUNTER — OFFICE VISIT (OUTPATIENT)
Dept: NEUROLOGY | Facility: CLINIC | Age: 68
End: 2018-06-11
Payer: MEDICARE

## 2018-06-11 VITALS
SYSTOLIC BLOOD PRESSURE: 172 MMHG | BODY MASS INDEX: 45.28 KG/M2 | DIASTOLIC BLOOD PRESSURE: 92 MMHG | RESPIRATION RATE: 20 BRPM | HEART RATE: 72 BPM | WEIGHT: 230.63 LBS | HEIGHT: 60 IN

## 2018-06-11 DIAGNOSIS — R41.3 MEMORY LOSS: Primary | ICD-10-CM

## 2018-06-11 DIAGNOSIS — R41.3 MEMORY LOSS: ICD-10-CM

## 2018-06-11 PROCEDURE — 36415 COLL VENOUS BLD VENIPUNCTURE: CPT | Mod: PO

## 2018-06-11 PROCEDURE — 99205 OFFICE O/P NEW HI 60 MIN: CPT | Mod: S$PBB,,, | Performed by: PSYCHIATRY & NEUROLOGY

## 2018-06-11 PROCEDURE — 99213 OFFICE O/P EST LOW 20 MIN: CPT | Mod: PBBFAC,PO | Performed by: PSYCHIATRY & NEUROLOGY

## 2018-06-11 PROCEDURE — 99999 PR PBB SHADOW E&M-EST. PATIENT-LVL III: CPT | Mod: PBBFAC,,, | Performed by: PSYCHIATRY & NEUROLOGY

## 2018-06-11 PROCEDURE — 82565 ASSAY OF CREATININE: CPT

## 2018-06-11 PROCEDURE — 83921 ORGANIC ACID SINGLE QUANT: CPT

## 2018-06-11 PROCEDURE — 82607 VITAMIN B-12: CPT

## 2018-06-11 PROCEDURE — 82746 ASSAY OF FOLIC ACID SERUM: CPT

## 2018-06-11 NOTE — LETTER
June 12, 2018      Lynn Wiggins MD  [address] [place] [address]           [facility] Neurology  [address] [place] [address]  Phone: [phone]          Patient: Yoana Pardo   MR Number: [id number]   YOB: 1950   Date of Visit: 6/11/2018       Dear Dr. Lynn Wiggins:    Thank you for referring Yoana Pardo to me for evaluation. Attached you will find relevant portions of my assessment and plan of care.    If you have questions, please do not hesitate to call me. I look forward to following Yoana Pardo along with you.    Sincerely,    Esvin Olivo MD    Enclosure  CC:  No Recipients    If you would like to receive this communication electronically, please contact [email or web address] or [phone] to request more information on [employer] Link access.    For providers and/or their staff who would like to refer a patient to [facility], please contact us through our one-stop-shop provider referral line, [facility], at [phone].    If you feel you have received this communication in error or would no longer like to receive these types of communications, please e-mail [email or web address]

## 2018-06-11 NOTE — PROGRESS NOTES
Consult Requested By: No att. providers found  Reason for Consult: Memory loss     SUBJECTIVE:       HPI:   HISTORY OF PRESENT ILLNESS:  This is a 67-year-old right-handed lady, presented   today in the clinic with the complaint of memory loss.  She said that it is   happening over two years.  Sometimes she cannot recall the names and face of the   recent encounters with the people.  She also thinks that sometimes she wanders,   she thinks why she is here.  She does have own medications.  There is no   problem in doing ADLs.  Her children sometimes tell her that she is forgetting   and she is not sure whether it is teasing or real, but she sleeps with CPAP   machine and she snores.  She has high school graduate and some college and she   is an .  There are no accidents.  No diabetes.  She drinks alcohol   occasionally.  No family history of Alzheimer's.      AK/ZULMA  dd: 06/11/2018 15:24:12 (CDT)  td: 06/12/2018 03:24:48 (CDT)  Doc ID   #[id number]  Job ID #[id number]    CC:     This office note has been dictated.      Past Medical History:   Diagnosis Date    GERD (gastroesophageal reflux disease)     Hemorrhoids     History of positive PPD, untreated     Hx of cold sores     Hyperlipidemia     Hypertension     MVP (mitral valve prolapse)     Peripheral neuropathy     Pulmonary HTN     Dr Bailon    Sleep apnea     has  but not using CPAP     Past Surgical History:   Procedure Laterality Date    CHOLECYSTECTOMY      DILATION AND CURETTAGE OF UTERUS       Family History   Problem Relation Age of Onset    Heart disease Mother     Obesity Mother     Kidney disease Father     Hypertension Father     Obesity Father     Heart disease Father     Diabetes Sister     Aneurysm Sister         AAA     Social History   Substance Use Topics    Smoking status: Never Smoker    Smokeless tobacco: Never Used    Alcohol use Yes      Comment: rarely     Review of patient's allergies indicates:   Allergen  Reactions    Lisinopril Other (See Comments)     Cough      Bactrim [sulfamethoxazole-trimethoprim] Itching    Zosyn [piperacillin-tazobactam] Hives     Pt received second dose of Zosyn and had hives on both arms. Benadryl given and pt continued to receive zosyn with no issues. Hydralazine also given around the same time and discontinued.        Review of Systems   Constitutional: Negative for fever and weight loss.   HENT: Negative for hearing loss.    Eyes: Negative for blurred vision, double vision, photophobia and pain.   Respiratory: Negative for cough.    Cardiovascular: Negative for chest pain.   Gastrointestinal: Negative for abdominal pain, nausea and vomiting.   Genitourinary: Negative for dysuria, frequency and urgency.   Musculoskeletal: Negative.  Negative for back pain, falls, joint pain, myalgias and neck pain.   Skin: Negative for itching and rash.   Neurological: Negative for tingling and headaches.   Psychiatric/Behavioral: Positive for memory loss. Negative for depression.       OBJECTIVE:     Vital Signs (Most Recent)  Pulse: 72 (06/11/18 1452)  Resp: 20 (06/11/18 1452)  BP: (!) 172/92 (06/11/18 1452)    Physical Exam   Constitutional: She is oriented to person, place, and time. She appears well-developed and well-nourished.   HENT:   Head: Normocephalic and atraumatic.   Eyes: Conjunctivae and EOM are normal. Pupils are equal, round, and reactive to light.   Neck: Normal range of motion. Neck supple. No JVD present. No tracheal deviation present. No thyromegaly present.   Cardiovascular: Normal rate, regular rhythm and normal heart sounds.    Pulmonary/Chest: Effort normal and breath sounds normal.   Abdominal: She exhibits no distension. There is no tenderness.   Musculoskeletal: Normal range of motion. She exhibits no edema or tenderness.   Neurological: She is alert and oriented to person, place, and time. She has normal strength and normal reflexes. She displays normal reflexes. No  "cranial nerve deficit or sensory deficit. She exhibits normal muscle tone. She displays a negative Romberg sign. Coordination and gait normal.   Reflex Scores:       Tricep reflexes are 2+ on the right side and 2+ on the left side.       Bicep reflexes are 2+ on the right side and 2+ on the left side.       Brachioradialis reflexes are 2+ on the right side and 2+ on the left side.       Patellar reflexes are 2+ on the right side and 2+ on the left side.       Achilles reflexes are 2+ on the right side and 2+ on the left side.  MINI-MENTAL STATE EXAM    What is the (year), (season), (date), (day), (month)?5 points   Where are we (state), (parish), (town), (hospital), (floor)? 5 points  Name 3 objects: 1 second to say each, then ask the patient to repeat all three after you have said them.  Repeat initially until he/she learns all three. 3 points  Serial 7's. 1 point for each answer, stop after 5.  Alternatively, spell "world" backwards.  Must be in the correct sequence.  5 points  Show 2 common objects (pencil and watch).  Ask patient to name. 2 points  Ask the patient to repeat No ifs, ands or buts. 1 point  Follow a 3 stage command: "Take this paper in your right hand, fold it in half, and put it on the floor". 3 points  Read and obey: "Close your eyes". 1 poinr  Ask the patient to recall the three words you previously asked. 3 points  Give pt. paper and ask to write a sentence. 1 point  Show pt. drawing of intersecting pentagons. Ask pt. to draw. 1 point  What was the mini mental exam score? 30 /30  Level of consciousness: alert  Describe if abnormal:   Fund of knowledge: Normal  General appearance: Normal    Geriatric Depression score: 4/15     Skin: Skin is warm and dry. No rash noted.   Psychiatric: She has a normal mood and affect. Her speech is normal and behavior is normal. Judgment and thought content normal. Cognition and memory are normal.         Strength  Deltoids Triceps Biceps Wrist Extension Wrist " Flexion Hand    Upper: R 5/5 5/5 5/5 5/5 5/5 5/5    L 5/5 5/5 5/5 5/5 5/5 5/5     Iliopsoas Quadriceps Knee  Flexion Tibialis  anterior Gastro- cnemius EHL   Lower: R 5/5 5/5 5/5 5/5 5/5 5/5    L 5/5 5/5 5/5 5/5 5/5 5/5     Laboratory:  Lab Results   Component Value Date    WBC 6.24 04/27/2018    HGB 11.8 (L) 04/27/2018    HCT 36.7 (L) 04/27/2018     04/27/2018    CHOL 175 11/30/2017    TRIG 114 11/30/2017    HDL 38 (L) 11/30/2017    ALT 10 04/13/2018    AST 16 04/13/2018     04/13/2018    K 4.3 04/13/2018    CL 95 04/13/2018    CREATININE 0.8 04/13/2018    BUN 17 04/13/2018    CO2 35 (H) 04/13/2018    TSH 0.641 11/30/2017    INR 1.1 01/18/2006    HGBA1C 5.6 01/18/2006             ASSESSMENT/PLAN:     Primary Diagnoses:  1. Memory loss : she did well in screening testing.  Rule out early onset dementia or MCI. Will do routine workshop<  MRI of the brain, Labs.     Patient Active Problem List   Diagnosis    Pulmonary hypertension    MVP (mitral valve prolapse)    Hiatal hernia with GERD    History of positive PPD, untreated    Hemorrhoids    Essential hypertension    Peripheral neuropathy    Primary osteoarthritis of both knees    Morbid obesity with BMI of 40.0-44.9, adult    [place] (obstructive sleep apnea)    Inadequate sleep hygiene    Psychophysiological insomnia    Chronic seasonal allergic rhinitis    Atherosclerosis of aorta    S/P laparoscopic cholecystectomy    Acute on chronic respiratory failure with hypoxia    Elevated hemidiaphragm        Plan: Time spent: 40 minutes in face to face discussion concerning diagnosis, prognosis, review of lab and test results, benefits of treatment as well as management of disease, counseling of patient and coordination of care between various health care providers . Greater than half the time spent was used for coordination of care and counseling of patient. This note may have some spelling or wording mistakes which might have been  overlooked during proof reading.

## 2018-06-12 ENCOUNTER — TELEPHONE (OUTPATIENT)
Dept: RADIOLOGY | Facility: HOSPITAL | Age: 68
End: 2018-06-12

## 2018-06-12 LAB
CREAT SERPL-MCNC: 0.7 MG/DL
EST. GFR  (AFRICAN AMERICAN): >60 ML/MIN/1.73 M^2
EST. GFR  (NON AFRICAN AMERICAN): >60 ML/MIN/1.73 M^2
FOLATE SERPL-MCNC: 7.7 NG/ML
VIT B12 SERPL-MCNC: 403 PG/ML

## 2018-06-14 ENCOUNTER — TELEPHONE (OUTPATIENT)
Dept: RADIOLOGY | Facility: HOSPITAL | Age: 68
End: 2018-06-14

## 2018-06-15 ENCOUNTER — TELEPHONE (OUTPATIENT)
Dept: NEUROLOGY | Facility: CLINIC | Age: 68
End: 2018-06-15

## 2018-06-15 ENCOUNTER — HOSPITAL ENCOUNTER (OUTPATIENT)
Dept: RADIOLOGY | Facility: HOSPITAL | Age: 68
Discharge: HOME | End: 2018-06-15
Attending: PSYCHIATRY & NEUROLOGY
Payer: MEDICARE

## 2018-06-15 DIAGNOSIS — R41.3 MEMORY LOSS: ICD-10-CM

## 2018-06-15 LAB — METHYLMALONATE SERPL-SCNC: 0.12 UMOL/L

## 2018-06-15 PROCEDURE — 70553 MRI BRAIN STEM W/O & W/DYE: CPT | Mod: 26,,, | Performed by: RADIOLOGY

## 2018-06-15 PROCEDURE — 70553 MRI BRAIN STEM W/O & W/DYE: CPT | Mod: TC,PO

## 2018-06-15 PROCEDURE — A9585 GADOBUTROL INJECTION: HCPCS | Mod: PO | Performed by: PSYCHIATRY & NEUROLOGY

## 2018-06-15 PROCEDURE — 25500020 PHARM REV CODE 255: Mod: PO | Performed by: PSYCHIATRY & NEUROLOGY

## 2018-06-15 RX ORDER — GADOBUTROL 604.72 MG/ML
10 INJECTION INTRAVENOUS
Status: COMPLETED | OUTPATIENT
Start: 2018-06-15 | End: 2018-06-15

## 2018-06-15 RX ADMIN — GADOBUTROL 10 ML: 604.72 INJECTION INTRAVENOUS at 11:06

## 2018-07-02 ENCOUNTER — OFFICE VISIT (OUTPATIENT)
Dept: PULMONOLOGY | Facility: CLINIC | Age: 68
End: 2018-07-02
Payer: MEDICARE

## 2018-07-02 ENCOUNTER — LAB VISIT (OUTPATIENT)
Dept: LAB | Facility: HOSPITAL | Age: 68
End: 2018-07-02
Attending: INTERNAL MEDICINE
Payer: MEDICARE

## 2018-07-02 ENCOUNTER — PROCEDURE VISIT (OUTPATIENT)
Dept: PULMONOLOGY | Facility: CLINIC | Age: 68
End: 2018-07-02
Payer: MEDICARE

## 2018-07-02 VITALS
HEIGHT: 60 IN | RESPIRATION RATE: 19 BRPM | WEIGHT: 231 LBS | OXYGEN SATURATION: 95 % | BODY MASS INDEX: 45.35 KG/M2 | HEIGHT: 60 IN | BODY MASS INDEX: 45.35 KG/M2 | DIASTOLIC BLOOD PRESSURE: 86 MMHG | HEART RATE: 78 BPM | SYSTOLIC BLOOD PRESSURE: 140 MMHG | WEIGHT: 231 LBS

## 2018-07-02 DIAGNOSIS — E66.01 MORBID OBESITY WITH BMI OF 40.0-44.9, ADULT: ICD-10-CM

## 2018-07-02 DIAGNOSIS — J98.4 RESTRICTIVE LUNG DISEASE SECONDARY TO OBESITY: ICD-10-CM

## 2018-07-02 DIAGNOSIS — R60.0 EDEMA OF LEG: ICD-10-CM

## 2018-07-02 DIAGNOSIS — I27.20 PULMONARY HYPERTENSION: Primary | ICD-10-CM

## 2018-07-02 DIAGNOSIS — I27.20 PULMONARY HTN: ICD-10-CM

## 2018-07-02 DIAGNOSIS — R76.11 HISTORY OF POSITIVE PPD, UNTREATED: ICD-10-CM

## 2018-07-02 DIAGNOSIS — J98.6 ELEVATED HEMIDIAPHRAGM: ICD-10-CM

## 2018-07-02 DIAGNOSIS — R94.2 DIFFUSION CAPACITY OF LUNG (DL), DECREASED: ICD-10-CM

## 2018-07-02 DIAGNOSIS — E66.9 RESTRICTIVE LUNG DISEASE SECONDARY TO OBESITY: ICD-10-CM

## 2018-07-02 DIAGNOSIS — G47.33 OSA ON CPAP: ICD-10-CM

## 2018-07-02 PROBLEM — J96.21 ACUTE ON CHRONIC RESPIRATORY FAILURE WITH HYPOXIA: Status: RESOLVED | Noted: 2018-04-04 | Resolved: 2018-07-02

## 2018-07-02 LAB
BRPFT: ABNORMAL
DLCO ADJ PRE: 8.26 ML/(MIN*MMHG) (ref 13.7–25.17)
DLCO PRE: 7.82 ML/(MIN*MMHG) (ref 13.7–25.17)
DLCO SINGLE BREATH LLN: 13.7
DLCO SINGLE BREATH PRE REF: 40.3 %
DLCO SINGLE BREATH REF: 19.43
DLCOC SBVA LLN: 2.86
DLCOC SBVA PRE REF: 100.2 %
DLCOC SBVA REF: 4.58
DLCOC SINGLE BREATH LLN: 13.7
DLCOC SINGLE BREATH PRE REF: 42.5 %
DLCOC SINGLE BREATH REF: 19.43
DLCOVA LLN: 2.86
DLCOVA PRE REF: 94.9 %
DLCOVA PRE: 4.35 ML/(MIN*MMHG*L) (ref 2.86–6.31)
DLCOVA REF: 4.58
DLVAADJ PRE: 4.59 ML/(MIN*MMHG*L) (ref 2.86–6.31)
ERV LLN: 0.68
ERV PRE REF: 28 %
ERV PRE: 0.19 L (ref 0.68–0.68)
ERV REF: 0.68
ERVN2 LLN: 0.68
ERVN2 REF: 0.68
FEF 25 75 CHG: 25.2 %
FEF 25 75 LLN: 0.65
FEF 25 75 POST REF: 58.1 %
FEF 25 75 POST: 0.94 L/S (ref 0.65–2.6)
FEF 25 75 PRE REF: 46.4 %
FEF 25 75 PRE: 0.76 L/S (ref 0.65–2.6)
FEF 25 75 REF: 1.63
FET100 CHG: -9.8 %
FET100 POST: 7.65 SEC
FET100 PRE: 8.48 SEC
FEV1 CHG: 9.4 %
FEV1 FVC CHG: 6.7 %
FEV1 FVC LLN: 67
FEV1 FVC POST REF: 97.6 %
FEV1 FVC POST: 77.7 % (ref 67.17–92.11)
FEV1 FVC PRE REF: 91.4 %
FEV1 FVC PRE: 72.83 % (ref 67.17–92.11)
FEV1 FVC REF: 80
FEV1 LLN: 1.24
FEV1 POST REF: 69.1 %
FEV1 POST: 1.2 L (ref 1.24–2.24)
FEV1 PRE REF: 63.2 %
FEV1 PRE: 1.1 L (ref 1.24–2.24)
FEV1 REF: 1.74
FEV6 CHG: 5.2 %
FEV6 LLN: 1.37
FEV6 POST REF: 77.1 %
FEV6 POST: 1.52 L (ref 1.37–2.59)
FEV6 PRE REF: 73.3 %
FEV6 PRE: 1.45 L (ref 1.37–2.59)
FEV6 REF: 1.98
FRCN2 LLN: 1.65
FRCN2 REF: 2.47
FRCPL PRE: 1.22 L
FRCPLETH LLN: 1.65
FRCPLETH PREREF: 49.3 %
FRCPLETH REF: 2.47
FVC CHG: 2.5 %
FVC LLN: 1.58
FVC POST REF: 70.6 %
FVC POST: 1.55 L (ref 1.58–2.8)
FVC PRE REF: 68.9 %
FVC PRE: 1.51 L (ref 1.58–2.8)
FVC REF: 2.19
IVC PRE: 1.11 L (ref 1.58–2.8)
IVC SINGLE BREATH LLN: 1.58
IVC SINGLE BREATH PRE REF: 50.6 %
IVC SINGLE BREATH REF: 2.19
MVV LLN: 60
MVV REF: 71
PEF CHG: 38.4 %
PEF LLN: 2.84
PEF POST REF: 98.5 %
PEF POST: 4.52 L/S (ref 2.84–6.34)
PEF PRE REF: 71.2 %
PEF PRE: 3.27 L/S (ref 2.84–6.34)
PEF REF: 4.59
RAW LLN: 3.06
RAW PRE REF: 115.4 %
RAW PRE: 3.53 CMH2O*S/L (ref 3.06–3.06)
RAW REF: 3.06
RV LLN: 1.22
RV PRE REF: 57.9 %
RV PRE: 1.04 L (ref 1.22–2.37)
RV REF: 1.79
RVN2 LLN: 1.22
RVN2 REF: 1.79
RVN2TLCN2 LLN: 31
RVN2TLCN2 REF: 41
RVTLC LLN: 31
RVTLC PRE REF: 81.3 %
RVTLC PRE: 33.38 % (ref 31.47–50.65)
RVTLC REF: 41
TLC LLN: 3.25
TLC PRE REF: 73.2 %
TLC PRE: 3.11 L (ref 3.25–5.23)
TLC REF: 4.24
TLCN2 LLN: 3.25
TLCN2 REF: 4.24
VA PRE: 1.8 L (ref 4.09–4.09)
VA SINGLE BREATH LLN: 4.09
VA SINGLE BREATH PRE REF: 43.9 %
VA SINGLE BREATH REF: 4.09
VC LLN: 1.58
VC PRE REF: 94.5 %
VC PRE: 2.07 L (ref 1.58–2.8)
VC REF: 2.19
VCMAXN2 LLN: 1.58
VCMAXN2 REF: 2.19
VTGRAWPRE: 1.9 L

## 2018-07-02 PROCEDURE — 94726 PLETHYSMOGRAPHY LUNG VOLUMES: CPT | Mod: 26,S$PBB,, | Performed by: INTERNAL MEDICINE

## 2018-07-02 PROCEDURE — 94618 PULMONARY STRESS TESTING: CPT | Mod: 26,S$PBB,, | Performed by: INTERNAL MEDICINE

## 2018-07-02 PROCEDURE — 99214 OFFICE O/P EST MOD 30 MIN: CPT | Mod: 25,S$PBB,, | Performed by: INTERNAL MEDICINE

## 2018-07-02 PROCEDURE — 94729 DIFFUSING CAPACITY: CPT | Mod: PBBFAC,PO

## 2018-07-02 PROCEDURE — 94726 PLETHYSMOGRAPHY LUNG VOLUMES: CPT | Mod: PBBFAC,PO

## 2018-07-02 PROCEDURE — 99999 PR PBB SHADOW E&M-EST. PATIENT-LVL III: CPT | Mod: PBBFAC,,, | Performed by: INTERNAL MEDICINE

## 2018-07-02 PROCEDURE — 94729 DIFFUSING CAPACITY: CPT | Mod: 26,S$PBB,, | Performed by: INTERNAL MEDICINE

## 2018-07-02 PROCEDURE — 36415 COLL VENOUS BLD VENIPUNCTURE: CPT | Mod: PO

## 2018-07-02 PROCEDURE — 94060 EVALUATION OF WHEEZING: CPT | Mod: PBBFAC,PO

## 2018-07-02 PROCEDURE — 86038 ANTINUCLEAR ANTIBODIES: CPT

## 2018-07-02 PROCEDURE — 94060 EVALUATION OF WHEEZING: CPT | Mod: 26,59,S$PBB, | Performed by: INTERNAL MEDICINE

## 2018-07-02 PROCEDURE — 99213 OFFICE O/P EST LOW 20 MIN: CPT | Mod: PBBFAC,PO | Performed by: INTERNAL MEDICINE

## 2018-07-02 PROCEDURE — 94618 PULMONARY STRESS TESTING: CPT | Mod: PBBFAC,PO

## 2018-07-02 NOTE — PROGRESS NOTES
"Subjective:       Patient ID: Yoana Pardo is a 67 y.o. female.    Chief Complaint: Pulmonary Hypertension and Sleep Apnea    Ms. Yoana Pardo is 67 years old  She was recently discharged from the hospital.  She had a gangrenous gallbladder status post robotic laparoscopic cholecystectomy this was April 2018.  Follow-up to review test for 4/6/2018  Heart mild pulmonary hypertension based on echocardiogram  Have 6 min walk test was normal based on exercise capacity although blood pressure was slightly elevated.  No exercise desaturation  PFT showed mild restriction and moderate reduction in DLCO  Her prior echocardiogram pressure was estimated at 38 and increased to 47 recently.  Repeat study repeated in about 3 months  We also discussed with patient about fluid management and diuretics of her lower extremity edema  She will also attempt to use thigh-high elastic stockings on a daily basis  She has a CPAP currently using and benefits from it  Bedtime is 1030pm to 12 midnight  Wake-up time is 6:00 a.m.  Patient reports use and benefit of the device  Will obtain download from her company          Review of Systems   Constitutional: Positive for fatigue.   HENT: Negative.    Respiratory: Positive for apnea (CPAP through [facility]). Negative for snoring, sputum production, shortness of breath, wheezing, pleurisy and use of rescue inhaler.    Cardiovascular: Negative.    Genitourinary: Negative.    Endocrine: endocrine negative   Musculoskeletal: Negative.    Skin: Negative for rash.   Neurological: Negative.        Objective:       Vitals:    07/02/18 1249   BP: (!) 140/86   Pulse: 78   Resp: 19   SpO2: 95%   Weight: 104.8 kg (231 lb)   Height: 4' 11.8" (1.519 m)     Physical Exam   Constitutional: She is oriented to person, place, and time. Vital signs are normal. She appears well-developed and well-nourished.     She is obese.   HENT:   Head: Normocephalic.   Nose: Nose normal.   Mouth/Throat: Oropharynx is " "clear and moist. No oropharyngeal exudate. Mallampati Score: IV.   Neck: Normal range of motion. Neck supple. No tracheal deviation present. No thyromegaly present.   Cardiovascular: Normal rate and regular rhythm.    No murmur heard.  Pulmonary/Chest: Normal expansion, symmetric chest wall expansion, effort normal and breath sounds normal.   Abdominal: Soft. Bowel sounds are normal.   Musculoskeletal: Normal range of motion. She exhibits no edema.   Lymphadenopathy:     She has no cervical adenopathy.   Neurological: She is alert and oriented to person, place, and time. Gait normal.   Skin: Skin is warm and dry. No rash noted. No erythema. Nails show no clubbing.   Psychiatric: She has a normal mood and affect.   Nursing note and vitals reviewed.    Personal Diagnostic Review     PFT:  Restrictive defect with reduced diffusion capacity.  FEV1 of 1.10 L (63% predicted) FVC 1.51 (68% predicted) FEV1 for/FVC was 73.  TLC was 3.11 (73% predicted) DLCO was 7.82 (40.3% predicted)    Download of machine will be obtained from [employer]    6 min walk was completed.  Patient complained of knee pain  Patient completed the walk for 360 sec.  This is completed was 365.76 m (98.08% predicted)  Normal exercise capacity  Very trivial dyspnea  Heart rate maximum was 126 beats per minute  Blood pressure was elevated 146/108    6MW Test  Height: 4' 11.8" (151.9 cm)  Weight: 104.8 kg (231 lb)  BMI (Calculated): 45.5  Predicted Distance: 215.13  Interpretation  Predicted Distance Meters (Calculated): 371.86 meters      No flowsheet data found.      Assessment:       Problem List Items Addressed This Visit     [place] on CPAP    Relevant Orders    PULSE OXIMETRY OVERNIGHT    Comprehensive metabolic panel    Pulmonary hypertension - Primary     MPAP FROM [facility]:     mPAP = 0.6146.56  + 2 = 30.4 mmHg.     Mild pulmonary hypertension at best likely secondary to hypoxia.      Recommend diuresis. Repeat ECHO after appropriate diuresis.   Treat [place] " with nocturnal PAP         Relevant Orders    PULSE OXIMETRY OVERNIGHT    2D echo with color flow doppler and bubble contrast    Comprehensive metabolic panel    History of positive PPD, untreated     Works at [employer]  TB spot test         Relevant Orders    T-SPOT TB Screening Test    Comprehensive metabolic panel    Morbid obesity with BMI of 40.0-44.9, adult     General weight loss/lifestyle modification strategies discussed (elicit support from others; identify saboteurs; non-food rewards).  Diet interventions: low calorie (1000 kCal/d) deficit diet           Relevant Orders    Comprehensive metabolic panel    Elevated hemidiaphragm     Breathing exercises.         Relevant Orders    Comprehensive metabolic panel    Diffusion capacity of lung (dl), decreased    Relevant Orders    Comprehensive metabolic panel    Restrictive lung disease secondary to obesity    Relevant Orders    Comprehensive metabolic panel      Other Visit Diagnoses     Edema of leg        elastic stockings, continue lasix    Relevant Orders    Comprehensive metabolic panel        Plan:       Download from [facility] [place]  Validate ONSAT on CPAP with RA  Follow-up after acclimation with CPAP  Echo repeat in 3 months     Follow-up in about 3 months (around 10/2/2018) for Overnight SAT, T spot schedule at [facility] lab, echo 2 D, Download, CPAP supplies, Labs today, BMP, CMP or Standing BMP.    This note was prepared using voice recognition system and is likely to have sound alike errors that may have been overlooked even after proof reading.  Please call me with any questions    Discussed diagnosis, its evaluation, treatment and usual course. All questions answered.    Thank you for the courtesy of participating in the care of this patient    Ernie Mcintosh MD

## 2018-07-02 NOTE — PROCEDURES
[facility]  Six Minute Walk     SUMMARY     Ordering Provider: Dr Mcintosh   Interpreting Provider: Dr Mcintosh  Performing nurse/tech/RT: OSCAR Grey  Diagnosis: Pulmonary Hypertension  Height: 5' (152.4 cm)  Weight: 104.8 kg (231 lb)  BMI (Calculated): 45.2   Patient Race:             Phase Oxygen Assessment Supplemental O2 Heart   Rate Blood Pressure Marcelle Dyspnea Scale Rating   Resting 96 % Room Air 72 bpm 142/67 1   Exercise        Minute        1 94 % Room Air 74 bpm     2 94 % Room Air 74 bpm     3 94 % Room Air 85 bpm     4 97 % Room Air 124 bpm     5 96 % Room Air 126 bpm     6  97 % Room Air 118 bpm (!) 146/108 3   Recovery        Minute        1 97 % Room Air 72 bpm     2 99 % Room Air 70 bpm     3 99 % Room Air 68 bpm     4 100 % Room Air 82 bpm 139/83 1     Six Minute Walk Summary  6MWT Status: completed without stopping  Patient Reported: Other (Comment) (Knee Pain)     Interpretation:  Did the patient stop or pause?: No        Total Time Walked (Calculated): 360 seconds  Final Partial Lap Distance (feet): 0 feet  Total Distance Meters (Calculated): 365.76 meters  Predicted Distance Meters (Calculated): 372.92 meters  Percentage of Predicted (Calculated): 98.08  Peak VO2 (Calculated): 14.95  Mets: 4.27  Has The Patient Had a Previous Six Minute Walk Test?: No       Previous 6MWT Results  Has The Patient Had a Previous Six Minute Walk Test?: No     REPORT    CLINICAL INTERPRETATION:  Six minute walk distance is 365.76m (98.08 % predicted) with light dyspnea.  During exercise, there was no significant desaturation while breathing room air.  The patient reported non-pulmonary symptoms during exercise.  Knee Pain.  Based upon age and body mass index, exercise capacity is normal.    Ernie Mcintosh MD

## 2018-07-03 LAB — ANA SER QL IF: NORMAL

## 2018-07-05 ENCOUNTER — LAB VISIT (OUTPATIENT)
Dept: LAB | Facility: HOSPITAL | Age: 68
End: 2018-07-05
Attending: INTERNAL MEDICINE
Payer: MEDICARE

## 2018-07-05 DIAGNOSIS — R76.11 HISTORY OF POSITIVE PPD, UNTREATED: Primary | ICD-10-CM

## 2018-07-05 DIAGNOSIS — M25.9 DISORDER OF JOINT: Primary | ICD-10-CM

## 2018-07-05 PROCEDURE — 86481 TB AG RESPONSE T-CELL SUSP: CPT

## 2018-07-05 PROCEDURE — 36415 COLL VENOUS BLD VENIPUNCTURE: CPT

## 2018-07-11 ENCOUNTER — DOCUMENTATION ONLY (OUTPATIENT)
Dept: CARDIOLOGY | Facility: CLINIC | Age: 68
End: 2018-07-11

## 2018-07-11 ENCOUNTER — CLINICAL SUPPORT (OUTPATIENT)
Dept: CARDIOLOGY | Facility: CLINIC | Age: 68
End: 2018-07-11
Attending: INTERNAL MEDICINE
Payer: MEDICARE

## 2018-07-11 DIAGNOSIS — I27.20 PULMONARY HYPERTENSION: ICD-10-CM

## 2018-07-11 LAB — T-SPOT TB SCREENING TEST: NORMAL

## 2018-07-11 PROCEDURE — 93306 TTE W/DOPPLER COMPLETE: CPT | Mod: PBBFAC,PO | Performed by: INTERNAL MEDICINE

## 2018-07-11 NOTE — PROGRESS NOTES
"Subjective:    Patient ID:  Yoana Pardo is a 67 y.o. female who presents for {Misc; evaluation/follow-up:78879::"follow-up"} of No chief complaint on file.      HPI    ROS     Objective:    Physical Exam      Assessment:       No diagnosis found.     Plan:                   "

## 2018-07-11 NOTE — PROGRESS NOTES
Pt presented for an echocardiogram with bubble study per Dr. Mcintosh  Procedure was explained to the patient, Sheverbalized understanding.  IV, 24ga x 1 attempt, was started in the left AC using aseptic technique.  Patient tolerated the procedure well.  IV discontinued, pressure dressing applied.

## 2018-07-12 LAB
DIASTOLIC DYSFUNCTION: NO
ESTIMATED PA SYSTOLIC PRESSURE: 47.33
RETIRED EF AND QEF - SEE NOTES: 60 (ref 55–65)
TRICUSPID VALVE REGURGITATION: ABNORMAL

## 2018-07-13 ENCOUNTER — TELEPHONE (OUTPATIENT)
Dept: PULMONOLOGY | Facility: CLINIC | Age: 68
End: 2018-07-13

## 2018-07-13 DIAGNOSIS — I27.21 PAH (PULMONARY ARTERIAL HYPERTENSION) WITH PORTAL HYPERTENSION: Primary | ICD-10-CM

## 2018-07-13 DIAGNOSIS — K76.6 PAH (PULMONARY ARTERIAL HYPERTENSION) WITH PORTAL HYPERTENSION: Primary | ICD-10-CM

## 2018-09-12 NOTE — ASSESSMENT & PLAN NOTE
General weight loss/lifestyle modification strategies discussed (elicit support from others; identify saboteurs; non-food rewards).  Diet interventions: low calorie (1000 kCal/d) deficit diet     normal (ped)...

## 2018-09-25 ENCOUNTER — LAB VISIT (OUTPATIENT)
Dept: LAB | Facility: HOSPITAL | Age: 68
End: 2018-09-25
Attending: FAMILY MEDICINE
Payer: MEDICARE

## 2018-09-25 ENCOUNTER — OFFICE VISIT (OUTPATIENT)
Dept: INTERNAL MEDICINE | Facility: CLINIC | Age: 68
End: 2018-09-25
Payer: MEDICARE

## 2018-09-25 VITALS
OXYGEN SATURATION: 98 % | DIASTOLIC BLOOD PRESSURE: 78 MMHG | HEART RATE: 60 BPM | TEMPERATURE: 99 F | HEIGHT: 60 IN | BODY MASS INDEX: 44.23 KG/M2 | WEIGHT: 225.31 LBS | SYSTOLIC BLOOD PRESSURE: 136 MMHG

## 2018-09-25 DIAGNOSIS — M17.0 PRIMARY OSTEOARTHRITIS OF BOTH KNEES: ICD-10-CM

## 2018-09-25 DIAGNOSIS — K44.9 HIATAL HERNIA WITH GERD: ICD-10-CM

## 2018-09-25 DIAGNOSIS — J30.2 CHRONIC SEASONAL ALLERGIC RHINITIS: ICD-10-CM

## 2018-09-25 DIAGNOSIS — I27.20 PULMONARY HYPERTENSION: ICD-10-CM

## 2018-09-25 DIAGNOSIS — I70.0 ATHEROSCLEROSIS OF AORTA: ICD-10-CM

## 2018-09-25 DIAGNOSIS — I10 ESSENTIAL HYPERTENSION: Chronic | ICD-10-CM

## 2018-09-25 DIAGNOSIS — E78.2 MIXED HYPERLIPIDEMIA: ICD-10-CM

## 2018-09-25 DIAGNOSIS — I10 ESSENTIAL HYPERTENSION: Primary | Chronic | ICD-10-CM

## 2018-09-25 DIAGNOSIS — K21.9 HIATAL HERNIA WITH GERD: ICD-10-CM

## 2018-09-25 DIAGNOSIS — G47.33 OSA ON CPAP: ICD-10-CM

## 2018-09-25 DIAGNOSIS — E66.01 MORBID OBESITY WITH BMI OF 40.0-44.9, ADULT: ICD-10-CM

## 2018-09-25 LAB
ALBUMIN SERPL BCP-MCNC: 3.7 G/DL
ALP SERPL-CCNC: 88 U/L
ALT SERPL W/O P-5'-P-CCNC: 11 U/L
ANION GAP SERPL CALC-SCNC: 10 MMOL/L
AST SERPL-CCNC: 14 U/L
BASOPHILS # BLD AUTO: 0.04 K/UL
BASOPHILS NFR BLD: 0.6 %
BILIRUB SERPL-MCNC: 0.5 MG/DL
BUN SERPL-MCNC: 25 MG/DL
CALCIUM SERPL-MCNC: 9.2 MG/DL
CHLORIDE SERPL-SCNC: 104 MMOL/L
CHOLEST SERPL-MCNC: 170 MG/DL
CHOLEST/HDLC SERPL: 4 {RATIO}
CO2 SERPL-SCNC: 27 MMOL/L
CREAT SERPL-MCNC: 0.8 MG/DL
DIFFERENTIAL METHOD: ABNORMAL
EOSINOPHIL # BLD AUTO: 0.1 K/UL
EOSINOPHIL NFR BLD: 1.3 %
ERYTHROCYTE [DISTWIDTH] IN BLOOD BY AUTOMATED COUNT: 14.2 %
EST. GFR  (AFRICAN AMERICAN): >60 ML/MIN/1.73 M^2
EST. GFR  (NON AFRICAN AMERICAN): >60 ML/MIN/1.73 M^2
GLUCOSE SERPL-MCNC: 98 MG/DL
HCT VFR BLD AUTO: 41.4 %
HDLC SERPL-MCNC: 43 MG/DL
HDLC SERPL: 25.3 %
HGB BLD-MCNC: 12.8 G/DL
IMM GRANULOCYTES # BLD AUTO: 0.01 K/UL
IMM GRANULOCYTES NFR BLD AUTO: 0.2 %
LDLC SERPL CALC-MCNC: 98.8 MG/DL
LYMPHOCYTES # BLD AUTO: 2.7 K/UL
LYMPHOCYTES NFR BLD: 43.5 %
MCH RBC QN AUTO: 27.8 PG
MCHC RBC AUTO-ENTMCNC: 30.9 G/DL
MCV RBC AUTO: 90 FL
MONOCYTES # BLD AUTO: 0.7 K/UL
MONOCYTES NFR BLD: 10.5 %
NEUTROPHILS # BLD AUTO: 2.7 K/UL
NEUTROPHILS NFR BLD: 43.9 %
NONHDLC SERPL-MCNC: 127 MG/DL
NRBC BLD-RTO: 0 /100 WBC
PLATELET # BLD AUTO: 332 K/UL
PMV BLD AUTO: 10.9 FL
POTASSIUM SERPL-SCNC: 3.9 MMOL/L
PROT SERPL-MCNC: 7.9 G/DL
RBC # BLD AUTO: 4.6 M/UL
SODIUM SERPL-SCNC: 141 MMOL/L
TRIGL SERPL-MCNC: 141 MG/DL
TSH SERPL DL<=0.005 MIU/L-ACNC: 1.07 UIU/ML
WBC # BLD AUTO: 6.21 K/UL

## 2018-09-25 PROCEDURE — 36415 COLL VENOUS BLD VENIPUNCTURE: CPT | Mod: PO

## 2018-09-25 PROCEDURE — 99999 PR PBB SHADOW E&M-EST. PATIENT-LVL III: CPT | Mod: PBBFAC,,, | Performed by: FAMILY MEDICINE

## 2018-09-25 PROCEDURE — 90662 IIV NO PRSV INCREASED AG IM: CPT | Mod: PBBFAC,PO

## 2018-09-25 PROCEDURE — 99214 OFFICE O/P EST MOD 30 MIN: CPT | Mod: 25,S$PBB,, | Performed by: FAMILY MEDICINE

## 2018-09-25 PROCEDURE — 99213 OFFICE O/P EST LOW 20 MIN: CPT | Mod: PBBFAC,PO | Performed by: FAMILY MEDICINE

## 2018-09-25 PROCEDURE — 80061 LIPID PANEL: CPT

## 2018-09-25 PROCEDURE — 84443 ASSAY THYROID STIM HORMONE: CPT

## 2018-09-25 PROCEDURE — 85025 COMPLETE CBC W/AUTO DIFF WBC: CPT

## 2018-09-25 PROCEDURE — 80053 COMPREHEN METABOLIC PANEL: CPT

## 2018-09-25 RX ORDER — FLUTICASONE PROPIONATE 50 MCG
1 SPRAY, SUSPENSION (ML) NASAL DAILY
Qty: 16 G | Refills: 0 | Status: SHIPPED | OUTPATIENT
Start: 2018-09-25 | End: 2021-02-18

## 2018-09-25 RX ORDER — LOSARTAN POTASSIUM AND HYDROCHLOROTHIAZIDE 25; 100 MG/1; MG/1
1 TABLET ORAL DAILY
Qty: 90 TABLET | Refills: 1 | Status: SHIPPED | OUTPATIENT
Start: 2018-09-25 | End: 2019-06-11 | Stop reason: SDUPTHER

## 2018-09-25 RX ORDER — FUROSEMIDE 20 MG/1
20 TABLET ORAL 2 TIMES DAILY PRN
Qty: 60 TABLET | Refills: 1 | Status: SHIPPED | OUTPATIENT
Start: 2018-09-25 | End: 2019-02-23 | Stop reason: SDUPTHER

## 2018-09-25 RX ORDER — METHYLPREDNISOLONE 4 MG/1
TABLET ORAL
Qty: 1 PACKAGE | Refills: 0 | Status: SHIPPED | OUTPATIENT
Start: 2018-09-25 | End: 2018-10-10 | Stop reason: ALTCHOICE

## 2018-09-25 RX ORDER — SIMVASTATIN 20 MG/1
20 TABLET, FILM COATED ORAL NIGHTLY
Qty: 90 TABLET | Refills: 3 | Status: SHIPPED | OUTPATIENT
Start: 2018-09-25 | End: 2019-10-25 | Stop reason: SDUPTHER

## 2018-09-25 RX ORDER — MONTELUKAST SODIUM 10 MG/1
10 TABLET ORAL NIGHTLY
Qty: 30 TABLET | Refills: 0 | Status: SHIPPED | OUTPATIENT
Start: 2018-09-25 | End: 2018-10-25

## 2018-09-25 NOTE — PROGRESS NOTES
Subjective:       Patient ID: Yoana Pardo is a 68 y.o. female.    Chief Complaint: Follow-up    68-year-old  female patient with Patient Active Problem List:     Pulmonary hypertension     MVP (mitral valve prolapse)     Hiatal hernia with GERD     History of positive PPD, untreated     Hemorrhoids     Essential hypertension     Peripheral neuropathy     Primary osteoarthritis of both knees     Morbid obesity with BMI of 40.0-44.9, adult     [place] on CPAP     Inadequate sleep hygiene     Psychophysiological insomnia     Chronic seasonal allergic rhinitis     Atherosclerosis of aorta     S/P laparoscopic cholecystectomy     Elevated hemidiaphragm     Diffusion capacity of lung (dl), decreased     Restrictive lung disease secondary to obesity  Here for follow-up on chronic medical conditions  Patient reports that she has been having nasal congestion and runny nose off and on lately every other week in spite of taking Xyzal, reports minimal headache secondary to cold like symptoms but denies any fever with chills, excess sinus pressure  Patient denies any shortness of breath or chest pain  Has been using CPAP machine with history of sleep apnea  Reports that she has been watching her diet and trying to stay physically active with exercise by walking but unable to lose weight.       Review of Systems   Constitutional: Negative for fatigue and fever.   HENT: Positive for congestion, postnasal drip, rhinorrhea and sneezing.    Eyes: Negative for visual disturbance.   Respiratory: Negative for cough and shortness of breath.    Cardiovascular: Negative for chest pain and leg swelling.   Gastrointestinal: Negative for abdominal pain, nausea and vomiting.   Musculoskeletal: Negative for myalgias.   Skin: Negative for rash.   Neurological: Negative for light-headedness and headaches.   Psychiatric/Behavioral: Negative for sleep disturbance.         /78 (BP Location: Right arm, Patient Position:  Sitting)   Pulse 60   Temp 98.5 °F (36.9 °C) (Oral)   Ht 5' (1.524 m)   Wt 102.2 kg (225 lb 5 oz)   SpO2 98%   BMI 44.00 kg/m²   Objective:      Physical Exam   Constitutional: She is oriented to person, place, and time. She appears well-developed and well-nourished.   HENT:   Head: Normocephalic and atraumatic.   Right Ear: External ear normal.   Left Ear: External ear normal.   Mouth/Throat: Oropharynx is clear and moist.   Postnasal drip and rhinorrhea noted   Neck: Neck supple.   Cardiovascular: Normal rate, regular rhythm and normal heart sounds.   No murmur heard.  Pulmonary/Chest: Effort normal and breath sounds normal. She has no wheezes.   Abdominal: Soft. Bowel sounds are normal. There is no tenderness.   Musculoskeletal: She exhibits no edema.   Lymphadenopathy:     She has no cervical adenopathy.   Neurological: She is alert and oriented to person, place, and time.   Skin: Skin is warm and dry. No rash noted.   Psychiatric: She has a normal mood and affect.         Assessment:       1. Essential hypertension    2. Hiatal hernia with GERD    3. Primary osteoarthritis of both knees    4. [place] on CPAP    5. Pulmonary hypertension    6. Atherosclerosis of aorta    7. Morbid obesity with BMI of 40.0-44.9, adult    8. Chronic seasonal allergic rhinitis    9. Edema    10. Mixed hyperlipidemia        Plan:   Essential hypertension  -     Lipid panel; Future; Expected date: 09/25/2018  -     Comprehensive metabolic panel; Future; Expected date: 09/25/2018  -     TSH; Future; Expected date: 09/25/2018  -     losartan-hydrochlorothiazide 100-25 mg (HYZAAR) 100-25 mg per tablet; Take 1 tablet by mouth once daily.  Dispense: 90 tablet; Refill: 1  Blood pressure is stable today currently on losartan hydrochlorothiazide 100/25 mg, refills given today  Will check fasting labs    Hiatal hernia with GERD-stable on omeprazole 40 mg daily    Primary osteoarthritis of both knees  -     CBC auto differential; Future;  Expected date: 09/25/2018  Graded exercise regimen recommended    [place] on CPAP-encouraged to use CPAP machine regularly    Pulmonary hypertension  -     CBC auto differential; Future; Expected date: 09/25/2018  To be followed by Cardiology currently on Lasix    Atherosclerosis of aorta-stable and asymptomatic    Morbid obesity with BMI of 40.0-44.9, adult-Lifestyle modifications recommended to lose weight with BMI 44 by restricting fried foods    Chronic seasonal allergic rhinitis  -     fluticasone (FLONASE) 50 mcg/actuation nasal spray; 1 spray (50 mcg total) by [facility] route once daily.  Dispense: 16 g; Refill: 0  -     montelukast (SINGULAIR) 10 mg tablet; Take 1 tablet (10 mg total) by mouth every evening.  Dispense: 30 tablet; Refill: 0  -     methylPREDNISolone (MEDROL DOSEPACK) 4 mg tablet; use as directed  Dispense: 1 Package; Refill: 0  Will change Xyzal to Singulair and will start on Flonase and Medrol Dosepak prescribed today for severe nasal congestion with allergic rhinitis    Edema  -     furosemide (LASIX) 20 MG tablet; Take 1 tablet (20 mg total) by mouth 2 (two) times daily as needed.  Dispense: 60 tablet; Refill: 1    Mixed hyperlipidemia  -     simvastatin (ZOCOR) 20 MG tablet; Take 1 tablet (20 mg total) by mouth every evening.  Dispense: 90 tablet; Refill: 3  Will start on low dose of simvastatin 20 mg daily secondary to elevated ASCVD risk profile    Other orders  -     Influenza - High Dose (65+) (PF) (IM)  Flu shot given today    Will consider giving pneumonia series next visit and advised to consider getting shingles vaccine if interested at outside pharmacy

## 2018-09-27 ENCOUNTER — OFFICE VISIT (OUTPATIENT)
Dept: NEUROLOGY | Facility: CLINIC | Age: 68
End: 2018-09-27
Payer: MEDICARE

## 2018-09-27 VITALS
HEART RATE: 68 BPM | HEIGHT: 59 IN | SYSTOLIC BLOOD PRESSURE: 148 MMHG | DIASTOLIC BLOOD PRESSURE: 80 MMHG | WEIGHT: 226.19 LBS | RESPIRATION RATE: 20 BRPM | BODY MASS INDEX: 45.6 KG/M2

## 2018-09-27 DIAGNOSIS — R41.3 MEMORY LOSS: ICD-10-CM

## 2018-09-27 PROCEDURE — 99213 OFFICE O/P EST LOW 20 MIN: CPT | Mod: PBBFAC,PO | Performed by: PSYCHIATRY & NEUROLOGY

## 2018-09-27 PROCEDURE — 99999 PR PBB SHADOW E&M-EST. PATIENT-LVL III: CPT | Mod: PBBFAC,,, | Performed by: PSYCHIATRY & NEUROLOGY

## 2018-09-27 PROCEDURE — 99215 OFFICE O/P EST HI 40 MIN: CPT | Mod: S$PBB,,, | Performed by: PSYCHIATRY & NEUROLOGY

## 2018-09-27 NOTE — PROGRESS NOTES
Follow up:   Memory loss     SUBJECTIVE:       HPI:   She is ok  And stable.      Past Medical History:   Diagnosis Date    GERD (gastroesophageal reflux disease)     Hemorrhoids     History of positive PPD, untreated     Hx of cold sores     Hyperlipidemia     Hypertension     MVP (mitral valve prolapse)     Peripheral neuropathy     Pulmonary HTN     Dr Bailon    Sleep apnea     has  but not using CPAP     Past Surgical History:   Procedure Laterality Date    CHOLECYSTECTOMY      DILATION AND CURETTAGE OF UTERUS       Family History   Problem Relation Age of Onset    Heart disease Mother     Obesity Mother     Kidney disease Father     Hypertension Father     Obesity Father     Heart disease Father     Diabetes Sister     Aneurysm Sister         AAA     Social History   Substance Use Topics    Smoking status: Never Smoker    Smokeless tobacco: Never Used    Alcohol use Yes      Comment: rarely     Review of patient's allergies indicates:   Allergen Reactions    Lisinopril Other (See Comments)     Cough      Bactrim [sulfamethoxazole-trimethoprim] Itching    Zosyn [piperacillin-tazobactam] Hives     Pt received second dose of Zosyn and had hives on both arms. Benadryl given and pt continued to receive zosyn with no issues. Hydralazine also given around the same time and discontinued.        Review of Systems   Constitutional: Negative for fever and weight loss.   HENT: Negative for hearing loss.    Eyes: Negative for blurred vision, double vision, photophobia and pain.   Respiratory: Negative for cough.    Cardiovascular: Negative for chest pain.   Gastrointestinal: Negative for abdominal pain, nausea and vomiting.   Genitourinary: Negative for dysuria, frequency and urgency.   Musculoskeletal: Negative.  Negative for back pain, falls, joint pain, myalgias and neck pain.   Skin: Negative for itching and rash.   Neurological: Negative for tingling and headaches.   Psychiatric/Behavioral:  Positive for memory loss. Negative for depression.       OBJECTIVE:       Physical Exam   Constitutional: She is oriented to person, place, and time. She appears well-developed and well-nourished.   HENT:   Head: Normocephalic and atraumatic.   Eyes: Conjunctivae and EOM are normal. Pupils are equal, round, and reactive to light.   Neck: Normal range of motion. Neck supple. No JVD present. No tracheal deviation present. No thyromegaly present.   Cardiovascular: Normal rate, regular rhythm and normal heart sounds.    Pulmonary/Chest: Effort normal and breath sounds normal.   Abdominal: She exhibits no distension. There is no tenderness.   Musculoskeletal: Normal range of motion. She exhibits no edema or tenderness.   Neurological: She is alert and oriented to person, place, and time. She has normal strength and normal reflexes. She displays normal reflexes. No cranial nerve deficit or sensory deficit. She exhibits normal muscle tone. She displays a negative Romberg sign. Coordination and gait normal.   Reflex Scores:       Tricep reflexes are 2+ on the right side and 2+ on the left side.       Bicep reflexes are 2+ on the right side and 2+ on the left side.       Brachioradialis reflexes are 2+ on the right side and 2+ on the left side.       Patellar reflexes are 2+ on the right side and 2+ on the left side.       Achilles reflexes are 2+ on the right side and 2+ on the left side.  MINI-MENTAL STATE EXAM: 6/11/2018  Score: 30/30     Skin: Skin is warm and dry. No rash noted.   Psychiatric: She has a normal mood and affect. Her speech is normal and behavior is normal. Judgment and thought content normal. Cognition and memory are normal.         Strength  Deltoids Triceps Biceps Wrist Extension Wrist Flexion Hand    Upper: R 5/5 5/5 5/5 5/5 5/5 5/5    L 5/5 5/5 5/5 5/5 5/5 5/5     Iliopsoas Quadriceps Knee  Flexion Tibialis  anterior Gastro- cnemius EHL   Lower: R 5/5 5/5 5/5 5/5 5/5 5/5    L 5/5 5/5 5/5 5/5 5/5  5/5     Laboratory:  Lab Results   Component Value Date    WBC 6.24 04/27/2018    HGB 11.8 (L) 04/27/2018    HCT 36.7 (L) 04/27/2018     04/27/2018    CHOL 175 11/30/2017    TRIG 114 11/30/2017    HDL 38 (L) 11/30/2017    ALT 10 04/13/2018    AST 16 04/13/2018     04/13/2018    K 4.3 04/13/2018    CL 95 04/13/2018    CREATININE 0.8 04/13/2018    BUN 17 04/13/2018    CO2 35 (H) 04/13/2018    TSH 0.641 11/30/2017    INR 1.1 01/18/2006    HGBA1C 5.6 01/18/2006   Results for MARISELA KOHLI (MRN [id number]) as of 9/27/2018 16:58   Ref. Range 6/11/2018 16:11   Folate Latest Ref Range: 4.0 - 24.0 ng/mL 7.7   Vitamin B-12 Latest Ref Range: 210 - 950 pg/mL 403   Methlymalonic Acid Latest Ref Range: <0.40 umol/L 0.12         MRI: 6/15/2018  Impression       Generalized brain atrophy and mild chronic white matter small vessel ischemic changes.  No acute abnormality       ASSESSMENT/PLAN:     Primary Diagnoses:  1. Memory loss : she did well in screening testing.  It seems to me that she might have benign memory loss of aging. Will not medicate now.     Patient Active Problem List   Diagnosis    Pulmonary hypertension    MVP (mitral valve prolapse)    Hiatal hernia with GERD    History of positive PPD, untreated    Hemorrhoids    Essential hypertension    Peripheral neuropathy    Primary osteoarthritis of both knees    Morbid obesity with BMI of 40.0-44.9, adult    [place] (obstructive sleep apnea)    Inadequate sleep hygiene    Psychophysiological insomnia    Chronic seasonal allergic rhinitis    Atherosclerosis of aorta    S/P laparoscopic cholecystectomy    Acute on chronic respiratory failure with hypoxia    Elevated hemidiaphragm        Plan: Time spent: 40 minutes in face to face discussion concerning diagnosis, prognosis, review of lab and test results, benefits of treatment as well as management of disease, counseling of patient and coordination of care between various health care providers .  Greater than half the time spent was used for coordination of care and counseling of patient. This note may have some spelling or wording mistakes which might have been overlooked during proof reading.

## 2018-10-01 ENCOUNTER — PROCEDURE VISIT (OUTPATIENT)
Dept: PULMONOLOGY | Facility: CLINIC | Age: 68
End: 2018-10-01
Payer: MEDICARE

## 2018-10-01 DIAGNOSIS — G47.33 OSA ON CPAP: ICD-10-CM

## 2018-10-01 DIAGNOSIS — I27.20 PULMONARY HYPERTENSION: ICD-10-CM

## 2018-10-01 PROCEDURE — 94762 N-INVAS EAR/PLS OXIMTRY CONT: CPT | Mod: PBBFAC,PO

## 2018-10-02 NOTE — PROCEDURES
[facility]  [address] * [place] [address]  Telephone: [phone]  Test date: 10/01/18 Start: 10/01/18 21:57:48 Yoana Pardo  Doctor: Dr Mcintosh End: 10/02/18 06:03:36 [id number]  Oximetry: Summary Report  Comments: On room air with CPAP  Recording time: 08:05:48 Highest pulse: 149 Highest SpO2: 99%  Excluded samplin:01:04 Lowest pulse: 41 Lowest SpO2: 85%  Total valid samplin:04:44 Mean pulse: 63 Mean SpO2: 95.6%  1 S.D.: 8.1 1 S.D.: 1.3  Time with SpO2<90: 0:00:40, 0.1%  Time with SpO2<80: 0:00:00, 0.0%  Time with SpO2<70: 0:00:00, 0.0%  Time with SpO2<60: 0:00:00, 0.0%  Time with SpO2<88: 0:00:20, 0.1%  Time with SpO2 =>90: 8:04:04, 99.9%  Time with SpO2=>80 & <90: 0:00:40, 0.1%  Time with SpO2=>70 & <80: 0:00:00, 0.0%  Time with SpO2=>60 & <70: 0:00:00, 0.0%  The longest continuous time with saturation <=88 was 00:00:20, which started at  10/02/18 04:49:56.  A desaturation event was defined as a decrease of saturation by 4 or more.  No events were excluded due to artifact.  There was one desaturation event over 3 minutes duration.  There were 28 desaturation events of less than 3 minutes duration during which:  The mean high was 96.6%. The mean low was 91.4%.  The number of these events that were:  > 0 & <10 seconds: 1 > 0 seconds: 28  =>10 & <20 seconds: 12 =>10 seconds: 27  =>20 & <30 seconds: 8 =>20 seconds: 15  =>30 & <40 seconds: 0 =>30 seconds: 7  =>40 & <50 seconds: 0 =>40 seconds: 7  =>50 & <60 seconds: 3 =>50 seconds: 7  =>60 seconds: 4 =>60 seconds: 4  The mean length of desaturation events that were >=10 sec & <=3 mins was: 36.3 sec.  Desaturation event index (events >=10 sec per sampled hour): 3.3  Desaturation event index (events >= 0 sec per sampled hour): 3.5    REPORT  OVERNIGHT OXIMETRY REPORT:    Dictated by: Ernie Mcintosh MD  Test date: 10/01/2018  Dictated on: 10/02/2018      Comment: This test was performed on CPAP and Room Air     A desaturation  event was defined as a decrease of saturation by 4 or more.    REPORT SUMMARY  Total valid samplin:04:44   High SpO2: 99%    Low SpO2: 85%    Mean SpO2  95.6 %  Cumulative time with oxygen saturation less than 88% (TC88): 00:00:20    CLINICAL INTERPRETATION  Results are normal and  There is no significant nocturnal oxygen desaturation    Medicare Criteria Comments:   Oximetry test results suggest the patient does not fall under Medicare Group 1 Criteria. ( Arterial oxygen saturation at or below 88% for at least 5 minutes taken during sleep)  Ernie Mcintosh MD    Details about Medicare Group Criteria coverage can be found at http://www.cms.hhs.gov/manuals/downloads/

## 2018-10-10 ENCOUNTER — OFFICE VISIT (OUTPATIENT)
Dept: ALLERGY | Facility: CLINIC | Age: 68
End: 2018-10-10
Payer: MEDICARE

## 2018-10-10 ENCOUNTER — LAB VISIT (OUTPATIENT)
Dept: LAB | Facility: HOSPITAL | Age: 68
End: 2018-10-10
Attending: ALLERGY & IMMUNOLOGY
Payer: MEDICARE

## 2018-10-10 VITALS
RESPIRATION RATE: 15 BRPM | TEMPERATURE: 97 F | BODY MASS INDEX: 42.87 KG/M2 | SYSTOLIC BLOOD PRESSURE: 130 MMHG | DIASTOLIC BLOOD PRESSURE: 80 MMHG | WEIGHT: 227.06 LBS | HEIGHT: 61 IN | HEART RATE: 78 BPM

## 2018-10-10 DIAGNOSIS — J30.9 ALLERGIC RHINITIS, UNSPECIFIED SEASONALITY, UNSPECIFIED TRIGGER: Primary | ICD-10-CM

## 2018-10-10 DIAGNOSIS — J30.9 ALLERGIC RHINITIS, UNSPECIFIED SEASONALITY, UNSPECIFIED TRIGGER: ICD-10-CM

## 2018-10-10 DIAGNOSIS — G47.33 OSA ON CPAP: ICD-10-CM

## 2018-10-10 PROCEDURE — 86003 ALLG SPEC IGE CRUDE XTRC EA: CPT

## 2018-10-10 PROCEDURE — 99999 PR PBB SHADOW E&M-EST. PATIENT-LVL III: CPT | Mod: PBBFAC,,, | Performed by: ALLERGY & IMMUNOLOGY

## 2018-10-10 PROCEDURE — 86003 ALLG SPEC IGE CRUDE XTRC EA: CPT | Mod: 59

## 2018-10-10 PROCEDURE — 99204 OFFICE O/P NEW MOD 45 MIN: CPT | Mod: S$PBB,,, | Performed by: ALLERGY & IMMUNOLOGY

## 2018-10-10 PROCEDURE — 99213 OFFICE O/P EST LOW 20 MIN: CPT | Mod: PBBFAC,PO | Performed by: ALLERGY & IMMUNOLOGY

## 2018-10-10 PROCEDURE — 36415 COLL VENOUS BLD VENIPUNCTURE: CPT | Mod: PO

## 2018-10-10 RX ORDER — IPRATROPIUM BROMIDE 21 UG/1
2 SPRAY, METERED NASAL EVERY 8 HOURS PRN
Qty: 30 ML | Refills: 4 | Status: SHIPPED | OUTPATIENT
Start: 2018-10-10 | End: 2023-09-12 | Stop reason: SDUPTHER
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
## 2018-10-15 ENCOUNTER — OFFICE VISIT (OUTPATIENT)
Dept: PULMONOLOGY | Facility: CLINIC | Age: 68
End: 2018-10-15
Payer: MEDICARE

## 2018-10-15 VITALS
SYSTOLIC BLOOD PRESSURE: 130 MMHG | BODY MASS INDEX: 42.62 KG/M2 | OXYGEN SATURATION: 98 % | HEIGHT: 61 IN | DIASTOLIC BLOOD PRESSURE: 76 MMHG | RESPIRATION RATE: 18 BRPM | WEIGHT: 225.75 LBS | HEART RATE: 80 BPM

## 2018-10-15 DIAGNOSIS — G47.33 OSA ON CPAP: Primary | ICD-10-CM

## 2018-10-15 DIAGNOSIS — I27.20 PULMONARY HYPERTENSION: ICD-10-CM

## 2018-10-15 DIAGNOSIS — E66.01 MORBID OBESITY WITH BMI OF 40.0-44.9, ADULT: ICD-10-CM

## 2018-10-15 DIAGNOSIS — J98.4 RESTRICTIVE LUNG DISEASE SECONDARY TO OBESITY: ICD-10-CM

## 2018-10-15 DIAGNOSIS — E66.9 RESTRICTIVE LUNG DISEASE SECONDARY TO OBESITY: ICD-10-CM

## 2018-10-15 DIAGNOSIS — J98.6 ELEVATED HEMIDIAPHRAGM: ICD-10-CM

## 2018-10-15 DIAGNOSIS — R94.2 DIFFUSION CAPACITY OF LUNG (DL), DECREASED: ICD-10-CM

## 2018-10-15 LAB
A ALTERNATA IGE QN: <0.35 KU/L
A FUMIGATUS IGE QN: <0.35 KU/L
ALLERGEN PENICILLIUM IGE: <0.35 KU/L
BERMUDA GRASS IGE QN: <0.35 KU/L
C HERBARUM IGE QN: <0.35 KU/L
COMMON RAGWEED IGE QN: <0.35 KU/L
D FARINAE IGE QN: <0.35 KU/L
D PTERONYSS IGE QN: <0.35 KU/L
DEPRECATED A ALTERNATA IGE RAST QL: NORMAL
DEPRECATED A FUMIGATUS IGE RAST QL: NORMAL
DEPRECATED BERMUDA GRASS IGE RAST QL: NORMAL
DEPRECATED C HERBARUM IGE RAST QL: NORMAL
DEPRECATED COMMON RAGWEED IGE RAST QL: NORMAL
DEPRECATED D FARINAE IGE RAST QL: NORMAL
DEPRECATED D PTERONYSS IGE RAST QL: NORMAL
DEPRECATED MARSH ELDER IGE RAST QL: NORMAL
DEPRECATED PECAN/HICK TREE IGE RAST QL: NORMAL
DEPRECATED ROACH IGE RAST QL: NORMAL
DEPRECATED TIMOTHY IGE RAST QL: NORMAL
DEPRECATED WHITE OAK IGE RAST QL: NORMAL
MARSH ELDER IGE QN: <0.35 KU/L
PECAN/HICK TREE IGE QN: <0.35 KU/L
PENICILLIUM CLASS: NORMAL
ROACH IGE QN: <0.35 KU/L
TIMOTHY IGE QN: <0.35 KU/L
WHITE OAK IGE QN: <0.35 KU/L

## 2018-10-15 PROCEDURE — 99214 OFFICE O/P EST MOD 30 MIN: CPT | Mod: 25,S$PBB,, | Performed by: INTERNAL MEDICINE

## 2018-10-15 PROCEDURE — 99213 OFFICE O/P EST LOW 20 MIN: CPT | Mod: PBBFAC,PO | Performed by: INTERNAL MEDICINE

## 2018-10-15 PROCEDURE — 99999 PR PBB SHADOW E&M-EST. PATIENT-LVL III: CPT | Mod: PBBFAC,,, | Performed by: INTERNAL MEDICINE

## 2018-10-15 PROCEDURE — 90670 PCV13 VACCINE IM: CPT | Mod: PBBFAC,PO

## 2018-10-15 NOTE — PROGRESS NOTES
"Subjective:       Patient ID: Yoana Pardo is a 68 y.o. female.    Chief Complaint: Pulmonary Hypertension and Sleep Apnea    Ms. Yoana Pardo is 68 years old  Last visit 07/02/2018  She has no cough, No SOB, no wheezing  No dyspnea on exertion.  Function  Class 0.  Lost some weight from 231lb to 225lb  Has CPAP and benefits from machine  Heart mild pulmonary hypertension based on echocardiogram last exam.    Follow-up  Echo Plan  Bubble study was negative.  All her illness was related to sepsis from gangrenous gallbladder.  This has all resolved   Overnight sat was normal  PFT showed mild restriction and moderate reduction in DLCO  She has a CPAP currently using and benefits from it  Bedtime is 1030pm to 12 midnight  Wake-up time is 6:00 a.m.  Patient reports use and benefit of the device            Review of Systems   Constitutional: Negative for fatigue.   HENT: Negative.    Respiratory: Negative for apnea (CPAP through [facility]), snoring, sputum production, shortness of breath, wheezing, pleurisy and use of rescue inhaler.    Cardiovascular: Negative.    Genitourinary: Negative.    Endocrine: endocrine negative   Musculoskeletal: Negative.    Skin: Negative for rash.   Neurological: Negative.        Objective:       Vitals:    10/15/18 1312   BP: 130/76   Pulse: 80   Resp: 18   SpO2: 98%   Weight: 102.4 kg (225 lb 12 oz)   Height: 5' 0.5" (1.537 m)     Physical Exam   Constitutional: She is oriented to person, place, and time. Vital signs are normal. She appears well-developed and well-nourished.     She is obese.   HENT:   Head: Normocephalic.   Nose: Nose normal.   Mouth/Throat: Oropharynx is clear and moist. No oropharyngeal exudate. Mallampati Score: IV.   Neck: Normal range of motion. Neck supple. No tracheal deviation present. No thyromegaly present.   Cardiovascular: Normal rate and regular rhythm.   No murmur heard.  Pulmonary/Chest: Normal expansion, symmetric chest wall expansion, effort " "normal and breath sounds normal.   Abdominal: Soft. Bowel sounds are normal.   Musculoskeletal: Normal range of motion. She exhibits no edema.   Lymphadenopathy:     She has no cervical adenopathy.   Neurological: She is alert and oriented to person, place, and time. Gait normal.   Skin: Skin is warm and dry. No rash noted. No erythema. Nails show no clubbing.   Psychiatric: She has a normal mood and affect.   Nursing note and vitals reviewed.    Personal Diagnostic Review     PFT:  Restrictive defect with reduced diffusion capacity.  FEV1 of 1.10 L (63% predicted) FVC 1.51 (68% predicted) FEV1 for/FVC was 73.  TLC was 3.11 (73% predicted) DLCO was 7.82 (40.3% predicted)    Download of machine will be obtained from [employer]    6 min walk was completed.  Patient complained of knee pain  Patient completed the walk for 360 sec.  This is completed was 365.76 m (98.08% predicted)  Normal exercise capacity  Very trivial dyspnea  Heart rate maximum was 126 beats per minute  Blood pressure was elevated 146/108    6MW Test  Height: 5' 0.5" (153.7 cm)  Weight: 102.4 kg (225 lb 12 oz)  BMI (Calculated): 43.5  Predicted Distance: 221.78  Interpretation  Predicted Distance Meters (Calculated): 375.33 meters   Echo  CONCLUSIONS     1 - Concentric hypertrophy.     2 - No wall motion abnormalities.     3 - Normal left ventricular systolic function (EF 60-65%).     4 - Normal left ventricular diastolic function.     5 - Normal right ventricular systolic function .     6 - Pulmonary hypertension. The estimated PA systolic pressure is greater than 47 mmHg.     7 - Mild tricuspid regurgitation.     8 - No evidence of intracardiac shunt.     ONSAT on CPAP  Cumulative time with oxygen saturation less than 88% (TC88):   00:00:20    CLINICAL INTERPRETATION  Results are normal and  There is no significant nocturnal oxygen   desaturation    Medicare Criteria Comments:   Oximetry test results suggest the patient does not fall under   Medicare " Group 1 Criteria. ( Arterial oxygen saturation at or   below 88% for at least 5 minutes taken during sleep)  Ernie Mcintosh MD    No flowsheet data found.      Assessment:       Problem List Items Addressed This Visit     [place] on CPAP - Primary    Relevant Orders    HME - OTHER    X-Ray Chest PA And Lateral    Pneumococcal conjugate vaccine 13-valent less than 6yo IM (Completed)    Pulmonary hypertension     MPAP FROM [facility]:     mPAP = 0.6146.56  + 2 = 30.4 mmHg.     Mild pulmonary hypertension at best likely secondary to hypoxia.      Recommend diuresis. Repeat ECHO after appropriate diuresis.   Treat [place] with nocturnal PAP    Follow up echo 10/26         Relevant Orders    HME - OTHER    X-Ray Chest PA And Lateral    Pneumococcal conjugate vaccine 13-valent less than 6yo IM (Completed)    Morbid obesity with BMI of 40.0-44.9, adult    Relevant Orders    Pneumococcal conjugate vaccine 13-valent less than 6yo IM (Completed)    Elevated hemidiaphragm    Relevant Orders    Pneumococcal conjugate vaccine 13-valent less than 6yo IM (Completed)    Diffusion capacity of lung (dl), decreased    Relevant Orders    Pneumococcal conjugate vaccine 13-valent less than 6yo IM (Completed)    Restrictive lung disease secondary to obesity    Relevant Orders    Pneumococcal conjugate vaccine 13-valent less than 6yo IM (Completed)        Plan:       DC Oxygen  Adherence with CPAP  Follow-up after acclimation with CPAP  Echo repeat in 3 months     Follow-up in about 6 months (around 4/15/2019), or PCV 13 vacination, for CXR: PA and lat, Download, weight loss, echo 10/26.    This note was prepared using voice recognition system and is likely to have sound alike errors that may have been overlooked even after proof reading.  Please call me with any questions    Discussed diagnosis, its evaluation, treatment and usual course. All questions answered.    Thank you for the courtesy of participating in the care of this  patient    Ernie Mcintosh MD

## 2018-10-15 NOTE — ASSESSMENT & PLAN NOTE
MPAP FROM [facility]:     mPAP = 0.6146.56  + 2 = 30.4 mmHg.     Mild pulmonary hypertension at best likely secondary to hypoxia.      Recommend diuresis. Repeat ECHO after appropriate diuresis.   Treat [place] with nocturnal PAP    Follow up echo 10/26

## 2018-10-26 ENCOUNTER — CLINICAL SUPPORT (OUTPATIENT)
Dept: CARDIOLOGY | Facility: CLINIC | Age: 68
End: 2018-10-26
Attending: INTERNAL MEDICINE
Payer: MEDICARE

## 2018-10-26 DIAGNOSIS — K76.6 PAH (PULMONARY ARTERIAL HYPERTENSION) WITH PORTAL HYPERTENSION: ICD-10-CM

## 2018-10-26 DIAGNOSIS — I27.21 PAH (PULMONARY ARTERIAL HYPERTENSION) WITH PORTAL HYPERTENSION: ICD-10-CM

## 2018-10-26 PROCEDURE — 93306 TTE W/DOPPLER COMPLETE: CPT | Mod: PBBFAC,PO | Performed by: INTERNAL MEDICINE

## 2018-10-27 LAB
DIASTOLIC DYSFUNCTION: YES
ESTIMATED PA SYSTOLIC PRESSURE: 41.73
RETIRED EF AND QEF - SEE NOTES: 60 (ref 55–65)
TRICUSPID VALVE REGURGITATION: ABNORMAL

## 2018-10-28 NOTE — PROGRESS NOTES
TEST DESCRIPTION       Aorta: The aortic root is normal in size, measuring 2.1 cm at sinotubular junction and 2.4 cm at Sinuses of Valsalva. The proximal ascending aorta is normal in size, measuring 3.0 cm across.     Left Atrium: The left atrial volume index is severely enlarged, measuring 55.67 cc/m2.     Left Ventricle: The left ventricle is normal in size, with an end-diastolic diameter of 4.0 cm, and an end-systolic diameter of 2.7 cm. LV wall thickness is normal, with the septum measuring 1.5 cm and the posterior wall measuring 1.3 cm across. Relative   wall thickness was increased at 0.65, and the LV mass index was increased at 125.9 g/m2 consistent with concentric left ventricular hypertrophy. There are no regional wall motion abnormalities. Left ventricular systolic function appears normal. Visually   estimated ejection fraction is 60-65%. The LV Doppler derived stroke volume equals 82.0 ccs; at a measured heart rate of 74 bpm this results in a LV Doppler derived cardiac output of 6.1 L/min (CI = 3.11 L/min/m2).     Diastolic indices: E wave velocity 0.7 m/s, E/A ratio 0.9,  msec., E/e' ratio(avg) 8. There is diastolic dysfunction secondary to relaxation abnormality.     Right Atrium: The right atrium is normal in size, measuring 5.7 cm in length and 3.5 cm in width in the apical view. The end-systolic right atrial area is 13.6 cm2.     Right Ventricle: The right ventricle is normal in size measuring 2.9 cm at the base, 2.5 cm in the mid-cavity, and 4.6 cm in length in the apical right ventricle-focused view. Global right ventricular systolic function appears normal. The RV Doppler   derived stroke volume equals 28.0 ccs; at a measured heart rate of 74 bpm this results in a RV Doppler derived cardiac output of 2.07 L/min (CI = 1.06 L/min/m2). RV fractional area change (FAC) is 43%. Tricuspid annular plane systolic excursion (TAPSE)   is 2.6 cm. RV myocardial performance index derived from pulse tissue  Doppler is .4. Tissue Doppler-derived tricuspid annular peak systolic velocity (S prime) is 13.0 cm/s. The estimated PVR is 1.47 Sinclair units. The estimated PA systolic pressure is   greater than 42 mmHg.     Aortic Valve:  Aortic valve is normal in structure with normal leaflet mobility.     Mitral Valve:  Mitral valve is normal in structure with normal leaflet mobility.     Tricuspid Valve:  Tricuspid valve is normal in structure with normal leaflet mobility. There is mild tricuspid regurgitation.     Pulmonary Valve:  Pulmonary valve is normal in structure with normal leaflet mobility. The peak gradient obtained across the pulmonic valve is 5 mmHg., with a mean gradient of 3 mmHg.     Intracavitary: There is no evidence of pericardial effusion, intracavity mass, thrombi, or vegetation.         CONCLUSIONS     1 - Severe left atrial enlargement.     2 - Concentric hypertrophy.     3 - No wall motion abnormalities.     4 - Normal left ventricular systolic function (EF 60-65%).     5 - Impaired LV relaxation, normal LAP (grade 1 diastolic dysfunction).     6 - Normal right ventricular systolic function .     7 - Pulmonary hypertension. The estimated PA systolic pressure is greater than 42 mmHg.     8 - Mild tricuspid regurgitation.             This document has been electronically    SIGNED BY: Kevon Hannah MD On: 10/27/2018 06:56

## 2019-02-23 DIAGNOSIS — I27.20 PULMONARY HYPERTENSION: ICD-10-CM

## 2019-02-24 RX ORDER — FUROSEMIDE 20 MG/1
TABLET ORAL
Qty: 60 TABLET | Refills: 1 | Status: SHIPPED | OUTPATIENT
Start: 2019-02-24 | End: 2019-06-11 | Stop reason: SDUPTHER

## 2019-04-08 ENCOUNTER — OFFICE VISIT (OUTPATIENT)
Dept: URGENT CARE | Facility: CLINIC | Age: 69
End: 2019-04-08
Payer: MEDICARE

## 2019-04-08 VITALS
RESPIRATION RATE: 16 BRPM | DIASTOLIC BLOOD PRESSURE: 90 MMHG | OXYGEN SATURATION: 99 % | HEART RATE: 66 BPM | SYSTOLIC BLOOD PRESSURE: 158 MMHG | BODY MASS INDEX: 44.19 KG/M2 | HEIGHT: 60 IN | WEIGHT: 225.06 LBS | TEMPERATURE: 100 F

## 2019-04-08 DIAGNOSIS — B34.9 VIRAL ILLNESS: Primary | ICD-10-CM

## 2019-04-08 DIAGNOSIS — J03.80 ACUTE BACTERIAL TONSILLITIS: ICD-10-CM

## 2019-04-08 DIAGNOSIS — B96.89 ACUTE BACTERIAL TONSILLITIS: ICD-10-CM

## 2019-04-08 DIAGNOSIS — B95.0 STREPTOCOCCAL INFECTION GROUP A: ICD-10-CM

## 2019-04-08 LAB
CTP QC/QA: YES
CTP QC/QA: YES
POC MOLECULAR INFLUENZA A AGN: NEGATIVE
POC MOLECULAR INFLUENZA B AGN: NEGATIVE
S PYO RRNA THROAT QL PROBE: POSITIVE

## 2019-04-08 PROCEDURE — 87880 STREP A ASSAY W/OPTIC: CPT | Mod: PBBFAC,PN | Performed by: FAMILY MEDICINE

## 2019-04-08 PROCEDURE — 99214 PR OFFICE/OUTPT VISIT, EST, LEVL IV, 30-39 MIN: ICD-10-PCS | Mod: S$PBB,,, | Performed by: FAMILY MEDICINE

## 2019-04-08 PROCEDURE — 99213 OFFICE O/P EST LOW 20 MIN: CPT | Mod: PBBFAC,PN | Performed by: FAMILY MEDICINE

## 2019-04-08 PROCEDURE — 99214 OFFICE O/P EST MOD 30 MIN: CPT | Mod: S$PBB,,, | Performed by: FAMILY MEDICINE

## 2019-04-08 PROCEDURE — 87502 INFLUENZA DNA AMP PROBE: CPT | Mod: PBBFAC,PN | Performed by: FAMILY MEDICINE

## 2019-04-08 PROCEDURE — 99999 PR PBB SHADOW E&M-EST. PATIENT-LVL III: ICD-10-PCS | Mod: PBBFAC,,, | Performed by: FAMILY MEDICINE

## 2019-04-08 PROCEDURE — 99999 PR PBB SHADOW E&M-EST. PATIENT-LVL III: CPT | Mod: PBBFAC,,, | Performed by: FAMILY MEDICINE

## 2019-04-08 RX ORDER — AZITHROMYCIN 250 MG/1
TABLET, FILM COATED ORAL
Qty: 6 TABLET | Refills: 0 | Status: SHIPPED | OUTPATIENT
Start: 2019-04-08 | End: 2019-04-08 | Stop reason: CLARIF

## 2019-04-08 RX ORDER — AMOXICILLIN 875 MG/1
875 TABLET, FILM COATED ORAL 2 TIMES DAILY
Qty: 20 TABLET | Refills: 0 | Status: SHIPPED | OUTPATIENT
Start: 2019-04-08 | End: 2019-04-18

## 2019-04-08 NOTE — LETTER
April 8, 2019      [facility]  [address]  [place] [address]  Phone: [phone]  Fax: [phone]       Patient: Yoana Pardo   YOB: 1950  Date of Visit: 04/08/2019    To Whom It May Concern:    Tommie Pardo  was at [facility] on 04/08/2019. She may return to work/school on 4/10 with no restrictions. If you have any questions or concerns, or if I can be of further assistance, please do not hesitate to contact me.    Sincerely,    Navin العلي MD

## 2019-04-09 NOTE — PATIENT INSTRUCTIONS
1. Rx amoxil for strep throat  2. Fluid, rest, tylenol for pain and fever  3. Work excuse      Peritonsillar Infection (Strep Throat)    You (or your child) has an infection around the tonsils. This is generally caused by the streptococcus bacteria, so it is often called strep throat. The infection can cause severe sore throat, pain with swallowing, swollen glands, and fever.  The infection is treated with antibiotics.   Home care  · All of the antibiotics should be taken as prescribed until they are gone. This is true even if symptoms start to get better. This is very important to ensure that the infection goes away. This helps prevent serious complications. It also helps keep the infection from spreading to other people.  · Pain medicines should be taken as directed. (Do not give aspirin or aspirin-containing medicines to children younger than 18 years. It can cause a serious problem called Reye syndrome.)  · To help ease pain, children older than 6 years and adults can gargle with warm salt water. This can be done four times a day for the first two days. Dissolve 1/2 teaspoon of salt in 1 glass of hot water. Gargle with the solution, then spit it out. (Ensure that children do not swallow the salt water.)  · Cool liquids and soft foods may make eating easier for the first few days.  Follow-up care  Follow up with a healthcare provider or as advised within 1-2 days.   When to seek medical advice  Call the healthcare provider right away if any of the following occur:  · Fever of 100.4°F (38ºC) or higher after 3 days of treatment  · Symptoms that get worse or new symptoms   · Symptoms that go away and come back  · Trouble swallowing  · Inability to eat or drink, or refusing food and drink  · Trouble breathing  · Excessive drooling  · Trouble opening the mouth  · Neck stiffness  · Bleeding  · Rash  · Swelling or bumps in the neck  Date Last Reviewed: 5/4/2015  © 2069-0847 [employer]. [address] [place] [address]  Road, [place] [address]. All rights reserved. This information is not intended as a substitute for professional medical care. Always follow your healthcare professional's instructions.

## 2019-04-09 NOTE — PROGRESS NOTES
Subjective:       Patient ID: Yoana Pardo is a 68 y.o. female.    Chief Complaint: Fever; Chills (x two days ); Generalized Body Aches; and Otalgia    BP (!) 158/90 (BP Location: Right arm, Patient Position: Sitting)   Pulse 66   Temp (!) 100.4 °F (38 °C) (Tympanic)   Resp 16   Ht 5' (1.524 m)   Wt 102.1 kg (225 lb 1.4 oz)   SpO2 99%   BMI 43.96 kg/m²     HPI  Cc as above.. 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   HENT: Positive for congestion, ear pain, rhinorrhea, sinus pressure and sore throat. Negative for facial swelling and sinus pain.    Eyes: Negative.    Respiratory: Positive for cough. Negative for wheezing.    Cardiovascular: Negative.    Musculoskeletal: Positive for myalgias.   Neurological: Negative.        Objective:      Physical Exam   Constitutional: She is oriented to person, place, and time. She appears well-developed and well-nourished. No distress.   HENT:   Head: Normocephalic and atraumatic.   Mouth/Throat: Oropharyngeal exudate (large erythematous tonsils with purulent exudates on left) present.   TMs clear   Eyes: Pupils are equal, round, and reactive to light. EOM are normal. Right eye exhibits no discharge. Left eye exhibits no discharge.   Neck: Normal range of motion. Neck supple.   Cardiovascular: Normal rate, regular rhythm and normal heart sounds.   No murmur heard.  Pulmonary/Chest: Effort normal. She has no wheezes. She has no rales.   Rhonchi on left cleared by deep cough   Musculoskeletal: Normal range of motion.   Lymphadenopathy:     She has cervical adenopathy.   Neurological: She is alert and oriented to person, place, and time.   Skin: Skin is warm and dry. She is not diaphoretic.   Nursing note and vitals  reviewed.      Assessment:       1. Viral illness    2. Acute bacterial tonsillitis    3. Streptococcal infection group A        Plan:     Yoana MILLER was seen today for fever, chills, generalized body aches and otalgia.    Diagnoses and all orders for this visit:    Viral illness  -     POCT Influenza A/B Molecular    Acute bacterial tonsillitis  -     POCT rapid strep A  -     Discontinue: azithromycin (Z-JAE) 250 MG tablet; As per packet instructions  -     amoxicillin (AMOXIL) 875 MG tablet; Take 1 tablet (875 mg total) by mouth 2 (two) times daily. for 10 days    Streptococcal infection group A  -     amoxicillin (AMOXIL) 875 MG tablet; Take 1 tablet (875 mg total) by mouth 2 (two) times daily. for 10 days      1. Rx amoxil for strep throat  2. Fluid, rest, tylenol for pain and fever  3. Work excuse

## 2019-04-15 ENCOUNTER — HOSPITAL ENCOUNTER (OUTPATIENT)
Dept: RADIOLOGY | Facility: HOSPITAL | Age: 69
Discharge: HOME | End: 2019-04-15
Attending: INTERNAL MEDICINE
Payer: MEDICARE

## 2019-04-15 ENCOUNTER — OFFICE VISIT (OUTPATIENT)
Dept: PULMONOLOGY | Facility: CLINIC | Age: 69
End: 2019-04-15
Payer: MEDICARE

## 2019-04-15 VITALS
HEART RATE: 67 BPM | WEIGHT: 226 LBS | RESPIRATION RATE: 18 BRPM | HEIGHT: 60 IN | SYSTOLIC BLOOD PRESSURE: 130 MMHG | DIASTOLIC BLOOD PRESSURE: 80 MMHG | BODY MASS INDEX: 44.37 KG/M2 | OXYGEN SATURATION: 97 %

## 2019-04-15 DIAGNOSIS — E66.9 RESTRICTIVE LUNG DISEASE SECONDARY TO OBESITY: ICD-10-CM

## 2019-04-15 DIAGNOSIS — J30.2 CHRONIC SEASONAL ALLERGIC RHINITIS: ICD-10-CM

## 2019-04-15 DIAGNOSIS — E66.01 MORBID OBESITY WITH BMI OF 40.0-44.9, ADULT: ICD-10-CM

## 2019-04-15 DIAGNOSIS — I27.20 PULMONARY HYPERTENSION: ICD-10-CM

## 2019-04-15 DIAGNOSIS — G47.33 OSA ON CPAP: Primary | ICD-10-CM

## 2019-04-15 DIAGNOSIS — G47.10 HYPERSOMNIA WITH SLEEP APNEA: ICD-10-CM

## 2019-04-15 DIAGNOSIS — F51.04 PSYCHOPHYSIOLOGICAL INSOMNIA: ICD-10-CM

## 2019-04-15 DIAGNOSIS — J32.9 CHRONIC SINUSITIS, UNSPECIFIED LOCATION: ICD-10-CM

## 2019-04-15 DIAGNOSIS — Z72.821 INADEQUATE SLEEP HYGIENE: ICD-10-CM

## 2019-04-15 DIAGNOSIS — G47.33 OSA ON CPAP: ICD-10-CM

## 2019-04-15 DIAGNOSIS — I70.0 ATHEROSCLEROSIS OF AORTA: ICD-10-CM

## 2019-04-15 DIAGNOSIS — G47.30 HYPERSOMNIA WITH SLEEP APNEA: ICD-10-CM

## 2019-04-15 DIAGNOSIS — I10 ESSENTIAL HYPERTENSION: Chronic | ICD-10-CM

## 2019-04-15 DIAGNOSIS — J98.4 RESTRICTIVE LUNG DISEASE SECONDARY TO OBESITY: ICD-10-CM

## 2019-04-15 PROCEDURE — 99999 PR PBB SHADOW E&M-EST. PATIENT-LVL III: ICD-10-PCS | Mod: PBBFAC,,, | Performed by: INTERNAL MEDICINE

## 2019-04-15 PROCEDURE — 71046 X-RAY EXAM CHEST 2 VIEWS: CPT | Mod: 26,,, | Performed by: RADIOLOGY

## 2019-04-15 PROCEDURE — 99213 OFFICE O/P EST LOW 20 MIN: CPT | Mod: PBBFAC,25,PN | Performed by: INTERNAL MEDICINE

## 2019-04-15 PROCEDURE — 99214 PR OFFICE/OUTPT VISIT, EST, LEVL IV, 30-39 MIN: ICD-10-PCS | Mod: S$PBB,,, | Performed by: INTERNAL MEDICINE

## 2019-04-15 PROCEDURE — 99999 PR PBB SHADOW E&M-EST. PATIENT-LVL III: CPT | Mod: PBBFAC,,, | Performed by: INTERNAL MEDICINE

## 2019-04-15 PROCEDURE — 71046 XR CHEST PA AND LATERAL: ICD-10-PCS | Mod: 26,,, | Performed by: RADIOLOGY

## 2019-04-15 PROCEDURE — 99214 OFFICE O/P EST MOD 30 MIN: CPT | Mod: S$PBB,,, | Performed by: INTERNAL MEDICINE

## 2019-04-15 PROCEDURE — 71046 X-RAY EXAM CHEST 2 VIEWS: CPT | Mod: TC

## 2019-04-15 RX ORDER — MODAFINIL 100 MG/1
100 TABLET ORAL DAILY
Qty: 30 TABLET | Refills: 3 | Status: SHIPPED | OUTPATIENT
Start: 2019-04-15 | End: 2019-04-17

## 2019-04-15 NOTE — PROGRESS NOTES
Subjective:       Patient ID: Yoana Pardo is a 68 y.o. female.    Chief Complaint: Sleep Apnea    Ms. Yoana Pardo is 68 years old  Last visit 10/15/2018  Recent recurrent sinus infections, seen urgernt care for URI  Says since Jan 2019: poor adherence, had purchased ionic  for CPAP  She has no cough, No SOB, no wheezing  No dyspnea on exertion.  Function  Class 0.  Weight stable  Has CPAP and benefits from machine  Heart mild pulmonary hypertension based on echocardiogram last exam.  .  Also concern about poor sleep depth  Wilton score 19.   Bed time 1030pm to 1130pm: Tv on  Wake time 6-7 am  Occasional naps  Sleepy driving.  I have reviewed the patient's medical history in detail and updated the computerized patient record.            Review of Systems   Constitutional: Positive for fatigue.   HENT: Positive for postnasal drip, rhinorrhea and congestion.    Respiratory: Positive for apnea (CPAP through [facility]). Negative for snoring, sputum production, shortness of breath, wheezing, pleurisy and use of rescue inhaler.         Poor adherence since Jan 2019: recurrent sinus infections   Cardiovascular: Negative.    Genitourinary: Negative.    Endocrine: endocrine negative   Musculoskeletal: Negative.    Skin: Negative for rash.   Neurological: Negative.    Psychiatric/Behavioral: Positive for sleep disturbance.       Objective:       Vitals:    04/15/19 0936   BP: 130/80   Pulse: 67   Resp: 18   SpO2: 97%   Weight: 102.5 kg (225 lb 15.5 oz)   Height: 5' (1.524 m)     Physical Exam   Constitutional: She is oriented to person, place, and time. Vital signs are normal. She appears well-developed and well-nourished.     She is obese.   HENT:   Head: Normocephalic.   Nose: Mucosal edema present. Polyps are present.   Mouth/Throat: Oropharynx is clear and moist. No oropharyngeal exudate. Mallampati Score: IV.   Neck: Normal range of motion. Neck supple. No tracheal deviation present. No thyromegaly  present.   Cardiovascular: Normal rate and regular rhythm.   No murmur heard.  Pulmonary/Chest: Normal expansion, symmetric chest wall expansion, effort normal and breath sounds normal.   Abdominal: Soft. Bowel sounds are normal.   Musculoskeletal: Normal range of motion. She exhibits no edema.   Lymphadenopathy:     She has no cervical adenopathy.   Neurological: She is alert and oriented to person, place, and time. Gait normal.   Skin: Skin is warm and dry. No rash noted. No erythema. Nails show no clubbing.   Psychiatric: She has a normal mood and affect.   Nursing note and vitals reviewed.    Personal Diagnostic Review    Prior sleep study 2016:  REASONS FOR REFERRAL: Ms Pardo is a 66 year old female, referred for CPAP titration polysomnography by Elizabeth LeJeune, APRN, based on her home sleep apnea test of 9-27-16, which revealed Apnea + Hypopnea Index = 27.3 events / hr asleep, moderate  to severe obstructive sleep apnea / hypopnea syndrome (OSAHS). Note that the A + H I is only an estimated quantity as sleep was  not assessed in the HSAT, and an undetermined number of the scored respiratory events may not have occurred during sleep. CPAP  titration was recommended by the interpreting physician, Dr. Ernie Mcintosh, and Elizabeth LeJeune, APRN, ordered it. Ms  LeJeune was the referring provider, and Dr. Josey Wiggins is the patient?s primary care physician.  STUDY PARAMETERS: This study involved analysis of the patient's sleep pattern while breathing was assisted. The study was  performed with a sleep technologist in attendance for the entire test period, with video monitoring throughout the study, and routine  laboratory clinical parameters recorded: NOTE: The polysomnography electrophysiological record for the patient has been reviewed in  its entirety by Dr. Leach.  SUMMARY STATEMENTS  DIAGNOSTIC IMPRESSIONS  G47.33 / 327.23 Moderate to Severe Obstructive Sleep Apnea, Adult (OSAHS)  F51.04 / 307.42  Psychophysiological Insomnia (stress - related and conditioned)  F51.12 / 307.44 Insufficient Sleep Syndrome  Z72.821 / V69.4 Inadequate Sleep Hygiene  G47.01 / 327.01 r/o Insomnia due to Medical Condition (pain, discomfort)  G25.81 / 333.99 r/o Restless Legs Syndrome (RLS)  G47.21 / 327.31 r/o Delayed Sleep - Wake Phase Disorder  PRIMARY TREATMENT RECOMMENDATIONS Treat, or refer to [facility].  1. The CPAP titration polysomnography revealed that 12 cm H2O pressure (C - Flex 3, humidity 2), the most effective pressure most  completely tested, was optimal, as it reduced the rate of respiratory events 83 % to A + H Index = 4.4 events / hr asleep in 4.3 hrs  sleep (1.2 hrs REM sleep). 16 of 22 events were central sleep apneas, but most were post - arousal. Snoring was eliminated at  12 cm and 14 cm. Higher pressure (14 cm) was nearly adequate AHI =7.7), but lower pressures were ineffective. AutoCPAP (4  cm - 20 cm) could be tried if necessary.  The overall A + H Index was 7.1 / hr asleep, with 0.1 respiratory event - related arousals / hr asleep (and no RERAs) for the  titration trial. The mean SpO2 value was 95.2 % throughout the study, with a minimum oxygen saturation during sleep of 85.0 %  (waking baseline SpO2 was 100 %). A medium ResMed Mirage FX nasal CPAP mask was used and well - tolerated. Please see PAP  trial outcomes table, below.     PFT:  Restrictive defect with reduced diffusion capacity.  FEV1 of 1.10 L (63% predicted) FVC 1.51 (68% predicted) FEV1 for/FVC was 73.  TLC was 3.11 (73% predicted) DLCO was 7.82 (40.3% predicted)    Download of machine will be obtained from [employer]  01/15/2019 - 2019  : 1950  Age: 68 years  [place]  [address], [address]  [facility] [place], [address]  Phone: [phone]  Email: [email or web address]  Compliance Report  Usage 01/15/2019 - 2019  Usage days 19/90 days (21%)  >= 4 hours 19 days (21%)  < 4 hours 0 days (0%)  Usage hours 134  hours 33 minutes  Average usage (total days) 1 hours 30 minutes  Average usage (days used) 7 hours 5 minutes  Median usage (days used) 7 hours 14 minutes  Total used hours (value since last reset - 04/14/2019) 1,941 hours  AirSense 10 AutoSet  Serial number [id number]  Mode AutoSet  Min Pressure 6 cmH2O  Max Pressure 20 cmH2O  EPR Fulltime  EPR level 2  Response Standard  Therapy  Pressure - cmH2O Median: 9.9 95th percentile: 12.1 Maximum: 13.8  Leaks - L/min Median: 1.7 95th percentile: 18.5 Maximum: 31.1  Events per hour AI: 2.3 HI: 0.7 AHI: 3.0  Apnea Index Central: 0.2 Obstructive: 2.0 Unknown: 0.1  RERA Index 0.1  Cheyne-Sosa respiration (average duration per night) 0 minutes (0%)  Usage - hours  Printed on      No flowsheet data found.      Assessment:       Problem List Items Addressed This Visit     Essential hypertension (Chronic)     Stable followed by PCP         [place] on CPAP - Primary     APAP 6-20 cm through [facility]  Unhappy about poor supplies delivery  [place]  Score 19:   Bed time: 10 pm  Wake time: 6 30 am  No naps  [facility] nursing Home[facility]         Relevant Medications    armodafinil (NUVIGIL) 150 mg tablet    Pulmonary hypertension    Morbid obesity with BMI of 40.0-44.9, adult     Discussed weight loss: medical and surgical options  AHI 27.3/hr titrated to CPAP 12 cm         Inadequate sleep hygiene     Sleep hygeine  TV off in Bed         Psychophysiological insomnia     Insomnia score is 21.         Chronic seasonal allergic rhinitis    Relevant Orders    CT Sinuses without Contrast (Completed)    Atherosclerosis of aorta     Stable followed by PCP         Restrictive lung disease secondary to obesity    Hypersomnia with sleep apnea    Relevant Medications    armodafinil (NUVIGIL) 150 mg tablet    Chronic sinusitis     Recently seen urgent care  Sinus issues making CPAP use difficult  On Flonase and Antihistamine spray         Relevant Orders    CT Sinuses without Contrast  (Completed)    IMMUNOGLOBULINS (IGG, IGA, IGM) QUANTITATIVE (Completed)    FUNGAL IMMUNODIFFUSION - BLOOD        Plan:       Needs to improve adherence to > 4 hrs daily per CMS guidelines  if patient intolerant to CPAP then other therapies need to be considered.      Weight loss and consider bariatric surgery  CT sinus to determine interventions necessary to improved adherence   Avoid driving while sleepy  May need PSG with MSLT  Good sleep hygiene,    Follow up in about 3 months (around 7/15/2019), or sleep diary, added provigil, download, sleep hygeine, weight loss, for CT sinus, insomnia score, labs today.    This note was prepared using voice recognition system and is likely to have sound alike errors that may have been overlooked even after proof reading.  Please call me with any questions    Discussed diagnosis, its evaluation, treatment and usual course. All questions answered.    Thank you for the courtesy of participating in the care of this patient    Ernie Mcintosh MD

## 2019-04-15 NOTE — ASSESSMENT & PLAN NOTE
Recently seen urgent care  Sinus issues making CPAP use difficult  On Flonase and Antihistamine spray

## 2019-04-15 NOTE — ASSESSMENT & PLAN NOTE
APAP 6-20 cm through [facility]  Unhappy about poor supplies delivery  [place]  Score 19:   Bed time: 10 pm  Wake time: 6 30 am  No naps  [facility] nursing Home[facility] : [facility]

## 2019-04-16 ENCOUNTER — HOSPITAL ENCOUNTER (OUTPATIENT)
Dept: RADIOLOGY | Facility: HOSPITAL | Age: 69
Discharge: HOME | End: 2019-04-16
Attending: INTERNAL MEDICINE
Payer: MEDICARE

## 2019-04-16 DIAGNOSIS — J30.2 CHRONIC SEASONAL ALLERGIC RHINITIS: ICD-10-CM

## 2019-04-16 DIAGNOSIS — J32.9 CHRONIC SINUSITIS, UNSPECIFIED LOCATION: ICD-10-CM

## 2019-04-16 PROCEDURE — 70486 CT SINUSES WITHOUT CONTRAST: ICD-10-PCS | Mod: 26,,, | Performed by: RADIOLOGY

## 2019-04-16 PROCEDURE — 70486 CT MAXILLOFACIAL W/O DYE: CPT | Mod: 26,,, | Performed by: RADIOLOGY

## 2019-04-16 PROCEDURE — 70486 CT MAXILLOFACIAL W/O DYE: CPT | Mod: TC

## 2019-04-17 RX ORDER — ARMODAFINIL 150 MG/1
150 TABLET ORAL DAILY
Qty: 30 TABLET | Refills: 3 | Status: SHIPPED | OUTPATIENT
Start: 2019-04-17 | End: 2019-06-02

## 2019-06-07 ENCOUNTER — PATIENT MESSAGE (OUTPATIENT)
Dept: INTERNAL MEDICINE | Facility: CLINIC | Age: 69
End: 2019-06-07

## 2019-06-11 ENCOUNTER — HOSPITAL ENCOUNTER (OUTPATIENT)
Dept: RADIOLOGY | Facility: HOSPITAL | Age: 69
Discharge: HOME | End: 2019-06-11
Attending: FAMILY MEDICINE
Payer: MEDICARE

## 2019-06-11 ENCOUNTER — OFFICE VISIT (OUTPATIENT)
Dept: INTERNAL MEDICINE | Facility: CLINIC | Age: 69
End: 2019-06-11
Payer: MEDICARE

## 2019-06-11 VITALS
TEMPERATURE: 95 F | OXYGEN SATURATION: 98 % | HEIGHT: 60 IN | DIASTOLIC BLOOD PRESSURE: 80 MMHG | SYSTOLIC BLOOD PRESSURE: 138 MMHG | HEART RATE: 80 BPM | RESPIRATION RATE: 18 BRPM | WEIGHT: 230.63 LBS | BODY MASS INDEX: 45.28 KG/M2

## 2019-06-11 DIAGNOSIS — M79.661 PAIN AND SWELLING OF RIGHT LOWER LEG: ICD-10-CM

## 2019-06-11 DIAGNOSIS — G47.33 OSA ON CPAP: ICD-10-CM

## 2019-06-11 DIAGNOSIS — Z12.31 ENCOUNTER FOR SCREENING MAMMOGRAM FOR MALIGNANT NEOPLASM OF BREAST: ICD-10-CM

## 2019-06-11 DIAGNOSIS — E66.01 MORBID OBESITY WITH BMI OF 45.0-49.9, ADULT: ICD-10-CM

## 2019-06-11 DIAGNOSIS — M79.661 PAIN AND SWELLING OF RIGHT LOWER LEG: Primary | ICD-10-CM

## 2019-06-11 DIAGNOSIS — M79.89 PAIN AND SWELLING OF RIGHT LOWER LEG: Primary | ICD-10-CM

## 2019-06-11 DIAGNOSIS — B35.1 ONYCHOMYCOSIS: ICD-10-CM

## 2019-06-11 DIAGNOSIS — M79.89 PAIN AND SWELLING OF RIGHT LOWER LEG: ICD-10-CM

## 2019-06-11 DIAGNOSIS — K21.9 GASTROESOPHAGEAL REFLUX DISEASE, ESOPHAGITIS PRESENCE NOT SPECIFIED: ICD-10-CM

## 2019-06-11 DIAGNOSIS — J30.2 CHRONIC SEASONAL ALLERGIC RHINITIS: ICD-10-CM

## 2019-06-11 DIAGNOSIS — Z78.0 ASYMPTOMATIC MENOPAUSAL STATE: ICD-10-CM

## 2019-06-11 DIAGNOSIS — M79.89 SWELLING OF BOTH HANDS: ICD-10-CM

## 2019-06-11 DIAGNOSIS — I70.0 ATHEROSCLEROSIS OF AORTA: ICD-10-CM

## 2019-06-11 DIAGNOSIS — R60.0 PEDAL EDEMA: ICD-10-CM

## 2019-06-11 DIAGNOSIS — I27.20 PULMONARY HYPERTENSION: ICD-10-CM

## 2019-06-11 DIAGNOSIS — I10 ESSENTIAL HYPERTENSION: Chronic | ICD-10-CM

## 2019-06-11 DIAGNOSIS — M17.0 PRIMARY OSTEOARTHRITIS OF BOTH KNEES: ICD-10-CM

## 2019-06-11 PROBLEM — G47.10 HYPERSOMNIA WITH SLEEP APNEA: Status: RESOLVED | Noted: 2019-04-15 | Resolved: 2019-06-11

## 2019-06-11 PROBLEM — J32.9 CHRONIC SINUSITIS: Status: RESOLVED | Noted: 2019-04-15 | Resolved: 2019-06-11

## 2019-06-11 PROBLEM — G47.30 HYPERSOMNIA WITH SLEEP APNEA: Status: RESOLVED | Noted: 2019-04-15 | Resolved: 2019-06-11

## 2019-06-11 PROBLEM — J98.4 RESTRICTIVE LUNG DISEASE SECONDARY TO OBESITY: Status: RESOLVED | Noted: 2018-07-02 | Resolved: 2019-06-11

## 2019-06-11 PROBLEM — E66.9 RESTRICTIVE LUNG DISEASE SECONDARY TO OBESITY: Status: RESOLVED | Noted: 2018-07-02 | Resolved: 2019-06-11

## 2019-06-11 PROCEDURE — 99999 PR PBB SHADOW E&M-EST. PATIENT-LVL IV: CPT | Mod: PBBFAC,,, | Performed by: FAMILY MEDICINE

## 2019-06-11 PROCEDURE — 99214 PR OFFICE/OUTPT VISIT, EST, LEVL IV, 30-39 MIN: ICD-10-PCS | Mod: S$PBB,,, | Performed by: FAMILY MEDICINE

## 2019-06-11 PROCEDURE — 72110 X-RAY EXAM L-2 SPINE 4/>VWS: CPT | Mod: TC

## 2019-06-11 PROCEDURE — 72110 X-RAY EXAM L-2 SPINE 4/>VWS: CPT | Mod: 26,,, | Performed by: RADIOLOGY

## 2019-06-11 PROCEDURE — 72110 XR LUMBAR SPINE COMPLETE 5 VIEW: ICD-10-PCS | Mod: 26,,, | Performed by: RADIOLOGY

## 2019-06-11 PROCEDURE — 99999 PR PBB SHADOW E&M-EST. PATIENT-LVL IV: ICD-10-PCS | Mod: PBBFAC,,, | Performed by: FAMILY MEDICINE

## 2019-06-11 PROCEDURE — 99214 OFFICE O/P EST MOD 30 MIN: CPT | Mod: S$PBB,,, | Performed by: FAMILY MEDICINE

## 2019-06-11 PROCEDURE — 99214 OFFICE O/P EST MOD 30 MIN: CPT | Mod: PBBFAC,25 | Performed by: FAMILY MEDICINE

## 2019-06-11 RX ORDER — LOSARTAN POTASSIUM AND HYDROCHLOROTHIAZIDE 25; 100 MG/1; MG/1
1 TABLET ORAL DAILY
Qty: 90 TABLET | Refills: 1 | Status: SHIPPED | OUTPATIENT
Start: 2019-06-11 | End: 2019-10-25

## 2019-06-11 RX ORDER — TERBINAFINE HYDROCHLORIDE 250 MG/1
250 TABLET ORAL DAILY
Qty: 45 TABLET | Refills: 0 | Status: SHIPPED | OUTPATIENT
Start: 2019-06-11 | End: 2019-07-26

## 2019-06-11 RX ORDER — FUROSEMIDE 20 MG/1
20 TABLET ORAL 2 TIMES DAILY PRN
Qty: 60 TABLET | Refills: 3 | Status: SHIPPED | OUTPATIENT
Start: 2019-06-11 | End: 2020-03-19

## 2019-06-11 RX ORDER — GABAPENTIN 100 MG/1
100 CAPSULE ORAL NIGHTLY PRN
Qty: 30 CAPSULE | Refills: 0 | Status: SHIPPED | OUTPATIENT
Start: 2019-06-11 | End: 2020-08-18 | Stop reason: SDUPTHER

## 2019-06-11 RX ORDER — ESOMEPRAZOLE MAGNESIUM 40 MG/1
40 CAPSULE, DELAYED RELEASE ORAL
Qty: 30 CAPSULE | Refills: 11 | Status: SHIPPED | OUTPATIENT
Start: 2019-06-11 | End: 2020-08-18 | Stop reason: SDUPTHER

## 2019-06-11 NOTE — PROGRESS NOTES
Subjective:       Patient ID: Yoana Parod is a 68 y.o. female.    Chief Complaint: Leg Pain; Knee Pain (Swelling); Leg Swelling (Pain); Cough; and Swelling (Hands and Fingers)    68-year-old  female patient with Patient Active Problem List:     Pulmonary hypertension     MVP (mitral valve prolapse)     Hiatal hernia with GERD     History of positive PPD, untreated     Hemorrhoids     Essential hypertension     Peripheral neuropathy     Primary osteoarthritis of both knees     Morbid obesity with BMI of 45.0-49.9, adult     [place] on CPAP     Chronic seasonal allergic rhinitis     Atherosclerosis of aorta     S/P laparoscopic cholecystectomy     Elevated hemidiaphragm     Diffusion capacity of lung (dl), decreased     Memory loss  Concerned about right leg pain and swelling, has been having occasional discomfort to the left leg but has been worse in her right leg lately for the past 2-3 weeks, patient reports that she has been waking up with excruciating right leg pain and would like to get checked for blood clots  Patient does have minimal low back pain, occasionally radiating to bilateral lower legs  Has been trying to use CPAP machine and has not been feeling comfortable using it  Patient has been noticing swelling to bilateral hands and feet and does not add excess salt to her diet  Reports fatigue, denies any chest pain or difficulty breathing or palpitations  Patient concerned about fungal infection and discoloration of fingernails and toenails  Patient reports persistent cough    Review of Systems   Constitutional: Negative for fatigue.   Eyes: Negative for visual disturbance.   Respiratory: Positive for cough. Negative for shortness of breath.    Cardiovascular: Negative for chest pain and leg swelling.   Gastrointestinal: Negative for abdominal pain, nausea and vomiting.   Musculoskeletal: Positive for arthralgias, back pain, joint swelling and myalgias.   Skin: Positive for color  change. Negative for rash.   Neurological: Positive for numbness. Negative for weakness, light-headedness and headaches.   Psychiatric/Behavioral: Negative for sleep disturbance.         /80 (BP Location: Right arm, Patient Position: Sitting, BP Method: Large (Manual))   Pulse 80   Temp (!) 94.9 °F (34.9 °C) (Tympanic)   Resp 18   Ht 5' (1.524 m)   Wt 104.6 kg (230 lb 9.6 oz)   SpO2 98%   BMI 45.04 kg/m²   Objective:      Physical Exam   Constitutional: She is oriented to person, place, and time. She appears well-developed and well-nourished.   HENT:   Head: Normocephalic and atraumatic.   Mouth/Throat: Oropharynx is clear and moist.   Cardiovascular: Normal rate, regular rhythm and normal heart sounds.   No murmur heard.  Pulmonary/Chest: Effort normal and breath sounds normal. She has no wheezes.   Abdominal: Soft. Bowel sounds are normal. There is no tenderness.   Musculoskeletal: She exhibits tenderness. She exhibits no edema.   Positive for minimal tenderness to the paraspinal lumbar muscles on the right side.   No significant tenderness or calf swelling noted to the right leg   Neurological: She is alert and oriented to person, place, and time.   Skin: Skin is warm and dry. Rash noted.   Positive for hyperpigmented toenails and fingernails with fungal infection to multiple toenails   Psychiatric: She has a normal mood and affect.         Assessment/Plan:   1. Pain and swelling of right lower leg  - CBC auto differential; Future  - X-Ray Lumbar Spine Complete 5 View; Future  - US Lower Extremity Veins Right; Future  - gabapentin (NEURONTIN) 100 MG capsule; Take 1 capsule (100 mg total) by mouth nightly as needed.  Dispense: 30 capsule; Refill: 0  - Vitamin D; Future  Secondary to pain and swelling to the right lower leg will get x-ray of the lumbar spine to rule out radiculopathy and ultrasound of the right lower leg to rule out DVT  Gabapentin has been prescribed today for symptomatic relief with  right leg pain at bedtime to be taken as needed  Will check vitamin-D levels    2. Pedal edema  - JOSE Screen w/Reflex; Future  3. Swelling of both hands  - JOSE Screen w/Reflex; Future  - Rheumatoid factor; Future  Currently taking Lasix 20 mg twice daily as needed  Refill given today  Restrict salt intake and Will check further labs    4. Essential hypertension  - Comprehensive metabolic panel; Future  - Lipid panel; Future  - TSH; Future  - losartan-hydrochlorothiazide 100-25 mg (HYZAAR) 100-25 mg per tablet; Take 1 tablet by mouth once daily.  Dispense: 90 tablet; Refill: 1  Blood pressure stable today currently on losartan hydrochlorothiazide 100/25 mg    5. Primary osteoarthritis of both knees  Graded exercise regimen recommended    6. Pulmonary hypertension  - furosemide (LASIX) 20 MG tablet; Take 1 tablet (20 mg total) by mouth 2 (two) times daily as needed.  Dispense: 60 tablet; Refill: 3  Refill has been given on Lasix    7. [place] on CPAP  Encouraged to use CPAP machine regularly    8. Atherosclerosis of aorta    9. Chronic seasonal allergic rhinitis  Stable on inhalers as needed    10. Morbid obesity with BMI of 45.0-49.9, adult  - Vitamin D; Future  Lifestyle modifications recommended to lose weight with BMI 45  Currently walking 4-5 miles daily    11. Encounter for screening mammogram for malignant neoplasm of breast  - Mammo Digital Screening Bilat; Future  Due for mammogram and DEXA scan    12. Asymptomatic menopausal state  - DXA Bone Density Spine And Hip; Future    13. Gastroesophageal reflux disease, esophagitis presence not specified  - esomeprazole (NEXIUM) 40 MG capsule; Take 1 capsule (40 mg total) by mouth before breakfast.  Dispense: 30 capsule; Refill: 11  Will do a trial of Nexium 40 mg daily for persistent cough to rule out acid reflex  No response noted with Prilosec in the past    14. Onychomycosis  - terbinafine HCl (LAMISIL) 250 mg tablet; Take 1 tablet (250 mg total) by mouth once daily.   Dispense: 45 tablet; Refill: 0  Lamisil will be prescribed today for 1 and half month  Patient was advised to use over-the-counter Lamisil powder for fungal infection

## 2019-06-26 ENCOUNTER — HOSPITAL ENCOUNTER (OUTPATIENT)
Dept: RADIOLOGY | Facility: HOSPITAL | Age: 69
Discharge: HOME | End: 2019-06-26
Attending: FAMILY MEDICINE
Payer: MEDICARE

## 2019-06-26 VITALS — WEIGHT: 230 LBS | BODY MASS INDEX: 45.16 KG/M2 | HEIGHT: 60 IN

## 2019-06-26 DIAGNOSIS — Z12.31 ENCOUNTER FOR SCREENING MAMMOGRAM FOR MALIGNANT NEOPLASM OF BREAST: ICD-10-CM

## 2019-06-26 DIAGNOSIS — M79.661 PAIN AND SWELLING OF RIGHT LOWER LEG: ICD-10-CM

## 2019-06-26 DIAGNOSIS — M79.89 PAIN AND SWELLING OF RIGHT LOWER LEG: ICD-10-CM

## 2019-06-26 PROCEDURE — 77067 SCR MAMMO BI INCL CAD: CPT | Mod: TC

## 2019-06-26 PROCEDURE — 77063 MAMMO DIGITAL SCREENING BILAT WITH TOMOSYNTHESIS_CAD: ICD-10-PCS | Mod: 26,,, | Performed by: RADIOLOGY

## 2019-06-26 PROCEDURE — 77063 BREAST TOMOSYNTHESIS BI: CPT | Mod: 26,,, | Performed by: RADIOLOGY

## 2019-06-26 PROCEDURE — 93971 US LOWER EXTREMITY VEINS RIGHT: ICD-10-PCS | Mod: 26,RT,, | Performed by: RADIOLOGY

## 2019-06-26 PROCEDURE — 93971 EXTREMITY STUDY: CPT | Mod: 26,RT,, | Performed by: RADIOLOGY

## 2019-06-26 PROCEDURE — 93971 EXTREMITY STUDY: CPT | Mod: TC,RT

## 2019-06-26 PROCEDURE — 77067 MAMMO DIGITAL SCREENING BILAT WITH TOMOSYNTHESIS_CAD: ICD-10-PCS | Mod: 26,,, | Performed by: RADIOLOGY

## 2019-06-26 PROCEDURE — 77067 SCR MAMMO BI INCL CAD: CPT | Mod: 26,,, | Performed by: RADIOLOGY

## 2019-07-22 ENCOUNTER — OFFICE VISIT (OUTPATIENT)
Dept: PULMONOLOGY | Facility: CLINIC | Age: 69
End: 2019-07-22
Payer: MEDICARE

## 2019-07-22 VITALS
BODY MASS INDEX: 44.72 KG/M2 | DIASTOLIC BLOOD PRESSURE: 70 MMHG | WEIGHT: 227.75 LBS | HEIGHT: 60 IN | HEART RATE: 58 BPM | OXYGEN SATURATION: 97 % | SYSTOLIC BLOOD PRESSURE: 122 MMHG | RESPIRATION RATE: 18 BRPM

## 2019-07-22 DIAGNOSIS — J30.2 CHRONIC SEASONAL ALLERGIC RHINITIS: ICD-10-CM

## 2019-07-22 DIAGNOSIS — G47.33 OSA ON CPAP: ICD-10-CM

## 2019-07-22 DIAGNOSIS — E66.01 MORBID OBESITY WITH BMI OF 45.0-49.9, ADULT: Primary | ICD-10-CM

## 2019-07-22 PROCEDURE — 99213 OFFICE O/P EST LOW 20 MIN: CPT | Mod: PBBFAC | Performed by: INTERNAL MEDICINE

## 2019-07-22 PROCEDURE — 99999 PR PBB SHADOW E&M-EST. PATIENT-LVL III: ICD-10-PCS | Mod: PBBFAC,,, | Performed by: INTERNAL MEDICINE

## 2019-07-22 PROCEDURE — 99214 OFFICE O/P EST MOD 30 MIN: CPT | Mod: S$PBB,,, | Performed by: INTERNAL MEDICINE

## 2019-07-22 PROCEDURE — 99999 PR PBB SHADOW E&M-EST. PATIENT-LVL III: CPT | Mod: PBBFAC,,, | Performed by: INTERNAL MEDICINE

## 2019-07-22 PROCEDURE — 99214 PR OFFICE/OUTPT VISIT, EST, LEVL IV, 30-39 MIN: ICD-10-PCS | Mod: S$PBB,,, | Performed by: INTERNAL MEDICINE

## 2019-07-22 NOTE — PATIENT INSTRUCTIONS
Tips to Control Acid Reflux    To control acid reflux, youll need to make some basic diet and lifestyle changes. The simple steps outlined below may be all youll need to ease discomfort.  Watch what you eat  · Avoid fatty foods and spicy foods.  · Eat fewer acidic foods, such as citrus and tomato-based foods. These can increase symptoms.  · Limit drinking alcohol, caffeine, and fizzy beverages. All increase acid reflux.  · Try limiting chocolate, peppermint, and spearmint. These can worsen acid reflux in some people.  Watch when you eat  · Avoid lying down for 3 hours after eating.  · Do not snack before going to bed.  Raise your head  Raising your head and upper body by 4 to 6 inches helps limit reflux when youre lying down. Put blocks under the head of your bed frame to raise it.  Other changes  · Lose weight, if you need to  · Dont exercise near bedtime  · Avoid tight-fitting clothes  · Limit aspirin and ibuprofen  · Stop smoking   Date Last Reviewed: 7/1/2016 © 2000-2017 [employer] [address] [place] [address], [place] [address]. All rights reserved. This information is not intended as a substitute for professional medical care. Always follow your healthcare professional's instructions.        Fluticasone nasal spray  What is this medicine?  FLUTICASONE (floo TIK a sone) is a corticosteroid. This medicine is used to treat the symptoms of allergies like sneezing, itchy red eyes, and itchy, runny, or stuffy nose.  How should I use this medicine?  This medicine is for use in the nose. Follow the directions on your product or prescription label. This medicine works best if used at regular intervals. Do not use more often than directed. Make sure that you are using your nasal spray correctly. After 6 months of daily use without a prescription, talk to your doctor or health care professional before using it for a longer time. Ask your doctor or health care professional if you have any questions.  Talk  to your pediatrician regarding the use of this medicine in children. Special care may be needed. This medicine has been used in children as young as 2 years. After two months of daily use without a prescription in a child, talk to your pediatrician before using it for a longer time.  What side effects may I notice from receiving this medicine?  Side effects that you should report to your doctor or health care professional as soon as possible:  · allergic reactions like skin rash, itching or hives, swelling of the face, lips, or tongue  · changes in vision  · flu-like symptoms  · white patches or sores in the mouth or nose  Side effects that usually do not require medical attention (report to your doctor or health care professional if they continue or are bothersome):  · burning or irritation inside the nose or throat  · cough  · headache  · nosebleed  · unusual taste or smell  What may interact with this medicine?  · ketoconazole  · metyrapone  · some medicines for HIV  · vaccines  What if I miss a dose?  If you miss a dose, use it as soon as you remember. If it is almost time for your next dose, use only that dose and continue with your regular schedule. Do not use double or extra doses.  Where should I keep my medicine?  Keep out of the reach of children.  Store at room temperature between 15 and 30 degrees C (59 and 86 degrees F). Throw away any unused medicine after the expiration date.  What should I tell my health care provider before I take this medicine?  They need to know if you have any of these conditions:  · infection, like tuberculosis, herpes, or fungal infection  · recent surgery on nose or sinuses  · taking corticosteroid by mouth  · an unusual or allergic reaction to fluticasone, steroids, other medicines, foods, dyes, or preservatives  · pregnant or trying to get pregnant  · breast-feeding  What should I watch for while using this medicine?  Visit your doctor or health care professional for regular  checks on your progress. Some symptoms may improve within 12 hours after starting use. Check with your doctor or health care professional if there is no improvement in your condition after 3 weeks of use.  Do not come in contact with people who have chickenpox or the measles while you are taking this medicine. If you do, call your doctor right away.  NOTE:This sheet is a summary. It may not cover all possible information. If you have questions about this medicine, talk to your doctor, pharmacist, or health care provider. Copyright© 2017 [employer]      What Are CPAP and Other Air Pressure Treatments?   Continuous positive air pressure (CPAP) uses gentle air pressure to hold the airway open. CPAP is often the most effective treatment for sleep apnea and severe snoring. It works very well for many people. But keep in mind that it can take several adjustments before the setup is right for you.   How CPAP Works   A small portable pump beside the bed sends air through a hose, which is held over your nose by a mask. Air is gently pushed through your airway. The air pressure nudges sagging tissues aside. This widens the airway so you can breathe better. CPAP may be combined with other kinds of therapy for sleep apnea.      A mask over the nose gently directs air into the throat to keep the airway open.      Types of Air Pressure Treatments   There are different types of CPAP. Your doctor or CPAP technician will help you decide which type is best for you:   Basic CPAP keeps the pressure constant all night long.   A bilevel device gives out more pressure when you breathe in and less when you breathe out.   An autoCPAP device automatically adjusts pressure throughout the night and in response to changes such as body position, sleep stage, and snoring.      Please call office [phone] for any questions

## 2019-07-22 NOTE — PROGRESS NOTES
Subjective:       Patient ID: Yoana Pardo is a 68 y.o. female.  Patient Active Problem List   Diagnosis    Pulmonary hypertension    MVP (mitral valve prolapse)    Hiatal hernia with GERD    History of positive PPD, untreated    Hemorrhoids    Essential hypertension    Peripheral neuropathy    Primary osteoarthritis of both knees    Morbid obesity with BMI of 45.0-49.9, adult    [place] on CPAP    Chronic seasonal allergic rhinitis    Atherosclerosis of aorta    S/P laparoscopic cholecystectomy    Elevated hemidiaphragm    Diffusion capacity of lung (dl), decreased    Memory loss       Chief Complaint: Sleep Apnea    Ms. Yoana Pardo is 68 years old  Last visit 04/15/2019  Follow-up visit to review use of her CPAP and daytime sleepiness  [place] score is 9  Unfortunately she has not cardiac had data download card  She states her cough is much better although location is still waking up at night.  Technique for her Flonase was reviewed  No dyspnea on exertion.  Function  Class 0.  Weight stable  Bed time 1030pm to 1130pm: Tv on  Wake time 6-7 am  Occasional naps  I have reviewed the patient's medical history in detail and updated the computerized patient record.            Review of Systems   Constitutional: Positive for fatigue.   HENT: Negative for postnasal drip, rhinorrhea and congestion.    Respiratory: Positive for apnea (CPAP through [facility]). Negative for snoring, sputum production, shortness of breath, wheezing, pleurisy and use of rescue inhaler.         Poor adherence since Jan 2019: recurrent sinus infections   Cardiovascular: Negative.    Genitourinary: Negative.    Endocrine: endocrine negative   Musculoskeletal: Negative.    Skin: Negative for rash.   Neurological: Negative.    Psychiatric/Behavioral: Positive for sleep disturbance.       Objective:       Vitals:    07/22/19 1454   BP: 122/70   Pulse: (!) 58   Resp: 18   SpO2: 97%   Weight: 103.3 kg (227 lb 11.8 oz)   Height:  5' (1.524 m)     Physical Exam   Constitutional: She is oriented to person, place, and time. Vital signs are normal. She appears well-developed and well-nourished.     She is obese.   HENT:   Head: Normocephalic.   Nose: Mucosal edema present. No polyps.   Mouth/Throat: Oropharynx is clear and moist. No oropharyngeal exudate. Mallampati Score: IV.   Neck: Normal range of motion. Neck supple. No tracheal deviation present. No thyromegaly present.   Cardiovascular: Normal rate and regular rhythm.   No murmur heard.  Pulmonary/Chest: Normal expansion, symmetric chest wall expansion, effort normal and breath sounds normal.   Abdominal: Soft. Bowel sounds are normal.   Musculoskeletal: Normal range of motion. She exhibits no edema.   Lymphadenopathy:     She has no cervical adenopathy.   Neurological: She is alert and oriented to person, place, and time. Gait normal.   Skin: Skin is warm and dry. No rash noted. No erythema. Nails show no clubbing.   Psychiatric: She has a normal mood and affect.   Nursing note and vitals reviewed.    Personal Diagnostic Review    No flowsheet data found.      Assessment:       Problem List Items Addressed This Visit     [place] on CPAP     APAP 6-20 cm through [facility]  [place]  Score 9 previously 19:   Bed time: 10 pm  Wake time: 6 30 am  No naps  [facility] nursing Home: : [facility]         Morbid obesity with BMI of 45.0-49.9, adult - Primary     Weight loss           Chronic seasonal allergic rhinitis     Recently seen urgent care  Sinus issues making CPAP use difficult  On Flonase and Antihistamine spray  Proper technique demonstrated             Plan:       Needs to improve adherence to > 4 hrs daily per CMS guidelines  if patient intolerant to CPAP then other therapies need to be considered.      Weight loss and consider bariatric surgery  Daytime sleepiness bettter  Good sleep hygiene,  Flonase instruction    Follow up in about 3 months (around 10/22/2019), or Download,  Flonase technique.    This note was prepared using voice recognition system and is likely to have sound alike errors that may have been overlooked even after proof reading.  Please call me with any questions    Discussed diagnosis, its evaluation, treatment and usual course. All questions answered.    Thank you for the courtesy of participating in the care of this patient    Ernie Mcintosh MD

## 2019-07-22 NOTE — ASSESSMENT & PLAN NOTE
Recently seen urgent care  Sinus issues making CPAP use difficult  On Flonase and Antihistamine spray  Proper technique demonstrated

## 2019-07-22 NOTE — ASSESSMENT & PLAN NOTE
APAP 6-20 cm through [facility]  [place]  Score 9 previously 19:   Bed time: 10 pm  Wake time: 6 30 am  No naps  [facility]

## 2019-09-30 ENCOUNTER — EXTERNAL CHRONIC CARE MANAGEMENT (OUTPATIENT)
Dept: PRIMARY CARE CLINIC | Facility: CLINIC | Age: 69
End: 2019-09-30
Payer: MEDICARE

## 2019-09-30 PROCEDURE — 99490 CHRNC CARE MGMT STAFF 1ST 20: CPT | Mod: S$GLB,,, | Performed by: FAMILY MEDICINE

## 2019-09-30 PROCEDURE — 99490 PR CHRONIC CARE MGMT, 1ST 20 MIN: ICD-10-PCS | Mod: S$GLB,,, | Performed by: FAMILY MEDICINE

## 2019-10-25 ENCOUNTER — OFFICE VISIT (OUTPATIENT)
Dept: INTERNAL MEDICINE | Facility: CLINIC | Age: 69
End: 2019-10-25
Payer: MEDICARE

## 2019-10-25 ENCOUNTER — HOSPITAL ENCOUNTER (OUTPATIENT)
Dept: RADIOLOGY | Facility: HOSPITAL | Age: 69
Discharge: HOME | End: 2019-10-25
Attending: FAMILY MEDICINE
Payer: MEDICARE

## 2019-10-25 ENCOUNTER — OFFICE VISIT (OUTPATIENT)
Dept: PULMONOLOGY | Facility: CLINIC | Age: 69
End: 2019-10-25
Payer: MEDICARE

## 2019-10-25 VITALS
SYSTOLIC BLOOD PRESSURE: 144 MMHG | OXYGEN SATURATION: 97 % | HEART RATE: 57 BPM | WEIGHT: 230.38 LBS | HEIGHT: 60 IN | TEMPERATURE: 98 F | DIASTOLIC BLOOD PRESSURE: 86 MMHG | BODY MASS INDEX: 45.23 KG/M2

## 2019-10-25 VITALS
SYSTOLIC BLOOD PRESSURE: 144 MMHG | RESPIRATION RATE: 15 BRPM | BODY MASS INDEX: 45.23 KG/M2 | DIASTOLIC BLOOD PRESSURE: 86 MMHG | WEIGHT: 230.38 LBS | OXYGEN SATURATION: 97 % | HEART RATE: 57 BPM | HEIGHT: 60 IN

## 2019-10-25 DIAGNOSIS — G47.21 DELAYED SLEEP PHASE SYNDROME: ICD-10-CM

## 2019-10-25 DIAGNOSIS — R94.2 DIFFUSION CAPACITY OF LUNG (DL), DECREASED: ICD-10-CM

## 2019-10-25 DIAGNOSIS — R41.3 MEMORY LOSS: ICD-10-CM

## 2019-10-25 DIAGNOSIS — M79.671 PAIN IN BOTH FEET: ICD-10-CM

## 2019-10-25 DIAGNOSIS — M17.0 PRIMARY OSTEOARTHRITIS OF BOTH KNEES: ICD-10-CM

## 2019-10-25 DIAGNOSIS — I10 ESSENTIAL HYPERTENSION: Chronic | ICD-10-CM

## 2019-10-25 DIAGNOSIS — I10 ESSENTIAL HYPERTENSION: Primary | Chronic | ICD-10-CM

## 2019-10-25 DIAGNOSIS — M79.672 PAIN IN BOTH FEET: ICD-10-CM

## 2019-10-25 DIAGNOSIS — G47.33 OSA ON CPAP: ICD-10-CM

## 2019-10-25 DIAGNOSIS — I70.0 ATHEROSCLEROSIS OF AORTA: ICD-10-CM

## 2019-10-25 DIAGNOSIS — E78.2 MIXED HYPERLIPIDEMIA: ICD-10-CM

## 2019-10-25 DIAGNOSIS — I27.20 PULMONARY HYPERTENSION: Primary | ICD-10-CM

## 2019-10-25 DIAGNOSIS — I27.20 PULMONARY HYPERTENSION: ICD-10-CM

## 2019-10-25 DIAGNOSIS — Z23 NEED FOR PROPHYLACTIC VACCINATION WITH STREPTOCOCCUS PNEUMONIAE (PNEUMOCOCCUS) AND INFLUENZA VACCINES: ICD-10-CM

## 2019-10-25 DIAGNOSIS — I34.1 MITRAL VALVE PROLAPSE: ICD-10-CM

## 2019-10-25 DIAGNOSIS — J30.2 CHRONIC SEASONAL ALLERGIC RHINITIS: ICD-10-CM

## 2019-10-25 DIAGNOSIS — K21.9 GASTROESOPHAGEAL REFLUX DISEASE, ESOPHAGITIS PRESENCE NOT SPECIFIED: ICD-10-CM

## 2019-10-25 DIAGNOSIS — J98.6 ELEVATED HEMIDIAPHRAGM: ICD-10-CM

## 2019-10-25 PROCEDURE — 90662 IIV NO PRSV INCREASED AG IM: CPT | Mod: PBBFAC

## 2019-10-25 PROCEDURE — 73630 XR FOOT COMPLETE 3 VIEW BILATERAL: ICD-10-PCS | Mod: 26,50,, | Performed by: RADIOLOGY

## 2019-10-25 PROCEDURE — G0009 ADMIN PNEUMOCOCCAL VACCINE: HCPCS | Mod: PBBFAC

## 2019-10-25 PROCEDURE — 99215 OFFICE O/P EST HI 40 MIN: CPT | Mod: PBBFAC,25 | Performed by: INTERNAL MEDICINE

## 2019-10-25 PROCEDURE — 99214 PR OFFICE/OUTPT VISIT, EST, LEVL IV, 30-39 MIN: ICD-10-PCS | Mod: 25,S$PBB,, | Performed by: FAMILY MEDICINE

## 2019-10-25 PROCEDURE — 99999 PR PBB SHADOW E&M-EST. PATIENT-LVL III: CPT | Mod: PBBFAC,,, | Performed by: FAMILY MEDICINE

## 2019-10-25 PROCEDURE — 99214 OFFICE O/P EST MOD 30 MIN: CPT | Mod: S$PBB,,, | Performed by: INTERNAL MEDICINE

## 2019-10-25 PROCEDURE — 99214 PR OFFICE/OUTPT VISIT, EST, LEVL IV, 30-39 MIN: ICD-10-PCS | Mod: S$PBB,,, | Performed by: INTERNAL MEDICINE

## 2019-10-25 PROCEDURE — 73630 X-RAY EXAM OF FOOT: CPT | Mod: 26,50,, | Performed by: RADIOLOGY

## 2019-10-25 PROCEDURE — 99214 OFFICE O/P EST MOD 30 MIN: CPT | Mod: 25,S$PBB,, | Performed by: FAMILY MEDICINE

## 2019-10-25 PROCEDURE — 99999 PR PBB SHADOW E&M-EST. PATIENT-LVL III: ICD-10-PCS | Mod: PBBFAC,,, | Performed by: FAMILY MEDICINE

## 2019-10-25 PROCEDURE — 99999 PR PBB SHADOW E&M-EST. PATIENT-LVL V: ICD-10-PCS | Mod: PBBFAC,,, | Performed by: INTERNAL MEDICINE

## 2019-10-25 PROCEDURE — 99213 OFFICE O/P EST LOW 20 MIN: CPT | Mod: PBBFAC,25,27 | Performed by: FAMILY MEDICINE

## 2019-10-25 PROCEDURE — 73630 X-RAY EXAM OF FOOT: CPT | Mod: 50,TC

## 2019-10-25 PROCEDURE — 99999 PR PBB SHADOW E&M-EST. PATIENT-LVL V: CPT | Mod: PBBFAC,,, | Performed by: INTERNAL MEDICINE

## 2019-10-25 RX ORDER — LOSARTAN POTASSIUM 100 MG/1
100 TABLET ORAL DAILY
Qty: 90 TABLET | Refills: 3 | Status: SHIPPED | OUTPATIENT
Start: 2019-10-25 | End: 2020-10-28 | Stop reason: SDUPTHER

## 2019-10-25 RX ORDER — SIMVASTATIN 20 MG/1
20 TABLET, FILM COATED ORAL NIGHTLY
Qty: 90 TABLET | Refills: 3 | Status: SHIPPED | OUTPATIENT
Start: 2019-10-25 | End: 2020-10-28 | Stop reason: SDUPTHER

## 2019-10-25 RX ORDER — MELOXICAM 15 MG/1
15 TABLET ORAL DAILY PRN
Qty: 30 TABLET | Refills: 0 | Status: SHIPPED | OUTPATIENT
Start: 2019-10-25 | End: 2019-12-10

## 2019-10-25 RX ORDER — HYDROCHLOROTHIAZIDE 25 MG/1
25 TABLET ORAL DAILY
Qty: 90 TABLET | Refills: 3 | Status: SHIPPED | OUTPATIENT
Start: 2019-10-25 | End: 2020-10-28 | Stop reason: SDUPTHER

## 2019-10-25 NOTE — PROGRESS NOTES
ffSubjective:       Patient ID: Yoana Pardo is a 69 y.o. female.    Chief Complaint: Follow-up    69-year-old  female patient with Patient Active Problem List:     Mixed hyperlipidemia     Pulmonary hypertension     MVP (mitral valve prolapse)     Hiatal hernia with GERD     History of positive PPD, untreated     Hemorrhoids     Essential hypertension     Peripheral neuropathy     Primary osteoarthritis of both knees     BMI 40.0-44.9, adult     [place] on CPAP     Chronic seasonal allergic rhinitis     Atherosclerosis of aorta     S/P laparoscopic cholecystectomy     Elevated hemidiaphragm     Diffusion capacity of lung (dl), decreased     Memory loss  Here reports that she has been having bilateral knee pains as well as bilateral feet pain lately, denies any injury or falls.  Reports bilateral feet pain up to 5 to 6/10  Has been suffering from low back pain as well as shoulder pains due to arthritis  Patient has not been watching her diet lately and reports that each she takes fluid pill as needed  Denies any chest pain or difficulty breathing, abdominal discomfort nausea vomiting  Has been walking 4 miles daily  Reports that he she has been out of blood pressure medication for the past few weeks as patient is unable to get losartan hydrochlorothiazide together    Review of Systems   Constitutional: Negative for fatigue.   Eyes: Negative for visual disturbance.   Respiratory: Negative for shortness of breath.    Cardiovascular: Negative for chest pain and leg swelling.   Gastrointestinal: Negative for abdominal pain, nausea and vomiting.   Musculoskeletal: Positive for arthralgias, back pain, gait problem and myalgias.   Skin: Negative for rash.   Neurological: Negative for light-headedness and headaches.   Psychiatric/Behavioral: Negative for sleep disturbance.         BP (!) 144/86   Pulse (!) 57   Temp 97.8 °F (36.6 °C) (Oral)   Ht 5' (1.524 m)   Wt 104.5 kg (230 lb 6.1 oz)   SpO2 97%    BMI 44.99 kg/m²   Objective:      Physical Exam   Constitutional: She is oriented to person, place, and time. She appears well-developed and well-nourished.   HENT:   Head: Normocephalic and atraumatic.   Mouth/Throat: Oropharynx is clear and moist.   Cardiovascular: Normal rate, regular rhythm and normal heart sounds.   No murmur heard.  Pulmonary/Chest: Effort normal and breath sounds normal. She has no wheezes.   Abdominal: Soft. Bowel sounds are normal. There is no tenderness.   Musculoskeletal: She exhibits tenderness. She exhibits no edema.   Positive for bilateral knee tenderness, feet tenderness, paraspinal lumbar muscle tenderness noted bilaterally   Neurological: She is alert and oriented to person, place, and time.   Skin: Skin is warm and dry. No rash noted. No erythema.   Psychiatric: She has a normal mood and affect.         Assessment/Plan:   1. Essential hypertension  - losartan (COZAAR) 100 MG tablet; Take 1 tablet (100 mg total) by mouth once daily.  Dispense: 90 tablet; Refill: 3  - hydroCHLOROthiazide (HYDRODIURIL) 25 MG tablet; Take 1 tablet (25 mg total) by mouth once daily.  Dispense: 90 tablet; Refill: 3  Will split blood pressure medication to losartan 100 mg and hydrochlorothiazide 25 mg  Restrict salt intake and eat low-fat and low-cholesterol diet    2. Primary osteoarthritis of both knees  - meloxicam (MOBIC) 15 MG tablet; Take 1 tablet (15 mg total) by mouth daily as needed.  Dispense: 30 tablet; Refill: 0  Will do a trial of meloxicam secondary to multiple arthralgias secondary to arthritis  Continue graded exercise regimen  If symptoms continue to persist discuss further with orthopedic physician    3. Gastroesophageal reflux disease, esophagitis presence not specified  Stable on Nexium 40 mg daily    4. Atherosclerosis of aorta    5. Chronic seasonal allergic rhinitis  Stable on Flonase and Zyrtec as needed    6. [place] on CPAP  Continue to use CPAP machine regularly    7. Pulmonary  hypertension    8. BMI 40.0-44.9, adult  Continue lifestyle modifications to lose weight with BMI 44    9. Mixed hyperlipidemia  - simvastatin (ZOCOR) 20 MG tablet; Take 1 tablet (20 mg total) by mouth every evening.  Dispense: 90 tablet; Refill: 3  Will give refill on simvastatin 20 mg as patient has been out of cholesterol medication    10. Need for prophylactic vaccination with Streptococcus pneumoniae (Pneumococcus) and Influenza vaccines  - (In Office Administered) Pneumococcal Polysaccharide Vaccine (23 Valent) (SQ/IM)  Pneumovax and flu shot given today    11. Memory loss  Likely age-related    12. Pain in both feet  - meloxicam (MOBIC) 15 MG tablet; Take 1 tablet (15 mg total) by mouth daily as needed.  Dispense: 30 tablet; Refill: 0  - X-Ray Foot Complete 3 view Bilateral; Future  Will get x-ray of bilateral feet to look into further etiology likely secondary to arthritis  Take meloxicam as prescribed to see if there is any improvement  After reviewing x-rays if there is any worsening will refer to Podiatry  Wear comfortable shoes

## 2019-10-25 NOTE — ASSESSMENT & PLAN NOTE
APAP 6-20 cm through [facility]  [place]  Score 12:   Bed time: 12MN  Wake time: 6 30 am  No naps  [facility]

## 2019-10-25 NOTE — PROGRESS NOTES
Subjective:       Patient ID: Yoana Pardo is a 69 y.o. female.  Patient Active Problem List   Diagnosis    Mixed hyperlipidemia    Pulmonary hypertension    MVP (mitral valve prolapse)    Hiatal hernia with GERD    History of positive PPD, untreated    Hemorrhoids    Essential hypertension    Peripheral neuropathy    Primary osteoarthritis of both knees    BMI 40.0-44.9, adult    [place] on CPAP    Chronic seasonal allergic rhinitis    Atherosclerosis of aorta    S/P laparoscopic cholecystectomy    Elevated hemidiaphragm    Diffusion capacity of lung (dl), decreased    Memory loss     Immunization History   Administered Date(s) Administered    Influenza - High Dose - PF (65 years and older) 09/25/2018, 10/25/2019    Influenza - Quadrivalent 10/29/2014    Influenza - Quadrivalent - PF (6 months and older) 10/27/2015, 10/03/2016    Influenza Split 11/22/2006, 11/06/2008, 10/27/2009, 10/19/2012    Pneumococcal Conjugate - 13 Valent 10/15/2018    Tdap 08/29/2016     EPWORTH SLEEPINESS SCALE 10/25/2019   Sitting and reading 2   Watching TV 2   Sitting, inactive in a public place (e.g. a theatre or a meeting) 0   As a passenger in a car for an hour without a break 3   Lying down to rest in the afternoon when circumstances permit 2   Sitting and talking to someone 0   Sitting quietly after a lunch without alcohol 2   In a car, while stopped for a few minutes in traffic 1   Total score 12       Chief Complaint: Sleep Apnea    Ms. Yoana Pardo is 69 y.o.   Last visit 07/22/2019  Follow-up visit to review use of her CPAP and daytime sleepiness  [place] score is 12  Here to review download  Usage > 4 hrs was 16% adherence poor  Has used consistently since 10/13  No dyspnea on exertion.  Function  Class 0.  Weight stable 227lb/230lb  Wake time 6-7 am  Occasional naps  I have reviewed the patient's medical history in detail and updated the computerized patient record.            Review of  Systems   Constitutional: Positive for fatigue.   HENT: Negative for postnasal drip, rhinorrhea and congestion.    Respiratory: Positive for apnea (CPAP through [facility]). Negative for snoring, sputum production, shortness of breath, wheezing, pleurisy and use of rescue inhaler.         Poor adherence since 2019: recurrent sinus infections   Cardiovascular: Negative.    Genitourinary: Negative.    Endocrine: endocrine negative   Musculoskeletal: Negative.    Skin: Negative for rash.   Neurological: Negative.    Psychiatric/Behavioral: Positive for sleep disturbance.       Objective:       Vitals:    10/25/19 1302   BP: (!) 144/86   Pulse: (!) 57   Resp: 15   SpO2: 97%   Weight: 104.5 kg (230 lb 6.1 oz)   Height: 5' (1.524 m)     Physical Exam   Constitutional: She is oriented to person, place, and time. Vital signs are normal. She appears well-developed and well-nourished.     She is obese.   HENT:   Head: Normocephalic.   Nose: No mucosal edema. No polyps.   Mouth/Throat: Oropharynx is clear and moist. No oropharyngeal exudate. Mallampati Score: IV.   Neck: Normal range of motion. Neck supple. No tracheal deviation present. No thyromegaly present.   Cardiovascular: Normal rate and regular rhythm.   No murmur heard.  Pulmonary/Chest: Normal expansion, symmetric chest wall expansion, effort normal and breath sounds normal.   Abdominal: Soft. Bowel sounds are normal.   Musculoskeletal: Normal range of motion. She exhibits edema.   Lymphadenopathy:     She has no cervical adenopathy.   Neurological: She is alert and oriented to person, place, and time. Gait normal.   Skin: Skin is warm and dry. No rash noted. No erythema. Nails show no clubbing.   Psychiatric: She has a normal mood and affect.   Nursing note and vitals reviewed.    Personal Diagnostic Review  DOWNLOAD  2019 - 10/25/2019  : 1950  Age: 69 years  [place]  [address], [address]  [facility] [place], [address]  Phone:  [phone]  Email: [email or web address]  Compliance Report  Usage 07/28/2019 - 10/25/2019  Usage days 14/90 days (16%)  >= 4 hours 14 days (16%)  < 4 hours 0 days (0%)  Usage hours 94 hours 6 minutes  Average usage (total days) 1 hours 3 minutes  Average usage (days used) 6 hours 43 minutes  Median usage (days used) 6 hours 43 minutes  Total used hours (value since last reset - 10/25/2019) 2,452 hours  AirSense 10 AutoSet  Serial number [id number]  Mode AutoSet  Min Pressure 6 cmH2O  Max Pressure 20 cmH2O  EPR Fulltime  EPR level 2  Response Standard  Therapy  Pressure - cmH2O Median: 9.6 95th percentile: 12.8 Maximum: 14.2  Leaks - L/min Median: 0.3 95th percentile: 10.6 Maximum: 24.2  Events per hour AI: 1.7 HI: 0.5 AHI: 2.2  Apnea Index Central: 0.1 Obstructive: 1.5 Unknown: 0.0  RERA Index 0.1  Cheyne-Sosa respiration (average duration per night) 0 minutes (0%)  No flowsheet data found.      Assessment:       Problem List Items Addressed This Visit     Diffusion capacity of lung (dl), decreased    Essential hypertension (Chronic)     STABLE: HYZAAR,         [place] on CPAP     APAP 6-20 cm through [facility]  [place]  Score 12:   Bed time: 12MN  Wake time: 6 30 am  No naps  [facility] nursing [facility]         Pulmonary hypertension - Primary     MPAP FROM [facility]:     mPAP = 0.6146.56  + 2 = 30.4 mmHg.     Mild pulmonary hypertension at best likely secondary to hypoxia.      Recommend diuresis. Repeat ECHO after appropriate diuresis.   Treat [place] with nocturnal PAP              Relevant Orders    2D echo with color flow doppler    X-Ray Chest PA And Lateral    BMI 40.0-44.9, adult     General weight loss/lifestyle modification strategies discussed (elicit support from others; identify saboteurs; non-food rewards).  Diet interventions: low calorie (1000 kCal/d) deficit diet           Atherosclerosis of aorta     Stable noted on chest x-ray         Elevated hemidiaphragm     Breathing exercises.          RESOLVED: Delayed sleep phase syndrome     Preferred bed time ; 12 Mn wake time 7 am               Plan:       Needs to improve adherence to > 4 hrs daily per CMS guidelines  if patient intolerant to CPAP then other therapies need to be considered.      Weight loss and consider bariatric surgery  Daytime sleepiness bettter  Good sleep hygiene,  Flonase instruction  Discussed options; MANDIBULAR ADVANCEMENT DEVICE, INSPIRE, NASAL PROVENT    Follow up in about 6 months (around 4/25/2020), or CXR and echo, improve CPAp adherence, weight loss, .    This note was prepared using voice recognition system and is likely to have sound alike errors that may have been overlooked even after proof reading.  Please call me with any questions    Discussed diagnosis, its evaluation, treatment and usual course. All questions answered.    Thank you for the courtesy of participating in the care of this patient    Ernie Mcintosh MD      Complications of untreated sleep apnea discussed with pateint in detail including but not limited to  high blood pressure, heart attack, stroke, obesity,  diabetes and worsening heart failure. Patient voiced understanding.

## 2019-10-31 ENCOUNTER — EXTERNAL CHRONIC CARE MANAGEMENT (OUTPATIENT)
Dept: PRIMARY CARE CLINIC | Facility: CLINIC | Age: 69
End: 2019-10-31
Payer: MEDICARE

## 2019-10-31 PROCEDURE — 99490 PR CHRONIC CARE MGMT, 1ST 20 MIN: ICD-10-PCS | Mod: S$GLB,,, | Performed by: FAMILY MEDICINE

## 2019-10-31 PROCEDURE — 99490 CHRNC CARE MGMT STAFF 1ST 20: CPT | Mod: S$GLB,,, | Performed by: FAMILY MEDICINE

## 2019-11-22 ENCOUNTER — PATIENT OUTREACH (OUTPATIENT)
Dept: ADMINISTRATIVE | Facility: HOSPITAL | Age: 69
End: 2019-11-22

## 2019-11-22 NOTE — PROGRESS NOTES
Spoke with pt re  scheduling appt with Dr. Wiggins for HTN/Overdue HM. Appt scheduled 12/10/19. Instructed pt on appt.

## 2019-11-30 ENCOUNTER — EXTERNAL CHRONIC CARE MANAGEMENT (OUTPATIENT)
Dept: PRIMARY CARE CLINIC | Facility: CLINIC | Age: 69
End: 2019-11-30
Payer: COMMERCIAL

## 2019-11-30 PROCEDURE — 99490 CHRNC CARE MGMT STAFF 1ST 20: CPT | Mod: S$GLB,,, | Performed by: FAMILY MEDICINE

## 2019-11-30 PROCEDURE — 99490 PR CHRONIC CARE MGMT, 1ST 20 MIN: ICD-10-PCS | Mod: S$GLB,,, | Performed by: FAMILY MEDICINE

## 2019-12-10 ENCOUNTER — HOSPITAL ENCOUNTER (OUTPATIENT)
Dept: RADIOLOGY | Facility: HOSPITAL | Age: 69
Discharge: HOME | End: 2019-12-10
Attending: FAMILY MEDICINE
Payer: MEDICARE

## 2019-12-10 ENCOUNTER — OFFICE VISIT (OUTPATIENT)
Dept: INTERNAL MEDICINE | Facility: CLINIC | Age: 69
End: 2019-12-10
Payer: MEDICARE

## 2019-12-10 VITALS
RESPIRATION RATE: 18 BRPM | SYSTOLIC BLOOD PRESSURE: 138 MMHG | HEART RATE: 75 BPM | HEIGHT: 60 IN | WEIGHT: 236.75 LBS | OXYGEN SATURATION: 98 % | TEMPERATURE: 98 F | DIASTOLIC BLOOD PRESSURE: 100 MMHG | BODY MASS INDEX: 46.48 KG/M2

## 2019-12-10 DIAGNOSIS — K92.1: ICD-10-CM

## 2019-12-10 DIAGNOSIS — J06.9 VIRAL URI WITH COUGH: ICD-10-CM

## 2019-12-10 DIAGNOSIS — J06.9 VIRAL URI WITH COUGH: Primary | ICD-10-CM

## 2019-12-10 DIAGNOSIS — G47.33 OSA ON CPAP: ICD-10-CM

## 2019-12-10 DIAGNOSIS — E66.01 MORBID OBESITY WITH BMI OF 45.0-49.9, ADULT: ICD-10-CM

## 2019-12-10 DIAGNOSIS — J30.2 CHRONIC SEASONAL ALLERGIC RHINITIS: ICD-10-CM

## 2019-12-10 DIAGNOSIS — I16.0 HYPERTENSIVE URGENCY: ICD-10-CM

## 2019-12-10 DIAGNOSIS — I10 ESSENTIAL HYPERTENSION: Chronic | ICD-10-CM

## 2019-12-10 PROCEDURE — 1126F PR PAIN SEVERITY QUANTIFIED, NO PAIN PRESENT: ICD-10-PCS | Mod: ,,, | Performed by: FAMILY MEDICINE

## 2019-12-10 PROCEDURE — 99214 OFFICE O/P EST MOD 30 MIN: CPT | Mod: S$PBB,,, | Performed by: FAMILY MEDICINE

## 2019-12-10 PROCEDURE — 99214 PR OFFICE/OUTPT VISIT, EST, LEVL IV, 30-39 MIN: ICD-10-PCS | Mod: S$PBB,,, | Performed by: FAMILY MEDICINE

## 2019-12-10 PROCEDURE — 71046 XR CHEST PA AND LATERAL: ICD-10-PCS | Mod: 26,,, | Performed by: RADIOLOGY

## 2019-12-10 PROCEDURE — 71046 X-RAY EXAM CHEST 2 VIEWS: CPT | Mod: 26,,, | Performed by: RADIOLOGY

## 2019-12-10 PROCEDURE — 99999 PR PBB SHADOW E&M-EST. PATIENT-LVL IV: CPT | Mod: PBBFAC,,, | Performed by: FAMILY MEDICINE

## 2019-12-10 PROCEDURE — 71046 X-RAY EXAM CHEST 2 VIEWS: CPT | Mod: TC

## 2019-12-10 PROCEDURE — 99999 PR PBB SHADOW E&M-EST. PATIENT-LVL IV: ICD-10-PCS | Mod: PBBFAC,,, | Performed by: FAMILY MEDICINE

## 2019-12-10 PROCEDURE — 1159F MED LIST DOCD IN RCRD: CPT | Mod: ,,, | Performed by: FAMILY MEDICINE

## 2019-12-10 PROCEDURE — 1126F AMNT PAIN NOTED NONE PRSNT: CPT | Mod: ,,, | Performed by: FAMILY MEDICINE

## 2019-12-10 PROCEDURE — 1159F PR MEDICATION LIST DOCUMENTED IN MEDICAL RECORD: ICD-10-PCS | Mod: ,,, | Performed by: FAMILY MEDICINE

## 2019-12-10 PROCEDURE — 99214 OFFICE O/P EST MOD 30 MIN: CPT | Mod: PBBFAC,25 | Performed by: FAMILY MEDICINE

## 2019-12-10 RX ORDER — PROMETHAZINE HYDROCHLORIDE AND DEXTROMETHORPHAN HYDROBROMIDE 6.25; 15 MG/5ML; MG/5ML
5 SYRUP ORAL NIGHTLY PRN
Qty: 118 ML | Refills: 0 | Status: SHIPPED | OUTPATIENT
Start: 2019-12-10 | End: 2019-12-20

## 2019-12-10 RX ORDER — AMLODIPINE BESYLATE 5 MG/1
5 TABLET ORAL DAILY
Qty: 30 TABLET | Refills: 2 | Status: SHIPPED | OUTPATIENT
Start: 2019-12-10 | End: 2021-02-18

## 2019-12-10 NOTE — PROGRESS NOTES
Subjective:       Patient ID: Yoana Pardo is a 69 y.o. female.    Chief Complaint: Follow-up; Cough; Nasal Congestion; and Itchy Throat    69-year-old  female patient with Patient Active Problem List:     Mixed hyperlipidemia     Pulmonary hypertension     MVP (mitral valve prolapse)     Hiatal hernia with GERD     History of positive PPD, untreated     Hemorrhoids     Essential hypertension     Peripheral neuropathy     Primary osteoarthritis of both knees     Morbid obesity with BMI of 45.0-49.9, adult     [place] on CPAP     Chronic seasonal allergic rhinitis     Atherosclerosis of aorta     S/P laparoscopic cholecystectomy     Elevated hemidiaphragm     Diffusion capacity of lung (dl), decreased     Memory loss  Here with complaint of productive cough with sputum, nasal congestion, runny nose for the past 3-4 weeks.  Denies any fever with chills, has been having occasional wheezing secondary to persistent cough  Denies any chest pain or difficulty breathing, abdominal discomfort nausea vomiting  Reports has been having occasional headaches lately and reports that she has been taking her blood pressure medications regularly  Patient reports occasionally noticing bright red blood in the stool, denies any dizziness or abdominal pain    Review of Systems   Constitutional: Negative for chills, fatigue and fever.   HENT: Positive for congestion, postnasal drip, rhinorrhea and sneezing.    Eyes: Negative for visual disturbance.   Respiratory: Positive for cough. Negative for shortness of breath.    Cardiovascular: Negative for chest pain and leg swelling.   Gastrointestinal: Positive for blood in stool and constipation. Negative for abdominal pain, nausea and vomiting.   Musculoskeletal: Negative for myalgias.   Skin: Negative for rash.   Neurological: Positive for headaches. Negative for light-headedness.   Psychiatric/Behavioral: Negative for sleep disturbance.         BP (!) 138/100 (BP  Location: Right arm, Patient Position: Sitting, BP Method: Large (Manual))   Pulse 75   Temp 98.2 °F (36.8 °C) (Tympanic)   Resp 18   Ht 5' (1.524 m)   Wt 107.4 kg (236 lb 12.4 oz)   SpO2 98%   BMI 46.24 kg/m²   Objective:      Physical Exam   Constitutional: She is oriented to person, place, and time. She appears well-developed and well-nourished.   HENT:   Head: Normocephalic and atraumatic.   Right Ear: External ear normal.   Left Ear: External ear normal.   Mouth/Throat: Oropharynx is clear and moist.   Neck: Neck supple.   Cardiovascular: Normal rate, regular rhythm and normal heart sounds.   No murmur heard.  Pulmonary/Chest: Effort normal and breath sounds normal. No respiratory distress. She has no wheezes.   Abdominal: Soft. Bowel sounds are normal. There is no tenderness.   Musculoskeletal: She exhibits no edema.   Lymphadenopathy:     She has no cervical adenopathy.   Neurological: She is alert and oriented to person, place, and time.   Skin: Skin is warm and dry. No rash noted.   Psychiatric: She has a normal mood and affect.         Assessment/Plan:   1. Viral URI with cough  - promethazine-dextromethorphan (PROMETHAZINE-DM) 6.25-15 mg/5 mL Syrp; Take 5 mLs by mouth nightly as needed.  Dispense: 118 mL; Refill: 0  - X-Ray Chest PA And Lateral; Future  Patient was advised to avoid taking decongestants over-the-counter  Promethazine DM cough syrup prescribed today for symptomatic relief and will get chest x-ray and will consider treating with antibiotics if positive for infection    2. Hypertensive urgency  3. Essential hypertension  - amLODIPine (NORVASC) 5 MG tablet; Take 1 tablet (5 mg total) by mouth once daily.  Dispense: 30 tablet; Refill: 2  - Basic metabolic panel; Future  Noted elevated blood pressure  Will add amlodipine 5 mg to losartan 100 mg and hydrochlorothiazide 25 mg  Follow-up in 1 week to 10 days for nurse visit for blood pressure check    4. Chronic seasonal allergic  rhinitis  Continue using nasal spray and advised to take over-the-counter Zyrtec  Drink adequate fluids    5. [place] on CPAP    6. Morbid obesity with BMI of 45.0-49.9, adult    7. Blood in stool, ranjeet  - CBC auto differential; Future  - Ambulatory Referral to Gastroenterology  - Occult Blood Stool, CA Screen; Future  Reports blood in the stool could be secondary to hemorrhoids but will check further labs and will refer to Gastroenterology  Advised to try over-the-counter preparation H cream and eat fiber rich diet

## 2019-12-12 ENCOUNTER — LAB VISIT (OUTPATIENT)
Dept: LAB | Facility: HOSPITAL | Age: 69
End: 2019-12-12
Payer: MEDICARE

## 2019-12-12 DIAGNOSIS — K92.1: ICD-10-CM

## 2019-12-12 LAB — OB PNL STL: NEGATIVE

## 2019-12-12 PROCEDURE — 82270 OCCULT BLOOD FECES: CPT

## 2019-12-31 ENCOUNTER — EXTERNAL CHRONIC CARE MANAGEMENT (OUTPATIENT)
Dept: PRIMARY CARE CLINIC | Facility: CLINIC | Age: 69
End: 2019-12-31
Payer: COMMERCIAL

## 2019-12-31 PROCEDURE — 99490 CHRNC CARE MGMT STAFF 1ST 20: CPT | Mod: S$GLB,,, | Performed by: FAMILY MEDICINE

## 2019-12-31 PROCEDURE — 99490 PR CHRONIC CARE MGMT, 1ST 20 MIN: ICD-10-PCS | Mod: S$GLB,,, | Performed by: FAMILY MEDICINE

## 2020-01-29 ENCOUNTER — TELEPHONE (OUTPATIENT)
Dept: GASTROENTEROLOGY | Facility: CLINIC | Age: 70
End: 2020-01-29

## 2020-01-29 ENCOUNTER — OFFICE VISIT (OUTPATIENT)
Dept: GASTROENTEROLOGY | Facility: CLINIC | Age: 70
End: 2020-01-29
Payer: MEDICARE

## 2020-01-29 VITALS
BODY MASS INDEX: 46.13 KG/M2 | HEART RATE: 51 BPM | WEIGHT: 235 LBS | SYSTOLIC BLOOD PRESSURE: 132 MMHG | HEIGHT: 60 IN | DIASTOLIC BLOOD PRESSURE: 78 MMHG

## 2020-01-29 DIAGNOSIS — K92.1 HEMATOCHEZIA: Primary | ICD-10-CM

## 2020-01-29 PROCEDURE — 1159F PR MEDICATION LIST DOCUMENTED IN MEDICAL RECORD: ICD-10-PCS | Mod: ,,, | Performed by: INTERNAL MEDICINE

## 2020-01-29 PROCEDURE — 99203 OFFICE O/P NEW LOW 30 MIN: CPT | Mod: S$PBB,,, | Performed by: INTERNAL MEDICINE

## 2020-01-29 PROCEDURE — 1126F PR PAIN SEVERITY QUANTIFIED, NO PAIN PRESENT: ICD-10-PCS | Mod: ,,, | Performed by: INTERNAL MEDICINE

## 2020-01-29 PROCEDURE — 99203 PR OFFICE/OUTPT VISIT, NEW, LEVL III, 30-44 MIN: ICD-10-PCS | Mod: S$PBB,,, | Performed by: INTERNAL MEDICINE

## 2020-01-29 PROCEDURE — 1126F AMNT PAIN NOTED NONE PRSNT: CPT | Mod: ,,, | Performed by: INTERNAL MEDICINE

## 2020-01-29 PROCEDURE — 99999 PR PBB SHADOW E&M-EST. PATIENT-LVL III: CPT | Mod: PBBFAC,,, | Performed by: INTERNAL MEDICINE

## 2020-01-29 PROCEDURE — 1159F MED LIST DOCD IN RCRD: CPT | Mod: ,,, | Performed by: INTERNAL MEDICINE

## 2020-01-29 PROCEDURE — 99999 PR PBB SHADOW E&M-EST. PATIENT-LVL III: ICD-10-PCS | Mod: PBBFAC,,, | Performed by: INTERNAL MEDICINE

## 2020-01-29 PROCEDURE — 99213 OFFICE O/P EST LOW 20 MIN: CPT | Mod: PBBFAC | Performed by: INTERNAL MEDICINE

## 2020-01-29 RX ORDER — SODIUM, POTASSIUM,MAG SULFATES 17.5-3.13G
1 SOLUTION, RECONSTITUTED, ORAL ORAL ONCE
Qty: 1 BOTTLE | Refills: 0 | Status: SHIPPED | OUTPATIENT
Start: 2020-01-29 | End: 2020-01-29

## 2020-01-29 NOTE — PROGRESS NOTES
Clinic Consult:  [facility] Consultation Note    Reason for Consult:  The encounter diagnosis was Hematochezia.    PCP: Lynn Wiggins   [address] [facility] / [place] [address]    HPI:  This is a 69 y.o. female here for evaluation of hematochezia.  Has had several episodes of blood with a BM and sometimes just blood when she goes to the bathroom.  This has been happening for the past 4-5 months.  Has history of hemorrhoids.  Does endorse crampy abdominal pain prior to a BM.  Has occasional loose stool.      GI history:  Endoscopy:    Colonoscopy: 2012, normal, repeat in 10 years   Family history: negative for crc  No anticoagulation    ROS:  CONSTITUTIONAL: Denies weight change,  fatigue, fevers, chills, night sweats.  EYES: No changes in vision.   ENT: No oral lesions or sore throat.  HEMATOLOGICAL/Lymph: Denies bleeding tendency, bruising tendency. No swellings or enlarged lymph nodes.  CARDIOVASCULAR: Denies chest pain, shortness of breath, orthopnea and edema.  RESPIRATORY: Denies cough, hemoptysis, dyspnea, and wheezing.  GI: See HPI.  : Denies dysuria and hematuria  MUSCULOSKELETAL: Denies joint pain or swelling, back pain and muscle pain.  SKIN: Denies rashes.  NEUROLOGIC: Denies headaches, seizures and numbness.  PSYCHIATRIC: Denies depression or anxiety.  ENDOCRINE: Denies heat or cold intolerance and excessive thirst or urination.    Medical History:   Past Medical History:   Diagnosis Date    GERD (gastroesophageal reflux disease)     Hemorrhoids     History of positive PPD, untreated     Hx of cold sores     Hyperlipidemia     Hypertension     MVP (mitral valve prolapse)     Peripheral neuropathy     Pulmonary HTN     Dr Bailon    Sleep apnea     has  but not using CPAP       Surgical History:  Past Surgical History:   Procedure Laterality Date    CHOLECYSTECTOMY      DILATION AND CURETTAGE OF UTERUS         Family History:   Family History   Problem Relation Age of Onset     Heart disease Mother     Obesity Mother     Kidney disease Father     Hypertension Father     Obesity Father     Heart disease Father     Diabetes Sister     Aneurysm Sister         AAA       Social History:   Social History     Tobacco Use    Smoking status: Never Smoker    Smokeless tobacco: Never Used   Substance Use Topics    Alcohol use: Yes     Comment: rarely    Drug use: No       Allergies: Reviewed    Home Medications:   Medication List with Changes/Refills   New Medications    SODIUM,POTASSIUM,MAG SULFATES (SUPREP BOWEL PREP KIT) 17.5-3.13-1.6 GRAM SOLR    Take 177 mLs by mouth once. Use as directed for 1 dose   Current Medications    AMLODIPINE (NORVASC) 5 MG TABLET    Take 1 tablet (5 mg total) by mouth once daily.    ASPIRIN (ECOTRIN) 81 MG EC TABLET    Take 81 mg by mouth once daily.    ESOMEPRAZOLE (NEXIUM) 40 MG CAPSULE    Take 1 capsule (40 mg total) by mouth before breakfast.    FLUTICASONE (FLONASE) 50 MCG/ACTUATION NASAL SPRAY    1 spray (50 mcg total) by Each Nare route once daily.    FUROSEMIDE (LASIX) 20 MG TABLET    Take 1 tablet (20 mg total) by mouth 2 (two) times daily as needed.    GABAPENTIN (NEURONTIN) 100 MG CAPSULE    Take 1 capsule (100 mg total) by mouth nightly as needed.    HYDROCHLOROTHIAZIDE (HYDRODIURIL) 25 MG TABLET    Take 1 tablet (25 mg total) by mouth once daily.    IPRATROPIUM (ATROVENT) 0.03 % NASAL SPRAY    2 sprays by Nasal route every 8 (eight) hours as needed for Rhinitis.    LOSARTAN (COZAAR) 100 MG TABLET    Take 1 tablet (100 mg total) by mouth once daily.    SIMVASTATIN (ZOCOR) 20 MG TABLET    Take 1 tablet (20 mg total) by mouth every evening.         Physical Exam:  Vital Signs:  /78   Pulse (!) 51   Ht 5' (1.524 m)   Wt 106.6 kg (235 lb 0.2 oz)   BMI 45.90 kg/m²   Body mass index is 45.9 kg/m².      GENERAL: No acute distress, A&Ox3  EYES: Anicteric, no pallor noted.  ENT: OP clear  NECK: Supple, no masses, no thyromegally.  CHEST:  Equal breath sounds bilaterally, no wheezing.  CARDIOVASCULAR: Regular rate and rhythm. Murmurs, rubs and gallops absent.  ABDOMEN: soft, non-tender, non-distended, normal bowel sounds, no hepatosplenomegaly   EXTREMITIES: No clubbing, cyanosis or edema.  SKIN: Without lesion or erythema.  LYMPH: No cervical, axillary lymphadenopathy palpable.   NEUROLOGICAL: Grossly normal, no asterixis present.    Labs: Pertinent labs reviewed.    Assessment and Plan:  Hematochezia  -     Case request GI: COLONOSCOPY    Other orders  -     sodium,potassium,mag sulfates (SUPREP BOWEL PREP KIT) 17.5-3.13-1.6 gram SolR; Take 177 mLs by mouth once. Use as directed for 1 dose  Dispense: 1 Bottle; Refill: 0        Thank you so much for allowing me to participate in the care of Yoana Magana MD

## 2020-01-29 NOTE — LETTER
January 29, 2020      Lynn Wiggins MD  [address] [place] [address]           [facility]  [address] [place] [address]  Phone: [phone]  Fax: [phone]          Patient: Yoana Pardo   MR Number: [id number]   YOB: 1950   Date of Visit: 1/29/2020       Dear Dr. Lynn Wiggins:    Thank you for referring Yoana Pardo to me for evaluation. Attached you will find relevant portions of my assessment and plan of care.    If you have questions, please do not hesitate to call me. I look forward to following Yoana Pardo along with you.    Sincerely,    Saba Magana MD    Enclosure  CC:  No Recipients    If you would like to receive this communication electronically, please contact [email or web address] or [phone] to request more information on [facility] Link access.    For providers and/or their staff who would like to refer a patient to [facility], please contact us through our one-stop-shop provider referral line, [facility], at [phone].    If you feel you have received this communication in error or would no longer like to receive these types of communications, please e-mail [email or web address]

## 2020-01-29 NOTE — TELEPHONE ENCOUNTER
"1. Have you been admitted overnight to the hospital in the past 3 months?    If yes, schedule an appointment with PCP before scheduling endoscopic procedure.     2. Have you had a stent placed in the last 12 months?    If yes, for a screening visit, cancel and message the ordering provider.  The patient will need a new order when the time is appropriate.     3. Have you had a stroke or heart attack in the past 6 months? no   If yes, cancel and refer patient to ordering provider for clearance, also message ordering provider to inform.     4. Have you had any chest pain in the past 3 months? no   If yes, Have you been evaluated by your PCP and/or cardiologist and it was determined to not be heart related? no   If No, Pt needs to be seen by PCP or Cardiologist .  Pt can be scheduled once clearance obtained by either of those providers.     5. Do you take prescription weight loss medications?  no   If yes, must stop for 2 weeks prior to procedure.     6. Have you been diagnosed with diverticulitis within the past 3 months? no   If yes, must have been seen by GI within the last 3 months, if not schedule with GI SRINATH.    If pt has been seen by GI, schedule procedure 8-12 weeks post antibiotic treatment.     7. Are you on Dialysis? no  If yes, schedule procedure for the day AFTER dialysis.  Appt time should be 9am or later, patient arrival time is 2 hours prior.  Nulytely or miralax prep for all patients with kidney disease.     8. Are you diabetic?  no   If yes, schedule morning appt. Advise pt to hold all diabetic meds day of procedure.     9. If pt is older than 80 years of age and HAS NOT been seen by GI or PCP within the last 6 months, needs appt with GI SRINATH.   If pt has been seen by the GI provider or PCP within the past 6  months AND meets criteria, schedule procedure AND send message to the endoscopist.     10. Is patient on a "high risk" medication (blood thinner/antiplatelet agent)?  no   If yes, has cardiac " clearance been obtained within the last 60 days? No   If no, a new clearance needs to be obtained.       I have reviewed the last colonoscopy for recommendations regarding next procedure bowel prep.  yes  I have reviewed medications and allergies.  no  I have verified the pharmacy information and appropriate prep sent if needed. yes  Prep instructions have been mailed or sent to portal per patient request. no    If answers yes to any of the following, schedule at [facility] ONLY. If No, OK for either location.     Is BMI over 45?   Any complaints of chest pain, new onset or at rest?  Does pt have an AICD?  Is there a diagnosis of heart failure?  Is patient on dialysis?  Does patient have an insulin pump?  If procedure for esophageal banding?

## 2020-01-31 ENCOUNTER — EXTERNAL CHRONIC CARE MANAGEMENT (OUTPATIENT)
Dept: PRIMARY CARE CLINIC | Facility: CLINIC | Age: 70
End: 2020-01-31
Payer: MEDICARE

## 2020-01-31 PROCEDURE — 99490 CHRNC CARE MGMT STAFF 1ST 20: CPT | Mod: S$GLB,,, | Performed by: FAMILY MEDICINE

## 2020-01-31 PROCEDURE — 99490 PR CHRONIC CARE MGMT, 1ST 20 MIN: ICD-10-PCS | Mod: S$GLB,,, | Performed by: FAMILY MEDICINE

## 2020-03-18 DIAGNOSIS — I27.20 PULMONARY HYPERTENSION: ICD-10-CM

## 2020-03-19 RX ORDER — FUROSEMIDE 20 MG/1
TABLET ORAL
Qty: 60 TABLET | Refills: 0 | Status: SHIPPED | OUTPATIENT
Start: 2020-03-19 | End: 2020-07-21

## 2020-03-20 ENCOUNTER — TELEPHONE (OUTPATIENT)
Dept: PULMONOLOGY | Facility: CLINIC | Age: 70
End: 2020-03-20

## 2020-03-20 NOTE — TELEPHONE ENCOUNTER
----- Message from RT Saurabh sent at 3/20/2020 12:51 PM CDT -----  Due to concerns associated with Covid19 the radiology team is seeking to reschedule outpatient diagnostic exams between 3/21 - 4/21 that have been ordered prior to 3/19.  Yoana Pardo is scheduled for x-ray on 4/21/2020 at [facility].  Please respond to this message if you believe it is safe to postpone this exam and the radiology team will reschedule and update you on the patient's response.  If you have concerns that postponement is not safe (urgent or time sensitive diagnostic study), then reply and it will be performed as ordered.   If further discussion needed, please provide a contact number and a Radiologist will reach out to you shortly.

## 2020-03-30 ENCOUNTER — PATIENT MESSAGE (OUTPATIENT)
Dept: CARDIOLOGY | Facility: HOSPITAL | Age: 70
End: 2020-03-30

## 2020-03-30 ENCOUNTER — TELEPHONE (OUTPATIENT)
Dept: CARDIOLOGY | Facility: HOSPITAL | Age: 70
End: 2020-03-30

## 2020-03-30 NOTE — TELEPHONE ENCOUNTER
----- Message from Ernie Mcintosh MD sent at 3/30/2020  9:01 AM CDT -----  Can wait  Ernie Mcintosh MD    ----- Message -----  From: Thania Mahajan  Sent: 3/30/2020   7:55 AM CDT  To: Ernie Mcintosh MD    At this time we are only doing testing on medically urgent patients. Is this patients echo that is scheduled for 4/21 medically urgent or can it be pushed to after May 15? Thank you.

## 2020-04-24 ENCOUNTER — TELEPHONE (OUTPATIENT)
Dept: INTERNAL MEDICINE | Facility: CLINIC | Age: 70
End: 2020-04-24

## 2020-04-24 ENCOUNTER — TELEPHONE (OUTPATIENT)
Dept: ENDOSCOPY | Facility: HOSPITAL | Age: 70
End: 2020-04-24

## 2020-04-24 NOTE — TELEPHONE ENCOUNTER
Attempted to reschedule procedure. Pt failed screening questionnaire ai relates to chest pain. Pt has an appt on 05-21-20 for an Echocardiogram at [facility]. Pt will schedule once cleared. veronica

## 2020-05-12 ENCOUNTER — PATIENT OUTREACH (OUTPATIENT)
Dept: ADMINISTRATIVE | Facility: OTHER | Age: 70
End: 2020-05-12

## 2020-05-12 ENCOUNTER — OFFICE VISIT (OUTPATIENT)
Dept: OPHTHALMOLOGY | Facility: CLINIC | Age: 70
End: 2020-05-12
Payer: MEDICARE

## 2020-05-12 DIAGNOSIS — H52.4 MYOPIA WITH ASTIGMATISM AND PRESBYOPIA, BILATERAL: ICD-10-CM

## 2020-05-12 DIAGNOSIS — Z12.31 ENCOUNTER FOR SCREENING MAMMOGRAM FOR MALIGNANT NEOPLASM OF BREAST: Primary | ICD-10-CM

## 2020-05-12 DIAGNOSIS — H52.203 MYOPIA WITH ASTIGMATISM AND PRESBYOPIA, BILATERAL: ICD-10-CM

## 2020-05-12 DIAGNOSIS — H25.013 CORTICAL AGE-RELATED CATARACT OF BOTH EYES: ICD-10-CM

## 2020-05-12 DIAGNOSIS — H25.13 NUCLEAR SCLEROSIS, BILATERAL: Primary | ICD-10-CM

## 2020-05-12 DIAGNOSIS — H52.13 MYOPIA WITH ASTIGMATISM AND PRESBYOPIA, BILATERAL: ICD-10-CM

## 2020-05-12 PROCEDURE — 99999 PR PBB SHADOW E&M-EST. PATIENT-LVL I: CPT | Mod: PBBFAC,,, | Performed by: OPTOMETRIST

## 2020-05-12 PROCEDURE — 92015 PR REFRACTION: ICD-10-PCS | Mod: S$GLB,,, | Performed by: OPTOMETRIST

## 2020-05-12 PROCEDURE — 92014 PR EYE EXAM, EST PATIENT,COMPREHESV: ICD-10-PCS | Mod: S$GLB,,, | Performed by: OPTOMETRIST

## 2020-05-12 PROCEDURE — 99211 OFF/OP EST MAY X REQ PHY/QHP: CPT | Mod: PBBFAC | Performed by: OPTOMETRIST

## 2020-05-12 PROCEDURE — 99999 PR PBB SHADOW E&M-EST. PATIENT-LVL I: ICD-10-PCS | Mod: PBBFAC,,, | Performed by: OPTOMETRIST

## 2020-05-12 PROCEDURE — 92015 DETERMINE REFRACTIVE STATE: CPT | Mod: S$GLB,,, | Performed by: OPTOMETRIST

## 2020-05-12 PROCEDURE — 92014 COMPRE OPH EXAM EST PT 1/>: CPT | Mod: S$GLB,,, | Performed by: OPTOMETRIST

## 2020-05-12 NOTE — PROGRESS NOTES
HPI     Hypertensive Eye Exam      Additional comments: Yearly              Comments     NP to DNL  Last seen by [facility] on 12/5/17 for yearly eye exam  Patient here today fr yearly eye exam  HPI    Any vision changes since last exam: No   Eye pain: No  Other ocular symptoms: No    Do you wear currently wear glasses or contacts? None    Interested in contacts today? No    Do you plan on getting new glasses today? If needed    1. Dry eyes OU  2. NSC OU              Last edited by Trini Gilmore, PCT on 5/12/2020 10:43 AM. (History)              Assessment /Plan     For exam results, see Encounter Report.    Nuclear sclerosis, bilateral  Cortical age-related cataract of both eyes  Surgery is not indicated at this time.   Monitor 12 months.    Myopia with astigmatism and presbyopia, bilateral  Eyeglass Final Rx     Eyeglass Final Rx       Sphere Cylinder Axis Add    Right -1.50 +0.75 055 +2.75    Left -3.00 +0.25 100 +2.75    Expiration Date:  5/13/2021    Comments:  PD 67/64              RTC 1 yr for dilated eye exam or PRN if any problems.   Discussed above and answered questions.

## 2020-05-21 ENCOUNTER — HOSPITAL ENCOUNTER (OUTPATIENT)
Dept: CARDIOLOGY | Facility: HOSPITAL | Age: 70
Discharge: HOME | End: 2020-05-21
Attending: INTERNAL MEDICINE
Payer: MEDICARE

## 2020-05-21 VITALS
DIASTOLIC BLOOD PRESSURE: 78 MMHG | BODY MASS INDEX: 46.13 KG/M2 | WEIGHT: 235 LBS | SYSTOLIC BLOOD PRESSURE: 132 MMHG | HEIGHT: 60 IN

## 2020-05-21 DIAGNOSIS — I34.1 MITRAL VALVE PROLAPSE: ICD-10-CM

## 2020-05-21 DIAGNOSIS — I27.20 PULMONARY HYPERTENSION: ICD-10-CM

## 2020-05-21 LAB
AORTIC ROOT ANNULUS: 2.94 CM
AV INDEX (PROSTH): 0.71
AV MEAN GRADIENT: 6 MMHG
AV PEAK GRADIENT: 11 MMHG
AV VALVE AREA: 2.12 CM2
AV VELOCITY RATIO: 0.61
BSA FOR ECHO PROCEDURE: 2.12 M2
CV ECHO LV RWT: 0.65 CM
DOP CALC AO PEAK VEL: 1.65 M/S
DOP CALC AO VTI: 40.48 CM
DOP CALC LVOT AREA: 3 CM2
DOP CALC LVOT DIAMETER: 1.95 CM
DOP CALC LVOT PEAK VEL: 1.01 M/S
DOP CALC LVOT STROKE VOLUME: 85.91 CM3
DOP CALCLVOT PEAK VEL VTI: 28.78 CM
E WAVE DECELERATION TIME: 197.2 MSEC
E/A RATIO: 1.23
E/E' RATIO: 6.09 M/S
ECHO LV POSTERIOR WALL: 1.28 CM (ref 0.6–1.1)
FRACTIONAL SHORTENING: 30 % (ref 28–44)
INTERVENTRICULAR SEPTUM: 1.57 CM (ref 0.6–1.1)
IVRT: 91.34 MSEC
LA MAJOR: 5.92 CM
LA MINOR: 5.88 CM
LA WIDTH: 4.11 CM
LEFT ATRIUM SIZE: 4.51 CM
LEFT ATRIUM VOLUME INDEX: 46.5 ML/M2
LEFT ATRIUM VOLUME: 92.96 CM3
LEFT INTERNAL DIMENSION IN SYSTOLE: 2.75 CM (ref 2.1–4)
LEFT VENTRICLE DIASTOLIC VOLUME INDEX: 33.5 ML/M2
LEFT VENTRICLE DIASTOLIC VOLUME: 66.97 ML
LEFT VENTRICLE MASS INDEX: 105 G/M2
LEFT VENTRICLE SYSTOLIC VOLUME INDEX: 14.2 ML/M2
LEFT VENTRICLE SYSTOLIC VOLUME: 28.33 ML
LEFT VENTRICULAR INTERNAL DIMENSION IN DIASTOLE: 3.93 CM (ref 3.5–6)
LEFT VENTRICULAR MASS: 209.4 G
LV LATERAL E/E' RATIO: 5.83 M/S
LV SEPTAL E/E' RATIO: 6.36 M/S
MV PEAK A VEL: 0.57 M/S
MV PEAK E VEL: 0.7 M/S
PISA TR MAX VEL: 3.78 M/S
PULM VEIN S/D RATIO: 1.58
PV PEAK D VEL: 0.31 M/S
PV PEAK S VEL: 0.49 M/S
PV PEAK VELOCITY: 1.18 CM/S
RA MAJOR: 4.94 CM
RA PRESSURE: 3 MMHG
RA WIDTH: 3.26 CM
RIGHT VENTRICULAR END-DIASTOLIC DIMENSION: 2.92 CM
SINUS: 2.57 CM
STJ: 2.35 CM
TDI LATERAL: 0.12 M/S
TDI SEPTAL: 0.11 M/S
TDI: 0.12 M/S
TR MAX PG: 57 MMHG
TRICUSPID ANNULAR PLANE SYSTOLIC EXCURSION: 2.36 CM
TV REST PULMONARY ARTERY PRESSURE: 60 MMHG

## 2020-05-21 PROCEDURE — 93306 TTE W/DOPPLER COMPLETE: CPT | Mod: 26,,, | Performed by: INTERNAL MEDICINE

## 2020-05-21 PROCEDURE — 93306 TTE W/DOPPLER COMPLETE: CPT

## 2020-05-21 PROCEDURE — 93306 ECHO (CUPID ONLY): ICD-10-PCS | Mod: 26,,, | Performed by: INTERNAL MEDICINE

## 2020-06-04 ENCOUNTER — PATIENT MESSAGE (OUTPATIENT)
Dept: ADMINISTRATIVE | Facility: OTHER | Age: 70
End: 2020-06-04

## 2020-06-07 ENCOUNTER — PATIENT OUTREACH (OUTPATIENT)
Dept: ADMINISTRATIVE | Facility: OTHER | Age: 70
End: 2020-06-07

## 2020-06-08 ENCOUNTER — OFFICE VISIT (OUTPATIENT)
Dept: PULMONOLOGY | Facility: CLINIC | Age: 70
End: 2020-06-08
Payer: MEDICARE

## 2020-06-08 ENCOUNTER — HOSPITAL ENCOUNTER (OUTPATIENT)
Dept: RADIOLOGY | Facility: HOSPITAL | Age: 70
Discharge: HOME | End: 2020-06-08
Attending: INTERNAL MEDICINE
Payer: MEDICARE

## 2020-06-08 ENCOUNTER — CLINICAL SUPPORT (OUTPATIENT)
Dept: PULMONOLOGY | Facility: CLINIC | Age: 70
End: 2020-06-08
Payer: MEDICARE

## 2020-06-08 VITALS
HEART RATE: 69 BPM | DIASTOLIC BLOOD PRESSURE: 76 MMHG | BODY MASS INDEX: 46.27 KG/M2 | SYSTOLIC BLOOD PRESSURE: 112 MMHG | HEIGHT: 60 IN | RESPIRATION RATE: 17 BRPM | WEIGHT: 235.69 LBS | OXYGEN SATURATION: 96 %

## 2020-06-08 DIAGNOSIS — I27.20 PULMONARY HYPERTENSION: ICD-10-CM

## 2020-06-08 DIAGNOSIS — R94.2 DIFFUSION CAPACITY OF LUNG (DL), DECREASED: ICD-10-CM

## 2020-06-08 DIAGNOSIS — G47.33 OSA ON CPAP: Primary | ICD-10-CM

## 2020-06-08 DIAGNOSIS — I70.0 ATHEROSCLEROSIS OF AORTA: ICD-10-CM

## 2020-06-08 DIAGNOSIS — J98.6 ELEVATED HEMIDIAPHRAGM: ICD-10-CM

## 2020-06-08 DIAGNOSIS — E78.2 MIXED HYPERLIPIDEMIA: ICD-10-CM

## 2020-06-08 DIAGNOSIS — I10 ESSENTIAL HYPERTENSION: Chronic | ICD-10-CM

## 2020-06-08 DIAGNOSIS — J30.2 CHRONIC SEASONAL ALLERGIC RHINITIS: ICD-10-CM

## 2020-06-08 DIAGNOSIS — E66.01 MORBID OBESITY WITH BMI OF 45.0-49.9, ADULT: ICD-10-CM

## 2020-06-08 PROCEDURE — 99999 PR PBB SHADOW E&M-EST. PATIENT-LVL V: ICD-10-PCS | Mod: PBBFAC,,, | Performed by: INTERNAL MEDICINE

## 2020-06-08 PROCEDURE — 99214 OFFICE O/P EST MOD 30 MIN: CPT | Mod: 25,S$PBB,, | Performed by: INTERNAL MEDICINE

## 2020-06-08 PROCEDURE — 99999 PR PBB SHADOW E&M-EST. PATIENT-LVL I: ICD-10-PCS | Mod: PBBFAC,,,

## 2020-06-08 PROCEDURE — 71046 X-RAY EXAM CHEST 2 VIEWS: CPT | Mod: 26,,, | Performed by: RADIOLOGY

## 2020-06-08 PROCEDURE — 99211 OFF/OP EST MAY X REQ PHY/QHP: CPT | Mod: PBBFAC,25

## 2020-06-08 PROCEDURE — 71046 X-RAY EXAM CHEST 2 VIEWS: CPT | Mod: TC

## 2020-06-08 PROCEDURE — 99214 PR OFFICE/OUTPT VISIT, EST, LEVL IV, 30-39 MIN: ICD-10-PCS | Mod: 25,S$PBB,, | Performed by: INTERNAL MEDICINE

## 2020-06-08 PROCEDURE — 71046 XR CHEST PA AND LATERAL: ICD-10-PCS | Mod: 26,,, | Performed by: RADIOLOGY

## 2020-06-08 PROCEDURE — 99999 PR PBB SHADOW E&M-EST. PATIENT-LVL I: CPT | Mod: PBBFAC,,,

## 2020-06-08 PROCEDURE — 99999 PR PBB SHADOW E&M-EST. PATIENT-LVL V: CPT | Mod: PBBFAC,,, | Performed by: INTERNAL MEDICINE

## 2020-06-08 PROCEDURE — 94762 N-INVAS EAR/PLS OXIMTRY CONT: CPT | Mod: PBBFAC

## 2020-06-08 PROCEDURE — 99215 OFFICE O/P EST HI 40 MIN: CPT | Mod: PBBFAC,25,27 | Performed by: INTERNAL MEDICINE

## 2020-06-08 NOTE — ASSESSMENT & PLAN NOTE
MPAP FROM PASP:     mPAP = 0.6160  + 2 = 42.2 mmHg.     Mild- Moderate  pulmonary hypertension at best likely secondary to hypoxia.      Recommend diuresis. Ask cardiology for RHC.   Treat [place] with nocturnal PAP  ONSAT

## 2020-06-08 NOTE — ASSESSMENT & PLAN NOTE
APAP 6-20 cm through [facility]: AHI 2.9  Usage > 4 hrs was 60%  [place]  Score 12:   Bed time: 12MN  Wake time: 6 30 am  No naps  [facility] nursing Home: : [facility]

## 2020-06-08 NOTE — PROGRESS NOTES
Subjective:       Patient ID: Yoana Pardo is a 69 y.o. female.  Patient Active Problem List   Diagnosis    Mixed hyperlipidemia    Pulmonary hypertension    MVP (mitral valve prolapse)    Hiatal hernia with GERD    History of positive PPD, untreated    Hemorrhoids    Essential hypertension    Peripheral neuropathy    Primary osteoarthritis of both knees    Morbid obesity with BMI of 45.0-49.9, adult    [place] on CPAP    Chronic seasonal allergic rhinitis    Atherosclerosis of aorta    S/P laparoscopic cholecystectomy    Elevated hemidiaphragm    Diffusion capacity of lung (dl), decreased    Memory loss     Immunization History   Administered Date(s) Administered    Influenza - High Dose - PF (65 years and older) 09/25/2018, 10/25/2019    Influenza - Quadrivalent 10/29/2014    Influenza - Quadrivalent - PF (6 months and older) 10/27/2015, 10/03/2016    Influenza Split 11/22/2006, 11/06/2008, 10/27/2009, 10/19/2012    Pneumococcal Conjugate - 13 Valent 10/15/2018    Pneumococcal Polysaccharide - 23 Valent 10/28/2019    Tdap 08/29/2016      EPWORTH SLEEPINESS SCALE 6/8/2020   Sitting and reading 0   Watching TV 1   Sitting, inactive in a public place (e.g. a theatre or a meeting) 0   As a passenger in a car for an hour without a break 1   Lying down to rest in the afternoon when circumstances permit 0   Sitting and talking to someone 0   Sitting quietly after a lunch without alcohol 0   In a car, while stopped for a few minutes in traffic 0   Total score 2         CATS score 17    Chief Complaint: Sleep Apnea    Ms. Yoana Pardo is 69 y.o.   Last visit 02/21/2020  Follow-up visit to review use of her CPAP , echo and echo  [place] score is 12  Here to review download  Usage > 4 hrs was 60% adherence poor  Has used consistently more recently  No dyspnea on exertion.  Function  Class 0.  Weight stable 227lb/235lb  Wake time 6-7 am  Occasional naps  I have reviewed the patient's medical  history in detail and updated the computerized patient record.  Echo and CXR reviewed  Echo shows PA pressure increased from 41 to 60  Will get RHC and ONSAT on CPAP              Review of Systems   Constitutional: Positive for fatigue.   HENT: Negative for postnasal drip, rhinorrhea and congestion.    Respiratory: Positive for apnea (CPAP through [facility]). Negative for snoring, sputum production, shortness of breath, wheezing, pleurisy and use of rescue inhaler.             Cardiovascular: Negative.    Genitourinary: Negative.    Endocrine: endocrine negative   Musculoskeletal: Negative.    Skin: Negative for rash.   Neurological: Negative.    Psychiatric/Behavioral: Negative for sleep disturbance.       Objective:       Vitals:    06/08/20 1312   BP: 112/76   Pulse: 69   Resp: 17   SpO2: 96%   Weight: 106.9 kg (235 lb 10.8 oz)   Height: 5' (1.524 m)     Physical Exam   Constitutional: She is oriented to person, place, and time. Vital signs are normal. She appears well-developed and well-nourished.     She is obese.   HENT:   Head: Normocephalic.   Nose: No mucosal edema. No polyps.   Mouth/Throat: Oropharynx is clear and moist. No oropharyngeal exudate. Mallampati Score: IV.   Neck: Normal range of motion. Neck supple. No tracheal deviation present. No thyromegaly present.   Cardiovascular: Normal rate and regular rhythm.   No murmur heard.  Pulmonary/Chest: Normal expansion, symmetric chest wall expansion, effort normal and breath sounds normal.   Abdominal: Soft. Bowel sounds are normal.   Musculoskeletal: Normal range of motion. She exhibits edema.   Lymphadenopathy:     She has no cervical adenopathy.   Neurological: She is alert and oriented to person, place, and time. Gait normal.   Skin: Skin is warm and dry. No rash noted. No erythema. Nails show no clubbing.   Psychiatric: She has a normal mood and affect.   Nursing note and vitals reviewed.    Personal Diagnostic Review  DOWNLOAD  07/28/2019 -  10/25/2019  : 1950  Age: 69 years  [facility]  [address], [address]  [place], [address]  Phone: [phone]  Email: [email or web address]  Compliance Report  Usage 2019 - 10/25/2019  Usage days 18/90 days (60%)  >= 4 hours18 days (60%)  < 4 hours 0 days (0%)  AutoPAP 6-20 AHI 2.9  AHIAirSense 10 AutoSe   SNo flowsheet data found.      Echo  · Concentric left ventricular hypertrophy.  · Moderate left atrial enlargement.  · Normal left ventricular systolic function. The estimated ejection fraction is 60%.  · Indeterminate left ventricular diastolic function.  · Normal right ventricular systolic function.  · Normal central venous pressure (3 mmHg).  · The estimated PA systolic pressure is 60 mmHg.  · Pulmonary hypertension present.  · Mild tricuspid regurgitation.  Assessment:       Problem List Items Addressed This Visit     Diffusion capacity of lung (dl), decreased    Chronic seasonal allergic rhinitis    Essential hypertension (Chronic)     STABLE: HYZAAR,         [place] on CPAP - Primary     APAP 6-20 cm through [facility]: AHI 2.9  Usage > 4 hrs was 60%  [place]  Score 12:   Bed time: 12MN  Wake time: 6 30 am  No naps  [facility] nursing Home: : [facility]           Pulmonary hypertension     MPAP FROM PASP:     mPAP = 0.6160  + 2 = 42.2 mmHg.     Mild- Moderate  pulmonary hypertension at best likely secondary to hypoxia.      Recommend diuresis. Ask cardiology for RHC.   Treat [place] with nocturnal PAP  ONSAT              Relevant Orders    PULSE OXIMETRY OVERNIGHT    Ambulatory referral/consult to Cardiology    Mixed hyperlipidemia     STABLE ON ZOCOR         Morbid obesity with BMI of 45.0-49.9, adult     General weight loss/lifestyle modification strategies discussed (elicit support from others; identify saboteurs; non-food rewards).  Diet interventions: low calorie (1000 kCal/d) deficit diet  referal to bariatric medicine         Relevant Orders    Ambulatory referral/consult to  Bariatric Surgery    Atherosclerosis of aorta     Stable noted on chest x-ray         Elevated hemidiaphragm     Breathing exercises             Plan:       Needs to improve adherence to > 4 hrs daily per CMS guidelines  if patient intolerant to CPAP then other therapies need to be considered.      Weight loss and consider bariatric surgery  Will need RHC:  Echo estimated PA pressure going up from 41 to 60 mm mercury  Good sleep hygiene,       Follow up in about 10 weeks (around 8/17/2020), or see cardiology for RHC, ONSAT on CPAP, ref bartiatric surgery, weight loss.    This note was prepared using voice recognition system and is likely to have sound alike errors that may have been overlooked even after proof reading.  Please call me with any questions    Discussed diagnosis, its evaluation, treatment and usual course. All questions answered.    Thank you for the courtesy of participating in the care of this patient    Ernie Mcintosh MD      Complications of untreated sleep apnea discussed with pateint in detail including but not limited to  high blood pressure, heart attack, stroke, obesity,  diabetes and worsening heart failure. Patient voiced understanding.

## 2020-06-09 ENCOUNTER — TELEPHONE (OUTPATIENT)
Dept: PULMONOLOGY | Facility: CLINIC | Age: 70
End: 2020-06-09

## 2020-06-09 DIAGNOSIS — I10 ESSENTIAL HYPERTENSION: Primary | ICD-10-CM

## 2020-06-09 NOTE — PROCEDURES
[facility]  [address] * [place] [address]  Telephone: [phone]  Test date: 20 Start: 20 00:27:32 Yoana Pardo  Doctor: Dr Mcintosh End: 20 06:10:20 [id number]  Oximetry: Summary Report  Comments: AUTOPAP 6-20 cmH20  Recording time: 05:42:48 Highest pulse: 108 Highest SpO2: 99%  Excluded samplin:00:24 Lowest pulse: 48 Lowest SpO2: 87%  Total valid samplin:42:24 Mean pulse: 74 Mean SpO2: 93.7%  1 S.D.: 5.9 1 S.D.: 1.5  Time with SpO2<90: 0:02:44, 0.8%  Time with SpO2<80: 0:00:00, 0.0%  Time with SpO2<70: 0:00:00, 0.0%  Time with SpO2<60: 0:00:00, 0.0%  Time with SpO2<89: 0:00:48, 0.2%  Time with SpO2 =>90: 5:39:40, 99.2%  Time with SpO2=>80 & <90: 0:02:44, 0.8%  Time with SpO2=>70 & <80: 0:00:00, 0.0%  Time with SpO2=>60 & <70: 0:00:00, 0.0%  The longest continuous time with saturation <=88 was 00:00:16, which started at  20 01:52:12.  A desaturation event was defined as a decrease of saturation by 4 or more.  No events were excluded due to artifact.  There were 8 desaturation events over 3 minutes duration.  There were 37 desaturation events of less than 3 minutes duration during which:  The mean high was 95.7%. The mean low was 90.5%.  The number of these events that were:  > 0 & <10 seconds: 4 > 0 seconds: 37  =>10 & <20 seconds: 11 =>10 seconds: 33  =>20 & <30 seconds: 5 =>20 seconds: 22  =>30 & <40 seconds: 3 =>30 seconds: 17  =>40 & <50 seconds: 1 =>40 seconds: 14  =>50 & <60 seconds: 3 =>50 seconds: 13  =>60 seconds: 10 =>60 seconds: 10  The mean length of desaturation events that were >=10 sec & <=3 mins was: 46.5 sec.  Desaturation event index (events >=10 sec per sampled hour): 5.8  Desaturation event index (events >= 0 sec per sampled hour): 6.5    OVERNIGHT OXIMETRY REPORT:    Dictated by: Ernie Mcintosh MD  Test date: 2020  Dictated on: 2020      Comment: This test was performed on CPAP and Room Air     A  desaturation event was defined as a decrease of saturation by 4 or more.    REPORT SUMMARY  Total valid samplin:42:24   High SpO2: 99%    Low SpO2: 87%    Mean SpO2  93.7 %  Cumulative time with oxygen saturation less than 88% (TC88): 0:00:48    CLINICAL INTERPRETATION  Results are normal,  There is no significant nocturnal oxygen desaturation and  Clinical correlation is advised    Medicare Criteria Comments:   Oximetry test results suggest the patient does not fall under Medicare Group 1 Criteria. ( Arterial oxygen saturation at or below 88% for at least 5 minutes taken during sleep)  Ernie Mcintosh MD    Details about Medicare Group Criteria coverage can be found at http://www.cms.hhs.gov/manuals/downloads/

## 2020-06-09 NOTE — TELEPHONE ENCOUNTER
-Telephoned patient and confirmed appointment with Dr. Belle on Friday for 1130 am    ----- Message -----  From: Ernie Mcintosh MD  Sent: 6/8/2020   1:45 PM CDT  To: Ashlie Belle MD    Request for [facility]

## 2020-06-09 NOTE — TELEPHONE ENCOUNTER
Left vm informing pt that over night saturation study did not show the need for night time oxygen and to return call with any questions.

## 2020-06-09 NOTE — TELEPHONE ENCOUNTER
----- Message from Ernie Mcintosh MD sent at 2020  2:35 PM CDT -----   Please inform patient overnight saturation study did not  Show  the need for nighttime oxygen    Ernie Mcintosh MD    OVERNIGHT OXIMETRY REPORT:    Dictated by: Ernie Mcintosh MD  Test date: 2020  Dictated on: 2020      Comment: This test was performed on CPAP and Room Air     A desaturation event was defined as a decrease of saturation by 4 or more.    REPORT SUMMARY  Total valid samplin:42:24   High SpO2: 99%    Low SpO2: 87%    Mean SpO2  93.7 %  Cumulative time with oxygen saturation less than 88% (TC88): 0:00:48    CLINICAL INTERPRETATION  Results are normal,  There is no significant nocturnal oxygen desaturation and  Clinical correlation is advised    Medicare Criteria Comments:   Oximetry test results suggest the patient does not fall under Medicare Group 1 Criteria. ( Arterial oxygen saturation at or below 88% for at least 5 minutes taken during sleep)  Ernie Mcintosh MD    Details about Medicare Group Criteria coverage can be found at http://www.cms.hhs.gov/manuals/downloads/

## 2020-06-12 ENCOUNTER — HOSPITAL ENCOUNTER (OUTPATIENT)
Dept: CARDIOLOGY | Facility: HOSPITAL | Age: 70
Discharge: HOME | End: 2020-06-12
Attending: INTERNAL MEDICINE
Payer: MEDICARE

## 2020-06-12 ENCOUNTER — OFFICE VISIT (OUTPATIENT)
Dept: CARDIOLOGY | Facility: CLINIC | Age: 70
End: 2020-06-12
Payer: MEDICARE

## 2020-06-12 VITALS
OXYGEN SATURATION: 98 % | HEART RATE: 63 BPM | DIASTOLIC BLOOD PRESSURE: 71 MMHG | WEIGHT: 236.13 LBS | SYSTOLIC BLOOD PRESSURE: 120 MMHG | BODY MASS INDEX: 46.11 KG/M2

## 2020-06-12 DIAGNOSIS — G62.9 PERIPHERAL POLYNEUROPATHY: ICD-10-CM

## 2020-06-12 DIAGNOSIS — R06.02 SHORTNESS OF BREATH: ICD-10-CM

## 2020-06-12 DIAGNOSIS — I10 ESSENTIAL HYPERTENSION: ICD-10-CM

## 2020-06-12 DIAGNOSIS — E66.01 MORBID OBESITY WITH BMI OF 45.0-49.9, ADULT: ICD-10-CM

## 2020-06-12 DIAGNOSIS — G47.33 OSA ON CPAP: ICD-10-CM

## 2020-06-12 DIAGNOSIS — E78.2 MIXED HYPERLIPIDEMIA: ICD-10-CM

## 2020-06-12 DIAGNOSIS — I27.20 PULMONARY HYPERTENSION: Primary | ICD-10-CM

## 2020-06-12 DIAGNOSIS — R94.2 DIFFUSION CAPACITY OF LUNG (DL), DECREASED: ICD-10-CM

## 2020-06-12 DIAGNOSIS — I34.1 MVP (MITRAL VALVE PROLAPSE): ICD-10-CM

## 2020-06-12 DIAGNOSIS — I10 ESSENTIAL HYPERTENSION: Chronic | ICD-10-CM

## 2020-06-12 DIAGNOSIS — M17.0 PRIMARY OSTEOARTHRITIS OF BOTH KNEES: ICD-10-CM

## 2020-06-12 PROCEDURE — 99204 PR OFFICE/OUTPT VISIT, NEW, LEVL IV, 45-59 MIN: ICD-10-PCS | Mod: S$PBB,,, | Performed by: INTERNAL MEDICINE

## 2020-06-12 PROCEDURE — 99999 PR PBB SHADOW E&M-EST. PATIENT-LVL III: CPT | Mod: PBBFAC,,, | Performed by: INTERNAL MEDICINE

## 2020-06-12 PROCEDURE — 93005 ELECTROCARDIOGRAM TRACING: CPT

## 2020-06-12 PROCEDURE — 93010 EKG 12-LEAD: ICD-10-PCS | Mod: ,,, | Performed by: INTERNAL MEDICINE

## 2020-06-12 PROCEDURE — 93010 ELECTROCARDIOGRAM REPORT: CPT | Mod: ,,, | Performed by: INTERNAL MEDICINE

## 2020-06-12 PROCEDURE — 99213 OFFICE O/P EST LOW 20 MIN: CPT | Mod: PBBFAC,25 | Performed by: INTERNAL MEDICINE

## 2020-06-12 PROCEDURE — 99204 OFFICE O/P NEW MOD 45 MIN: CPT | Mod: S$PBB,,, | Performed by: INTERNAL MEDICINE

## 2020-06-12 PROCEDURE — 99999 PR PBB SHADOW E&M-EST. PATIENT-LVL III: ICD-10-PCS | Mod: PBBFAC,,, | Performed by: INTERNAL MEDICINE

## 2020-06-12 NOTE — PROGRESS NOTES
Subjective:   Patient ID:  Yoana Pardo is a 69 y.o. female who presents for evaluation of No chief complaint on file.      HPI  A 70 yo obese female with htn hlp mvp pulmonary htn is referred from DR OROZCO for rhc. She has shortness of breath she does not feel rested despite cpap her pa pressures have increased. Has no angina no leg swelling.  Compared to previously she has been consistently using her cpap. Has been compliant with salt intake.  Past Medical History:   Diagnosis Date    GERD (gastroesophageal reflux disease)     Hemorrhoids     History of positive PPD, untreated     Hx of cold sores     Hyperlipidemia     Hypertension     MVP (mitral valve prolapse)     Peripheral neuropathy     Pulmonary HTN     Dr Bailon    Sleep apnea     has  but not using CPAP       Past Surgical History:   Procedure Laterality Date    CHOLECYSTECTOMY      DILATION AND CURETTAGE OF UTERUS         Social History     Tobacco Use    Smoking status: Never Smoker    Smokeless tobacco: Never Used   Substance Use Topics    Alcohol use: Yes     Comment: rarely    Drug use: No       Family History   Problem Relation Age of Onset    Heart disease Mother     Obesity Mother     Kidney disease Father     Hypertension Father     Obesity Father     Heart disease Father     Diabetes Sister     Aneurysm Sister         AAA       Current Outpatient Medications   Medication Sig    amLODIPine (NORVASC) 5 MG tablet Take 1 tablet (5 mg total) by mouth once daily.    esomeprazole (NEXIUM) 40 MG capsule Take 1 capsule (40 mg total) by mouth before breakfast.    fluticasone (FLONASE) 50 mcg/actuation nasal spray 1 spray (50 mcg total) by Each Nare route once daily.    furosemide (LASIX) 20 MG tablet Take 1 tablet by mouth twice daily as needed    gabapentin (NEURONTIN) 100 MG capsule Take 1 capsule (100 mg total) by mouth nightly as needed.    hydroCHLOROthiazide (HYDRODIURIL) 25 MG tablet Take 1 tablet (25 mg  total) by mouth once daily.    losartan (COZAAR) 100 MG tablet Take 1 tablet (100 mg total) by mouth once daily.    simvastatin (ZOCOR) 20 MG tablet Take 1 tablet (20 mg total) by mouth every evening.    aspirin (ECOTRIN) 81 MG EC tablet Take 81 mg by mouth once daily.    ipratropium (ATROVENT) 0.03 % nasal spray 2 sprays by Nasal route every 8 (eight) hours as needed for Rhinitis.     No current facility-administered medications for this visit.      Current Outpatient Medications on File Prior to Visit   Medication Sig    amLODIPine (NORVASC) 5 MG tablet Take 1 tablet (5 mg total) by mouth once daily.    esomeprazole (NEXIUM) 40 MG capsule Take 1 capsule (40 mg total) by mouth before breakfast.    fluticasone (FLONASE) 50 mcg/actuation nasal spray 1 spray (50 mcg total) by Each Nare route once daily.    furosemide (LASIX) 20 MG tablet Take 1 tablet by mouth twice daily as needed    gabapentin (NEURONTIN) 100 MG capsule Take 1 capsule (100 mg total) by mouth nightly as needed.    hydroCHLOROthiazide (HYDRODIURIL) 25 MG tablet Take 1 tablet (25 mg total) by mouth once daily.    losartan (COZAAR) 100 MG tablet Take 1 tablet (100 mg total) by mouth once daily.    simvastatin (ZOCOR) 20 MG tablet Take 1 tablet (20 mg total) by mouth every evening.    aspirin (ECOTRIN) 81 MG EC tablet Take 81 mg by mouth once daily.    ipratropium (ATROVENT) 0.03 % nasal spray 2 sprays by Nasal route every 8 (eight) hours as needed for Rhinitis.     No current facility-administered medications on file prior to visit.        Review of patient's allergies indicates:   Allergen Reactions    Lisinopril Other (See Comments)     Cough      Bactrim [sulfamethoxazole-trimethoprim] Itching    Zosyn [piperacillin-tazobactam] Hives     Pt received second dose of Zosyn and had hives on both arms. Benadryl given and pt continued to receive zosyn with no issues. Hydralazine also given around the same time and discontinued.         Review of Systems   Constitution: Negative for diaphoresis, malaise/fatigue and weight gain.   HENT: Negative for hoarse voice.    Eyes: Negative for double vision and visual disturbance.   Cardiovascular: Negative for chest pain, claudication, cyanosis, dyspnea on exertion, irregular heartbeat, leg swelling, near-syncope, orthopnea, palpitations, paroxysmal nocturnal dyspnea and syncope.   Respiratory: Positive for shortness of breath, sleep disturbances due to breathing and snoring. Negative for cough and hemoptysis.    Hematologic/Lymphatic: Negative for bleeding problem. Does not bruise/bleed easily.   Skin: Negative for color change and poor wound healing.   Musculoskeletal: Negative for muscle cramps, muscle weakness and myalgias.   Gastrointestinal: Negative for bloating, abdominal pain, change in bowel habit, diarrhea, heartburn, hematemesis, hematochezia, melena and nausea.   Neurological: Negative for excessive daytime sleepiness, dizziness, headaches, light-headedness, loss of balance, numbness and weakness.   Psychiatric/Behavioral: Negative for memory loss. The patient does not have insomnia.    Allergic/Immunologic: Negative for hives.       Objective:   Physical Exam   Constitutional: She is oriented to person, place, and time. She appears well-developed and well-nourished. She does not appear ill. No distress.   HENT:   Head: Normocephalic and atraumatic.   Eyes: Pupils are equal, round, and reactive to light. EOM are normal. No scleral icterus.   Neck: Normal range of motion. Neck supple. Normal carotid pulses, no hepatojugular reflux and no JVD present. Carotid bruit is not present. No tracheal deviation present. No thyromegaly present.   Cardiovascular: Normal rate, regular rhythm, normal heart sounds and normal pulses. Exam reveals no gallop and no friction rub.   No murmur heard.  Pulmonary/Chest: Effort normal and breath sounds normal. No respiratory distress. She has no wheezes. She has  no rhonchi. She has no rales. She exhibits no tenderness.   Abdominal: Soft. Normal appearance, normal aorta and bowel sounds are normal. She exhibits no distension, no abdominal bruit, no ascites and no pulsatile midline mass. There is no hepatomegaly. There is no tenderness.   obese   Musculoskeletal: She exhibits no edema.        Right shoulder: She exhibits no deformity.   Neurological: She is alert and oriented to person, place, and time. She has normal strength. No cranial nerve deficit. Coordination normal.   Skin: Skin is warm and dry. No rash noted. She is not diaphoretic. No cyanosis or erythema. Nails show no clubbing.   Psychiatric: She has a normal mood and affect. Her speech is normal and behavior is normal.   Nursing note and vitals reviewed.    Vitals:    06/12/20 1424 06/12/20 1428   BP: 123/74 120/71   BP Location: Right arm Left arm   Patient Position: Sitting Sitting   BP Method: Medium (Manual) Medium (Manual)   Pulse: 63    SpO2: 98%    Weight: 107.1 kg (236 lb 1.8 oz)      Lab Results   Component Value Date    CHOL 195 06/11/2019    CHOL 170 09/25/2018    CHOL 175 11/30/2017     Lab Results   Component Value Date    HDL 48 06/11/2019    HDL 43 09/25/2018    HDL 38 (L) 11/30/2017     Lab Results   Component Value Date    LDLCALC 123.4 06/11/2019    LDLCALC 98.8 09/25/2018    LDLCALC 114.2 11/30/2017     Lab Results   Component Value Date    TRIG 118 06/11/2019    TRIG 141 09/25/2018    TRIG 114 11/30/2017     Lab Results   Component Value Date    CHOLHDL 24.6 06/11/2019    CHOLHDL 25.3 09/25/2018    CHOLHDL 21.7 11/30/2017       Chemistry        Component Value Date/Time     12/10/2019 1709    K 4.0 12/10/2019 1709     12/10/2019 1709    CO2 32 (H) 12/10/2019 1709    BUN 14 12/10/2019 1709    CREATININE 0.8 12/10/2019 1709     12/10/2019 1709        Component Value Date/Time    CALCIUM 9.0 12/10/2019 1709    ALKPHOS 73 06/11/2019 1110    AST 18 06/11/2019 1110    ALT 10  06/11/2019 1110    BILITOT 0.6 06/11/2019 1110    ESTGFRAFRICA >60.0 12/10/2019 1709    EGFRNONAA >60.0 12/10/2019 1709          Lab Results   Component Value Date    TSH 0.704 06/11/2019     Lab Results   Component Value Date    INR 1.1 01/18/2006     Lab Results   Component Value Date    WBC 7.49 12/10/2019    HGB 12.9 12/10/2019    HCT 42.4 12/10/2019    MCV 96 12/10/2019     12/10/2019     BNP  @LABRCNTIP(BNP,BNPTRIAGEBLO)@  CrCl cannot be calculated (Patient's most recent lab result is older than the maximum 7 days allowed.).     · Concentric left ventricular hypertrophy.  · Moderate left atrial enlargement.  · Normal left ventricular systolic function. The estimated ejection fraction is 60%.  · Indeterminate left ventricular diastolic function.  · Normal right ventricular systolic function.  · Normal central venous pressure (3 mmHg).  · The estimated PA systolic pressure is 60 mmHg.  · Pulmonary hypertension present.  · Mild tricuspid regurgitation.       Assessment:     1. Pulmonary hypertension    2. Essential hypertension    3. Mixed hyperlipidemia    4. MVP (mitral valve prolapse)    5. Peripheral polyneuropathy    6. Morbid obesity with BMI of 45.0-49.9, adult    7. Primary osteoarthritis of both knees    8. [place] on CPAP    9. Diffusion capacity of lung (dl), decreased      Has pulmonary htn needs rhc to maximize therapy,.  Plan:   rhc  I have explained the risks, benefits , and alternatives of the procedure in detail.the patient voices understanding and all questions have been answered.the patient agrees to proceed as planned.

## 2020-06-12 NOTE — H&P (VIEW-ONLY)
Subjective:   Patient ID:  Yoana Pardo is a 69 y.o. female who presents for evaluation of No chief complaint on file.      HPI  A 68 yo obese female with htn hlp mvp pulmonary htn is referred from DR OROZCO for rhc. She has shortness of breath she does not feel rested despite cpap her pa pressures have increased. Has no angina no leg swelling.  Compared to previously she has been consistently using her cpap. Has been compliant with salt intake.  Past Medical History:   Diagnosis Date    GERD (gastroesophageal reflux disease)     Hemorrhoids     History of positive PPD, untreated     Hx of cold sores     Hyperlipidemia     Hypertension     MVP (mitral valve prolapse)     Peripheral neuropathy     Pulmonary HTN     Dr Bailon    Sleep apnea     has  but not using CPAP       Past Surgical History:   Procedure Laterality Date    CHOLECYSTECTOMY      DILATION AND CURETTAGE OF UTERUS         Social History     Tobacco Use    Smoking status: Never Smoker    Smokeless tobacco: Never Used   Substance Use Topics    Alcohol use: Yes     Comment: rarely    Drug use: No       Family History   Problem Relation Age of Onset    Heart disease Mother     Obesity Mother     Kidney disease Father     Hypertension Father     Obesity Father     Heart disease Father     Diabetes Sister     Aneurysm Sister         AAA       Current Outpatient Medications   Medication Sig    amLODIPine (NORVASC) 5 MG tablet Take 1 tablet (5 mg total) by mouth once daily.    esomeprazole (NEXIUM) 40 MG capsule Take 1 capsule (40 mg total) by mouth before breakfast.    fluticasone (FLONASE) 50 mcg/actuation nasal spray 1 spray (50 mcg total) by Each Nare route once daily.    furosemide (LASIX) 20 MG tablet Take 1 tablet by mouth twice daily as needed    gabapentin (NEURONTIN) 100 MG capsule Take 1 capsule (100 mg total) by mouth nightly as needed.    hydroCHLOROthiazide (HYDRODIURIL) 25 MG tablet Take 1 tablet (25 mg  total) by mouth once daily.    losartan (COZAAR) 100 MG tablet Take 1 tablet (100 mg total) by mouth once daily.    simvastatin (ZOCOR) 20 MG tablet Take 1 tablet (20 mg total) by mouth every evening.    aspirin (ECOTRIN) 81 MG EC tablet Take 81 mg by mouth once daily.    ipratropium (ATROVENT) 0.03 % nasal spray 2 sprays by Nasal route every 8 (eight) hours as needed for Rhinitis.     No current facility-administered medications for this visit.      Current Outpatient Medications on File Prior to Visit   Medication Sig    amLODIPine (NORVASC) 5 MG tablet Take 1 tablet (5 mg total) by mouth once daily.    esomeprazole (NEXIUM) 40 MG capsule Take 1 capsule (40 mg total) by mouth before breakfast.    fluticasone (FLONASE) 50 mcg/actuation nasal spray 1 spray (50 mcg total) by Each Nare route once daily.    furosemide (LASIX) 20 MG tablet Take 1 tablet by mouth twice daily as needed    gabapentin (NEURONTIN) 100 MG capsule Take 1 capsule (100 mg total) by mouth nightly as needed.    hydroCHLOROthiazide (HYDRODIURIL) 25 MG tablet Take 1 tablet (25 mg total) by mouth once daily.    losartan (COZAAR) 100 MG tablet Take 1 tablet (100 mg total) by mouth once daily.    simvastatin (ZOCOR) 20 MG tablet Take 1 tablet (20 mg total) by mouth every evening.    aspirin (ECOTRIN) 81 MG EC tablet Take 81 mg by mouth once daily.    ipratropium (ATROVENT) 0.03 % nasal spray 2 sprays by Nasal route every 8 (eight) hours as needed for Rhinitis.     No current facility-administered medications on file prior to visit.        Review of patient's allergies indicates:   Allergen Reactions    Lisinopril Other (See Comments)     Cough      Bactrim [sulfamethoxazole-trimethoprim] Itching    Zosyn [piperacillin-tazobactam] Hives     Pt received second dose of Zosyn and had hives on both arms. Benadryl given and pt continued to receive zosyn with no issues. Hydralazine also given around the same time and discontinued.         Review of Systems   Constitution: Negative for diaphoresis, malaise/fatigue and weight gain.   HENT: Negative for hoarse voice.    Eyes: Negative for double vision and visual disturbance.   Cardiovascular: Negative for chest pain, claudication, cyanosis, dyspnea on exertion, irregular heartbeat, leg swelling, near-syncope, orthopnea, palpitations, paroxysmal nocturnal dyspnea and syncope.   Respiratory: Positive for shortness of breath, sleep disturbances due to breathing and snoring. Negative for cough and hemoptysis.    Hematologic/Lymphatic: Negative for bleeding problem. Does not bruise/bleed easily.   Skin: Negative for color change and poor wound healing.   Musculoskeletal: Negative for muscle cramps, muscle weakness and myalgias.   Gastrointestinal: Negative for bloating, abdominal pain, change in bowel habit, diarrhea, heartburn, hematemesis, hematochezia, melena and nausea.   Neurological: Negative for excessive daytime sleepiness, dizziness, headaches, light-headedness, loss of balance, numbness and weakness.   Psychiatric/Behavioral: Negative for memory loss. The patient does not have insomnia.    Allergic/Immunologic: Negative for hives.       Objective:   Physical Exam   Constitutional: She is oriented to person, place, and time. She appears well-developed and well-nourished. She does not appear ill. No distress.   HENT:   Head: Normocephalic and atraumatic.   Eyes: Pupils are equal, round, and reactive to light. EOM are normal. No scleral icterus.   Neck: Normal range of motion. Neck supple. Normal carotid pulses, no hepatojugular reflux and no JVD present. Carotid bruit is not present. No tracheal deviation present. No thyromegaly present.   Cardiovascular: Normal rate, regular rhythm, normal heart sounds and normal pulses. Exam reveals no gallop and no friction rub.   No murmur heard.  Pulmonary/Chest: Effort normal and breath sounds normal. No respiratory distress. She has no wheezes. She has  no rhonchi. She has no rales. She exhibits no tenderness.   Abdominal: Soft. Normal appearance, normal aorta and bowel sounds are normal. She exhibits no distension, no abdominal bruit, no ascites and no pulsatile midline mass. There is no hepatomegaly. There is no tenderness.   obese   Musculoskeletal: She exhibits no edema.        Right shoulder: She exhibits no deformity.   Neurological: She is alert and oriented to person, place, and time. She has normal strength. No cranial nerve deficit. Coordination normal.   Skin: Skin is warm and dry. No rash noted. She is not diaphoretic. No cyanosis or erythema. Nails show no clubbing.   Psychiatric: She has a normal mood and affect. Her speech is normal and behavior is normal.   Nursing note and vitals reviewed.    Vitals:    06/12/20 1424 06/12/20 1428   BP: 123/74 120/71   BP Location: Right arm Left arm   Patient Position: Sitting Sitting   BP Method: Medium (Manual) Medium (Manual)   Pulse: 63    SpO2: 98%    Weight: 107.1 kg (236 lb 1.8 oz)      Lab Results   Component Value Date    CHOL 195 06/11/2019    CHOL 170 09/25/2018    CHOL 175 11/30/2017     Lab Results   Component Value Date    HDL 48 06/11/2019    HDL 43 09/25/2018    HDL 38 (L) 11/30/2017     Lab Results   Component Value Date    LDLCALC 123.4 06/11/2019    LDLCALC 98.8 09/25/2018    LDLCALC 114.2 11/30/2017     Lab Results   Component Value Date    TRIG 118 06/11/2019    TRIG 141 09/25/2018    TRIG 114 11/30/2017     Lab Results   Component Value Date    CHOLHDL 24.6 06/11/2019    CHOLHDL 25.3 09/25/2018    CHOLHDL 21.7 11/30/2017       Chemistry        Component Value Date/Time     12/10/2019 1709    K 4.0 12/10/2019 1709     12/10/2019 1709    CO2 32 (H) 12/10/2019 1709    BUN 14 12/10/2019 1709    CREATININE 0.8 12/10/2019 1709     12/10/2019 1709        Component Value Date/Time    CALCIUM 9.0 12/10/2019 1709    ALKPHOS 73 06/11/2019 1110    AST 18 06/11/2019 1110    ALT 10  06/11/2019 1110    BILITOT 0.6 06/11/2019 1110    ESTGFRAFRICA >60.0 12/10/2019 1709    EGFRNONAA >60.0 12/10/2019 1709          Lab Results   Component Value Date    TSH 0.704 06/11/2019     Lab Results   Component Value Date    INR 1.1 01/18/2006     Lab Results   Component Value Date    WBC 7.49 12/10/2019    HGB 12.9 12/10/2019    HCT 42.4 12/10/2019    MCV 96 12/10/2019     12/10/2019     BNP  @LABRCNTIP(BNP,BNPTRIAGEBLO)@  CrCl cannot be calculated (Patient's most recent lab result is older than the maximum 7 days allowed.).     · Concentric left ventricular hypertrophy.  · Moderate left atrial enlargement.  · Normal left ventricular systolic function. The estimated ejection fraction is 60%.  · Indeterminate left ventricular diastolic function.  · Normal right ventricular systolic function.  · Normal central venous pressure (3 mmHg).  · The estimated PA systolic pressure is 60 mmHg.  · Pulmonary hypertension present.  · Mild tricuspid regurgitation.       Assessment:     1. Pulmonary hypertension    2. Essential hypertension    3. Mixed hyperlipidemia    4. MVP (mitral valve prolapse)    5. Peripheral polyneuropathy    6. Morbid obesity with BMI of 45.0-49.9, adult    7. Primary osteoarthritis of both knees    8. [place] on CPAP    9. Diffusion capacity of lung (dl), decreased      Has pulmonary htn needs rhc to maximize therapy,.  Plan:   rhc  I have explained the risks, benefits , and alternatives of the procedure in detail.the patient voices understanding and all questions have been answered.the patient agrees to proceed as planned.

## 2020-06-12 NOTE — LETTER
June 12, 2020      Ernie Mcintosh MD  [address]  [place] [address]           Critical access hospital Cardiology  [address] [place] [id number]  Phone: [phone]  Fax: [phone]          Patient: Yoana Pardo   MR Number: [id number]   YOB: 1950   Date of Visit: 6/12/2020       Dear Dr. Ernie Mcintosh:    Thank you for referring Yoana Pardo to me for evaluation. Attached you will find relevant portions of my assessment and plan of care.    If you have questions, please do not hesitate to call me. I look forward to following Yoana Pardo along with you.    Sincerely,    Ashlie Belle MD    Enclosure  CC:  No Recipients    If you would like to receive this communication electronically, please contact [email or web address] or [phone] to request more information on [employer] access.    For providers and/or their staff who would like to refer a patient to [facility], please contact us through our one-stop-shop provider referral line, [facility], at [phone].    If you feel you have received this communication in error or would no longer like to receive these types of communications, please e-mail [email or web address]

## 2020-06-13 ENCOUNTER — NURSE TRIAGE (OUTPATIENT)
Dept: ADMINISTRATIVE | Facility: CLINIC | Age: 70
End: 2020-06-13

## 2020-06-13 NOTE — TELEPHONE ENCOUNTER
Reason for Disposition   Question about upcoming scheduled test, no triage required and triager able to answer question    Protocols used: INFORMATION ONLY CALL-A-AH    Pt stated she has a procedure on Monday with Dr. Belle. Pt stated she told his Nurse yesterday that she has COVID symptoms and a sore throat. Stated the Nurse told her to go test for COVID, but did not tell her where to go. Pt advised all [facility] can test for COVID, but Provider at site determines if a test will be administered, not the triage Nurse. Also advised COVID test will be done before all procedures at [facility]. Pt stated she knows that already, but wants to test today. Advised of Urgent Care location in her area.

## 2020-06-15 ENCOUNTER — HOSPITAL ENCOUNTER (OUTPATIENT)
Facility: HOSPITAL | Age: 70
Discharge: HOME | End: 2020-06-15
Attending: INTERNAL MEDICINE | Admitting: INTERNAL MEDICINE
Payer: MEDICARE

## 2020-06-15 ENCOUNTER — TELEPHONE (OUTPATIENT)
Dept: PULMONOLOGY | Facility: CLINIC | Age: 70
End: 2020-06-15

## 2020-06-15 VITALS
WEIGHT: 236 LBS | BODY MASS INDEX: 46.33 KG/M2 | RESPIRATION RATE: 11 BRPM | HEIGHT: 60 IN | TEMPERATURE: 97 F | HEART RATE: 57 BPM | SYSTOLIC BLOOD PRESSURE: 153 MMHG | DIASTOLIC BLOOD PRESSURE: 66 MMHG | OXYGEN SATURATION: 98 %

## 2020-06-15 DIAGNOSIS — I27.20 PULMONARY HYPERTENSION: ICD-10-CM

## 2020-06-15 LAB
ANION GAP SERPL CALC-SCNC: 13 MMOL/L (ref 8–16)
APTT BLDCRRT: 27.2 SEC (ref 21–32)
BASOPHILS # BLD AUTO: 0.04 K/UL (ref 0–0.2)
BASOPHILS NFR BLD: 0.6 % (ref 0–1.9)
BUN SERPL-MCNC: 19 MG/DL (ref 8–23)
CALCIUM SERPL-MCNC: 8.8 MG/DL (ref 8.7–10.5)
CHLORIDE SERPL-SCNC: 101 MMOL/L (ref 95–110)
CO2 SERPL-SCNC: 30 MMOL/L (ref 23–29)
CREAT SERPL-MCNC: 0.8 MG/DL (ref 0.5–1.4)
DIFFERENTIAL METHOD: ABNORMAL
EOSINOPHIL # BLD AUTO: 0.1 K/UL (ref 0–0.5)
EOSINOPHIL NFR BLD: 1.5 % (ref 0–8)
ERYTHROCYTE [DISTWIDTH] IN BLOOD BY AUTOMATED COUNT: 13.1 % (ref 11.5–14.5)
EST. GFR  (AFRICAN AMERICAN): >60 ML/MIN/1.73 M^2
EST. GFR  (NON AFRICAN AMERICAN): >60 ML/MIN/1.73 M^2
GLUCOSE SERPL-MCNC: 102 MG/DL (ref 70–110)
HCT VFR BLD AUTO: 42.9 % (ref 37–48.5)
HGB BLD-MCNC: 13.3 G/DL (ref 12–16)
IMM GRANULOCYTES # BLD AUTO: 0.01 K/UL (ref 0–0.04)
IMM GRANULOCYTES NFR BLD AUTO: 0.2 % (ref 0–0.5)
INR PPP: 1 (ref 0.8–1.2)
LYMPHOCYTES # BLD AUTO: 3 K/UL (ref 1–4.8)
LYMPHOCYTES NFR BLD: 46.5 % (ref 18–48)
MCH RBC QN AUTO: 28.9 PG (ref 27–31)
MCHC RBC AUTO-ENTMCNC: 31 G/DL (ref 32–36)
MCV RBC AUTO: 93 FL (ref 82–98)
MONOCYTES # BLD AUTO: 0.6 K/UL (ref 0.3–1)
MONOCYTES NFR BLD: 8.6 % (ref 4–15)
NEUTROPHILS # BLD AUTO: 2.8 K/UL (ref 1.8–7.7)
NEUTROPHILS NFR BLD: 42.6 % (ref 38–73)
NRBC BLD-RTO: 0 /100 WBC
PLATELET # BLD AUTO: 327 K/UL (ref 150–350)
PMV BLD AUTO: 9.3 FL (ref 9.2–12.9)
POTASSIUM SERPL-SCNC: 3.3 MMOL/L (ref 3.5–5.1)
PROTHROMBIN TIME: 10.3 SEC (ref 9–12.5)
RBC # BLD AUTO: 4.61 M/UL (ref 4–5.4)
SARS-COV-2 RDRP RESP QL NAA+PROBE: NEGATIVE
SODIUM SERPL-SCNC: 144 MMOL/L (ref 136–145)
WBC # BLD AUTO: 6.5 K/UL (ref 3.9–12.7)

## 2020-06-15 PROCEDURE — 25000003 PHARM REV CODE 250: Performed by: INTERNAL MEDICINE

## 2020-06-15 PROCEDURE — 99152 MOD SED SAME PHYS/QHP 5/>YRS: CPT | Mod: ,,, | Performed by: INTERNAL MEDICINE

## 2020-06-15 PROCEDURE — 93451 RIGHT HEART CATH: CPT | Mod: 26,,, | Performed by: INTERNAL MEDICINE

## 2020-06-15 PROCEDURE — 93451 RIGHT HEART CATH: CPT | Performed by: INTERNAL MEDICINE

## 2020-06-15 PROCEDURE — C1751 CATH, INF, PER/CENT/MIDLINE: HCPCS | Performed by: INTERNAL MEDICINE

## 2020-06-15 PROCEDURE — 93451 PR RIGHT HEART CATH O2 SATURATION & CARDIAC OUTPUT: ICD-10-PCS | Mod: 26,,, | Performed by: INTERNAL MEDICINE

## 2020-06-15 PROCEDURE — 85025 COMPLETE CBC W/AUTO DIFF WBC: CPT

## 2020-06-15 PROCEDURE — 63600175 PHARM REV CODE 636 W HCPCS: Performed by: INTERNAL MEDICINE

## 2020-06-15 PROCEDURE — 85610 PROTHROMBIN TIME: CPT

## 2020-06-15 PROCEDURE — 99152 MOD SED SAME PHYS/QHP 5/>YRS: CPT | Performed by: INTERNAL MEDICINE

## 2020-06-15 PROCEDURE — 85730 THROMBOPLASTIN TIME PARTIAL: CPT

## 2020-06-15 PROCEDURE — C1769 GUIDE WIRE: HCPCS | Performed by: INTERNAL MEDICINE

## 2020-06-15 PROCEDURE — C1894 INTRO/SHEATH, NON-LASER: HCPCS | Performed by: INTERNAL MEDICINE

## 2020-06-15 PROCEDURE — 99153 MOD SED SAME PHYS/QHP EA: CPT | Performed by: INTERNAL MEDICINE

## 2020-06-15 PROCEDURE — 80048 BASIC METABOLIC PNL TOTAL CA: CPT

## 2020-06-15 PROCEDURE — U0002 COVID-19 LAB TEST NON-CDC: HCPCS

## 2020-06-15 PROCEDURE — 99152 PR MOD CONSCIOUS SEDATION, SAME PHYS, 5+ YRS, FIRST 15 MIN: ICD-10-PCS | Mod: ,,, | Performed by: INTERNAL MEDICINE

## 2020-06-15 RX ORDER — DIAZEPAM 5 MG/1
5 TABLET ORAL
Status: DISCONTINUED | OUTPATIENT
Start: 2020-06-15 | End: 2020-06-15 | Stop reason: HOSPADM

## 2020-06-15 RX ORDER — SODIUM CHLORIDE 9 MG/ML
INJECTION, SOLUTION INTRAVENOUS CONTINUOUS
Status: DISCONTINUED | OUTPATIENT
Start: 2020-06-15 | End: 2020-06-15 | Stop reason: HOSPADM

## 2020-06-15 RX ORDER — DIPHENHYDRAMINE HCL 50 MG
50 CAPSULE ORAL ONCE
Status: COMPLETED | OUTPATIENT
Start: 2020-06-15 | End: 2020-06-15

## 2020-06-15 RX ORDER — FENTANYL CITRATE 50 UG/ML
INJECTION, SOLUTION INTRAMUSCULAR; INTRAVENOUS
Status: DISCONTINUED | OUTPATIENT
Start: 2020-06-15 | End: 2020-06-15 | Stop reason: HOSPADM

## 2020-06-15 RX ORDER — MIDAZOLAM HYDROCHLORIDE 1 MG/ML
INJECTION, SOLUTION INTRAMUSCULAR; INTRAVENOUS
Status: DISCONTINUED | OUTPATIENT
Start: 2020-06-15 | End: 2020-06-15 | Stop reason: HOSPADM

## 2020-06-15 RX ORDER — NAPROXEN SODIUM 220 MG/1
81 TABLET, FILM COATED ORAL ONCE
Status: COMPLETED | OUTPATIENT
Start: 2020-06-15 | End: 2020-06-15

## 2020-06-15 RX ADMIN — DIAZEPAM 5 MG: 5 TABLET ORAL at 10:06

## 2020-06-15 RX ADMIN — DIPHENHYDRAMINE HYDROCHLORIDE 50 MG: 50 CAPSULE ORAL at 10:06

## 2020-06-15 RX ADMIN — ASPIRIN 81 MG 81 MG: 81 TABLET ORAL at 10:06

## 2020-06-15 RX ADMIN — SODIUM CHLORIDE: 0.9 INJECTION, SOLUTION INTRAVENOUS at 10:06

## 2020-06-15 NOTE — INTERVAL H&P NOTE
The patient has been examined and the H&P has been reviewed:    I concur with the findings and no changes have occurred since H&P was written.    Anesthesia/Surgery risks, benefits and alternative options discussed and understood by patient/family.  I have explained the risks, benefits , and alternatives of the procedure in detail.the patient voices understanding and all questions have been answered.the patient agrees to proceed as planned.          Active Hospital Problems    Diagnosis  POA    Pulmonary hypertension [I27.20]  Yes     Priority: Low      Resolved Hospital Problems   No resolved problems to display.

## 2020-06-15 NOTE — TELEPHONE ENCOUNTER
----- Message from Quirino Tidwell sent at 6/15/2020  1:12 PM CDT -----  .Type:  Patient Returning Call    Who Called:  camila GIRON  Who Left Message for Patient:  Does the patient know what this is regarding?: 2week f/u   Would the patient rather a call back or a response via [facility]?   Best Call Back Number: [phone]  Additional Information: Juan GIRON ) is requesting a two week f/u hospital

## 2020-06-15 NOTE — DISCHARGE SUMMARY
Discharge Note  Short Stay      SUMMARY     Admit Date: 6/15/2020    Attending Physician: Ashlie Belle MD     Discharge Physician: Shakeel Belle MD     Discharge Date: 6/15/2020    Final Diagnosis: PULMONARY HTN     Outcome of Stay:PATIENT TOLERATED PROCEDURE WELL NO COMPLICATION SHE HAS PULMONARY HTN .SHE HAS MODERATE PULMONARY HTN SHE WAS DEEMED STABLE FOR DISCHARGE.SHE WILL FOLLOW WITH DR OROZCO.     Disposition: Home or Self Care    Patient Instructions:   Current Discharge Medication List      CONTINUE these medications which have NOT CHANGED    Details   aspirin (ECOTRIN) 81 MG EC tablet Take 81 mg by mouth once daily.      esomeprazole (NEXIUM) 40 MG capsule Take 1 capsule (40 mg total) by mouth before breakfast.  Qty: 30 capsule, Refills: 11    Associated Diagnoses: Gastroesophageal reflux disease, esophagitis presence not specified      fluticasone (FLONASE) 50 mcg/actuation nasal spray 1 spray (50 mcg total) by Each Nare route once daily.  Qty: 16 g, Refills: 0    Associated Diagnoses: Chronic seasonal allergic rhinitis      furosemide (LASIX) 20 MG tablet Take 1 tablet by mouth twice daily as needed  Qty: 60 tablet, Refills: 0    Associated Diagnoses: Pulmonary hypertension      gabapentin (NEURONTIN) 100 MG capsule Take 1 capsule (100 mg total) by mouth nightly as needed.  Qty: 30 capsule, Refills: 0    Associated Diagnoses: Pain and swelling of right lower leg      hydroCHLOROthiazide (HYDRODIURIL) 25 MG tablet Take 1 tablet (25 mg total) by mouth once daily.  Qty: 90 tablet, Refills: 3    Associated Diagnoses: Essential hypertension      ipratropium (ATROVENT) 0.03 % nasal spray 2 sprays by Nasal route every 8 (eight) hours as needed for Rhinitis.  Qty: 30 mL, Refills: 4    Associated Diagnoses: Allergic rhinitis, unspecified seasonality, unspecified trigger      losartan (COZAAR) 100 MG tablet Take 1 tablet (100 mg total) by mouth once daily.  Qty: 90 tablet, Refills: 3    Associated  Diagnoses: Essential hypertension      amLODIPine (NORVASC) 5 MG tablet Take 1 tablet (5 mg total) by mouth once daily.  Qty: 30 tablet, Refills: 2    Associated Diagnoses: Essential hypertension      simvastatin (ZOCOR) 20 MG tablet Take 1 tablet (20 mg total) by mouth every evening.  Qty: 90 tablet, Refills: 3    Associated Diagnoses: Mixed hyperlipidemia             Discharge Procedure Orders (must include Diet, Follow-up, Activity)   Discharge Procedure Orders (must include Diet, Follow-up, Activity)   Diet general     Call MD for:  temperature >100.4     Call MD for:  persistent nausea and vomiting     Call MD for:  severe uncontrolled pain     Call MD for:  difficulty breathing, headache or visual disturbances     Call MD for:  redness, tenderness, or signs of infection (pain, swelling, redness, odor or green/yellow discharge around incision site)     Call MD for:  hives     Call MD for:  persistent dizziness or light-headedness     Call MD for:  extreme fatigue     Follow-up Information     Ernie Mcintosh MD In 2 weeks.    Specialty: Pulmonary Disease  Contact information:  [address]  [place] [address] [phone]

## 2020-06-15 NOTE — DISCHARGE INSTRUCTIONS
"Post-op Right Heart Catheterization - brachial access      . DIET: It is advisable for you to follow a diet that limits the intake of salt, sugar, saturated fats and cholesterol.   2. FOR THE NEXT 24 HOURS:   For the next 8 hours, you should be watched by a responsible adult. This person should make sure your condition is not getting worse.   Don't drink any alcohol?for the next 24 hours.   Don't drive, operate dangerous machinery, or make important business or personal decisions?during the next 24 hours.   Your healthcare provider may tell you not to take any medicine by mouth for pain or sleep in the next 4 hours. These medicines may react with the medicines you were given in the hospital. This could cause a much stronger response than usual.   3. ACTIVITY:     Day of discharge: NO vigorous activity, lifting or straining      Day after discharge: Avoid heavy lifting (up to 10 lbs)    The day after discharge you may shower, but avoid tub baths or    submerging your site in water for 5 days     Wash site gently with soap and water    NO powder or lotion to your procedure site.    Before you shower, remove dressing, apply bandaid if desired       2nd day after discharge: Resume normal activities     Exercise program as instructed  ?  ?  4. WOUND CARE: It is not unusual to have a small amount of bruising appear in the groin area. It is also common to have a tender "knot" develop beneath the skin at the puncture site in the groin. This is scar tissue only and is not a cause for concern or alarm. This tender knot may take several weeks to fully resolve. The bruise will usually spread over several days. If the lump gets bigger, call you doctor immediately.  ?  5. DISCOMFORT: For general discomfort at the puncture site, you may take 1 or 2 Acetaminophen (Tylenol) tablets every 4 hours as needed. (Do not take more than 4000 mg a day)  ?  6. CALL YOUR HEALTHCARE PROVIDER IF YOU START TO HAVE THE FOLLOWING SYMPTOMS:   ?  1. " "Problems with the affected arm: Pain, discomfort, loss of warmth, numbness or tingling   ?  2. Problems at the site: Bleeding, pain that is sudden/sharp/persistent,  swelling at site or a change in "lump" size, increased redness or drainage at   puncture site   ?  3. High fever (101 degrees or higher)  4. Drowsiness that doesn't get better  ?  5. Weakness or dizziness that doesn't get better  6. Repeated vomiting  ?  ?  7. GO TO THE EMERGENCY ROOM OR CALL 911 IF YOU HAVE: Chest pains or discomforts not relieved with 3 nitroglycerin doses (sublingual tablets or spray), numbness or severe pain or if your arm becomes cold or discolored or uncontrolled bleeding from site (apply direct pressure at the site).  "

## 2020-06-26 ENCOUNTER — HOSPITAL ENCOUNTER (OUTPATIENT)
Dept: RADIOLOGY | Facility: HOSPITAL | Age: 70
Discharge: HOME | End: 2020-06-26
Attending: FAMILY MEDICINE
Payer: MEDICARE

## 2020-06-26 VITALS — BODY MASS INDEX: 46.31 KG/M2 | WEIGHT: 235.88 LBS | HEIGHT: 60 IN

## 2020-06-26 DIAGNOSIS — Z12.31 ENCOUNTER FOR SCREENING MAMMOGRAM FOR MALIGNANT NEOPLASM OF BREAST: ICD-10-CM

## 2020-06-26 PROCEDURE — 77063 BREAST TOMOSYNTHESIS BI: CPT | Mod: 26,,, | Performed by: RADIOLOGY

## 2020-06-26 PROCEDURE — 77067 SCR MAMMO BI INCL CAD: CPT | Mod: TC

## 2020-06-26 PROCEDURE — 77067 SCR MAMMO BI INCL CAD: CPT | Mod: 26,,, | Performed by: RADIOLOGY

## 2020-06-26 PROCEDURE — 77063 MAMMO DIGITAL SCREENING BILAT WITH TOMOSYNTHESIS_CAD: ICD-10-PCS | Mod: 26,,, | Performed by: RADIOLOGY

## 2020-06-26 PROCEDURE — 77067 MAMMO DIGITAL SCREENING BILAT WITH TOMOSYNTHESIS_CAD: ICD-10-PCS | Mod: 26,,, | Performed by: RADIOLOGY

## 2020-06-28 ENCOUNTER — NURSE TRIAGE (OUTPATIENT)
Dept: ADMINISTRATIVE | Facility: CLINIC | Age: 70
End: 2020-06-28

## 2020-07-08 ENCOUNTER — OFFICE VISIT (OUTPATIENT)
Dept: PULMONOLOGY | Facility: CLINIC | Age: 70
End: 2020-07-08
Payer: COMMERCIAL

## 2020-07-08 VITALS
HEIGHT: 60 IN | DIASTOLIC BLOOD PRESSURE: 72 MMHG | HEART RATE: 81 BPM | RESPIRATION RATE: 18 BRPM | BODY MASS INDEX: 47.85 KG/M2 | WEIGHT: 243.75 LBS | OXYGEN SATURATION: 95 % | SYSTOLIC BLOOD PRESSURE: 144 MMHG

## 2020-07-08 DIAGNOSIS — I70.0 ATHEROSCLEROSIS OF AORTA: ICD-10-CM

## 2020-07-08 DIAGNOSIS — G47.33 OSA ON CPAP: ICD-10-CM

## 2020-07-08 DIAGNOSIS — E78.2 MIXED HYPERLIPIDEMIA: ICD-10-CM

## 2020-07-08 DIAGNOSIS — I10 ESSENTIAL HYPERTENSION: Chronic | ICD-10-CM

## 2020-07-08 DIAGNOSIS — I27.20 PULMONARY HYPERTENSION: Primary | ICD-10-CM

## 2020-07-08 DIAGNOSIS — J98.6 ELEVATED HEMIDIAPHRAGM: ICD-10-CM

## 2020-07-08 DIAGNOSIS — E66.01 MORBID OBESITY WITH BMI OF 45.0-49.9, ADULT: ICD-10-CM

## 2020-07-08 DIAGNOSIS — R94.2 DIFFUSION CAPACITY OF LUNG (DL), DECREASED: ICD-10-CM

## 2020-07-08 PROCEDURE — 99999 PR PBB SHADOW E&M-EST. PATIENT-LVL III: CPT | Mod: PBBFAC,,, | Performed by: INTERNAL MEDICINE

## 2020-07-08 PROCEDURE — 99214 PR OFFICE/OUTPT VISIT, EST, LEVL IV, 30-39 MIN: ICD-10-PCS | Mod: S$GLB,,, | Performed by: INTERNAL MEDICINE

## 2020-07-08 PROCEDURE — 99213 OFFICE O/P EST LOW 20 MIN: CPT | Mod: PBBFAC | Performed by: INTERNAL MEDICINE

## 2020-07-08 PROCEDURE — 99214 OFFICE O/P EST MOD 30 MIN: CPT | Mod: S$GLB,,, | Performed by: INTERNAL MEDICINE

## 2020-07-08 PROCEDURE — 99999 PR PBB SHADOW E&M-EST. PATIENT-LVL III: ICD-10-PCS | Mod: PBBFAC,,, | Performed by: INTERNAL MEDICINE

## 2020-07-08 RX ORDER — SILDENAFIL CITRATE 20 MG/1
20 TABLET ORAL 3 TIMES DAILY
Qty: 90 TABLET | Refills: 11 | Status: SHIPPED | OUTPATIENT
Start: 2020-07-08 | End: 2021-08-16 | Stop reason: SDUPTHER

## 2020-07-08 NOTE — PROGRESS NOTES
Subjective:       Patient ID: Yoana Pardo is a 69 y.o. female.  Patient Active Problem List   Diagnosis    Mixed hyperlipidemia    Pulmonary hypertension    MVP (mitral valve prolapse)    Hiatal hernia with GERD    History of positive PPD, untreated    Hemorrhoids    Essential hypertension    Peripheral neuropathy    Primary osteoarthritis of both knees    Morbid obesity with BMI of 45.0-49.9, adult    [place] on CPAP    Chronic seasonal allergic rhinitis    Atherosclerosis of aorta    S/P laparoscopic cholecystectomy    Elevated hemidiaphragm    Diffusion capacity of lung (dl), decreased    Memory loss     Immunization History   Administered Date(s) Administered    Influenza - High Dose - PF (65 years and older) 09/25/2018, 10/25/2019    Influenza - Quadrivalent 10/29/2014    Influenza - Quadrivalent - PF (6 months and older) 10/27/2015, 10/03/2016    Influenza Split 11/22/2006, 11/06/2008, 10/27/2009, 10/19/2012    Pneumococcal Conjugate - 13 Valent 10/15/2018    Pneumococcal Polysaccharide - 23 Valent 10/28/2019    Tdap 08/29/2016          EPWORTH SLEEPINESS SCALE 7/8/2020   Sitting and reading 2   Watching TV 2   Sitting, inactive in a public place (e.g. a theatre or a meeting) 1   As a passenger in a car for an hour without a break 2   Lying down to rest in the afternoon when circumstances permit 2   Sitting and talking to someone 0   Sitting quietly after a lunch without alcohol 3   In a car, while stopped for a few minutes in traffic 0   Total score 12          Chief Complaint: Sleep Apnea and Pulmonary Hypertension    Ms. Yoana Pardo is 69 y.o.   Last visit 06/08/2020  Follow up after [facility]  RA: 16/ 15/ 14 RV: 55/ 8/ 14 PA: 55/ 23/ 37 PWP: 20/ 18/ 18 .   Cardiac output was 3.9. Cardiac index is 1.9 L/min/m2.  Discuseed role of Pul vasodilator therapy  REVATIO  Hypoxemia and [place] treated with CPAP  Tohatchi score is 12  Weight stable 227lb/235lb  Wake time 6-7 am  Occasional  eric  I have reviewed the patient's medical history in detail and updated the computerized patient record.                 Review of Systems   Constitutional: Positive for fatigue.   HENT: Negative for postnasal drip, rhinorrhea and congestion.    Respiratory: Positive for apnea (CPAP through [facility]). Negative for snoring, sputum production, shortness of breath, wheezing, pleurisy and use of rescue inhaler.             Cardiovascular: Negative.    Genitourinary: Negative.    Endocrine: endocrine negative   Musculoskeletal: Negative.    Skin: Negative for rash.   Neurological: Negative.    Psychiatric/Behavioral: Negative for sleep disturbance.       Objective:       Vitals:    20 1650   BP: (!) 144/72   Pulse: 81   Resp: 18   SpO2: 95%   Weight: 110.6 kg (243 lb 11.5 oz)   Height: 5' (1.524 m)     Physical Exam   Constitutional: She is oriented to person, place, and time. Vital signs are normal. She appears well-developed and well-nourished.     She is obese.   HENT:   Head: Normocephalic.   Nose: No mucosal edema. No polyps.   Mouth/Throat: Oropharynx is clear and moist. No oropharyngeal exudate. Mallampati Score: IV.   Neck: Normal range of motion. Neck supple. No tracheal deviation present. No thyromegaly present.   Cardiovascular: Normal rate and regular rhythm.   No murmur heard.  Pulmonary/Chest: Normal expansion, symmetric chest wall expansion, effort normal and breath sounds normal.   Abdominal: Soft. Bowel sounds are normal.   Musculoskeletal: Normal range of motion.         General: Edema present.   Lymphadenopathy:     She has no cervical adenopathy.   Neurological: She is alert and oriented to person, place, and time. Gait normal.   Skin: Skin is warm and dry. No rash noted. No erythema. Nails show no clubbing.   Psychiatric: She has a normal mood and affect.   Nursing note and vitals reviewed.    Personal Diagnostic Review  DOWNLOAD  2019 - 10/25/2019  : 1950  Age: 69  years  [facility]  [address], [address]  [place], [address]  Phone: [phone]  Email: [email or web address]  Compliance Report  Usage 2019 - 10/25/2019  Usage days 18/90 days (60%)  >= 4 hours18 days (60%)  < 4 hours 0 days (0%)  AutoPAP 6-20 AHI 2.9  AHIAirSense 10 AutoSe   SNo flowsheet data found.      ONSAT  OVERNIGHT OXIMETRY REPORT:     Dictated by: Ernie Mcintosh MD  Test date: 2020  Dictated on: 2020        Comment: This test was performed on CPAP and Room Air      A desaturation event was defined as a decrease of saturation by 4 or more.     REPORT SUMMARY  Total valid samplin:42:24   High SpO2: 99%    Low SpO2: 87%    Mean SpO2  93.7 %  Cumulative time with oxygen saturation less than 88% (TC88): 0:00:48     CLINICAL INTERPRETATION  Results are normal,  There is no significant nocturnal oxygen desaturation and  Clinical correlation is advised     Medicare Criteria Comments:   Oximetry test results suggest the patient does not fall under Medicare Group 1 Criteria. ( Arterial oxygen saturation at or below 88% for at least 5 minutes taken during sleep)  Ernie Mcintosh MD    RA: 16/ 15/ 14 RV:  PA:  PWP:  .   Cardiac output was 3.9. Cardiac index is 1.9 L/min/m2.      Assessment:       Problem List Items Addressed This Visit     Diffusion capacity of lung (dl), decreased    Essential hypertension (Chronic)    [place] on CPAP    Mixed hyperlipidemia    Morbid obesity with BMI of 45.0-49.9, adult    Atherosclerosis of aorta    Elevated hemidiaphragm    Pulmonary hypertension - Primary     START REVATIO         Relevant Medications    sildenafil (REVATIO) 20 mg Tab    Other Relevant Orders    Echo Color Flow Doppler? Yes        Plan:       Added REVATIO  Side effects discussed  Will give feedback within 5-7 days of starting meds  F/u echo 3 months         Follow up in about 3 months (around 10/8/2020), or start revatio, repeat echo.    This  note was prepared using voice recognition system and is likely to have sound alike errors that may have been overlooked even after proof reading.  Please call me with any questions    Discussed diagnosis, its evaluation, treatment and usual course. All questions answered.    Thank you for the courtesy of participating in the care of this patient    Ernie Mcintosh MD      Complications of untreated sleep apnea discussed with pateint in detail including but not limited to  high blood pressure, heart attack, stroke, obesity,  diabetes and worsening heart failure. Patient voiced understanding.

## 2020-07-08 NOTE — PATIENT INSTRUCTIONS
Sildenafil oral suspension  What is this medicine?  SILDENAFIL (olvin DEN a zia) is used to treat pulmonary arterial hypertension. This is a serious heart and lung condition. This medicine helps to improve symptoms and quality of life.  How should I use this medicine?  Take this medicine by mouth. Follow the directions on the prescription label. Shake well for at least 10 seconds before using. Do not mix this medicine with other medicine or flavoring. This medicine should only be given using the oral syringe provided in the pack. You can take it with or without food. If it upsets your stomach, take it with food. Take your doses at regular intervals about 4 to 6 hours apart. Do not take it more often than directed. Do not stop taking except on your doctor's advice.  Talk to your pediatrician regarding the use of this medicine in children. This medicine is not approved for use in children.  What side effects may I notice from receiving this medicine?  Side effects that you should report to your doctor or health care professional as soon as possible:  · allergic reactions like skin rash, itching or hives, swelling of the face, lips, or tongue  · breathing problems  · changes in vision  · chest pain  · decreased hearing  · fast, irregular heartbeat  · men: prolonged or painful erection (lasting more than 4 hours)  Side effects that usually do not require medical attention (Report these to your doctor or health care professional if they continue or are bothersome.):  · facial flushing  · headache  · nosebleed  · trouble sleeping  · upset stomach  What may interact with this medicine?  Do not take this medicine with any of the following medications:  · cisapride  · cobicistat; elvitegravir; emtricitabine; tenofovir  · methscopolamine nitrate  · nitrates like amyl nitrite, isosorbide dinitrate, isosorbide mononitrate, nitroglycerin  · nitroprusside  · other sildenafil products (Viagra)  · riociguat  · telaprevir  This  medicine may also interact with the following medications:  · antiviral medicines for HIV or AIDS  · bosentan  · certain medicines for benign prostatic hyperplasia (BPH)  · certain medicines for blood pressure  · certain medicines for fungal infections like ketoconazole and itraconazole  · cimetidine  · erythromcyin  What if I miss a dose?  If you miss a dose, take it as soon as you can. If it is almost time for your next dose, take only that dose. Do not take double or extra doses.  Where should I keep my medicine?  Keep out of the reach of children.  After this medicine is mixed by your pharmacist, store it below 30 degrees C (86 degrees F) or in a refrigerator between 2 and 8 degrees C (36 and 46 degrees F). Do not freeze. Throw away any unused medicine after 60 days.  What should I tell my health care provider before I take this medicine?  They need to know if you have any of these conditions:  · anatomical deformation of the penis, Peyronie's disease, or history of priapism (painful and prolonged erection)  · bleeding disorders  · eye disease, vision problems  · heart disease  · high or low blood pressure  · history of blood diseases, like sickle cell anemia or leukemia  · kidney disease  · liver disease  · pulmonary veno-occlusive disease (PVOD)  · stomach ulcer  · an unusual or allergic reaction to sildenafil, other medicines, foods, dyes, or preservatives  · pregnant or trying to get pregnant  · breast-feeding  What should I watch for while using this medicine?  Tell your doctor or healthcare professional if your symptoms do not start to get better or if they get worse.  Tell your doctor or health care professional right away if you have any change in your eyesight or hearing.  You may get dizzy. Do not drive, use machinery, or do anything that needs mental alertness until you know how this medicine affects you. Do not stand or sit up quickly, especially if you are an older patient. This reduces the risk of  dizzy or fainting spells. Avoid alcoholic drinks; they can make you more dizzy.  NOTE:This sheet is a summary. It may not cover all possible information. If you have questions about this medicine, talk to your doctor, pharmacist, or health care provider. Copyright© 2017 [employer]

## 2020-07-09 ENCOUNTER — TELEPHONE (OUTPATIENT)
Dept: PHARMACY | Facility: CLINIC | Age: 70
End: 2020-07-09

## 2020-07-09 NOTE — TELEPHONE ENCOUNTER
LVM for callback to inform patient that [facility] received prescription for Revatio and prior authorization is required.  [facility] will be back in touch once insurance determination is received.

## 2020-07-13 NOTE — TELEPHONE ENCOUNTER
DOCUMENTATION ONLY:  Prior authorization for SILDENAFIL approved from 7/10/2020 to 7/9/2021.    Case ID# [id number]    Co-pay: $5.89    Patient Assistance IS NOT required.    Forwarding to [facility] for review and shipment. -HBR

## 2020-07-15 ENCOUNTER — TELEPHONE (OUTPATIENT)
Dept: PHARMACY | Facility: CLINIC | Age: 70
End: 2020-07-15

## 2020-07-15 NOTE — TELEPHONE ENCOUNTER
Sildenafil initial consultation and shipment attempted (attempt #1). No answer. [facility] for call back. Co-pay $5.89.

## 2020-07-22 ENCOUNTER — LAB VISIT (OUTPATIENT)
Dept: PRIMARY CARE CLINIC | Facility: CLINIC | Age: 70
End: 2020-07-22
Payer: MEDICARE

## 2020-07-22 DIAGNOSIS — Z00.6 RESEARCH STUDY PATIENT: Primary | ICD-10-CM

## 2020-07-22 LAB — SARS-COV-2 IGG SERPLBLD QL IA.RAPID: NEGATIVE

## 2020-07-22 PROCEDURE — 86769 SARS-COV-2 COVID-19 ANTIBODY: CPT

## 2020-07-22 PROCEDURE — U0003 INFECTIOUS AGENT DETECTION BY NUCLEIC ACID (DNA OR RNA); SEVERE ACUTE RESPIRATORY SYNDROME CORONAVIRUS 2 (SARS-COV-2) (CORONAVIRUS DISEASE [COVID-19]), AMPLIFIED PROBE TECHNIQUE, MAKING USE OF HIGH THROUGHPUT TECHNOLOGIES AS DESCRIBED BY CMS-2020-01-R: HCPCS

## 2020-07-22 NOTE — RESEARCH
Date of Consent: 7/22/2020    Sponsor: [facility]    Study Title/IRB Number: Observational study of Sars-CoV2 Immunoglobulin G (IgG) seroprevalence among the [facility] population over time 2020.163  Principle Investigator: Nancy Ulloa, PhD    Did the patient need translation services? No   name: N/A    Prior to the Informed Consent (IC) being signed, or any study protocol required data collection, testing, procedure, or intervention being performed, the following was done and/or discussed:   Patient was given a paper copy of the IC for review    Patient was given study FAQ   Purpose of the study and qualifications to participate    Study design and tests or procedures done at this visit   Confidentiality and HIPAA Authorization for Release of Medical Records for the research trial/ subject's rights/research related injury   Risk, Benefits, Alternative Treatments, Compensation and Costs   Participation in the research trial is voluntary and patient may withdraw at anytime   Contact information for study related questions    Patient verbalizes understanding of the above: Yes  Contact information for PI and IRB given to patient: Yes  Patient able to adequately summarize: the purpose of the study, the risks associated with the study, and all procedures, testing, and follow-ups associated with the study: Yes    The consent was discussed verbally with the patient and all questions were answered satisfactorily. Patient gave verbal consent for the Seroprevalence research study with an IRB approval date of 06/23/2020.      The Consent, Consent Witness and name of Clinical Research Coordinator consenting was captured and documented in REDCap.    All Inclusion and Exclusion Criteria reviewed, subject meets all Inclusion criteria and does not meet any Exclusion Criteria at this time.     Patient Eligibility was confirmed.    Patient responded to survey questions.    The following biospecimen  collection procedures were collected:    -Nasopharyngeal Swab Collection  -Blood collection

## 2020-07-24 LAB — SARS-COV-2 RNA RESP QL NAA+PROBE: NOT DETECTED

## 2020-08-05 NOTE — TELEPHONE ENCOUNTER
Patient filled last script of sildenafil at [facility] in [place]. Would like to have medication transferred to local [facility]. Explained to patient that she can have the [facility] that she would like to fill at call [facility] location and they would be able to transfer that medication out. Will close out at [facility] at this time.

## 2020-08-14 ENCOUNTER — OFFICE VISIT (OUTPATIENT)
Dept: SURGERY | Facility: CLINIC | Age: 70
End: 2020-08-14
Payer: MEDICARE

## 2020-08-14 VITALS
HEART RATE: 79 BPM | SYSTOLIC BLOOD PRESSURE: 136 MMHG | BODY MASS INDEX: 47.26 KG/M2 | TEMPERATURE: 97 F | DIASTOLIC BLOOD PRESSURE: 86 MMHG | WEIGHT: 240.75 LBS | HEIGHT: 60 IN

## 2020-08-14 DIAGNOSIS — E66.01 MORBID OBESITY WITH BMI OF 45.0-49.9, ADULT: ICD-10-CM

## 2020-08-14 PROCEDURE — 99999 PR PBB SHADOW E&M-EST. PATIENT-LVL V: CPT | Mod: PBBFAC,,, | Performed by: SURGERY

## 2020-08-14 PROCEDURE — 99213 PR OFFICE/OUTPT VISIT, EST, LEVL III, 20-29 MIN: ICD-10-PCS | Mod: S$PBB,,, | Performed by: SURGERY

## 2020-08-14 PROCEDURE — 99999 PR PBB SHADOW E&M-EST. PATIENT-LVL V: ICD-10-PCS | Mod: PBBFAC,,, | Performed by: SURGERY

## 2020-08-14 PROCEDURE — 99215 OFFICE O/P EST HI 40 MIN: CPT | Mod: PBBFAC | Performed by: SURGERY

## 2020-08-14 PROCEDURE — 99213 OFFICE O/P EST LOW 20 MIN: CPT | Mod: S$PBB,,, | Performed by: SURGERY

## 2020-08-14 RX ORDER — ACETAMINOPHEN 500 MG
500 TABLET ORAL EVERY 6 HOURS PRN
COMMUNITY

## 2020-08-14 NOTE — PROGRESS NOTES
Yoana is 70-year-old  female patient who is well known to me from previous encounters.  She had acute cholecystitis, and require surgical intervention and had laparoscopic cholecystectomy 2 years ago.  She had recovered quite well without any issues.  She has return to my office today to discuss her weight problems.  Her current BMI is 47.26 kilogram/meter sq and weighs 240 lb.  She has essential hypertension, mixed hyperlipidemia and with mitral valve prolapse as well as primary osteoarthritis of both knees and sleep apnea.  She has been referred to consider weight loss surgery in order to prevent worsening of the arthritis on both knees as well as to be off of CPAP machine which she has been experiencing some difficulties with.  Patient and I discussed about the algorithm of weight loss surgery.  She will be a good candidate.  She will be referred for dietary counseling and education.  She was also given a task to avoid all sugary food and increase exercise such as simple walking.  She is expected to follow up with me in a month.  Total time approximately half an hour was spent with this patient, and more than 50% of that was spent for treatment planning and counseling.    Evangelista Erickson      Answers for HPI/ROS submitted by the patient on 8/14/2020   activity change: No  unexpected weight change: Yes  neck pain: Yes  hearing loss: Yes  rhinorrhea: No  trouble swallowing: Yes  eye discharge: No  visual disturbance: No  chest tightness: No  wheezing: No  chest pain: No  palpitations: No  blood in stool: No  constipation: No  vomiting: No  diarrhea: No  polydipsia: No  polyuria: No  difficulty urinating: No  hematuria: No  menstrual problem: No  dysuria: No  joint swelling: Yes  arthralgias: Yes  headaches: No  weakness: No  confusion: No  dysphoric mood: No

## 2020-08-14 NOTE — LETTER
August 14, 2020      Ernie Mcintosh MD  [address] [facility] [place] [address]           [facility]  [address] [facility] [address] [facility] [address]  Phone: [phone]  Fax: [phone]          Patient: Yoana Pardo   MR Number: [id number]   YOB: 1950   Date of Visit: 8/14/2020       Dear Dr. Ernie Mcintosh:    Thank you for referring Yoana Pardo to me for evaluation. Attached you will find relevant portions of my assessment and plan of care.    If you have questions, please do not hesitate to call me. I look forward to following Yoana Pardo along with you.    Sincerely,    Evangelista Erickson MD    Enclosure  CC:  No Recipients    If you would like to receive this communication electronically, please contact [email or web address] or [phone] to request more information on [employer] Link access.    For providers and/or their staff who would like to refer a patient to [facility], please contact us through our one-stop-shop provider referral line, [facility], at [phone].    If you feel you have received this communication in error or would no longer like to receive these types of communications, please e-mail [email or web address]

## 2020-08-18 ENCOUNTER — LAB VISIT (OUTPATIENT)
Dept: LAB | Facility: HOSPITAL | Age: 70
End: 2020-08-18
Attending: FAMILY MEDICINE
Payer: MEDICARE

## 2020-08-18 ENCOUNTER — OFFICE VISIT (OUTPATIENT)
Dept: INTERNAL MEDICINE | Facility: CLINIC | Age: 70
End: 2020-08-18
Payer: MEDICARE

## 2020-08-18 VITALS
WEIGHT: 241.88 LBS | BODY MASS INDEX: 47.48 KG/M2 | HEART RATE: 72 BPM | SYSTOLIC BLOOD PRESSURE: 120 MMHG | OXYGEN SATURATION: 96 % | DIASTOLIC BLOOD PRESSURE: 78 MMHG

## 2020-08-18 DIAGNOSIS — I10 ESSENTIAL HYPERTENSION: Chronic | ICD-10-CM

## 2020-08-18 DIAGNOSIS — E78.2 MIXED HYPERLIPIDEMIA: ICD-10-CM

## 2020-08-18 DIAGNOSIS — K44.9 HIATAL HERNIA WITH GERD: ICD-10-CM

## 2020-08-18 DIAGNOSIS — G47.33 OSA ON CPAP: ICD-10-CM

## 2020-08-18 DIAGNOSIS — I27.20 PULMONARY HYPERTENSION: ICD-10-CM

## 2020-08-18 DIAGNOSIS — E66.01 MORBID OBESITY WITH BMI OF 45.0-49.9, ADULT: ICD-10-CM

## 2020-08-18 DIAGNOSIS — I10 ESSENTIAL HYPERTENSION: Primary | Chronic | ICD-10-CM

## 2020-08-18 DIAGNOSIS — R07.0 THROAT DISCOMFORT: ICD-10-CM

## 2020-08-18 DIAGNOSIS — J30.2 CHRONIC SEASONAL ALLERGIC RHINITIS: ICD-10-CM

## 2020-08-18 DIAGNOSIS — M17.0 PRIMARY OSTEOARTHRITIS OF BOTH KNEES: ICD-10-CM

## 2020-08-18 DIAGNOSIS — K21.9 HIATAL HERNIA WITH GERD: ICD-10-CM

## 2020-08-18 DIAGNOSIS — M47.26 OSTEOARTHRITIS OF SPINE WITH RADICULOPATHY, LUMBAR REGION: ICD-10-CM

## 2020-08-18 DIAGNOSIS — I70.0 ATHEROSCLEROSIS OF AORTA: ICD-10-CM

## 2020-08-18 DIAGNOSIS — I34.1 MVP (MITRAL VALVE PROLAPSE): ICD-10-CM

## 2020-08-18 PROCEDURE — 80053 COMPREHEN METABOLIC PANEL: CPT

## 2020-08-18 PROCEDURE — 99214 OFFICE O/P EST MOD 30 MIN: CPT | Mod: S$PBB,,, | Performed by: FAMILY MEDICINE

## 2020-08-18 PROCEDURE — 99999 PR PBB SHADOW E&M-EST. PATIENT-LVL IV: CPT | Mod: PBBFAC,,, | Performed by: FAMILY MEDICINE

## 2020-08-18 PROCEDURE — 84443 ASSAY THYROID STIM HORMONE: CPT

## 2020-08-18 PROCEDURE — 99214 PR OFFICE/OUTPT VISIT, EST, LEVL IV, 30-39 MIN: ICD-10-PCS | Mod: S$PBB,,, | Performed by: FAMILY MEDICINE

## 2020-08-18 PROCEDURE — 83880 ASSAY OF NATRIURETIC PEPTIDE: CPT

## 2020-08-18 PROCEDURE — 99999 PR PBB SHADOW E&M-EST. PATIENT-LVL IV: ICD-10-PCS | Mod: PBBFAC,,, | Performed by: FAMILY MEDICINE

## 2020-08-18 PROCEDURE — 99214 OFFICE O/P EST MOD 30 MIN: CPT | Mod: PBBFAC | Performed by: FAMILY MEDICINE

## 2020-08-18 PROCEDURE — 80061 LIPID PANEL: CPT

## 2020-08-18 PROCEDURE — 36415 COLL VENOUS BLD VENIPUNCTURE: CPT

## 2020-08-18 RX ORDER — GABAPENTIN 100 MG/1
100 CAPSULE ORAL NIGHTLY PRN
Qty: 30 CAPSULE | Refills: 3 | Status: SHIPPED | OUTPATIENT
Start: 2020-08-18 | End: 2021-01-26 | Stop reason: SDUPTHER

## 2020-08-18 RX ORDER — ESOMEPRAZOLE MAGNESIUM 40 MG/1
40 CAPSULE, DELAYED RELEASE ORAL
Qty: 30 CAPSULE | Refills: 11 | Status: SHIPPED | OUTPATIENT
Start: 2020-08-18 | End: 2021-02-18 | Stop reason: ALTCHOICE

## 2020-08-18 RX ORDER — FUROSEMIDE 20 MG/1
20 TABLET ORAL 2 TIMES DAILY PRN
Qty: 60 TABLET | Refills: 1 | Status: SHIPPED | OUTPATIENT
Start: 2020-08-18 | End: 2022-01-31 | Stop reason: SDUPTHER

## 2020-08-18 NOTE — PROGRESS NOTES
Subjective:       Patient ID: Yoana Pardo is a 70 y.o. female.    Chief Complaint: Annual Exam    70-year-old  female patient with Patient Active Problem List:     Mixed hyperlipidemia     Pulmonary hypertension     MVP (mitral valve prolapse)     Hiatal hernia with GERD     History of positive PPD, untreated     Hemorrhoids     Essential hypertension     Peripheral neuropathy     Primary osteoarthritis of both knees     Morbid obesity with BMI of 45.0-49.9, adult     [place] on CPAP     Chronic seasonal allergic rhinitis     Atherosclerosis of aorta     S/P laparoscopic cholecystectomy     Elevated hemidiaphragm     Diffusion capacity of lung (dl), decreased     Memory loss  Here reports that she has been taking her medications regularly but has been retaining more fluid, continues to have aches and pains in her joints, and occasionally tingling and numbness sensation to her fingers as well as feet, has not been taking Lasix and gabapentin lately and requesting refill.  Reports that she does not eat excess salt in her diet.   Occasionally feels mild shortness of breath and having discomfort with swallowing, has not been taking Nexium daily.   Denies any chest pain or palpitations, changes to bowel movements    Review of Systems   Constitutional: Negative for chills, fatigue and fever.   HENT: Positive for trouble swallowing. Negative for voice change.    Eyes: Negative for visual disturbance.   Respiratory: Positive for shortness of breath. Negative for cough.         Mild shortness of breath on exertion   Cardiovascular: Negative for chest pain, palpitations and leg swelling.   Gastrointestinal: Negative for abdominal pain, nausea and vomiting.   Musculoskeletal: Negative for myalgias.   Skin: Negative for rash.   Neurological: Positive for numbness. Negative for weakness, light-headedness and headaches.   Psychiatric/Behavioral: Negative for sleep disturbance. The patient is not  nervous/anxious.          /78 (BP Location: Left arm, Patient Position: Sitting, BP Method: Large (Manual))   Pulse 72   Wt 109.7 kg (241 lb 13.5 oz)   SpO2 96%   BMI 47.48 kg/m²   Objective:      Physical Exam  Constitutional:       Appearance: She is well-developed.   HENT:      Head: Normocephalic and atraumatic.      Mouth/Throat:      Mouth: Mucous membranes are moist.   Cardiovascular:      Rate and Rhythm: Normal rate and regular rhythm.      Heart sounds: Normal heart sounds. No murmur.   Pulmonary:      Effort: Pulmonary effort is normal.      Breath sounds: Normal breath sounds. No wheezing.   Abdominal:      General: Bowel sounds are normal.      Palpations: Abdomen is soft.      Tenderness: There is no abdominal tenderness.   Musculoskeletal:         General: Swelling present.      Comments: Positive for swelling to bilateral hands and feet, nonpitting   Lymphadenopathy:      Cervical: No cervical adenopathy.   Skin:     General: Skin is warm and dry.      Findings: No rash.   Neurological:      Mental Status: She is alert and oriented to person, place, and time.   Psychiatric:         Mood and Affect: Mood normal.           Assessment/Plan:   1. Essential hypertension  - Comprehensive metabolic panel; Future  - Lipid Panel; Future  - TSH; Future   Blood pressure is stable today currently on losartan 100 mg, hydrochlorothiazide 25 mg and amlodipine 5 mg daily  Restrict salt intake and eat low-fat and low-cholesterol diet    2. Mixed hyperlipidemia  - Lipid Panel; Future  Currently taking simvastatin 20 mg daily    3. Hiatal hernia with GERD  - esomeprazole (NEXIUM) 40 MG capsule; Take 1 capsule (40 mg total) by mouth before breakfast.  Dispense: 30 capsule; Refill: 11  Refill will be given on Nexium and encouraged  to take it regularly  Advised to eat small frequent meals and avoid spicy diet  If symptoms continue to persist encouraged to see Gastroenterology    4. Pulmonary hypertension  -  furosemide (LASIX) 20 MG tablet; Take 1 tablet (20 mg total) by mouth 2 (two) times daily as needed.  Dispense: 60 tablet; Refill: 1  - Brain Natriuretic Peptide; Future  Refill given on Lasix as needed but advised to take potassium supplements when taking Lasix    5. MVP (mitral valve prolapse)    6. Primary osteoarthritis of both knees  Graded exercise regimen recommended    7. Chronic seasonal allergic rhinitis  Stable on Atrovent nasal spray as needed alternating with Flonase    8. [place] on CPAP  Stable    9. Atherosclerosis of aorta    10. Morbid obesity with BMI of 45.0-49.9, adult  Lifestyle modifications recommended to lose weight with BMI 47    11. Osteoarthritis of spine with radiculopathy, lumbar region  - gabapentin (NEURONTIN) 100 MG capsule; Take 1 capsule (100 mg total) by mouth nightly as needed.  Dispense: 30 capsule; Refill: 3  Advised to take gabapentin 1-2 tablets at bedtime as needed secondary to neuropathy like symptoms  If any worsening will consider physical therapy    12. Throat discomfort  - US Soft Tissue Head Neck Thyroid; Future   Secondary to discomfort in the throat Will check ultrasound of the thyroid  Encouraged to take Nexium daily  Drink adequate fluids  No significant thyromegaly noted

## 2020-08-19 LAB
ALBUMIN SERPL BCP-MCNC: 3.8 G/DL (ref 3.5–5.2)
ALP SERPL-CCNC: 75 U/L (ref 55–135)
ALT SERPL W/O P-5'-P-CCNC: 11 U/L (ref 10–44)
ANION GAP SERPL CALC-SCNC: 10 MMOL/L (ref 8–16)
AST SERPL-CCNC: 20 U/L (ref 10–40)
BILIRUB SERPL-MCNC: 0.4 MG/DL (ref 0.1–1)
BNP SERPL-MCNC: 48 PG/ML (ref 0–99)
BUN SERPL-MCNC: 13 MG/DL (ref 8–23)
CALCIUM SERPL-MCNC: 8.8 MG/DL (ref 8.7–10.5)
CHLORIDE SERPL-SCNC: 103 MMOL/L (ref 95–110)
CHOLEST SERPL-MCNC: 182 MG/DL (ref 120–199)
CHOLEST/HDLC SERPL: 4 {RATIO} (ref 2–5)
CO2 SERPL-SCNC: 27 MMOL/L (ref 23–29)
CREAT SERPL-MCNC: 0.8 MG/DL (ref 0.5–1.4)
EST. GFR  (AFRICAN AMERICAN): >60 ML/MIN/1.73 M^2
EST. GFR  (NON AFRICAN AMERICAN): >60 ML/MIN/1.73 M^2
GLUCOSE SERPL-MCNC: 81 MG/DL (ref 70–110)
HDLC SERPL-MCNC: 46 MG/DL (ref 40–75)
HDLC SERPL: 25.3 % (ref 20–50)
LDLC SERPL CALC-MCNC: 104.4 MG/DL (ref 63–159)
NONHDLC SERPL-MCNC: 136 MG/DL
POTASSIUM SERPL-SCNC: 3.6 MMOL/L (ref 3.5–5.1)
PROT SERPL-MCNC: 7.5 G/DL (ref 6–8.4)
SODIUM SERPL-SCNC: 140 MMOL/L (ref 136–145)
TRIGL SERPL-MCNC: 158 MG/DL (ref 30–150)
TSH SERPL DL<=0.005 MIU/L-ACNC: 0.95 UIU/ML (ref 0.4–4)

## 2020-08-20 ENCOUNTER — TELEPHONE (OUTPATIENT)
Dept: RADIOLOGY | Facility: HOSPITAL | Age: 70
End: 2020-08-20

## 2020-08-21 ENCOUNTER — TELEPHONE (OUTPATIENT)
Dept: NUTRITION | Facility: CLINIC | Age: 70
End: 2020-08-21

## 2020-08-21 ENCOUNTER — HOSPITAL ENCOUNTER (OUTPATIENT)
Dept: RADIOLOGY | Facility: HOSPITAL | Age: 70
Discharge: HOME | End: 2020-08-21
Attending: FAMILY MEDICINE
Payer: MEDICARE

## 2020-08-21 DIAGNOSIS — R07.0 THROAT DISCOMFORT: ICD-10-CM

## 2020-08-21 PROCEDURE — 76536 US EXAM OF HEAD AND NECK: CPT | Mod: 26,,, | Performed by: RADIOLOGY

## 2020-08-21 PROCEDURE — 76536 US EXAM OF HEAD AND NECK: CPT | Mod: TC

## 2020-08-21 PROCEDURE — 76536 US SOFT TISSUE HEAD NECK THYROID: ICD-10-PCS | Mod: 26,,, | Performed by: RADIOLOGY

## 2020-08-21 NOTE — TELEPHONE ENCOUNTER
Called to inform patient of insurance denial. Did not answer. Left message with name and call back number.

## 2020-08-27 NOTE — PROGRESS NOTES
"NUTRITIONAL CONSULT    Referring Physician: Dr. Erickson  Reason for MNT Referral: Initial assessment for sleeve gastrectomy work-up    PAST MEDICAL HISTORY:   70 y.o. female  BMI 46.70 kg/m2  Weight history includes lifelong struggle with weight. Has been heavier set since childhood.  Dieting attempts include tried Keto and numerous diet programs with regular exercise. She began to experience pain with walking 1.5 hrs/day so she cut back to help with pain. She noticed weight regain after she completed Keto. Reports she has always been active and does not have overeating tendency's. Does not drink a lot of tea or soda. Reports she does not eat as unhealthy as she used to. Does not need a sit down for breakfast or lunch, she eat when she wants/has time to, could be 3:00 or 4:00. Does have occasional sweets/hard candies. Did medically supervised ideal protein diet a few years back with minimal weight loss success (loss of 10-15 lbs and was not sustainable due to cost).    Past Medical History:   Diagnosis Date    GERD (gastroesophageal reflux disease)     Hemorrhoids     History of positive PPD, untreated     Hx of cold sores     Hyperlipidemia     Hypertension     MVP (mitral valve prolapse)     Peripheral neuropathy     Pulmonary HTN     Dr Bailon    Sleep apnea     has  but not using CPAP       CLINICAL DATA:  70 y.o.-year-old Black or  female.  Height: 4' 11.84"  Weight: 237 lbs  IBW: BMI of 23-27 kg/m2 (for ages 65 and older) or 117-137 lbs  BMI: Body mass index is 46.7 kg/m².   EBW:104lbs  The patient's goal weight (40% EBW):178 lbs  Personal goal weight: 140lbs    Goal for Bariatric Surgery: to improve health and to improve quality of life    NUTRITIONAL NEEDS:  1198-9929 Calories (using [place] [address]. Jeor Equation)   Grams Protein (per bariatric guidelines)    NUTRITION & HEALTH HISTORY:  Greatest challenge: dining out frequency and irregular meal patterns    Current diet recall: "     B: 1 mug of coffee with stevia and creamer  L: Cheeseburger with onions, lettuce, tomatoes, ketchup, kelley  D: 1/2 Roasted Chicken Breast + Banana Nut Bread  Snack: Hard Candy (she keeps on her desk), around 15-20 throughout day (grecia's originals)    Fluid: Root Beer (12 oz can) + 1 16 oz bottle water    Current Diet:  Meal pattern: 2 meals + 1 dayana snacks (recently hard candy)  Protein supplements: None currently  Snackin / day, 15-20 hard candies during day if available   Vegetables: Likes a variety. Eats daily.  Fruits: Likes a few. Eats rarely. Enjoys plums, nectarines.   Beverages: water and coffee without sugar. Does soda once a week, occasional diet or regular green tea  Dining out: Daily. Mostly take-out. See below.  often orders fried food or chinese food takeout. Patient will sometimes cook her own food and someitmes will eat small portions of whatever her  gets.   Cooking at home: Daily. Mostly baked and grilled meat, fish and vegetables. Uses veggies as substitutes as grains. Will do riced cauliflower stir agosto, zuchini noodle spegheti/alfrado + chicken. Prepares meals like this as lunch before work but usually eats take out foods for dinner in smaller portions of whatever  orders.    Exercise:  Past exercise: Adequate, was previously walking for about 1.5 hours daily but experienced joint pain, so cut back    Current exercise: Adequate, walks 2 miles a day for about 45 minute, 4 days a week  Restrictions to exercise: Joint pain in knees, daughter states she is short of breath when she walks    Vitamins / Minerals / Herbs:   none      Labs:   Reviewed    Food Allergies:   None, but does experience lactose intolerance    Social:  Works regular daytime shifts. 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   Lives with lives with  and son.  Grocery shopping and food prep , she will shop for her own food, she prepares most of her own healthy food.  Patient believes the household  will be supportive after surgery. Her son will be more supportive than .  tends to buy a lot of sweets and junk food when patient is trying to lose weight. He gets upset when patient doesn't eat it.   Alcohol: Drinks Gin and Juice 2-3 days a week.  Smoking: None.    ASSESSMENT:  · Patient reports attempts at weight loss, only to regain lost weight.  · Patient demonstrated knowledge of healthy eating behaviors and exercise patterns; admits to not eating healthy and not exercising at this point.  · Patient states willingness to change lifestyle and make behavior modifications as evidenced by weight loss, good exercise, dietary changes and increased vegetables.    Insurance requires medically supervised diet prior to consideration for bariatric surgery.    Barriers to Education: environmental    Stage of change: contemplation    NUTRITION DIAGNOSIS:    Obesity related to Food and nutrition related knowledge deficit and Excessive calorie intake as evidence by BMI.    BARIATRIC DIET DISCUSSION/PLAN:  Discussed diet after surgery and related to patient's food record.  Reviewed nutrition guidelines for before and after surgery.  Answered all questions.  Resume work-up for surgery.  Continue to review Bariatric Nutrition Guidebook at home and call with any questions.  Work on Bariatric Nutrition Checklist.  Begin trying various protein supplements to determine preference.  5-6 meals per day.  Start including protein supplements in the diet plan daily.  Reduce frequency of dining out.  More grocery shopping and meal preparation at home.    RECOMMENDATIONS:  Patient is a good candidate for bariatric surgery.      Patient verbalized understanding.    Expect good  compliance after surgery at this time.    Communicated nutrition plan with bariatric team.    SESSION TIME:  60 minutes

## 2020-08-28 ENCOUNTER — NUTRITION (OUTPATIENT)
Dept: NUTRITION | Facility: CLINIC | Age: 70
End: 2020-08-28
Payer: MEDICARE

## 2020-08-28 VITALS — BODY MASS INDEX: 46.7 KG/M2 | WEIGHT: 237.88 LBS | HEIGHT: 60 IN

## 2020-08-28 DIAGNOSIS — Z71.3 DIETARY COUNSELING: Primary | ICD-10-CM

## 2020-08-28 DIAGNOSIS — E66.01 MORBID OBESITY WITH BMI OF 45.0-49.9, ADULT: ICD-10-CM

## 2020-08-28 PROCEDURE — 99999 PR PBB SHADOW E&M-EST. PATIENT-LVL II: ICD-10-PCS | Mod: PBBFAC,,, | Performed by: DIETITIAN, REGISTERED

## 2020-08-28 PROCEDURE — 97802 MEDICAL NUTRITION INDIV IN: CPT | Mod: PBBFAC,59 | Performed by: DIETITIAN, REGISTERED

## 2020-08-28 PROCEDURE — 99999 PR PBB SHADOW E&M-EST. PATIENT-LVL II: CPT | Mod: PBBFAC,,, | Performed by: DIETITIAN, REGISTERED

## 2020-08-28 PROCEDURE — 99212 OFFICE O/P EST SF 10 MIN: CPT | Mod: PBBFAC | Performed by: DIETITIAN, REGISTERED

## 2020-08-28 NOTE — LETTER
August 28, 2020        Evangelista Erickson MD  [address] [facility] [address]             [facility]  [address] [facility] [address]  Phone: [phone]  Fax: [phone]   Patient: Yoana Pardo   MR Number: [id number]   YOB: 1950   Date of Visit: 8/28/2020       Dear Dr. Erickson:    Thank you for referring Yoana Pardo to me for evaluation. Below are the relevant portions of my assessment and plan of care.     Patient is approved for surgery from a nutrition standpoint. Is reviewing guidebook and will reach out with questions, but patient has a genuine desire to learn proper nutrition recommendations.        If you have questions, please do not hesitate to call me. I look forward to following Yoana along with you.    Sincerely,      Charity Hernandez, ERIC           CC  No Recipients

## 2020-09-15 ENCOUNTER — OFFICE VISIT (OUTPATIENT)
Dept: SURGERY | Facility: CLINIC | Age: 70
End: 2020-09-15
Payer: MEDICARE

## 2020-09-15 VITALS
HEIGHT: 59 IN | SYSTOLIC BLOOD PRESSURE: 120 MMHG | DIASTOLIC BLOOD PRESSURE: 70 MMHG | HEART RATE: 64 BPM | WEIGHT: 239.88 LBS | TEMPERATURE: 99 F | BODY MASS INDEX: 48.36 KG/M2

## 2020-09-15 DIAGNOSIS — E66.01 MORBID OBESITY WITH BMI OF 45.0-49.9, ADULT: Primary | ICD-10-CM

## 2020-09-15 PROCEDURE — 99999 PR PBB SHADOW E&M-EST. PATIENT-LVL IV: CPT | Mod: PBBFAC,,, | Performed by: SURGERY

## 2020-09-15 PROCEDURE — 99212 PR OFFICE/OUTPT VISIT, EST, LEVL II, 10-19 MIN: ICD-10-PCS | Mod: S$PBB,,, | Performed by: SURGERY

## 2020-09-15 PROCEDURE — 99999 PR PBB SHADOW E&M-EST. PATIENT-LVL IV: ICD-10-PCS | Mod: PBBFAC,,, | Performed by: SURGERY

## 2020-09-15 PROCEDURE — 99212 OFFICE O/P EST SF 10 MIN: CPT | Mod: S$PBB,,, | Performed by: SURGERY

## 2020-09-15 PROCEDURE — 99214 OFFICE O/P EST MOD 30 MIN: CPT | Mod: PBBFAC | Performed by: SURGERY

## 2020-09-15 NOTE — PROGRESS NOTES
Yoana is 70-year-old  female patient who is known to me from previous multiple encounters.  She was recently seen about a month ago for known morbid obesity with her current BMI of 48.45 kilogram/meter sq and weighs 239 lb.  Because of all other comorbidities sterile associated with her obesity, she had been seen for consultation.  She and I had a long discussion about the weight loss algorithm, and has been seen by our bariatric dietician for counseling.  Since the last encounter, she had attempted to follow the recommendation of medically supervised diet plan without success.  The patient and I discussed about the commitment.  Also there are medical insurance requirements known for medically supervised diet plan for 6 months.  She is willing to try it again.  She will follow up with me and with dietitian for further counseling.  She will follow up with me in 1 months.Total time of 20 min was spent with this patient, and more than 50% of that was spent for treatment planning and counseling including the following:        Preparing to see the patient (eg, review of tests)   Obtaining and/or reviewing separately obtained history   Performing a medically appropriate examination and/or evaluation   Counseling and educating the patient/family/caregiver   Ordering medications, tests, or procedures   Referring and communicating with other health care professionals (when not separately reported)   Documenting clinical information in the electronic or other health record   Independently interpreting results (not separately reported) and communicating results to the patient/family/caregiver   Care coordination (not separately reported)    Evangelista Erickson

## 2020-09-15 NOTE — LETTER
September 15, 2020      Ernie Mcintosh MD  [address]  [place] [address]           [facility]  [address] [facility] [facility] [address]  Phone: [phone]  Fax: [phone]          Patient: Yoana Pardo   MR Number: [id number]   YOB: 1950   Date of Visit: 9/15/2020       Dear Dr. Ernie Mcintosh:    Thank you for referring Yoana Pardo to me for evaluation. Attached you will find relevant portions of my assessment and plan of care.    If you have questions, please do not hesitate to call me. I look forward to following Yoana Pardo along with you.    Sincerely,    Evangelista Erickson MD    Enclosure  CC:  No Recipients    If you would like to receive this communication electronically, please contact [email or web address] or [phone] to request more information on [employer] Link access.    For providers and/or their staff who would like to refer a patient to [facility], please contact us through our one-stop-shop provider referral line, [facility], at [phone].    If you feel you have received this communication in error or would no longer like to receive these types of communications, please e-mail [email or web address]

## 2020-10-02 PROCEDURE — 94618 PULMONARY STRESS TESTING: CPT | Mod: 26,S$PBB,, | Performed by: INTERNAL MEDICINE

## 2020-10-02 PROCEDURE — 94618 PULMONARY STRESS TESTING: ICD-10-PCS | Mod: 26,S$PBB,, | Performed by: INTERNAL MEDICINE

## 2020-10-15 ENCOUNTER — PATIENT OUTREACH (OUTPATIENT)
Dept: ADMINISTRATIVE | Facility: OTHER | Age: 70
End: 2020-10-15

## 2020-10-15 NOTE — PROGRESS NOTES
"NUTRITION NOTE    Referring Physician: Dr. Erickson  Reason for MNT Referral: Surgeon Requested MSD pending  Gastric Sleeve, surgery was scheduled today    Patient presents for 2nd visit for MSD with weight loss over the past month; total weight loss -8 lbs by making numerous dietary and lifestyle changes.    Has lost weight by drinking 3 premier protein drinks per day and enjoying more vegetables. Will have small portions of protein/CHO for one meal + 3 protein shakes and minimal snacks for the rest.     CLINICAL DATA:  70 y.o. female.    CLINICAL DATA:  70 y.o.-year-old Black or  female.  Height: 4' 11.84"  Weight: 231 lbs  IBW: BMI of 23-27 kg/m2 (for ages 65 and older) or 117-137 lbs  BMI: 46.8 kg/m2   EBW:104lbs  The patient's goal weight (40% EBW):178 lbs  Personal goal weight: 140lbs    NUTRITIONAL NEEDS:  9245-1626 Calories (using [place] [address]. Jeor Equation)   Grams Protein (per bariatric guidelines)    CURRENT DIET:  Reduced-calorie diet.  Diet Recall: Food records are not present.    Diet Includes:   Snacks: 1 cup of mixed nuts, 2 servings of chips  L: 1 cup of rice/black eyed peas + 1/2 cup green beans + Diet Cranberry juice  D: A few tangerines     Fluid: 2 16 oz bottles of water + 2 cups diet cranberry juice (54 oz total)    Meal Pattern: Improved.  Protein Supplements: 3 per day.  Snacking: Adequate. Snacks include healthy choices. Had chips yesterday but this was isolated incident   Vegetables: Likes a variety. Eats daily.  Fruits: Likes a few. Eats 2-3 times per week. Enjoys plums, nectarines, tangerines.  Beverages: water and sugar-free beverages, Coffee + stevia  Dining Out:  Reduced lately. Mostly take-out.  Cooking at home: Weekly. Mostly baked meat, fish, starchy CHO and vegetables.      From last visit: Uses veggies as substitutes as grains. Will do riced cauliflower stir agosto, zuchini noodle spegheti/alfrado + chicken. Prepares meals like this as lunch before work but usually " eats take out foods for dinner in smaller portions of whatever  orders.      CURRENT EXERCISE:  None, reports that she is not motivated to exercise. Tries to get up to exercise to walk for 20-30 minutes per day. She is afraid that knee is going to start hurting again.     Restrictions to exercise: Joint pain in knees, daughter states she is short of breath when she walks  Vitamins / Minerals / Herbs:   MVI + Potassium    Food Allergies:   None, but does experience lactose intolerance    Social:  Works retired.  Lives with lives with  and son.  Grocery shopping and food prep pt does food prep and grocery shopping.  Patient believes the household will be supportive after surgery. Her son will be more supportive than .  tends to buy a lot of sweets and junk food when patient is trying to lose weight. He gets upset when patient doesn't eat it.   Alcohol: Drinks Gin and Juice 2-3 days a week.  Smoking: None.    ASSESSMENT:  Patient demonstrates all willingness to change lifestyle habits as evidenced by weight loss, good exercise, dietary changes, including protein drinks, increased fruits, increased vegetables, reduced dining out, more food preparation at [place], healthier cooking at home and healthier snacking at home.    Doing well with working on greatest challenges (snacking at night).    Barriers to Education:  none  Stage of Change:  action    NUTRITION DIAGNOSIS:  Obesity related to Food and nutrition related knowledge deficit, Excessive carbohydrate intake and Excessive calorie intake as evidence by BMI.  Status: Same    PLAN:  Pt is good candidate for surgery.    Diet: Maintain diet plan.  Resume work-up for surgery.  Continue to review Bariatric Nutrition Guidebook at home and call with any questions.  Work on Bariatric Nutrition Checklist.  1200-calorie diet.  5-6 meals per day.  Start including protein supplements in the diet plan daily.  Start shopping for bariatric vitamins &  minerals.    Exercise: Increase.    Behavior Modification: Begin to document food & activity logs daily.    Weight loss prior to surgery (5-10% TBW): 3.7%        Communicated nutrition plan with bariatric team.    SESSION TIME:  30 minutes

## 2020-10-16 ENCOUNTER — HOSPITAL ENCOUNTER (OUTPATIENT)
Dept: CARDIOLOGY | Facility: HOSPITAL | Age: 70
Discharge: HOME | End: 2020-10-16
Attending: SURGERY
Payer: MEDICARE

## 2020-10-16 ENCOUNTER — OFFICE VISIT (OUTPATIENT)
Dept: SURGERY | Facility: CLINIC | Age: 70
End: 2020-10-16
Payer: MEDICARE

## 2020-10-16 ENCOUNTER — NUTRITION (OUTPATIENT)
Dept: NUTRITION | Facility: CLINIC | Age: 70
End: 2020-10-16
Payer: MEDICARE

## 2020-10-16 ENCOUNTER — HOSPITAL ENCOUNTER (OUTPATIENT)
Dept: RADIOLOGY | Facility: HOSPITAL | Age: 70
Discharge: HOME | End: 2020-10-16
Attending: SURGERY
Payer: MEDICARE

## 2020-10-16 VITALS
HEART RATE: 67 BPM | SYSTOLIC BLOOD PRESSURE: 99 MMHG | WEIGHT: 231.69 LBS | DIASTOLIC BLOOD PRESSURE: 61 MMHG | BODY MASS INDEX: 46.8 KG/M2 | TEMPERATURE: 99 F

## 2020-10-16 DIAGNOSIS — E66.01 MORBID OBESITY: ICD-10-CM

## 2020-10-16 DIAGNOSIS — E66.01 MORBID OBESITY WITH BMI OF 45.0-49.9, ADULT: ICD-10-CM

## 2020-10-16 DIAGNOSIS — E66.01 MORBID OBESITY WITH BMI OF 45.0-49.9, ADULT: Primary | ICD-10-CM

## 2020-10-16 DIAGNOSIS — Z71.3 DIETARY COUNSELING: Primary | ICD-10-CM

## 2020-10-16 DIAGNOSIS — Z01.818 PREOP TESTING: ICD-10-CM

## 2020-10-16 PROCEDURE — 99999 PR PBB SHADOW E&M-EST. PATIENT-LVL V: ICD-10-PCS | Mod: PBBFAC,,, | Performed by: SURGERY

## 2020-10-16 PROCEDURE — 97803 MED NUTRITION INDIV SUBSEQ: CPT | Mod: PBBFAC | Performed by: DIETITIAN, REGISTERED

## 2020-10-16 PROCEDURE — 93005 ELECTROCARDIOGRAM TRACING: CPT

## 2020-10-16 PROCEDURE — 99214 PR OFFICE/OUTPT VISIT, EST, LEVL IV, 30-39 MIN: ICD-10-PCS | Mod: S$PBB,,, | Performed by: SURGERY

## 2020-10-16 PROCEDURE — 99215 OFFICE O/P EST HI 40 MIN: CPT | Mod: PBBFAC,25 | Performed by: SURGERY

## 2020-10-16 PROCEDURE — 99214 OFFICE O/P EST MOD 30 MIN: CPT | Mod: S$PBB,,, | Performed by: SURGERY

## 2020-10-16 PROCEDURE — 71046 X-RAY EXAM CHEST 2 VIEWS: CPT | Mod: 26,,, | Performed by: RADIOLOGY

## 2020-10-16 PROCEDURE — 93010 ELECTROCARDIOGRAM REPORT: CPT | Mod: ,,, | Performed by: INTERNAL MEDICINE

## 2020-10-16 PROCEDURE — 93010 EKG 12-LEAD: ICD-10-PCS | Mod: ,,, | Performed by: INTERNAL MEDICINE

## 2020-10-16 PROCEDURE — 99999 PR PBB SHADOW E&M-EST. PATIENT-LVL V: CPT | Mod: PBBFAC,,, | Performed by: SURGERY

## 2020-10-16 PROCEDURE — 71046 XR CHEST PA AND LATERAL: ICD-10-PCS | Mod: 26,,, | Performed by: RADIOLOGY

## 2020-10-16 PROCEDURE — 71046 X-RAY EXAM CHEST 2 VIEWS: CPT | Mod: TC

## 2020-10-16 RX ORDER — SODIUM CHLORIDE 9 MG/ML
INJECTION, SOLUTION INTRAVENOUS CONTINUOUS
Status: CANCELLED | OUTPATIENT
Start: 2020-10-16

## 2020-10-16 NOTE — H&P
History & Physical    SUBJECTIVE:     History of Present Illness:  Yoana is 70-year-old  female patient who is known to me from previous multiple encounters.  She was recently seen about a month ago for known morbid obesity with her current BMI of 48.45 kilogram/meter sq and weighs 239 lb. She has essential hypertension, mixed hyperlipidemia and with mitral valve prolapse as well as primary osteoarthritis of both knees and sleep apnea.  She has been referred to consider weight loss surgery in order to prevent worsening of the arthritis on both knees as well as to be off of CPAP machine which she has been experiencing some difficulties with. Because of all other comorbidities sterile associated with her obesity, she had been seen for consultation.  She and I had a long discussion about the weight loss algorithm, and has been seen by our bariatric dietician for counseling.  Since the last encounter, she had attempted to follow the recommendation of medically supervised diet plan without success.  The patient and I discussed about the commitment.     Chief Complaint   Patient presents with    Follow-up     1 month       Review of patient's allergies indicates:   Allergen Reactions    Lisinopril Other (See Comments)     Cough      Bactrim [sulfamethoxazole-trimethoprim] Itching    Zosyn [piperacillin-tazobactam] Hives     Pt received second dose of Zosyn and had hives on both arms. Benadryl given and pt continued to receive zosyn with no issues. Hydralazine also given around the same time and discontinued.        Current Outpatient Medications   Medication Sig Dispense Refill    acetaminophen (TYLENOL) 500 MG tablet Take 500 mg by mouth every 6 (six) hours as needed for Pain.      amLODIPine (NORVASC) 5 MG tablet Take 1 tablet (5 mg total) by mouth once daily. 30 tablet 2    aspirin (ECOTRIN) 81 MG EC tablet Take 81 mg by mouth once daily.      esomeprazole (NEXIUM) 40 MG capsule Take 1  capsule (40 mg total) by mouth before breakfast. 30 capsule 11    fluticasone (FLONASE) 50 mcg/actuation nasal spray 1 spray (50 mcg total) by Each Nare route once daily. 16 g 0    furosemide (LASIX) 20 MG tablet Take 1 tablet (20 mg total) by mouth 2 (two) times daily as needed. 60 tablet 1    gabapentin (NEURONTIN) 100 MG capsule Take 1 capsule (100 mg total) by mouth nightly as needed. 30 capsule 3    hydroCHLOROthiazide (HYDRODIURIL) 25 MG tablet Take 1 tablet (25 mg total) by mouth once daily. 90 tablet 3    ipratropium (ATROVENT) 0.03 % nasal spray 2 sprays by Nasal route every 8 (eight) hours as needed for Rhinitis. 30 mL 4    losartan (COZAAR) 100 MG tablet Take 1 tablet (100 mg total) by mouth once daily. 90 tablet 3    sildenafil (REVATIO) 20 mg Tab Take 1 tablet (20 mg total) by mouth 3 (three) times daily. 90 tablet 11    simvastatin (ZOCOR) 20 MG tablet Take 1 tablet (20 mg total) by mouth every evening. 90 tablet 3     No current facility-administered medications for this visit.        Past Medical History:   Diagnosis Date    GERD (gastroesophageal reflux disease)     Hemorrhoids     History of positive PPD, untreated     Hx of cold sores     Hyperlipidemia     Hypertension     MVP (mitral valve prolapse)     Peripheral neuropathy     Pulmonary HTN     Dr Bailon    Sleep apnea     has  but not using CPAP     Past Surgical History:   Procedure Laterality Date    CHOLECYSTECTOMY      DILATION AND CURETTAGE OF UTERUS      RIGHT HEART CATHETERIZATION Right 6/15/2020    Procedure: INSERTION, CATHETER, RIGHT HEART;  Surgeon: Ashlie Belle MD;  Location: [facility] CATH LAB;  Service: Cardiology;  Laterality: Right;     Family History   Problem Relation Age of Onset    Heart disease Mother     Obesity Mother     Kidney disease Father     Hypertension Father     Obesity Father     Heart disease Father     Diabetes Sister     Aneurysm Sister         AAA     Social History      Tobacco Use    Smoking status: Never Smoker    Smokeless tobacco: Never Used   Substance Use Topics    Alcohol use: Yes     Comment: rarely    Drug use: No        Review of Systems:  Review of Systems   Constitutional: Negative for chills and fever.   HENT: Negative for sore throat and trouble swallowing.    Eyes: Negative.    Respiratory: Negative for cough and shortness of breath.    Cardiovascular: Negative.    Gastrointestinal: Negative for abdominal distention, abdominal pain, nausea and vomiting.   Endocrine: Negative.    Genitourinary: Negative.    Musculoskeletal: Negative.    Skin: Negative.    Allergic/Immunologic: Negative.    Neurological: Negative.    Hematological: Does not bruise/bleed easily.   Psychiatric/Behavioral: Negative.        OBJECTIVE:     Vital Signs (Most Recent)  Temp: 98.6 °F (37 °C) (10/16/20 0926)  Pulse: 67 (10/16/20 0926)  BP: 99/61 (10/16/20 0926)     105.1 kg (231 lb 11.3 oz)     Physical Exam:  Physical Exam  Constitutional:       Appearance: She is well-developed.   HENT:      Head: Normocephalic.      Right Ear: External ear normal.      Left Ear: External ear normal.      Nose: Nose normal.   Eyes:      General: No scleral icterus.     Pupils: Pupils are equal, round, and reactive to light.   Neck:      Musculoskeletal: Normal range of motion and neck supple.      Thyroid: No thyromegaly.   Cardiovascular:      Rate and Rhythm: Normal rate and regular rhythm.      Heart sounds: Normal heart sounds. No murmur.   Pulmonary:      Effort: Pulmonary effort is normal.      Breath sounds: Normal breath sounds.   Abdominal:      General: Bowel sounds are normal.      Palpations: Abdomen is soft.      Tenderness: There is no abdominal tenderness. There is no guarding.   Musculoskeletal: Normal range of motion.   Lymphadenopathy:      Cervical: No cervical adenopathy.   Skin:     General: Skin is warm and dry.   Neurological:      Mental Status: She is alert and oriented to  person, place, and time.         Laboratory  Lab Results   Component Value Date    WBC 6.50 06/15/2020    HGB 13.3 06/15/2020    HCT 42.9 06/15/2020     06/15/2020    CHOL 182 08/18/2020    TRIG 158 (H) 08/18/2020    HDL 46 08/18/2020    ALT 11 08/18/2020    AST 20 08/18/2020     08/18/2020    K 3.6 08/18/2020     08/18/2020    CREATININE 0.8 08/18/2020    BUN 13 08/18/2020    CO2 27 08/18/2020    TSH 0.949 08/18/2020    INR 1.0 06/15/2020    HGBA1C 5.6 01/18/2006       Results for orders placed during the hospital encounter of 09/13/16   CT Abdomen Pelvis W Wo Contrast    Narrative Comparison: CXR 01/11/2016    100 cc intravenous Omnipaque 350 and oral dilute Omnipaque 350 utilized.  Axial and reformatted sagittal and coronal sequences reviewed.    History: Enlarged lymph nodes    Findings:    Lung bases are clear.  No consolidation or effusion.  Large paraesophageal hernia noted.  Heart size within normal limits.  No pericardial effusion.     No hydroureteronephrosis or nephroureterolithiasis noted on precontrast imaging.  Liver is fatty replaced but otherwise unremarkable.  Gallbladder, pancreas, adrenal glands and spleen are normal in appearance.  Both kidneys enhance uniformly.  No free or loculated fluid within the abdomen or pelvis.  There is oral contrast noted extending to the rectum.  Shotty mesenteric and retroperitoneal nodes.  No bulky adenopathy within abdomen or pelvis.  Normal appendix.  Minimal discomfort or wall thickening noted involving the duodenal C-loop without obstruction.  Remainder of the large and small bowel demonstrates no focal or diffuse wall thickening.  Sigmoid colon is redundant but otherwise unremarkable.  Levoverted uterus with large calcified nodule measuring 5 cm maximum diameter within the body region most consistent with a degenerating leiomyoma.  The ovaries visualize with certainty.  No significant pelvic adenopathy.  A few subcentimeter short axis  inguinal nodes identified.  Degenerative changes throughout the spine.  No lytic or blastic lesion.  Small umbilical hernia containing fat only.  No inguinal hernia.    Impression       1.  Paraesophageal hernia and a tiny umbilical hernia containing fat only.    2.  No significant adenopathy within the abdomen or pelvis.    3.  Levoverted uterus with a 5 cm calcified leiomyoma.    4.  Additional incidental findings as above.  Followup and/or further evaluation as warranted.  ______________________________________     Electronically signed by: CHELSEA THOMPSON III, MD  Date:     09/14/16  Time:    05:47          Diagnostic Results:  Preoperative labs pending.    XR CHEST PA AND LATERAL    ASSESSMENT/PLAN:     Morbid obesity with current BMI of 46.86 kilogram/meter sq and weighs 231 lb.  She has multiple comorbidities such as hypertension, hyperlipidemia sleep apnea heart failure bilateral arthritis of knees.    PLAN:Plan     The recommendation is proceed with sleeve gastrectomy with possible repair of paraesophageal hiatal hernia.  Surgery scheduled for December 3, 2020 at 8:00 a.m..  She will undergo EGD evaluation on December 2, 2020 at 7:00 a.m..  She is expected to start her Optifast 2 weeks 800 calorie per diet plan on November 19, 2020.  The risk and the benefit of the procedure were fully discussed with the patient.    Evangelista Erickson

## 2020-10-19 ENCOUNTER — TELEPHONE (OUTPATIENT)
Dept: CARDIOLOGY | Facility: CLINIC | Age: 70
End: 2020-10-19

## 2020-10-19 NOTE — TELEPHONE ENCOUNTER
LVM for patient to call theofice to schedule her cardiac clearance  ----- Message from Victoria Mclean LPN sent at 10/19/2020 10:36 AM CDT -----  Regarding: Pre-op Clearance (Sleeve)  Hello  The above pt needs a pre-op cardiac clearance per Dr Erickson.  Thanks  Victoria  ----- Message -----  From: Evangelista Erickson MD  Sent: 10/17/2020   5:52 AM CDT  To: Victoria Mclean LPN    Cardiology consult.

## 2020-10-21 ENCOUNTER — TELEPHONE (OUTPATIENT)
Dept: CARDIOLOGY | Facility: CLINIC | Age: 70
End: 2020-10-21

## 2020-10-21 NOTE — TELEPHONE ENCOUNTER
Called patient and scheduled for cardiac clearance on 11/17 at 1:15 with Dr. Belle.    ----- Message from Victoria Mclean LPN sent at 10/21/2020  4:45 PM CDT -----  Regarding: Pre-op Clearance  Hello  The above pt is scheduled for sx (Sleeve) on 12/3/2020 and a pre-op clearance is needed per Dr Erickson.  Thanks  Victoria

## 2020-10-29 ENCOUNTER — OFFICE VISIT (OUTPATIENT)
Dept: PSYCHIATRY | Facility: CLINIC | Age: 70
End: 2020-10-29
Payer: MEDICARE

## 2020-10-29 DIAGNOSIS — Z01.818 PREOP EXAMINATION: Primary | ICD-10-CM

## 2020-10-29 DIAGNOSIS — I10 HYPERTENSION, ESSENTIAL: ICD-10-CM

## 2020-10-29 DIAGNOSIS — E66.01 MORBID OBESITY WITH BMI OF 45.0-49.9, ADULT: ICD-10-CM

## 2020-10-29 PROCEDURE — 99999 PR PBB SHADOW E&M-EST. PATIENT-LVL I: CPT | Mod: PBBFAC,,, | Performed by: SOCIAL WORKER

## 2020-10-29 PROCEDURE — 90791 PSYCH DIAGNOSTIC EVALUATION: CPT | Mod: ,,, | Performed by: SOCIAL WORKER

## 2020-10-29 PROCEDURE — 99999 PR PBB SHADOW E&M-EST. PATIENT-LVL I: ICD-10-PCS | Mod: PBBFAC,,, | Performed by: SOCIAL WORKER

## 2020-10-29 PROCEDURE — 90791 PR PSYCHIATRIC DIAGNOSTIC EVALUATION: ICD-10-PCS | Mod: ,,, | Performed by: SOCIAL WORKER

## 2020-10-29 PROCEDURE — 99211 OFF/OP EST MAY X REQ PHY/QHP: CPT | Mod: PBBFAC | Performed by: SOCIAL WORKER

## 2020-10-29 NOTE — PROGRESS NOTES
"Psychiatry Initial Visit (PhD/LCSW)  Diagnostic Interview - CPT 13860    Date: 10/29/2020    Site: [facility]    Referral source:  Evangelista Erickson MD    Clinical status of patient: Outpatient    Yoana Pardo, a 70 y.o. female, for initial evaluation visit.  Met with patient.    Chief complaint/reason for encounter: Psychiatric Evaluation prior to bariatric surgery    History of present illness:  She has "always" had a weight problem.  She was the "heavier" on in the family.  When she was in school, she was teased for being fat.  With every pregnancy, she gained a bit more weight and it was difficult for her to take off.  The weight has been going steadily up.  She was 160 after her last pregnancy.  She was doing a lot to keep her weight off.  She would walk for hours in the morning.  She did not make any changes to her nutrition.  She is now weighing 230.  When she was less at home and working more, she put on more weight.  Her working environment had a good deal of food.  At every shift, someone was sending out for lunch.  She likes to eat when others are eating.  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  She has tried Weight Watcher's, medicine for weight loss, Ideal Protein, Keto Diet, the fasting diet.  She lost fifteen pounds with Ideal Protein, but the cost was high.  Her daughter got the gastric sleeve four years ago.  It worked well for her.  She lost about fifty pounds.  The weight is "creeping back up" on her.  The patient had expressed a desire to have it, but it was not covered by her insurance.  She was supportive of her daughter.  She has been thinking about it for several years.  She now has Medicare which will pay for it.  She has been talking to her heart doctor and her pulmonologist for several years.  She had a good meeting with Dr. Erickson.  She was able show some motivation for the third visit.  She lost ten pounds.  She has been working with Charity Hernandez in dietetics.  Her "  does not know she is having the surgery.  Her daughter is supportive.  She has wanted the patient to have it before.  She has difficulty sleeping at night.  She uses a CPAP.        Pain: 7 in her right knee.  The doctor has told her that she has arthritis.  He has recommended a knee replacement.    Symptoms:   · Mood: weight gain and insomnia  · Anxiety: denied  · Substance abuse: denied  · Cognitive functioning: denied  · Health behaviors: overweight, knee problems    Psychiatric history:  She has never been counseling before.  She denies any thoughts of hurting herself past or present.  She admits to having a problem with gambling in the past, but she has decreased the amount she is gambling.    Medical history: She has problems with high blood pressure and high cholesterol.  She had a cholecystectomy three years ago.      Family history of psychiatric illness: Her sister and her maternal aunt had mental health problems.      Social history (marriage, employment, etc.):  Patient's mother, Estefani, is  since  due to a heart condition.  She was in her early 70s.  She lived in [place].  She was a .  Patient's father, Adeel, is  since .  He was about 65.  He had multiple health issues including renal failure.  He was on dialysis.  He was a sharecropper.  He grew cotton.  Her parents were  over fifty years till her father's death.  She one of fourteen daughters.  She has four sister who have passed.  She is the eleventh child.  They were not all in the house at the same time due to her sisters being much older.  She had a difficult childhood.  She grew up poor and there were not a lot of resources.  She denies any physical or sexual abuse.  Her sisters  from end stage renal disease, an aortic aneurysm, and COVID.  She had a sister, who had mental illness.  She had delusions.  She was discovered  in her bed.  The patient grew up in [place].  She graduated  "from [employer] in 1971.  She had three semesters at [employer].  She was pursuing some type of medical profession.  She dropped out due to raising a family.  After she graduated high school, she got  to Jamir.  They have been  for 48 years.  He is retired.  He worked for the [employer] as a  for thirty years.  They have a good marriage.  He is from [place].  They live in [place] near [facility].  Their oldest son, Aga, lives with them.  Their children are:  Stacy, 48, lives in [place].  She is  with two children.  She is an .  She works for a chemical company.  Aga, 47, lives with the patient.  He is single with no children.  He has a business servicing [employer].  Thomas, 26, lives in [place].   He is single with no children.  He works as a  at an auto parts store.  She has a strained relationship with her youngest.  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  They moved to [place] in 1977.  She did not work for a couple of years.  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  She started as a nursing assistant, a renee clerk, , and human resources.  She was  for the last sixteen years.  She was at [employer] for 16 years.  She worked at [employer] for sixteen years.  She is not working for one month.  She is looking for work.  She is half-way age, but she does not want to just sit at home.  She was raised [facility].  She became Restorationism when she  her  because he is Restorationism.  They were attending [employer].  She stopped going to [employer] years back.  She believes that there is something or somebody out there.  She enjoys reading and playing video games.  She will read fantasy, science fiction, and " "self help.  She plays casino games on her phone or computer.     Substance use:   Alcohol: once or twice a week.  She will have a sip of alcohol.   Drugs: none   Tobacco: none   Caffeine: two cups of coffee in the AM    Current medications and drug reactions (include OTC, herbal): see medication list  She was on Prozac for "weight loss" about twenty years ago.    Strengths and liabilities: Strength: Patient accepts guidance/feedback, Strength: Patient is expressive/articulate., Strength: Patient is intelligent., Strength: Patient is motivated for change., Strength: Patient has positive support network., Strength: Patient has reasonable judgment., Strength: Patient is stable., Liability: Patient has poor health., Liability: Patient lacks coping skills.    Current Evaluation:     Mental Status Exam:  General Appearance:  age appropriate, casually dressed, neatly groomed, overweight   Speech: normal tone, normal rate, normal pitch, normal volume      Level of Cooperation: cooperative      Thought Processes: normal and logical   Mood: steady      Thought Content: normal, no suicidality, no homicidality, delusions, or paranoia   Affect: congruent and appropriate   Orientation: Oriented x3   Memory: recent >  intact, remote >  intact   Attention Span & Concentration: intact   Fund of General Knowledge: intact and appropriate to age and level of education   Abstract Reasoning:    Judgment & Insight: fair     Language  intact     Diagnostic Impression - Plan:       ICD-10-CM ICD-9-CM   1. Morbid obesity with BMI of 45.0-49.9, adult  E66.01 278.01    Z68.42 V85.42   Pre operative examination    Plan:  Patient does not exhibit or report any suicidal ideation, active addiction, or psychosis which would prohibit bariatric surgery.  She acknowledges the purpose and function of the gastric sleeve.  She is educated about using the surgery as a tool.  She is educated about the concept of trading addictions.  She is encouraged to " return to counseling if any emotional issues exacerbate.    Return to Clinic: as needed    Length of Service (minutes): 45

## 2020-10-29 NOTE — LETTER
October 29, 2020        Evangelista Erickson MD  [address]  [place] [address]             [facility]  [address]  [place] [address]  Phone: [phone]  Fax: [phone]   Patient: Yoana Pardo   MR Number: [id number]   YOB: 1950   Date of Visit: 10/29/2020       Dear Dr. Erickson:    Thank you for referring Yoana Pardo to me for evaluation. Below are the relevant portions of my assessment and plan of care.    If you have questions, please do not hesitate to call me. I look forward to following Yoana along with you.    Sincerely,      Barron Shaw, LYLY           CC  No Recipients

## 2020-10-30 ENCOUNTER — OFFICE VISIT (OUTPATIENT)
Dept: PULMONOLOGY | Facility: CLINIC | Age: 70
End: 2020-10-30
Attending: INTERNAL MEDICINE
Payer: MEDICARE

## 2020-10-30 ENCOUNTER — HOSPITAL ENCOUNTER (OUTPATIENT)
Dept: CARDIOLOGY | Facility: HOSPITAL | Age: 70
Discharge: HOME | End: 2020-10-30
Attending: INTERNAL MEDICINE
Payer: MEDICARE

## 2020-10-30 VITALS
WEIGHT: 231 LBS | RESPIRATION RATE: 18 BRPM | DIASTOLIC BLOOD PRESSURE: 80 MMHG | HEIGHT: 59 IN | HEART RATE: 80 BPM | OXYGEN SATURATION: 96 % | HEIGHT: 59 IN | BODY MASS INDEX: 46.57 KG/M2 | BODY MASS INDEX: 46.04 KG/M2 | SYSTOLIC BLOOD PRESSURE: 130 MMHG | WEIGHT: 228.38 LBS

## 2020-10-30 DIAGNOSIS — I27.20 PULMONARY HYPERTENSION: Primary | ICD-10-CM

## 2020-10-30 DIAGNOSIS — I27.20 PULMONARY HYPERTENSION: ICD-10-CM

## 2020-10-30 DIAGNOSIS — I10 ESSENTIAL HYPERTENSION: Chronic | ICD-10-CM

## 2020-10-30 DIAGNOSIS — R94.2 DIFFUSION CAPACITY OF LUNG (DL), DECREASED: ICD-10-CM

## 2020-10-30 DIAGNOSIS — I34.1 MVP (MITRAL VALVE PROLAPSE): ICD-10-CM

## 2020-10-30 DIAGNOSIS — R76.11 HISTORY OF POSITIVE PPD, UNTREATED: ICD-10-CM

## 2020-10-30 DIAGNOSIS — J98.6 ELEVATED HEMIDIAPHRAGM: ICD-10-CM

## 2020-10-30 DIAGNOSIS — E78.2 MIXED HYPERLIPIDEMIA: ICD-10-CM

## 2020-10-30 DIAGNOSIS — I70.0 ATHEROSCLEROSIS OF AORTA: ICD-10-CM

## 2020-10-30 DIAGNOSIS — E66.01 MORBID OBESITY WITH BMI OF 45.0-49.9, ADULT: ICD-10-CM

## 2020-10-30 DIAGNOSIS — G47.33 OSA ON CPAP: ICD-10-CM

## 2020-10-30 LAB
AORTIC ROOT ANNULUS: 2.73 CM
AV INDEX (PROSTH): 0.69
AV MEAN GRADIENT: 5 MMHG
AV PEAK GRADIENT: 8 MMHG
AV VALVE AREA: 2.2 CM2
AV VELOCITY RATIO: 0.64
BSA FOR ECHO PROCEDURE: 2.09 M2
CV ECHO LV RWT: 0.72 CM
DOP CALC AO PEAK VEL: 1.39 M/S
DOP CALC AO VTI: 34.26 CM
DOP CALC LVOT AREA: 3.2 CM2
DOP CALC LVOT DIAMETER: 2.01 CM
DOP CALC LVOT PEAK VEL: 0.89 M/S
DOP CALC LVOT STROKE VOLUME: 75.23 CM3
DOP CALC RVOT PEAK VEL: 0.71 M/S
DOP CALC RVOT VTI: 19.9 CM
DOP CALCLVOT PEAK VEL VTI: 23.72 CM
E WAVE DECELERATION TIME: 191.48 MSEC
E/A RATIO: 1.3
E/E' RATIO: 6.95 M/S
ECHO LV POSTERIOR WALL: 1.45 CM (ref 0.6–1.1)
FRACTIONAL SHORTENING: 33 % (ref 28–44)
INTERVENTRICULAR SEPTUM: 1.25 CM (ref 0.6–1.1)
IVRT: 82.78 MSEC
LA MAJOR: 6.31 CM
LA MINOR: 6.08 CM
LA WIDTH: 4.15 CM
LEFT ATRIUM SIZE: 4.95 CM
LEFT ATRIUM VOLUME INDEX: 55.4 ML/M2
LEFT ATRIUM VOLUME: 108.13 CM3
LEFT INTERNAL DIMENSION IN SYSTOLE: 2.71 CM (ref 2.1–4)
LEFT VENTRICLE DIASTOLIC VOLUME INDEX: 36.69 ML/M2
LEFT VENTRICLE DIASTOLIC VOLUME: 71.58 ML
LEFT VENTRICLE MASS INDEX: 103 G/M2
LEFT VENTRICLE SYSTOLIC VOLUME INDEX: 14 ML/M2
LEFT VENTRICLE SYSTOLIC VOLUME: 27.36 ML
LEFT VENTRICULAR INTERNAL DIMENSION IN DIASTOLE: 4.04 CM (ref 3.5–6)
LEFT VENTRICULAR MASS: 200.48 G
LV LATERAL E/E' RATIO: 5.21 M/S
LV SEPTAL E/E' RATIO: 10.43 M/S
MV PEAK A VEL: 0.56 M/S
MV PEAK E VEL: 0.73 M/S
PISA TR MAX VEL: 3.66 M/S
PV MEAN GRADIENT: 1.31 MMHG
PV PEAK VELOCITY: 1.11 CM/S
RA MAJOR: 5.29 CM
RA PRESSURE: 3 MMHG
RA WIDTH: 2.87 CM
RIGHT VENTRICULAR END-DIASTOLIC DIMENSION: 3.12 CM
SINUS: 2.58 CM
STJ: 2.34 CM
TDI LATERAL: 0.14 M/S
TDI SEPTAL: 0.07 M/S
TDI: 0.11 M/S
TR MAX PG: 54 MMHG
TRICUSPID ANNULAR PLANE SYSTOLIC EXCURSION: 2.64 CM
TV REST PULMONARY ARTERY PRESSURE: 57 MMHG

## 2020-10-30 PROCEDURE — 99214 PR OFFICE/OUTPT VISIT, EST, LEVL IV, 30-39 MIN: ICD-10-PCS | Mod: 25,S$PBB,, | Performed by: INTERNAL MEDICINE

## 2020-10-30 PROCEDURE — G0008 ADMIN INFLUENZA VIRUS VAC: HCPCS | Mod: PBBFAC

## 2020-10-30 PROCEDURE — 99999 PR PBB SHADOW E&M-EST. PATIENT-LVL IV: ICD-10-PCS | Mod: PBBFAC,,, | Performed by: INTERNAL MEDICINE

## 2020-10-30 PROCEDURE — 93306 TTE W/DOPPLER COMPLETE: CPT

## 2020-10-30 PROCEDURE — 93306 ECHO (CUPID ONLY): ICD-10-PCS | Mod: 26,,, | Performed by: INTERNAL MEDICINE

## 2020-10-30 PROCEDURE — 93306 TTE W/DOPPLER COMPLETE: CPT | Mod: 26,,, | Performed by: INTERNAL MEDICINE

## 2020-10-30 PROCEDURE — 99214 OFFICE O/P EST MOD 30 MIN: CPT | Mod: PBBFAC,25 | Performed by: INTERNAL MEDICINE

## 2020-10-30 PROCEDURE — 99214 OFFICE O/P EST MOD 30 MIN: CPT | Mod: 25,S$PBB,, | Performed by: INTERNAL MEDICINE

## 2020-10-30 PROCEDURE — 90694 VACC AIIV4 NO PRSRV 0.5ML IM: CPT | Mod: PBBFAC

## 2020-10-30 PROCEDURE — 99999 PR PBB SHADOW E&M-EST. PATIENT-LVL IV: CPT | Mod: PBBFAC,,, | Performed by: INTERNAL MEDICINE

## 2020-10-30 NOTE — PROGRESS NOTES
Subjective:       Patient ID: Yoana Pardo is a 70 y.o. female.  Patient Active Problem List   Diagnosis    Mixed hyperlipidemia    Pulmonary hypertension    MVP (mitral valve prolapse)    Hiatal hernia with GERD    History of positive PPD, untreated    Hemorrhoids    Essential hypertension    Peripheral neuropathy    Primary osteoarthritis of both knees    Morbid obesity with BMI of 45.0-49.9, adult    [place] on CPAP    Chronic seasonal allergic rhinitis    Atherosclerosis of aorta    S/P laparoscopic cholecystectomy    Elevated hemidiaphragm    Diffusion capacity of lung (dl), decreased    Memory loss     Immunization History   Administered Date(s) Administered    Influenza (FLUAD) - Quadrivalent - Adjuvanted - PF *Preferred* (65+) 10/30/2020    Influenza - High Dose - PF (65 years and older) 09/25/2018, 10/25/2019    Influenza - Quadrivalent 10/29/2014    Influenza - Quadrivalent - PF *Preferred* (6 months and older) 10/27/2015, 10/03/2016    Influenza Split 11/22/2006, 11/06/2008, 10/27/2009, 10/19/2012    Pneumococcal Conjugate - 13 Valent 10/15/2018    Pneumococcal Polysaccharide - 23 Valent 10/28/2019    Tdap 08/29/2016           EPWORTH SLEEPINESS SCALE 10/30/2020   Sitting and reading 2   Watching TV 2   Sitting, inactive in a public place (e.g. a theatre or a meeting) 0   As a passenger in a car for an hour without a break 2   Lying down to rest in the afternoon when circumstances permit 2   Sitting and talking to someone 0   Sitting quietly after a lunch without alcohol 0   In a car, while stopped for a few minutes in traffic 0   Total score 8      mRC  []  0: Dyspnea only with strenuous exercise  [x] +1:Dyspnea when hurrying or walking up a slight hill  [] +2:Walks slower than people of the same age because of dyspnea or has to stop for breath when walking at own pace  [] +3:Stops for breath after walking 100 yards (91 m) or after a few minutes  [] +4:Too dyspneic to leave  "house or breathless when dressing      NYHA    [] Class I: Patients with no limitation of activities; they suffer no symptoms from ordinary activities  [x] Class II: Patients with slight, mild limitation of activity; they are comfortable with rest or with mild exertion.  [] Class III:Patients with marked limitation of activity; they are comfortable only at rest.  [] Class IV: Patients who should be at complete rest, confined to bed or chair; any physical activity brings on discomfort and symptoms occur at rest.          Chief Complaint: Sleep Apnea    Ms. Yoana Pardo is 70 y.o.    Last visit 07/08/2020  Follow up after ECHO  On REVATIO  Last RHC:   RA: 16/ 15/ 14 RV: 55/ 8/ 14 PA: 55/ 23/ 37 PWP: 20/ 18/ 18 .   Cardiac output was 3.9. Cardiac index is 1.9 L/min/m2.  Hypoxemia and [place] treated with CPAP  using  Babcock score is 8  Weight stable 243 lb/228lb  Wake time 6-7 am  Occasional naps  Bariatric surgery scheduled for December 2020  I have reviewed the patient's medical history in detail and updated the computerized patient record.                 Review of Systems   Constitutional: Positive for fatigue.   HENT: Negative for postnasal drip, rhinorrhea and congestion.    Respiratory: Positive for apnea (CPAP through [facility]). Negative for snoring, sputum production, shortness of breath, wheezing, pleurisy and use of rescue inhaler.             Cardiovascular: Negative.    Genitourinary: Negative.    Endocrine: endocrine negative   Musculoskeletal: Negative.    Skin: Negative for rash.   Neurological: Negative.    Psychiatric/Behavioral: Negative for sleep disturbance.   All other systems reviewed and are negative.      Objective:       Vitals:    10/30/20 1046   BP: 130/80   Pulse: 80   Resp: 18   SpO2: 96%   Weight: 103.6 kg (228 lb 6.3 oz)   Height: 4' 11" (1.499 m)     Physical Exam   Constitutional: She is oriented to person, place, and time. Vital signs are normal. She appears well-developed and " well-nourished.     She is obese.   HENT:   Head: Normocephalic.   Nose: No mucosal edema. No polyps.   Mouth/Throat: Oropharynx is clear and moist. No oropharyngeal exudate. Mallampati Score: IV.   Neck: Normal range of motion. Neck supple. No tracheal deviation present. No thyromegaly present.   Cardiovascular: Normal rate and regular rhythm.   No murmur heard.  Pulmonary/Chest: Normal expansion, symmetric chest wall expansion, effort normal and breath sounds normal.   Abdominal: Soft. Bowel sounds are normal.   Musculoskeletal: Normal range of motion.         General: Edema present.   Lymphadenopathy:     She has no cervical adenopathy.   Neurological: She is alert and oriented to person, place, and time. Gait normal.   Skin: Skin is warm and dry. No rash noted. No erythema. Nails show no clubbing.   Psychiatric: She has a normal mood and affect.   Nursing note and vitals reviewed.    Personal Diagnostic Review  DOWNLOAD   09/29/2020 8-10 08/20/2020  Days used 22 days (73%  Usage greater than 4 hr was 73% of the time  Auto PAP settings 6-20  Residual AHI 2.1 events per hour.    Echocardiogram next    · The left ventricle is normal in size with normal systolic function. The estimated ejection fraction is 60%.  · There is mild left ventricular concentric hypertrophy.  · Severe left atrial enlargement.  · There is pulmonary hypertension.  · Indeterminate diastolic function.  · Normal right ventricular systolic function.  · Mild mitral regurgitation.  · Moderate to severe tricuspid regurgitation.  · Normal central venous pressure (3 mmHg).  · The estimated PA systolic pressure is 57 mmHg  Assessment:       Problem List Items Addressed This Visit     MVP (mitral valve prolapse)    History of positive PPD, untreated    Diffusion capacity of lung (dl), decreased    Essential hypertension (Chronic)     STABLE: HYZAAR,         [place] on CPAP     APAP 6-20 cm through [facility]: AHI 2.1  Usage > 4 hrs was 73 %  [place]  Score 8:    Bed time: 12MN  Wake time: 6 30 am  No naps    Patient using CPAP and benefits from it  Goals discussed             Pulmonary hypertension - Primary     Minimal dyspnea  Stable on REVATIO  Await echo         Relevant Orders    X-Ray Chest PA And Lateral    Stress test, pulmonary    Echo Color Flow Doppler? Yes    Spirometry without Bronchodilator    Mixed hyperlipidemia     STABLE ON ZOCOR         Morbid obesity with BMI of 45.0-49.9, adult     Await Gastric sleeve         Atherosclerosis of aorta     Stable noted on chest x-ray         Elevated hemidiaphragm     Breathing exercises             Plan:       Continue REVATIO  F/u echo 6 months  No pulmonary contraindications for weight loss surgery       Follow up in about 6 months (around 4/30/2021), or flu shot, echo, 6mwd, joel, weight loss, download CPAP from [facility].    This note was prepared using voice recognition system and is likely to have sound alike errors that may have been overlooked even after proof reading.  Please call me with any questions    Discussed diagnosis, its evaluation, treatment and usual course. All questions answered.    Thank you for the courtesy of participating in the care of this patient    Ernie Mcintosh MD      Complications of untreated sleep apnea discussed with pateint in detail including but not limited to  high blood pressure, heart attack, stroke, obesity,  diabetes and worsening heart failure. Patient voiced understanding.

## 2020-10-30 NOTE — ASSESSMENT & PLAN NOTE
Minimal dyspnea  Stable on REVATIO  Estimated pressure by echo 57 mm    PAmean = 0.6 x PASP + 2 mm Hg    36.2    Will repeat in 6 months.  Hopefully weight loss will help.

## 2020-10-30 NOTE — ASSESSMENT & PLAN NOTE
APAP 6-20 cm through [facility]: AHI 2.1  Usage > 4 hrs was 73 %  [place]  Score 8:   Bed time: 12MN  Wake time: 6 30 am  No naps    Patient using CPAP and benefits from it  Goals discussed

## 2020-11-17 ENCOUNTER — OFFICE VISIT (OUTPATIENT)
Dept: CARDIOLOGY | Facility: CLINIC | Age: 70
End: 2020-11-17
Payer: MEDICARE

## 2020-11-17 VITALS
SYSTOLIC BLOOD PRESSURE: 120 MMHG | BODY MASS INDEX: 45.51 KG/M2 | HEART RATE: 74 BPM | HEIGHT: 59 IN | DIASTOLIC BLOOD PRESSURE: 60 MMHG | OXYGEN SATURATION: 97 % | WEIGHT: 225.75 LBS

## 2020-11-17 DIAGNOSIS — Z90.49 S/P LAPAROSCOPIC CHOLECYSTECTOMY: ICD-10-CM

## 2020-11-17 DIAGNOSIS — G47.33 OSA ON CPAP: ICD-10-CM

## 2020-11-17 DIAGNOSIS — R94.2 DIFFUSION CAPACITY OF LUNG (DL), DECREASED: ICD-10-CM

## 2020-11-17 DIAGNOSIS — G62.9 PERIPHERAL POLYNEUROPATHY: ICD-10-CM

## 2020-11-17 DIAGNOSIS — E78.2 MIXED HYPERLIPIDEMIA: ICD-10-CM

## 2020-11-17 DIAGNOSIS — K21.9 HIATAL HERNIA WITH GERD: ICD-10-CM

## 2020-11-17 DIAGNOSIS — E66.01 MORBID OBESITY WITH BMI OF 45.0-49.9, ADULT: ICD-10-CM

## 2020-11-17 DIAGNOSIS — I27.20 PULMONARY HYPERTENSION: ICD-10-CM

## 2020-11-17 DIAGNOSIS — K44.9 HIATAL HERNIA WITH GERD: ICD-10-CM

## 2020-11-17 DIAGNOSIS — I70.0 ATHEROSCLEROSIS OF AORTA: ICD-10-CM

## 2020-11-17 DIAGNOSIS — I34.1 MVP (MITRAL VALVE PROLAPSE): ICD-10-CM

## 2020-11-17 DIAGNOSIS — I10 ESSENTIAL HYPERTENSION: Primary | Chronic | ICD-10-CM

## 2020-11-17 PROCEDURE — 99214 OFFICE O/P EST MOD 30 MIN: CPT | Mod: PBBFAC | Performed by: INTERNAL MEDICINE

## 2020-11-17 PROCEDURE — 99999 PR PBB SHADOW E&M-EST. PATIENT-LVL IV: ICD-10-PCS | Mod: PBBFAC,,, | Performed by: INTERNAL MEDICINE

## 2020-11-17 PROCEDURE — 99214 OFFICE O/P EST MOD 30 MIN: CPT | Mod: S$PBB,,, | Performed by: INTERNAL MEDICINE

## 2020-11-17 PROCEDURE — 99214 PR OFFICE/OUTPT VISIT, EST, LEVL IV, 30-39 MIN: ICD-10-PCS | Mod: S$PBB,,, | Performed by: INTERNAL MEDICINE

## 2020-11-17 PROCEDURE — 99999 PR PBB SHADOW E&M-EST. PATIENT-LVL IV: CPT | Mod: PBBFAC,,, | Performed by: INTERNAL MEDICINE

## 2020-11-17 NOTE — PROGRESS NOTES
Subjective:   Patient ID:  Yoana Pardo is a 70 y.o. female who presents for follow up of No chief complaint on file.      HPI   6/12/2020  A 68 yo obese female with htn hlp mvp pulmonary htn is referred from DR OROZCO for rhc. She has shortness of breath she does not feel rested despite cpap her pa pressures have increased. Has no angina no leg swelling.  Compared to previously she has been consistently using her cpap. Has been compliant with salt intake.    11/17/2020  No change in status she needs to have bariatric surgery she is compliant with meds htn controlled. Not compliant with cpap has no headache tia claudication  No leg swelling no chest pain. She has moderate pulmonary htn by cath.no shortness of breath.  Past Medical History:   Diagnosis Date    GERD (gastroesophageal reflux disease)     Hemorrhoids     History of positive PPD, untreated     Hx of cold sores     Hyperlipidemia     Hypertension     MVP (mitral valve prolapse)     Peripheral neuropathy     Pulmonary HTN     Dr Bailon    Sleep apnea     has  but not using CPAP       Past Surgical History:   Procedure Laterality Date    CHOLECYSTECTOMY      DILATION AND CURETTAGE OF UTERUS      RIGHT HEART CATHETERIZATION Right 6/15/2020    Procedure: INSERTION, CATHETER, RIGHT HEART;  Surgeon: Ashlie Belle MD;  Location[facility] CATH LAB;  Service: Cardiology;  Laterality: Right;       Social History     Tobacco Use    Smoking status: Never Smoker    Smokeless tobacco: Never Used   Substance Use Topics    Alcohol use: Yes     Comment: rarely    Drug use: No       Family History   Problem Relation Age of Onset    Heart disease Mother     Obesity Mother     Kidney disease Father     Hypertension Father     Obesity Father     Heart disease Father     Diabetes Sister     Aneurysm Sister         AAA       Current Outpatient Medications   Medication Sig    acetaminophen (TYLENOL) 500 MG tablet Take 500 mg by mouth every 6  (six) hours as needed for Pain.    amLODIPine (NORVASC) 5 MG tablet Take 1 tablet (5 mg total) by mouth once daily.    aspirin (ECOTRIN) 81 MG EC tablet Take 81 mg by mouth once daily.    esomeprazole (NEXIUM) 40 MG capsule Take 1 capsule (40 mg total) by mouth before breakfast.    fluticasone (FLONASE) 50 mcg/actuation nasal spray 1 spray (50 mcg total) by Each Nare route once daily.    furosemide (LASIX) 20 MG tablet Take 1 tablet (20 mg total) by mouth 2 (two) times daily as needed.    gabapentin (NEURONTIN) 100 MG capsule Take 1 capsule (100 mg total) by mouth nightly as needed.    hydroCHLOROthiazide (HYDRODIURIL) 25 MG tablet Take 1 tablet (25 mg total) by mouth once daily.    ipratropium (ATROVENT) 0.03 % nasal spray 2 sprays by Nasal route every 8 (eight) hours as needed for Rhinitis.    losartan (COZAAR) 100 MG tablet Take 1 tablet (100 mg total) by mouth once daily.    sildenafil (REVATIO) 20 mg Tab Take 1 tablet (20 mg total) by mouth 3 (three) times daily.    simvastatin (ZOCOR) 20 MG tablet Take 1 tablet (20 mg total) by mouth every evening.     No current facility-administered medications for this visit.      Current Outpatient Medications on File Prior to Visit   Medication Sig    acetaminophen (TYLENOL) 500 MG tablet Take 500 mg by mouth every 6 (six) hours as needed for Pain.    amLODIPine (NORVASC) 5 MG tablet Take 1 tablet (5 mg total) by mouth once daily.    aspirin (ECOTRIN) 81 MG EC tablet Take 81 mg by mouth once daily.    esomeprazole (NEXIUM) 40 MG capsule Take 1 capsule (40 mg total) by mouth before breakfast.    fluticasone (FLONASE) 50 mcg/actuation nasal spray 1 spray (50 mcg total) by Each Nare route once daily.    furosemide (LASIX) 20 MG tablet Take 1 tablet (20 mg total) by mouth 2 (two) times daily as needed.    gabapentin (NEURONTIN) 100 MG capsule Take 1 capsule (100 mg total) by mouth nightly as needed.    hydroCHLOROthiazide (HYDRODIURIL) 25 MG tablet Take 1  tablet (25 mg total) by mouth once daily.    ipratropium (ATROVENT) 0.03 % nasal spray 2 sprays by Nasal route every 8 (eight) hours as needed for Rhinitis.    losartan (COZAAR) 100 MG tablet Take 1 tablet (100 mg total) by mouth once daily.    sildenafil (REVATIO) 20 mg Tab Take 1 tablet (20 mg total) by mouth 3 (three) times daily.    simvastatin (ZOCOR) 20 MG tablet Take 1 tablet (20 mg total) by mouth every evening.     No current facility-administered medications on file prior to visit.      Review of patient's allergies indicates:   Allergen Reactions    Lisinopril Other (See Comments)     Cough      Bactrim [sulfamethoxazole-trimethoprim] Itching    Zosyn [piperacillin-tazobactam] Hives     Pt received second dose of Zosyn and had hives on both arms. Benadryl given and pt continued to receive zosyn with no issues. Hydralazine also given around the same time and discontinued.      Review of Systems   Constitution: Negative for diaphoresis, malaise/fatigue and weight gain.   HENT: Negative for hoarse voice.    Eyes: Negative for double vision and visual disturbance.   Cardiovascular: Negative for chest pain, claudication, cyanosis, dyspnea on exertion, irregular heartbeat, leg swelling, near-syncope, orthopnea, palpitations, paroxysmal nocturnal dyspnea and syncope.   Respiratory: Negative for cough, hemoptysis, shortness of breath and snoring.    Hematologic/Lymphatic: Negative for bleeding problem. Does not bruise/bleed easily.   Skin: Negative for color change and poor wound healing.   Musculoskeletal: Negative for muscle cramps, muscle weakness and myalgias.   Gastrointestinal: Negative for bloating, abdominal pain, change in bowel habit, diarrhea, heartburn, hematemesis, hematochezia, melena and nausea.   Neurological: Negative for excessive daytime sleepiness, dizziness, headaches, light-headedness, loss of balance, numbness and weakness.   Psychiatric/Behavioral: Negative for memory loss. The  "patient does not have insomnia.    Allergic/Immunologic: Negative for hives.       Objective:   Physical Exam   Constitutional: She is oriented to person, place, and time. She appears well-developed and well-nourished. She does not appear ill. No distress.   HENT:   Head: Normocephalic and atraumatic.   Eyes: Pupils are equal, round, and reactive to light. EOM are normal. No scleral icterus.   Neck: Normal range of motion. Neck supple. Normal carotid pulses, no hepatojugular reflux and no JVD present. Carotid bruit is not present. No tracheal deviation present. No thyromegaly present.   Cardiovascular: Normal rate, regular rhythm, normal heart sounds and normal pulses. Exam reveals no gallop and no friction rub.   No murmur heard.  Pulmonary/Chest: Effort normal and breath sounds normal. No respiratory distress. She has no wheezes. She has no rhonchi. She has no rales. She exhibits no tenderness.   Abdominal: Soft. Normal appearance, normal aorta and bowel sounds are normal. She exhibits no distension, no abdominal bruit, no ascites and no pulsatile midline mass. There is no hepatomegaly. There is no abdominal tenderness.   obese   Musculoskeletal:         General: No edema.      Right shoulder: She exhibits no deformity.   Neurological: She is alert and oriented to person, place, and time. She has normal strength. No cranial nerve deficit. Coordination normal.   Skin: Skin is warm and dry. No rash noted. She is not diaphoretic. No cyanosis or erythema. Nails show no clubbing.   Psychiatric: She has a normal mood and affect. Her speech is normal and behavior is normal.   Nursing note and vitals reviewed.    Vitals:    11/17/20 1321 11/17/20 1323   BP: 116/64 120/60   BP Location: Right arm Left arm   Patient Position: Sitting Sitting   BP Method: Large (Manual) Large (Manual)   Pulse: 74    SpO2: 97%    Weight: 102.4 kg (225 lb 12 oz)    Height: 4' 11" (1.499 m)      Lab Results   Component Value Date    CHOL 182 " 08/18/2020    CHOL 195 06/11/2019    CHOL 170 09/25/2018     Lab Results   Component Value Date    HDL 46 08/18/2020    HDL 48 06/11/2019    HDL 43 09/25/2018     Lab Results   Component Value Date    LDLCALC 104.4 08/18/2020    LDLCALC 123.4 06/11/2019    LDLCALC 98.8 09/25/2018     Lab Results   Component Value Date    TRIG 158 (H) 08/18/2020    TRIG 118 06/11/2019    TRIG 141 09/25/2018     Lab Results   Component Value Date    CHOLHDL 25.3 08/18/2020    CHOLHDL 24.6 06/11/2019    CHOLHDL 25.3 09/25/2018       Chemistry        Component Value Date/Time     10/30/2020 1035    K 3.4 (L) 10/30/2020 1035    CL 98 10/30/2020 1035    CO2 29 10/30/2020 1035    BUN 14 10/30/2020 1035    CREATININE 0.8 10/30/2020 1035    GLU 91 10/30/2020 1035        Component Value Date/Time    CALCIUM 9.2 10/30/2020 1035    ALKPHOS 79 10/30/2020 1035    AST 17 10/30/2020 1035    ALT 13 10/30/2020 1035    BILITOT 0.5 10/30/2020 1035    ESTGFRAFRICA >60.0 10/30/2020 1035    EGFRNONAA >60.0 10/30/2020 1035          Lab Results   Component Value Date    TSH 0.949 08/18/2020     Lab Results   Component Value Date    INR 1.0 06/15/2020    INR 1.1 01/18/2006     Lab Results   Component Value Date    WBC 4.76 10/30/2020    HGB 13.3 10/30/2020    HCT 43.0 10/30/2020    MCV 92 10/30/2020     10/30/2020     BMP  Sodium   Date Value Ref Range Status   10/30/2020 138 136 - 145 mmol/L Final     Potassium   Date Value Ref Range Status   10/30/2020 3.4 (L) 3.5 - 5.1 mmol/L Final     Chloride   Date Value Ref Range Status   10/30/2020 98 95 - 110 mmol/L Final     CO2   Date Value Ref Range Status   10/30/2020 29 23 - 29 mmol/L Final     BUN   Date Value Ref Range Status   10/30/2020 14 8 - 23 mg/dL Final     Creatinine   Date Value Ref Range Status   10/30/2020 0.8 0.5 - 1.4 mg/dL Final     Calcium   Date Value Ref Range Status   10/30/2020 9.2 8.7 - 10.5 mg/dL Final     Anion Gap   Date Value Ref Range Status   10/30/2020 11 8 - 16 mmol/L  Final     eGFR if    Date Value Ref Range Status   10/30/2020 >60.0 >60 mL/min/1.73 m^2 Final     eGFR if non    Date Value Ref Range Status   10/30/2020 >60.0 >60 mL/min/1.73 m^2 Final     Comment:     Calculation used to obtain the estimated glomerular filtration  rate (eGFR) is the CKD-EPI equation.        CrCl cannot be calculated (Patient's most recent lab result is older than the maximum 7 days allowed.).      · The left ventricle is normal in size with normal systolic function. The estimated ejection fraction is 60%.  · There is mild left ventricular concentric hypertrophy.  · Severe left atrial enlargement.  · There is pulmonary hypertension.  · Indeterminate diastolic function.  · Normal right ventricular systolic function.  · Mild mitral regurgitation.  · Moderate to severe tricuspid regurgitation.  · Normal central venous pressure (3 mmHg).  · The estimated PA systolic pressure is 57 mmHg.  Assessment:     1. Essential hypertension    2. Pulmonary hypertension    3. Mixed hyperlipidemia    4. MVP (mitral valve prolapse)    5. Hiatal hernia with GERD    6. Peripheral polyneuropathy    7. [place] on CPAP    8. Morbid obesity with BMI of 45.0-49.9, adult    9. S/P laparoscopic cholecystectomy    10. Atherosclerosis of aorta    11. Diffusion capacity of lung (dl), decreased      Cardiac wise I see no contraindication for surgery. She is at moderate risk.   htn good control  [place] being treated needs better compliance.   Plan:     Continue current therapy  Cardiac low salt diet.  Risk factor modification and excercise program.  Smoking cessation counseling  F/u yearly earlier if any problems. .

## 2020-11-27 ENCOUNTER — TELEPHONE (OUTPATIENT)
Dept: ENDOSCOPY | Facility: HOSPITAL | Age: 70
End: 2020-11-27

## 2020-11-27 NOTE — TELEPHONE ENCOUNTER
Patient has procedure scheduled for 12/2/2020 with dr boston.  Left message to call office to reschedule procedure to a different provider or different date.  Slot being held on dr pascual's schedule.

## 2020-11-27 NOTE — TELEPHONE ENCOUNTER
Pre op call complete. Instructions reviewed with patient. Patient expressed understanding. Pt moved to 0800 with Frida on 12/2/20. Pt is aware.

## 2020-11-29 ENCOUNTER — LAB VISIT (OUTPATIENT)
Dept: OTOLARYNGOLOGY | Facility: CLINIC | Age: 70
End: 2020-11-29
Payer: MEDICARE

## 2020-11-29 DIAGNOSIS — E66.01 MORBID OBESITY: ICD-10-CM

## 2020-11-29 LAB — SARS-COV-2 RNA RESP QL NAA+PROBE: NOT DETECTED

## 2020-11-29 PROCEDURE — U0003 INFECTIOUS AGENT DETECTION BY NUCLEIC ACID (DNA OR RNA); SEVERE ACUTE RESPIRATORY SYNDROME CORONAVIRUS 2 (SARS-COV-2) (CORONAVIRUS DISEASE [COVID-19]), AMPLIFIED PROBE TECHNIQUE, MAKING USE OF HIGH THROUGHPUT TECHNOLOGIES AS DESCRIBED BY CMS-2020-01-R: HCPCS

## 2020-11-30 ENCOUNTER — LAB VISIT (OUTPATIENT)
Dept: OTOLARYNGOLOGY | Facility: CLINIC | Age: 70
End: 2020-11-30
Payer: MEDICARE

## 2020-11-30 DIAGNOSIS — Z01.818 PREOP TESTING: ICD-10-CM

## 2020-11-30 LAB — SARS-COV-2 RNA RESP QL NAA+PROBE: NOT DETECTED

## 2020-11-30 PROCEDURE — U0003 INFECTIOUS AGENT DETECTION BY NUCLEIC ACID (DNA OR RNA); SEVERE ACUTE RESPIRATORY SYNDROME CORONAVIRUS 2 (SARS-COV-2) (CORONAVIRUS DISEASE [COVID-19]), AMPLIFIED PROBE TECHNIQUE, MAKING USE OF HIGH THROUGHPUT TECHNOLOGIES AS DESCRIBED BY CMS-2020-01-R: HCPCS

## 2020-12-02 ENCOUNTER — ANESTHESIA EVENT (OUTPATIENT)
Dept: ENDOSCOPY | Facility: HOSPITAL | Age: 70
End: 2020-12-02
Payer: MEDICARE

## 2020-12-02 ENCOUNTER — ANESTHESIA (OUTPATIENT)
Dept: ENDOSCOPY | Facility: HOSPITAL | Age: 70
End: 2020-12-02
Payer: MEDICARE

## 2020-12-02 ENCOUNTER — HOSPITAL ENCOUNTER (OUTPATIENT)
Facility: HOSPITAL | Age: 70
Discharge: HOME | End: 2020-12-02
Attending: SURGERY | Admitting: SURGERY
Payer: MEDICARE

## 2020-12-02 ENCOUNTER — TELEPHONE (OUTPATIENT)
Dept: SURGERY | Facility: CLINIC | Age: 70
End: 2020-12-02

## 2020-12-02 VITALS
DIASTOLIC BLOOD PRESSURE: 73 MMHG | HEART RATE: 56 BPM | HEIGHT: 59 IN | OXYGEN SATURATION: 99 % | SYSTOLIC BLOOD PRESSURE: 121 MMHG | TEMPERATURE: 98 F | BODY MASS INDEX: 45.46 KG/M2 | RESPIRATION RATE: 10 BRPM | WEIGHT: 225.5 LBS

## 2020-12-02 DIAGNOSIS — K21.9 GERD (GASTROESOPHAGEAL REFLUX DISEASE): ICD-10-CM

## 2020-12-02 PROCEDURE — 43235 EGD DIAGNOSTIC BRUSH WASH: CPT | Performed by: SURGERY

## 2020-12-02 PROCEDURE — 43235 PR EGD, FLEX, DIAGNOSTIC: ICD-10-PCS | Mod: ,,, | Performed by: SURGERY

## 2020-12-02 PROCEDURE — 43235 EGD DIAGNOSTIC BRUSH WASH: CPT | Mod: ,,, | Performed by: SURGERY

## 2020-12-02 PROCEDURE — 63600175 PHARM REV CODE 636 W HCPCS: Performed by: NURSE ANESTHETIST, CERTIFIED REGISTERED

## 2020-12-02 PROCEDURE — 37000009 HC ANESTHESIA EA ADD 15 MINS: Performed by: SURGERY

## 2020-12-02 PROCEDURE — 25000003 PHARM REV CODE 250: Performed by: NURSE ANESTHETIST, CERTIFIED REGISTERED

## 2020-12-02 PROCEDURE — 37000008 HC ANESTHESIA 1ST 15 MINUTES: Performed by: SURGERY

## 2020-12-02 PROCEDURE — 63600175 PHARM REV CODE 636 W HCPCS: Performed by: SURGERY

## 2020-12-02 RX ORDER — PROPOFOL 10 MG/ML
VIAL (ML) INTRAVENOUS
Status: DISCONTINUED | OUTPATIENT
Start: 2020-12-02 | End: 2020-12-02

## 2020-12-02 RX ORDER — SODIUM CHLORIDE, SODIUM LACTATE, POTASSIUM CHLORIDE, CALCIUM CHLORIDE 600; 310; 30; 20 MG/100ML; MG/100ML; MG/100ML; MG/100ML
INJECTION, SOLUTION INTRAVENOUS CONTINUOUS
Status: DISCONTINUED | OUTPATIENT
Start: 2020-12-02 | End: 2020-12-02 | Stop reason: HOSPADM

## 2020-12-02 RX ORDER — LIDOCAINE HYDROCHLORIDE 10 MG/ML
INJECTION, SOLUTION EPIDURAL; INFILTRATION; INTRACAUDAL; PERINEURAL
Status: DISCONTINUED | OUTPATIENT
Start: 2020-12-02 | End: 2020-12-02

## 2020-12-02 RX ORDER — SODIUM CHLORIDE 0.9 % (FLUSH) 0.9 %
2 SYRINGE (ML) INJECTION
Status: DISCONTINUED | OUTPATIENT
Start: 2020-12-02 | End: 2020-12-02 | Stop reason: HOSPADM

## 2020-12-02 RX ADMIN — PROPOFOL 30 MG: 10 INJECTION, EMULSION INTRAVENOUS at 08:12

## 2020-12-02 RX ADMIN — PROPOFOL 120 MG: 10 INJECTION, EMULSION INTRAVENOUS at 08:12

## 2020-12-02 RX ADMIN — LIDOCAINE HYDROCHLORIDE 50 MG: 10 INJECTION, SOLUTION EPIDURAL; INFILTRATION; INTRACAUDAL; PERINEURAL at 08:12

## 2020-12-02 RX ADMIN — SODIUM CHLORIDE, SODIUM LACTATE, POTASSIUM CHLORIDE, AND CALCIUM CHLORIDE: 600; 310; 30; 20 INJECTION, SOLUTION INTRAVENOUS at 08:12

## 2020-12-02 NOTE — DISCHARGE SUMMARY
[facility]  Discharge Note  Short Stay    Procedure(s) (LRB):  ESOPHAGOGASTRODUODENOSCOPY (EGD) (N/A)    OUTCOME: Patient tolerated treatment/procedure well without complication and is now ready for discharge.    DISPOSITION: Home or Self Care    FINAL DIAGNOSIS: Morbid obesity, and preoperative evaluation.    FOLLOWUP: In clinic    DISCHARGE INSTRUCTIONS:  Continue with the preparation.       Clinical Reference Documents Added to Patient Instructions       Document    HIATAL HERNIA, WHAT IS A (ENGLISH)      Prominent hiatal hernia, 7 cm.      Evangelista Erickson

## 2020-12-02 NOTE — ANESTHESIA PREPROCEDURE EVALUATION
12/02/2020  Yoana Pardo is a 70 y.o., female.    Anesthesia Evaluation    I have reviewed the Patient Summary Reports.    I have reviewed the Nursing Notes. I have reviewed the NPO Status.   I have reviewed the Medications.     Review of Systems  Anesthesia Hx:  No problems with previous Anesthesia    Social:  Non-Smoker, No Alcohol Use    Hematology/Oncology:  Hematology Normal   Oncology Normal     EENT/Dental:EENT/Dental Normal   Cardiovascular:   Hypertension, well controlled Valvular problems/Murmurs, MVP hyperlipidemia    Pulmonary:   Sleep Apnea    Renal/:  Renal/ Normal     Hepatic/GI:   PUD, GERD, poorly controlled    Musculoskeletal:   Arthritis     Neurological:   Neuromuscular Disease,    Endocrine:  Endocrine Normal    Dermatological:  Skin Normal    Psych:  Psychiatric Normal           Physical Exam  General:  Morbid Obesity    Airway/Jaw/Neck:  Airway Findings: Mouth Opening: Normal Tongue: Normal  General Airway Assessment: Adult      Dental:  Dental Findings: In tact, Periodontal disease, Mild   Chest/Lungs:  Chest/Lungs Findings: Clear to auscultation     Heart/Vascular:  Heart Findings: Rate: Normal             Anesthesia Plan  Type of Anesthesia, risks & benefits discussed:  Anesthesia Type:  MAC  Patient's Preference:   Intra-op Monitoring Plan: standard ASA monitors  Intra-op Monitoring Plan Comments:   Post Op Pain Control Plan:   Post Op Pain Control Plan Comments:   Induction:   IV  Beta Blocker:  Patient is not currently on a Beta-Blocker (No further documentation required).       Informed Consent: Patient understands risks and agrees with Anesthesia plan.  Questions answered. Anesthesia consent signed with patient.  ASA Score: 3     Day of Surgery Review of History & Physical: I have interviewed and examined the patient. I have reviewed the patient's H&P dated:  There  are no significant changes.          Ready For Surgery From Anesthesia Perspective.

## 2020-12-02 NOTE — ANESTHESIA POSTPROCEDURE EVALUATION
Anesthesia Post Evaluation    Patient: Yoana Pardo    Procedure(s) Performed: Procedure(s) (LRB):  ESOPHAGOGASTRODUODENOSCOPY (EGD) (N/A)    Final Anesthesia Type: MAC    Patient location during evaluation: PACU  Patient participation: Yes- Able to Participate  Level of consciousness: awake and alert  Post-procedure vital signs: reviewed and stable  Pain management: adequate  Airway patency: patent    PONV status at discharge: No PONV  Anesthetic complications: no      Cardiovascular status: blood pressure returned to baseline  Respiratory status: unassisted  Hydration status: euvolemic  Follow-up not needed.          Vitals Value Taken Time   /66 12/02/20 0821   Temp 98 12/02/20 0821   Pulse 66 12/02/20 0821   Resp 12 12/02/20 0821   SpO2 98 12/02/20 0821         No case tracking events are documented in the log.      Pain/Fredi Score: No data recorded

## 2020-12-02 NOTE — TELEPHONE ENCOUNTER
----- Message from Evangelista Erickson MD sent at 12/2/2020 10:12 AM CST -----  Please schedule this patient to come in for a discussion for Sam-en-Y gastric bypass next week.  Not urgent.  Her surgery for sleeve gastrectomy had been canceled.    Evangelista Erickson

## 2020-12-02 NOTE — TELEPHONE ENCOUNTER
Spoke with pt via phone, during conversation follow up was scheduled 12/8/2020 with Dr Erickson/CARLOS A per Dr Erickson. Patient verbalized an understanding

## 2020-12-02 NOTE — TRANSFER OF CARE
"Anesthesia Transfer of Care Note    Patient: Yoana Pardo    Procedure(s) Performed: Procedure(s) (LRB):  ESOPHAGOGASTRODUODENOSCOPY (EGD) (N/A)    Patient location: PACU    Anesthesia Type: MAC    Transport from OR: Transported from OR on room air with adequate spontaneous ventilation    Post pain: adequate analgesia    Post assessment: no apparent anesthetic complications    Post vital signs: stable    Level of consciousness: awake    Nausea/Vomiting: no nausea/vomiting    Complications: none    Transfer of care protocol was followed      Last vitals:   Visit Vitals  /67 (BP Location: Left arm, Patient Position: Lying)   Pulse 74   Temp 36.6 °C (97.9 °F) (Temporal)   Ht 4' 11" (1.499 m)   Wt 102.3 kg (225 lb 8.5 oz)   SpO2 97%   Breastfeeding No   BMI 45.55 kg/m²     "

## 2020-12-02 NOTE — OR NURSING
Pt. Adequately sedated.  Final time-out done and agreed with all staff.  See ANESTHESIA  For all medications and vital signs.

## 2020-12-02 NOTE — DISCHARGE INSTRUCTIONS
What Is a Hiatal Hernia?    Hiatal hernia is when the area where the stomach and esophagus meet bulges up through the diaphragm into the chest cavity. In some cases, part of the stomach may bulge above the diaphragm. Stomach acid may move up into the esophagus and cause symptoms. The symptoms are often blamed on gastroesophageal reflux disease (GERD). You may only know about the hernia when it shows up on an X-ray taken for other reasons.   What you may feel  The hiatus is a normal hole in the diaphragm. The esophagus passes through this hole and leads to the stomach. In some cases, part of the stomach may bulge above the diaphragm. This bulge is called a hernia. Stomach acid may move up into the esophagus and cause symptoms.  When you eat, the muscle at the hiatus relaxes to allow food to pass into the stomach. It tightens again to keep food and digestive acids in the stomach.  Many people with hiatal hernias have mild symptoms. You may notice the following GERD symptoms:  · Heartburn or other chest discomfort  · A feeling of chest fullness after a meal  · Frequent burping  · Acid taste in the mouth  · Trouble swallowing  Treating symptoms  If you have been diagnosed with hiatal hernia, these suggestions may help improve symptoms:  · Lose excess weight. Extra weight puts pressure on the stomach and esophagus.  · Dont lie down after eating. Sit up for at least an hour after eating. Lying down after eating can increase symptoms.  · Avoid certain foods and drinks. These include fatty foods, chocolate, coffee, mint, and other foods that cause symptoms for you.  · Dont smoke or drink alcohol. These can worsen symptoms.  · Look at your medicines. Discuss your medicines with your healthcare provider. Many medicines can cause symptoms.  · Consider an antacid medicine. Ask your healthcare provider about over-the-counter and prescription medicines that may help.  · Ask about surgery, if needed. Surgery is a treatment  choice for some people. Your healthcare provider can determine if surgery is an option for you.    Date Last Reviewed: 10/1/2016  © 1331-0245 [employer] [address] [place] [address], [place] [address]. All rights reserved. This information is not intended as a substitute for professional medical care. Always follow your healthcare professional's instructions.

## 2020-12-02 NOTE — H&P
Short Stay Endoscopy History and Physical    PCP - Lynn Wiggins MD    Procedure - EGD for preoperative evaluation.  ASA - 2  Mallampati - per anesthesia  History of Anesthesia problems - no  Family history Anesthesia problems -  no     HPI:  This is a 70 y.o.female here for evaluation of : EGD for preoperative evaluation.    Reflux - no  Dysphagia - no  Abdominal pain - no  Diarrhea - no  Anemia - no  GI bleeding - no  Nausea and vomiting-no  Early satiety-no  aversion to sight or smell of food-no    ROS:  Constitutional: No fevers, chills, No weight loss  ENT: No allergies  CV: No chest pain  Pulm: No cough, No shortness of breath  Ophtho: No vision changes  GI: see HPI  Derm: No rash  Heme: No lymphadenopathy, No bruising  MSK: No arthritis  : No dysuria, No hematuria  Endo: No hot or cold intolerance  Neuro: No syncope, No seizure  Psych: No anxiety, No depression    Medical History:  Past Medical History:   Diagnosis Date    GERD (gastroesophageal reflux disease)     Hemorrhoids     History of positive PPD, untreated     Hx of cold sores     Hyperlipidemia     Hypertension     MVP (mitral valve prolapse)     Peripheral neuropathy     Pulmonary HTN     Dr Bailon    Sleep apnea     [place]-CPAP  uses intermittently       Surgical History:  Past Surgical History:   Procedure Laterality Date    CHOLECYSTECTOMY      DILATION AND CURETTAGE OF UTERUS      RIGHT HEART CATHETERIZATION Right 6/15/2020    Procedure: INSERTION, CATHETER, RIGHT HEART;  Surgeon: Ashlie Belle MD;  Location: [facility] CATH LAB;  Service: Cardiology;  Laterality: Right;       Family History:  Family History   Problem Relation Age of Onset    Heart disease Mother     Obesity Mother     Kidney disease Father     Hypertension Father     Obesity Father     Heart disease Father     Diabetes Sister     Aneurysm Sister         AAA       Social History:  Social History     Socioeconomic History    Marital status:       Spouse name: Not on file    Number of children: 3    Years of education: Not on file    Highest education level: Not on file   Occupational History    Occupation: [employer]      Employer: [employer]   Social Needs    Financial resource strain: Not on file    Food insecurity     Worry: Not on file     Inability: Not on file    Transportation needs     Medical: Not on file     Non-medical: Not on file   Tobacco Use    Smoking status: Never Smoker    Smokeless tobacco: Never Used   Substance and Sexual Activity    Alcohol use: Not Currently     Comment: rarely    Drug use: No    Sexual activity: Not on file   Lifestyle    Physical activity     Days per week: Not on file     Minutes per session: Not on file    Stress: Not on file   Relationships    Social connections     Talks on phone: Not on file     Gets together: Not on file     Attends Latter day service: Not on file     Active member of club or organization: Not on file     Attends meetings of clubs or organizations: Not on file     Relationship status: Not on file   Other Topics Concern    Not on file   Social History Narrative    Not on file       Allergies:   Review of patient's allergies indicates:   Allergen Reactions    Lisinopril Other (See Comments)     Cough      Bactrim [sulfamethoxazole-trimethoprim] Itching    Zosyn [piperacillin-tazobactam] Hives     Pt received second dose of Zosyn and had hives on both arms. Benadryl given and pt continued to receive zosyn with no issues. Hydralazine also given around the same time and discontinued.        Medications:   No current facility-administered medications on file prior to encounter.      Current Outpatient Medications on File Prior to Encounter   Medication Sig Dispense Refill    amLODIPine (NORVASC) 5 MG tablet Take 1 tablet (5 mg total) by mouth once daily. 30 tablet 2    esomeprazole (NEXIUM) 40 MG capsule Take 1 capsule (40 mg total) by mouth before  breakfast. 30 capsule 11    furosemide (LASIX) 20 MG tablet Take 1 tablet (20 mg total) by mouth 2 (two) times daily as needed. 60 tablet 1    gabapentin (NEURONTIN) 100 MG capsule Take 1 capsule (100 mg total) by mouth nightly as needed. 30 capsule 3    sildenafil (REVATIO) 20 mg Tab Take 1 tablet (20 mg total) by mouth 3 (three) times daily. 90 tablet 11    acetaminophen (TYLENOL) 500 MG tablet Take 500 mg by mouth every 6 (six) hours as needed for Pain.      aspirin (ECOTRIN) 81 MG EC tablet Take 81 mg by mouth once daily.      fluticasone (FLONASE) 50 mcg/actuation nasal spray 1 spray (50 mcg total) by Each Nare route once daily. (Patient taking differently: 1 spray by Each Nostril route daily as needed. ) 16 g 0    ipratropium (ATROVENT) 0.03 % nasal spray 2 sprays by Nasal route every 8 (eight) hours as needed for Rhinitis. 30 mL 4       Objective Findings:    Vital Signs:  Vitals:    12/02/20 0655   BP: 135/67   Pulse: 74   Temp: 97.9 °F (36.6 °C)           Physical Exam:  General Appearance: Well appearing in no acute distress  Eyes:    No scleral icterus  ENT: Neck supple, Lips, mucosa, and tongue normal; teeth and gums normal  Lungs: CTA bilaterally in anterior and posterior fields, no wheezes, no crackles.  Heart:  Regular rate, S1, S2 normal, no murmurs heard.  Abdomen: Soft, non tender, non distended with normal bowel sounds. No hepatosplenomegaly, ascites, or mass.  Extremities: No clubbing, cyanosis or edema  Skin: No rash    Labs:  Reviewed    Plan:  I have explained the risks and benefits of endoscopy procedures to the patient including but not limited to bleeding, perforation, infection, and death. The patient wishes to proceed.     Evangelista Erickson

## 2020-12-02 NOTE — [employer] PATIENT INSTRUCTIONS
Discharge Summary/Instructions after an Endoscopic Procedure  Patient Name: Yoana Pardo  Patient MRN: [id number]  Patient YOB: 1950 Wednesday, December 2, 2020 Evangelista Erickson MD  RESTRICTIONS:  During your procedure today, you received medications for sedation.  These   medications may affect your judgment, balance and coordination.  Therefore,   for 24 hours, you have the following restrictions:   - DO NOT drive a car, operate machinery, make legal/financial decisions,   sign important papers or drink alcohol.    ACTIVITY:  Today: no heavy lifting, straining or running due to procedural   sedation/anesthesia.  The following day: return to full activity including work.  DIET:  Eat and drink normally unless instructed otherwise.     TREATMENT FOR COMMON SIDE EFFECTS:  - Mild abdominal pain, nausea, belching, bloating or excessive gas:  rest,   eat lightly and use a heating pad.  - Sore Throat: treat with throat lozenges and/or gargle with warm salt   water.  - Because air was used during the procedure, expelling large amounts of air   from your rectum or belching is normal.  - If a bowel prep was taken, you may not have a bowel movement for 1-3 days.    This is normal.  SYMPTOMS TO WATCH FOR AND REPORT TO YOUR PHYSICIAN:  1. Abdominal pain or bloating, other than gas cramps.  2. Chest pain.  3. Back pain.  4. Signs of infection such as: chills or fever occurring within 24 hours   after the procedure.  5. Rectal bleeding, which would show as bright red, maroon, or black stools.   (A tablespoon of blood from the rectum is not serious, especially if   hemorrhoids are present.)  6. Vomiting.  7. Weakness or dizziness.  GO DIRECTLY TO THE NEAREST EMERGENCY ROOM IF YOU HAVE ANY OF THE FOLLOWING:      Difficulty breathing              Chills and/or fever over 101 F   Persistent vomiting and/or vomiting blood   Severe abdominal pain   Severe chest pain   Black, tarry stools   Bleeding- more than one  tablespoon   Any other symptom or condition that you feel may need urgent attention  Your doctor recommends these additional instructions:  If any biopsies were taken, your doctors clinic will contact you in 1 to 2   weeks with any results.  - Discharge patient to home (ambulatory).   - Discharge patient to home [Means].  For questions, problems or results please call your physician Evangelista Erickson MD   at Work:  [phone]  If you have any questions about the above instructions, call the GI   department at [phone] or call the endoscopy unit at [phone]   from 7am until 3 pm.  [facility] [place], EMERGENCY ROOM PHONE NUMBER:   [phone]  IF A COMPLICATION OR EMERGENCY SITUATION ARISES AND YOU ARE UNABLE TO REACH   YOUR PHYSICIAN - GO DIRECTLY TO THE EMERGENCY ROOM.  I have read or have had read to me these discharge instructions for my   procedure and have received a written copy.  I understand these   instructions and will follow-up with my physician if I have any questions.     __________________________________       _____________________________________  Nurse Signature                                          Patient/Designated   Responsible Party Signature  Evangelista Erickson MD  12/2/2020 8:30:36 AM  This report has been verified and signed electronically.  [employer]

## 2020-12-02 NOTE — ANESTHESIA RELEASE NOTE
"Anesthesia Release from PACU Note    Patient: Yoana Pardo    Procedure(s) Performed: Procedure(s) (LRB):  ESOPHAGOGASTRODUODENOSCOPY (EGD) (N/A)    Anesthesia type: MAC    Post pain: Adequate analgesia    Post assessment: no apparent anesthetic complications    Last Vitals:   Visit Vitals  /67 (BP Location: Left arm, Patient Position: Lying)   Pulse 74   Temp 36.6 °C (97.9 °F) (Temporal)   Ht 4' 11" (1.499 m)   Wt 102.3 kg (225 lb 8.5 oz)   SpO2 97%   Breastfeeding No   BMI 45.55 kg/m²       Post vital signs: stable    Level of consciousness: awake    Nausea/Vomiting: no nausea/no vomiting    Complications: none    Airway Patency: patent    Respiratory: unassisted    Cardiovascular: stable and blood pressure at baseline    Hydration: euvolemic  "

## 2020-12-11 ENCOUNTER — PATIENT MESSAGE (OUTPATIENT)
Dept: OTHER | Facility: OTHER | Age: 70
End: 2020-12-11

## 2021-02-08 ENCOUNTER — PATIENT MESSAGE (OUTPATIENT)
Dept: SURGERY | Facility: CLINIC | Age: 71
End: 2021-02-08

## 2021-02-17 ENCOUNTER — PES CALL (OUTPATIENT)
Dept: ADMINISTRATIVE | Facility: CLINIC | Age: 71
End: 2021-02-17

## 2021-02-18 ENCOUNTER — OFFICE VISIT (OUTPATIENT)
Dept: INTERNAL MEDICINE | Facility: CLINIC | Age: 71
End: 2021-02-18
Payer: MEDICARE

## 2021-02-18 ENCOUNTER — TELEPHONE (OUTPATIENT)
Dept: INTERNAL MEDICINE | Facility: CLINIC | Age: 71
End: 2021-02-18

## 2021-02-18 ENCOUNTER — HOSPITAL ENCOUNTER (OUTPATIENT)
Dept: RADIOLOGY | Facility: HOSPITAL | Age: 71
Discharge: HOME | End: 2021-02-18
Attending: FAMILY MEDICINE
Payer: MEDICARE

## 2021-02-18 ENCOUNTER — TELEPHONE (OUTPATIENT)
Dept: ORTHOPEDICS | Facility: CLINIC | Age: 71
End: 2021-02-18

## 2021-02-18 VITALS
HEIGHT: 59 IN | SYSTOLIC BLOOD PRESSURE: 118 MMHG | TEMPERATURE: 97 F | DIASTOLIC BLOOD PRESSURE: 66 MMHG | OXYGEN SATURATION: 99 % | BODY MASS INDEX: 45.83 KG/M2 | WEIGHT: 227.31 LBS | HEART RATE: 95 BPM | RESPIRATION RATE: 18 BRPM

## 2021-02-18 DIAGNOSIS — M17.0 PRIMARY OSTEOARTHRITIS OF BOTH KNEES: ICD-10-CM

## 2021-02-18 DIAGNOSIS — J30.2 CHRONIC SEASONAL ALLERGIC RHINITIS: ICD-10-CM

## 2021-02-18 DIAGNOSIS — M15.9 GENERALIZED OSTEOARTHRITIS OF HAND: Primary | ICD-10-CM

## 2021-02-18 DIAGNOSIS — I10 ESSENTIAL HYPERTENSION: Primary | Chronic | ICD-10-CM

## 2021-02-18 DIAGNOSIS — G89.29 CHRONIC HAND PAIN, RIGHT: ICD-10-CM

## 2021-02-18 DIAGNOSIS — M79.641 CHRONIC HAND PAIN, RIGHT: ICD-10-CM

## 2021-02-18 DIAGNOSIS — E78.2 MIXED HYPERLIPIDEMIA: ICD-10-CM

## 2021-02-18 DIAGNOSIS — E66.01 MORBID OBESITY WITH BMI OF 45.0-49.9, ADULT: ICD-10-CM

## 2021-02-18 DIAGNOSIS — I27.20 PULMONARY HYPERTENSION: ICD-10-CM

## 2021-02-18 DIAGNOSIS — K44.9 HIATAL HERNIA WITH GERD: ICD-10-CM

## 2021-02-18 DIAGNOSIS — Z12.31 ENCOUNTER FOR SCREENING MAMMOGRAM FOR MALIGNANT NEOPLASM OF BREAST: ICD-10-CM

## 2021-02-18 DIAGNOSIS — K21.9 HIATAL HERNIA WITH GERD: ICD-10-CM

## 2021-02-18 DIAGNOSIS — I70.0 ATHEROSCLEROSIS OF AORTA: ICD-10-CM

## 2021-02-18 DIAGNOSIS — G47.33 OSA ON CPAP: ICD-10-CM

## 2021-02-18 PROCEDURE — 73562 X-RAY EXAM OF KNEE 3: CPT | Mod: 26,50,, | Performed by: RADIOLOGY

## 2021-02-18 PROCEDURE — 73562 XR KNEE ORTHO BILAT: ICD-10-PCS | Mod: 26,50,, | Performed by: RADIOLOGY

## 2021-02-18 PROCEDURE — 73130 X-RAY EXAM OF HAND: CPT | Mod: TC,RT

## 2021-02-18 PROCEDURE — 73130 X-RAY EXAM OF HAND: CPT | Mod: 26,RT,, | Performed by: RADIOLOGY

## 2021-02-18 PROCEDURE — 99999 PR PBB SHADOW E&M-EST. PATIENT-LVL V: ICD-10-PCS | Mod: PBBFAC,,, | Performed by: FAMILY MEDICINE

## 2021-02-18 PROCEDURE — 99999 PR PBB SHADOW E&M-EST. PATIENT-LVL V: CPT | Mod: PBBFAC,,, | Performed by: FAMILY MEDICINE

## 2021-02-18 PROCEDURE — 99215 OFFICE O/P EST HI 40 MIN: CPT | Mod: PBBFAC,25 | Performed by: FAMILY MEDICINE

## 2021-02-18 PROCEDURE — 73562 X-RAY EXAM OF KNEE 3: CPT | Mod: TC,50

## 2021-02-18 PROCEDURE — 99214 OFFICE O/P EST MOD 30 MIN: CPT | Mod: S$PBB,,, | Performed by: FAMILY MEDICINE

## 2021-02-18 PROCEDURE — 73130 XR HAND COMPLETE 3 VIEW RIGHT: ICD-10-PCS | Mod: 26,RT,, | Performed by: RADIOLOGY

## 2021-02-18 PROCEDURE — 99214 PR OFFICE/OUTPT VISIT, EST, LEVL IV, 30-39 MIN: ICD-10-PCS | Mod: S$PBB,,, | Performed by: FAMILY MEDICINE

## 2021-02-18 RX ORDER — OMEPRAZOLE 40 MG/1
40 CAPSULE, DELAYED RELEASE ORAL DAILY
Qty: 30 CAPSULE | Refills: 11 | Status: SHIPPED | OUTPATIENT
Start: 2021-02-18 | End: 2021-08-18 | Stop reason: ALTCHOICE

## 2021-02-18 RX ORDER — MELOXICAM 7.5 MG/1
TABLET ORAL
Qty: 30 TABLET | Refills: 1 | Status: SHIPPED | OUTPATIENT
Start: 2021-02-18 | End: 2021-05-19

## 2021-02-18 RX ORDER — SIMVASTATIN 20 MG/1
20 TABLET, FILM COATED ORAL NIGHTLY
Qty: 90 TABLET | Refills: 3 | Status: SHIPPED | OUTPATIENT
Start: 2021-02-18 | End: 2022-02-08 | Stop reason: SDUPTHER

## 2021-02-19 ENCOUNTER — TELEPHONE (OUTPATIENT)
Dept: BARIATRICS | Facility: CLINIC | Age: 71
End: 2021-02-19

## 2021-02-23 ENCOUNTER — PATIENT OUTREACH (OUTPATIENT)
Dept: ADMINISTRATIVE | Facility: OTHER | Age: 71
End: 2021-02-23

## 2021-02-23 ENCOUNTER — OFFICE VISIT (OUTPATIENT)
Dept: ORTHOPEDICS | Facility: CLINIC | Age: 71
End: 2021-02-23
Payer: MEDICARE

## 2021-02-23 VITALS — WEIGHT: 227 LBS | HEIGHT: 59 IN | BODY MASS INDEX: 45.76 KG/M2

## 2021-02-23 DIAGNOSIS — M17.0 PRIMARY OSTEOARTHRITIS OF BOTH KNEES: Primary | ICD-10-CM

## 2021-02-23 DIAGNOSIS — M21.41 PES PLANUS OF BOTH FEET: ICD-10-CM

## 2021-02-23 DIAGNOSIS — M15.9 GENERALIZED OSTEOARTHRITIS OF HAND: ICD-10-CM

## 2021-02-23 DIAGNOSIS — M21.42 PES PLANUS OF BOTH FEET: ICD-10-CM

## 2021-02-23 PROCEDURE — 99204 OFFICE O/P NEW MOD 45 MIN: CPT | Mod: S$PBB,25,, | Performed by: STUDENT IN AN ORGANIZED HEALTH CARE EDUCATION/TRAINING PROGRAM

## 2021-02-23 PROCEDURE — 99204 PR OFFICE/OUTPT VISIT, NEW, LEVL IV, 45-59 MIN: ICD-10-PCS | Mod: S$PBB,25,, | Performed by: STUDENT IN AN ORGANIZED HEALTH CARE EDUCATION/TRAINING PROGRAM

## 2021-02-23 PROCEDURE — 20610 LARGE JOINT ASPIRATION/INJECTION: BILATERAL KNEE: ICD-10-PCS | Mod: 50,S$PBB,, | Performed by: STUDENT IN AN ORGANIZED HEALTH CARE EDUCATION/TRAINING PROGRAM

## 2021-02-23 PROCEDURE — 99999 PR PBB SHADOW E&M-EST. PATIENT-LVL IV: CPT | Mod: PBBFAC,,, | Performed by: STUDENT IN AN ORGANIZED HEALTH CARE EDUCATION/TRAINING PROGRAM

## 2021-02-23 PROCEDURE — 99999 PR PBB SHADOW E&M-EST. PATIENT-LVL IV: ICD-10-PCS | Mod: PBBFAC,,, | Performed by: STUDENT IN AN ORGANIZED HEALTH CARE EDUCATION/TRAINING PROGRAM

## 2021-02-23 PROCEDURE — 99214 OFFICE O/P EST MOD 30 MIN: CPT | Mod: PBBFAC,25 | Performed by: STUDENT IN AN ORGANIZED HEALTH CARE EDUCATION/TRAINING PROGRAM

## 2021-02-23 PROCEDURE — 20610 DRAIN/INJ JOINT/BURSA W/O US: CPT | Mod: 50,PBBFAC | Performed by: STUDENT IN AN ORGANIZED HEALTH CARE EDUCATION/TRAINING PROGRAM

## 2021-02-23 RX ORDER — BUPIVACAINE HYDROCHLORIDE 5 MG/ML
2 INJECTION, SOLUTION PERINEURAL
Status: DISCONTINUED | OUTPATIENT
Start: 2021-02-23 | End: 2021-02-23 | Stop reason: HOSPADM

## 2021-02-23 RX ORDER — TRIAMCINOLONE ACETONIDE 40 MG/ML
40 INJECTION, SUSPENSION INTRA-ARTICULAR; INTRAMUSCULAR
Status: DISCONTINUED | OUTPATIENT
Start: 2021-02-23 | End: 2021-02-23 | Stop reason: HOSPADM

## 2021-02-23 RX ORDER — LIDOCAINE HYDROCHLORIDE 10 MG/ML
2 INJECTION INFILTRATION; PERINEURAL
Status: DISCONTINUED | OUTPATIENT
Start: 2021-02-23 | End: 2021-02-23 | Stop reason: HOSPADM

## 2021-02-23 RX ADMIN — LIDOCAINE HYDROCHLORIDE 2 ML: 10 INJECTION, SOLUTION INFILTRATION; PERINEURAL at 01:02

## 2021-02-23 RX ADMIN — BUPIVACAINE HYDROCHLORIDE 2 ML: 5 INJECTION, SOLUTION PERINEURAL at 01:02

## 2021-02-23 RX ADMIN — TRIAMCINOLONE ACETONIDE 40 MG: 40 INJECTION, SUSPENSION INTRA-ARTICULAR; INTRAMUSCULAR at 01:02

## 2021-03-03 ENCOUNTER — IMMUNIZATION (OUTPATIENT)
Dept: PHARMACY | Facility: CLINIC | Age: 71
End: 2021-03-03
Payer: MEDICARE

## 2021-03-03 DIAGNOSIS — Z23 NEED FOR VACCINATION: Primary | ICD-10-CM

## 2021-03-04 ENCOUNTER — OFFICE VISIT (OUTPATIENT)
Dept: HOME HEALTH SERVICES | Facility: CLINIC | Age: 71
End: 2021-03-04
Payer: MEDICARE

## 2021-03-04 VITALS
TEMPERATURE: 98 F | HEIGHT: 59 IN | OXYGEN SATURATION: 97 % | SYSTOLIC BLOOD PRESSURE: 128 MMHG | HEART RATE: 67 BPM | DIASTOLIC BLOOD PRESSURE: 75 MMHG | BODY MASS INDEX: 45.76 KG/M2 | WEIGHT: 227 LBS

## 2021-03-04 DIAGNOSIS — I70.0 ATHEROSCLEROSIS OF AORTA: ICD-10-CM

## 2021-03-04 DIAGNOSIS — J30.2 CHRONIC SEASONAL ALLERGIC RHINITIS: ICD-10-CM

## 2021-03-04 DIAGNOSIS — I10 ESSENTIAL HYPERTENSION: Chronic | ICD-10-CM

## 2021-03-04 DIAGNOSIS — K21.9 HIATAL HERNIA WITH GERD: ICD-10-CM

## 2021-03-04 DIAGNOSIS — J98.6 ELEVATED HEMIDIAPHRAGM: ICD-10-CM

## 2021-03-04 DIAGNOSIS — E78.2 MIXED HYPERLIPIDEMIA: ICD-10-CM

## 2021-03-04 DIAGNOSIS — Z00.00 ENCOUNTER FOR PREVENTIVE HEALTH EXAMINATION: Primary | ICD-10-CM

## 2021-03-04 DIAGNOSIS — R41.3 MEMORY LOSS: ICD-10-CM

## 2021-03-04 DIAGNOSIS — G47.33 OSA ON CPAP: ICD-10-CM

## 2021-03-04 DIAGNOSIS — M17.0 PRIMARY OSTEOARTHRITIS OF BOTH KNEES: ICD-10-CM

## 2021-03-04 DIAGNOSIS — K44.9 HIATAL HERNIA WITH GERD: ICD-10-CM

## 2021-03-04 DIAGNOSIS — I27.20 PULMONARY HYPERTENSION: ICD-10-CM

## 2021-03-04 DIAGNOSIS — E66.01 MORBID OBESITY WITH BMI OF 45.0-49.9, ADULT: ICD-10-CM

## 2021-03-04 DIAGNOSIS — G62.9 PERIPHERAL POLYNEUROPATHY: ICD-10-CM

## 2021-03-04 DIAGNOSIS — B35.1 ONYCHOMYCOSIS: ICD-10-CM

## 2021-03-04 DIAGNOSIS — I34.1 MVP (MITRAL VALVE PROLAPSE): ICD-10-CM

## 2021-03-04 PROCEDURE — G0439 PPPS, SUBSEQ VISIT: HCPCS | Mod: S$GLB,,, | Performed by: NURSE PRACTITIONER

## 2021-03-04 PROCEDURE — G0439 PR MEDICARE ANNUAL WELLNESS SUBSEQUENT VISIT: ICD-10-PCS | Mod: S$GLB,,, | Performed by: NURSE PRACTITIONER

## 2021-03-04 RX ORDER — CICLOPIROX 80 MG/ML
SOLUTION TOPICAL NIGHTLY
Qty: 1 BOTTLE | Refills: 10 | Status: SHIPPED | OUTPATIENT
Start: 2021-03-04 | End: 2023-05-15

## 2021-03-15 ENCOUNTER — TELEPHONE (OUTPATIENT)
Dept: INTERNAL MEDICINE | Facility: CLINIC | Age: 71
End: 2021-03-15

## 2021-03-31 ENCOUNTER — IMMUNIZATION (OUTPATIENT)
Dept: PHARMACY | Facility: CLINIC | Age: 71
End: 2021-03-31
Payer: MEDICARE

## 2021-03-31 DIAGNOSIS — Z23 NEED FOR VACCINATION: Primary | ICD-10-CM

## 2021-04-20 ENCOUNTER — TELEPHONE (OUTPATIENT)
Dept: PULMONOLOGY | Facility: CLINIC | Age: 71
End: 2021-04-20

## 2021-04-20 DIAGNOSIS — I27.20 PULMONARY HYPERTENSION: Primary | ICD-10-CM

## 2021-04-23 ENCOUNTER — OFFICE VISIT (OUTPATIENT)
Dept: ORTHOPEDICS | Facility: CLINIC | Age: 71
End: 2021-04-23
Payer: MEDICARE

## 2021-04-23 VITALS — WEIGHT: 227.06 LBS | HEIGHT: 59 IN | RESPIRATION RATE: 20 BRPM | BODY MASS INDEX: 45.77 KG/M2

## 2021-04-23 DIAGNOSIS — M15.9 GENERALIZED OSTEOARTHRITIS OF HAND: Primary | ICD-10-CM

## 2021-04-23 DIAGNOSIS — M17.0 PRIMARY OSTEOARTHRITIS OF BOTH KNEES: ICD-10-CM

## 2021-04-23 DIAGNOSIS — M15.2 OSTEOARTHRITIS OF PROXIMAL INTERPHALANGEAL (PIP) JOINT OF RIGHT MIDDLE FINGER: ICD-10-CM

## 2021-04-23 PROCEDURE — 99999 PR PBB SHADOW E&M-EST. PATIENT-LVL III: ICD-10-PCS | Mod: PBBFAC,,, | Performed by: STUDENT IN AN ORGANIZED HEALTH CARE EDUCATION/TRAINING PROGRAM

## 2021-04-23 PROCEDURE — 99999 PR PBB SHADOW E&M-EST. PATIENT-LVL III: CPT | Mod: PBBFAC,,, | Performed by: STUDENT IN AN ORGANIZED HEALTH CARE EDUCATION/TRAINING PROGRAM

## 2021-04-23 PROCEDURE — 99214 OFFICE O/P EST MOD 30 MIN: CPT | Mod: S$PBB,,, | Performed by: STUDENT IN AN ORGANIZED HEALTH CARE EDUCATION/TRAINING PROGRAM

## 2021-04-23 PROCEDURE — 99213 OFFICE O/P EST LOW 20 MIN: CPT | Mod: PBBFAC | Performed by: STUDENT IN AN ORGANIZED HEALTH CARE EDUCATION/TRAINING PROGRAM

## 2021-04-23 PROCEDURE — 99214 PR OFFICE/OUTPT VISIT, EST, LEVL IV, 30-39 MIN: ICD-10-PCS | Mod: S$PBB,,, | Performed by: STUDENT IN AN ORGANIZED HEALTH CARE EDUCATION/TRAINING PROGRAM

## 2021-04-27 ENCOUNTER — LAB VISIT (OUTPATIENT)
Dept: OTOLARYNGOLOGY | Facility: CLINIC | Age: 71
End: 2021-04-27
Payer: MEDICARE

## 2021-04-27 DIAGNOSIS — I27.20 PULMONARY HYPERTENSION: ICD-10-CM

## 2021-04-27 PROCEDURE — U0005 INFEC AGEN DETEC AMPLI PROBE: HCPCS | Performed by: INTERNAL MEDICINE

## 2021-04-27 PROCEDURE — U0003 INFECTIOUS AGENT DETECTION BY NUCLEIC ACID (DNA OR RNA); SEVERE ACUTE RESPIRATORY SYNDROME CORONAVIRUS 2 (SARS-COV-2) (CORONAVIRUS DISEASE [COVID-19]), AMPLIFIED PROBE TECHNIQUE, MAKING USE OF HIGH THROUGHPUT TECHNOLOGIES AS DESCRIBED BY CMS-2020-01-R: HCPCS | Performed by: INTERNAL MEDICINE

## 2021-04-28 LAB — SARS-COV-2 RNA RESP QL NAA+PROBE: NOT DETECTED

## 2021-04-30 ENCOUNTER — HOSPITAL ENCOUNTER (OUTPATIENT)
Dept: CARDIOLOGY | Facility: HOSPITAL | Age: 71
Discharge: HOME | End: 2021-04-30
Attending: INTERNAL MEDICINE
Payer: MEDICARE

## 2021-04-30 ENCOUNTER — CLINICAL SUPPORT (OUTPATIENT)
Dept: PULMONOLOGY | Facility: CLINIC | Age: 71
End: 2021-04-30
Attending: INTERNAL MEDICINE
Payer: MEDICARE

## 2021-04-30 VITALS — HEIGHT: 59 IN | BODY MASS INDEX: 45.77 KG/M2 | WEIGHT: 227.06 LBS

## 2021-04-30 VITALS — HEIGHT: 59 IN | WEIGHT: 227 LBS | BODY MASS INDEX: 45.76 KG/M2

## 2021-04-30 DIAGNOSIS — I27.20 PULMONARY HYPERTENSION: ICD-10-CM

## 2021-04-30 LAB
AORTIC ROOT ANNULUS: 2.7 CM
AV INDEX (PROSTH): 0.78
AV MEAN GRADIENT: 5 MMHG
AV PEAK GRADIENT: 8 MMHG
AV VALVE AREA: 2.58 CM2
AV VELOCITY RATIO: 0.68
BSA FOR ECHO PROCEDURE: 2.07 M2
CV ECHO LV RWT: 0.63 CM
DOP CALC AO PEAK VEL: 1.37 M/S
DOP CALC AO VTI: 31.32 CM
DOP CALC LVOT AREA: 3.3 CM2
DOP CALC LVOT DIAMETER: 2.05 CM
DOP CALC LVOT PEAK VEL: 0.93 M/S
DOP CALC LVOT STROKE VOLUME: 80.96 CM3
DOP CALC RVOT PEAK VEL: 0.75 M/S
DOP CALC RVOT VTI: 17.21 CM
DOP CALCLVOT PEAK VEL VTI: 24.54 CM
E WAVE DECELERATION TIME: 194.38 MSEC
E/A RATIO: 1.3
E/E' RATIO: 8.11 M/S
ECHO LV POSTERIOR WALL: 1.25 CM (ref 0.6–1.1)
EJECTION FRACTION: 60 %
FRACTIONAL SHORTENING: 31 % (ref 28–44)
INTERVENTRICULAR SEPTUM: 1.18 CM (ref 0.6–1.1)
IVRT: 79.92 MSEC
LA MAJOR: 6.19 CM
LA WIDTH: 4.27 CM
LEFT ATRIUM SIZE: 4.48 CM
LEFT INTERNAL DIMENSION IN SYSTOLE: 2.77 CM (ref 2.1–4)
LEFT VENTRICLE DIASTOLIC VOLUME INDEX: 35.6 ML/M2
LEFT VENTRICLE DIASTOLIC VOLUME: 69.42 ML
LEFT VENTRICLE MASS INDEX: 86 G/M2
LEFT VENTRICLE SYSTOLIC VOLUME INDEX: 14.7 ML/M2
LEFT VENTRICLE SYSTOLIC VOLUME: 28.66 ML
LEFT VENTRICULAR INTERNAL DIMENSION IN DIASTOLE: 3.99 CM (ref 3.5–6)
LEFT VENTRICULAR MASS: 167.91 G
LV LATERAL E/E' RATIO: 6.64 M/S
LV SEPTAL E/E' RATIO: 10.43 M/S
MV PEAK A VEL: 0.56 M/S
MV PEAK E VEL: 0.73 M/S
PISA TR MAX VEL: 3.46 M/S
PV MEAN GRADIENT: 1 MMHG
PV PEAK VELOCITY: 1.03 CM/S
RA MAJOR: 5.06 CM
RA PRESSURE: 3 MMHG
RA WIDTH: 2.77 CM
RIGHT VENTRICULAR END-DIASTOLIC DIMENSION: 2.93 CM
SINUS: 2.61 CM
STJ: 2.31 CM
TDI LATERAL: 0.11 M/S
TDI SEPTAL: 0.07 M/S
TDI: 0.09 M/S
TR MAX PG: 48 MMHG
TRICUSPID ANNULAR PLANE SYSTOLIC EXCURSION: 1.95 CM
TV REST PULMONARY ARTERY PRESSURE: 51 MMHG

## 2021-04-30 PROCEDURE — 99999 PR PBB SHADOW E&M-EST. PATIENT-LVL I: ICD-10-PCS | Mod: PBBFAC,,,

## 2021-04-30 PROCEDURE — 93306 TTE W/DOPPLER COMPLETE: CPT

## 2021-04-30 PROCEDURE — 99211 OFF/OP EST MAY X REQ PHY/QHP: CPT | Mod: PBBFAC,25

## 2021-04-30 PROCEDURE — 94010 BREATHING CAPACITY TEST: CPT | Mod: 26,59,S$PBB, | Performed by: INTERNAL MEDICINE

## 2021-04-30 PROCEDURE — 93306 TTE W/DOPPLER COMPLETE: CPT | Mod: 26,,, | Performed by: INTERNAL MEDICINE

## 2021-04-30 PROCEDURE — 94618 PULMONARY STRESS TESTING: CPT | Mod: 26,S$PBB,, | Performed by: INTERNAL MEDICINE

## 2021-04-30 PROCEDURE — 94618 PULMONARY STRESS TESTING: CPT | Mod: PBBFAC

## 2021-04-30 PROCEDURE — 99999 PR PBB SHADOW E&M-EST. PATIENT-LVL I: CPT | Mod: PBBFAC,,,

## 2021-04-30 PROCEDURE — 94010 BREATHING CAPACITY TEST: CPT | Mod: PBBFAC

## 2021-04-30 PROCEDURE — 94010 BREATHING CAPACITY TEST: ICD-10-PCS | Mod: 26,59,S$PBB, | Performed by: INTERNAL MEDICINE

## 2021-04-30 PROCEDURE — 93306 ECHO (CUPID ONLY): ICD-10-PCS | Mod: 26,,, | Performed by: INTERNAL MEDICINE

## 2021-04-30 PROCEDURE — 94618 PULMONARY STRESS TESTING: ICD-10-PCS | Mod: 26,S$PBB,, | Performed by: INTERNAL MEDICINE

## 2021-05-02 LAB
BRPFT: NORMAL
FEF 25 75 LLN: 0.58
FEF 25 75 PRE REF: 62.1 %
FEF 25 75 REF: 1.51
FEV1 FVC LLN: 67
FEV1 FVC PRE REF: 92.1 %
FEV1 FVC REF: 79
FEV1 LLN: 1.15
FEV1 PRE REF: 84.7 %
FEV1 REF: 1.63
FVC LLN: 1.47
FVC PRE REF: 91.6 %
FVC REF: 2.06
PEF LLN: 2.53
PEF PRE REF: 120.6 %
PEF REF: 4.24
PRE FEF 25 75: 0.94 L/S
PRE FET 100: 9.7 SEC
PRE FEV1 FVC: 73.16 %
PRE FEV1: 1.38 L
PRE FVC: 1.89 L
PRE PEF: 5.11 L/S

## 2021-06-09 ENCOUNTER — PATIENT OUTREACH (OUTPATIENT)
Dept: ADMINISTRATIVE | Facility: OTHER | Age: 71
End: 2021-06-09

## 2021-06-10 ENCOUNTER — OFFICE VISIT (OUTPATIENT)
Dept: SLEEP MEDICINE | Facility: CLINIC | Age: 71
End: 2021-06-10
Attending: INTERNAL MEDICINE
Payer: MEDICARE

## 2021-06-10 VITALS
WEIGHT: 230.63 LBS | DIASTOLIC BLOOD PRESSURE: 80 MMHG | OXYGEN SATURATION: 97 % | RESPIRATION RATE: 17 BRPM | HEART RATE: 71 BPM | HEIGHT: 59 IN | SYSTOLIC BLOOD PRESSURE: 120 MMHG | BODY MASS INDEX: 46.49 KG/M2

## 2021-06-10 DIAGNOSIS — I10 ESSENTIAL HYPERTENSION: Chronic | ICD-10-CM

## 2021-06-10 DIAGNOSIS — G47.33 OSA ON CPAP: ICD-10-CM

## 2021-06-10 DIAGNOSIS — F51.12 BEHAVIORALLY INDUCED INSUFFICIENT SLEEP SYNDROME: ICD-10-CM

## 2021-06-10 DIAGNOSIS — I70.0 ATHEROSCLEROSIS OF AORTA: ICD-10-CM

## 2021-06-10 DIAGNOSIS — R94.2 DIFFUSION CAPACITY OF LUNG (DL), DECREASED: ICD-10-CM

## 2021-06-10 DIAGNOSIS — J98.6 ELEVATED HEMIDIAPHRAGM: ICD-10-CM

## 2021-06-10 DIAGNOSIS — E66.01 MORBID OBESITY WITH BMI OF 45.0-49.9, ADULT: ICD-10-CM

## 2021-06-10 DIAGNOSIS — E78.2 MIXED HYPERLIPIDEMIA: ICD-10-CM

## 2021-06-10 DIAGNOSIS — I27.20 PULMONARY HYPERTENSION: Primary | ICD-10-CM

## 2021-06-10 PROCEDURE — 99214 PR OFFICE/OUTPT VISIT, EST, LEVL IV, 30-39 MIN: ICD-10-PCS | Mod: S$PBB,,, | Performed by: INTERNAL MEDICINE

## 2021-06-10 PROCEDURE — 99999 PR PBB SHADOW E&M-EST. PATIENT-LVL IV: CPT | Mod: PBBFAC,,, | Performed by: INTERNAL MEDICINE

## 2021-06-10 PROCEDURE — 99214 OFFICE O/P EST MOD 30 MIN: CPT | Mod: PBBFAC | Performed by: INTERNAL MEDICINE

## 2021-06-10 PROCEDURE — 99214 OFFICE O/P EST MOD 30 MIN: CPT | Mod: S$PBB,,, | Performed by: INTERNAL MEDICINE

## 2021-06-10 PROCEDURE — 99999 PR PBB SHADOW E&M-EST. PATIENT-LVL IV: ICD-10-PCS | Mod: PBBFAC,,, | Performed by: INTERNAL MEDICINE

## 2021-07-13 ENCOUNTER — TELEPHONE (OUTPATIENT)
Dept: ORTHOPEDICS | Facility: CLINIC | Age: 71
End: 2021-07-13

## 2021-07-22 ENCOUNTER — PATIENT OUTREACH (OUTPATIENT)
Dept: ADMINISTRATIVE | Facility: OTHER | Age: 71
End: 2021-07-22

## 2021-07-23 ENCOUNTER — OFFICE VISIT (OUTPATIENT)
Dept: ORTHOPEDICS | Facility: CLINIC | Age: 71
End: 2021-07-23
Payer: MEDICARE

## 2021-07-23 VITALS — BODY MASS INDEX: 46.37 KG/M2 | HEIGHT: 59 IN | WEIGHT: 230 LBS

## 2021-07-23 DIAGNOSIS — M17.0 PRIMARY OSTEOARTHRITIS OF BOTH KNEES: ICD-10-CM

## 2021-07-23 DIAGNOSIS — M15.2 OSTEOARTHRITIS OF PROXIMAL INTERPHALANGEAL (PIP) JOINT OF RIGHT MIDDLE FINGER: Primary | ICD-10-CM

## 2021-07-23 PROCEDURE — 99213 OFFICE O/P EST LOW 20 MIN: CPT | Mod: PBBFAC | Performed by: STUDENT IN AN ORGANIZED HEALTH CARE EDUCATION/TRAINING PROGRAM

## 2021-07-23 PROCEDURE — 99213 PR OFFICE/OUTPT VISIT, EST, LEVL III, 20-29 MIN: ICD-10-PCS | Mod: S$PBB,,, | Performed by: STUDENT IN AN ORGANIZED HEALTH CARE EDUCATION/TRAINING PROGRAM

## 2021-07-23 PROCEDURE — 99213 OFFICE O/P EST LOW 20 MIN: CPT | Mod: S$PBB,,, | Performed by: STUDENT IN AN ORGANIZED HEALTH CARE EDUCATION/TRAINING PROGRAM

## 2021-07-23 PROCEDURE — 99999 PR PBB SHADOW E&M-EST. PATIENT-LVL III: ICD-10-PCS | Mod: PBBFAC,,, | Performed by: STUDENT IN AN ORGANIZED HEALTH CARE EDUCATION/TRAINING PROGRAM

## 2021-07-23 PROCEDURE — 99999 PR PBB SHADOW E&M-EST. PATIENT-LVL III: CPT | Mod: PBBFAC,,, | Performed by: STUDENT IN AN ORGANIZED HEALTH CARE EDUCATION/TRAINING PROGRAM

## 2021-08-18 ENCOUNTER — LAB VISIT (OUTPATIENT)
Dept: LAB | Facility: HOSPITAL | Age: 71
End: 2021-08-18
Attending: FAMILY MEDICINE
Payer: MEDICARE

## 2021-08-18 ENCOUNTER — OFFICE VISIT (OUTPATIENT)
Dept: INTERNAL MEDICINE | Facility: CLINIC | Age: 71
End: 2021-08-18
Payer: MEDICARE

## 2021-08-18 VITALS
TEMPERATURE: 98 F | DIASTOLIC BLOOD PRESSURE: 78 MMHG | WEIGHT: 229.94 LBS | OXYGEN SATURATION: 96 % | SYSTOLIC BLOOD PRESSURE: 132 MMHG | BODY MASS INDEX: 46.44 KG/M2 | HEART RATE: 60 BPM

## 2021-08-18 DIAGNOSIS — J30.2 CHRONIC SEASONAL ALLERGIC RHINITIS: ICD-10-CM

## 2021-08-18 DIAGNOSIS — G62.9 PERIPHERAL POLYNEUROPATHY: ICD-10-CM

## 2021-08-18 DIAGNOSIS — K21.9 HIATAL HERNIA WITH GERD: ICD-10-CM

## 2021-08-18 DIAGNOSIS — K62.5 RECTAL BLEEDING: ICD-10-CM

## 2021-08-18 DIAGNOSIS — E78.2 MIXED HYPERLIPIDEMIA: ICD-10-CM

## 2021-08-18 DIAGNOSIS — I70.0 ATHEROSCLEROSIS OF AORTA: ICD-10-CM

## 2021-08-18 DIAGNOSIS — I10 ESSENTIAL HYPERTENSION: Chronic | ICD-10-CM

## 2021-08-18 DIAGNOSIS — I10 ESSENTIAL HYPERTENSION: Primary | Chronic | ICD-10-CM

## 2021-08-18 DIAGNOSIS — G47.33 OSA ON CPAP: ICD-10-CM

## 2021-08-18 DIAGNOSIS — K44.9 HIATAL HERNIA WITH GERD: ICD-10-CM

## 2021-08-18 DIAGNOSIS — I27.20 PULMONARY HYPERTENSION: ICD-10-CM

## 2021-08-18 DIAGNOSIS — M47.26 OSTEOARTHRITIS OF SPINE WITH RADICULOPATHY, LUMBAR REGION: ICD-10-CM

## 2021-08-18 DIAGNOSIS — E66.01 MORBID OBESITY WITH BMI OF 45.0-49.9, ADULT: ICD-10-CM

## 2021-08-18 DIAGNOSIS — M17.0 PRIMARY OSTEOARTHRITIS OF BOTH KNEES: ICD-10-CM

## 2021-08-18 DIAGNOSIS — Z79.899 OTHER LONG TERM (CURRENT) DRUG THERAPY: ICD-10-CM

## 2021-08-18 PROBLEM — J98.6 ELEVATED HEMIDIAPHRAGM: Status: RESOLVED | Noted: 2018-04-06 | Resolved: 2021-08-18

## 2021-08-18 PROBLEM — Z90.49 S/P LAPAROSCOPIC CHOLECYSTECTOMY: Status: RESOLVED | Noted: 2018-04-04 | Resolved: 2021-08-18

## 2021-08-18 PROBLEM — R94.2 DIFFUSION CAPACITY OF LUNG (DL), DECREASED: Status: RESOLVED | Noted: 2018-07-02 | Resolved: 2021-08-18

## 2021-08-18 LAB
ALBUMIN SERPL BCP-MCNC: 3.5 G/DL (ref 3.5–5.2)
ALP SERPL-CCNC: 83 U/L (ref 55–135)
ALT SERPL W/O P-5'-P-CCNC: 13 U/L (ref 10–44)
ANION GAP SERPL CALC-SCNC: 9 MMOL/L (ref 8–16)
AST SERPL-CCNC: 29 U/L (ref 10–40)
BASOPHILS # BLD AUTO: 0.03 K/UL (ref 0–0.2)
BASOPHILS NFR BLD: 0.5 % (ref 0–1.9)
BILIRUB SERPL-MCNC: 0.6 MG/DL (ref 0.1–1)
BUN SERPL-MCNC: 17 MG/DL (ref 8–23)
CALCIUM SERPL-MCNC: 9.3 MG/DL (ref 8.7–10.5)
CHLORIDE SERPL-SCNC: 100 MMOL/L (ref 95–110)
CHOLEST SERPL-MCNC: 188 MG/DL (ref 120–199)
CHOLEST/HDLC SERPL: 4.2 {RATIO} (ref 2–5)
CO2 SERPL-SCNC: 32 MMOL/L (ref 23–29)
CREAT SERPL-MCNC: 0.7 MG/DL (ref 0.5–1.4)
DIFFERENTIAL METHOD: ABNORMAL
EOSINOPHIL # BLD AUTO: 0.1 K/UL (ref 0–0.5)
EOSINOPHIL NFR BLD: 1.2 % (ref 0–8)
ERYTHROCYTE [DISTWIDTH] IN BLOOD BY AUTOMATED COUNT: 13.3 % (ref 11.5–14.5)
EST. GFR  (AFRICAN AMERICAN): >60 ML/MIN/1.73 M^2
EST. GFR  (NON AFRICAN AMERICAN): >60 ML/MIN/1.73 M^2
FERRITIN SERPL-MCNC: 38 NG/ML (ref 20–300)
GLUCOSE SERPL-MCNC: 92 MG/DL (ref 70–110)
HCT VFR BLD AUTO: 43 % (ref 37–48.5)
HDLC SERPL-MCNC: 45 MG/DL (ref 40–75)
HDLC SERPL: 23.9 % (ref 20–50)
HGB BLD-MCNC: 13.4 G/DL (ref 12–16)
IMM GRANULOCYTES # BLD AUTO: 0.02 K/UL (ref 0–0.04)
IMM GRANULOCYTES NFR BLD AUTO: 0.3 % (ref 0–0.5)
IRON SERPL-MCNC: 76 UG/DL (ref 30–160)
LDLC SERPL CALC-MCNC: 86.6 MG/DL (ref 63–159)
LYMPHOCYTES # BLD AUTO: 2.6 K/UL (ref 1–4.8)
LYMPHOCYTES NFR BLD: 43.9 % (ref 18–48)
MAGNESIUM SERPL-MCNC: 1.9 MG/DL (ref 1.6–2.6)
MCH RBC QN AUTO: 28.3 PG (ref 27–31)
MCHC RBC AUTO-ENTMCNC: 31.2 G/DL (ref 32–36)
MCV RBC AUTO: 91 FL (ref 82–98)
MONOCYTES # BLD AUTO: 0.6 K/UL (ref 0.3–1)
MONOCYTES NFR BLD: 9.3 % (ref 4–15)
NEUTROPHILS # BLD AUTO: 2.7 K/UL (ref 1.8–7.7)
NEUTROPHILS NFR BLD: 44.8 % (ref 38–73)
NONHDLC SERPL-MCNC: 143 MG/DL
NRBC BLD-RTO: 0 /100 WBC
PLATELET # BLD AUTO: 292 K/UL (ref 150–450)
PMV BLD AUTO: 11 FL (ref 9.2–12.9)
POTASSIUM SERPL-SCNC: 3.6 MMOL/L (ref 3.5–5.1)
PROT SERPL-MCNC: 7.4 G/DL (ref 6–8.4)
RBC # BLD AUTO: 4.73 M/UL (ref 4–5.4)
SATURATED IRON: 17 % (ref 20–50)
SODIUM SERPL-SCNC: 141 MMOL/L (ref 136–145)
TOTAL IRON BINDING CAPACITY: 454 UG/DL (ref 250–450)
TRANSFERRIN SERPL-MCNC: 307 MG/DL (ref 200–375)
TRIGL SERPL-MCNC: 282 MG/DL (ref 30–150)
TSH SERPL DL<=0.005 MIU/L-ACNC: 0.75 UIU/ML (ref 0.4–4)
VIT B12 SERPL-MCNC: 461 PG/ML (ref 210–950)
WBC # BLD AUTO: 6.02 K/UL (ref 3.9–12.7)

## 2021-08-18 PROCEDURE — 85025 COMPLETE CBC W/AUTO DIFF WBC: CPT | Performed by: FAMILY MEDICINE

## 2021-08-18 PROCEDURE — 80061 LIPID PANEL: CPT | Performed by: FAMILY MEDICINE

## 2021-08-18 PROCEDURE — 84443 ASSAY THYROID STIM HORMONE: CPT | Performed by: FAMILY MEDICINE

## 2021-08-18 PROCEDURE — 99999 PR PBB SHADOW E&M-EST. PATIENT-LVL IV: ICD-10-PCS | Mod: PBBFAC,,, | Performed by: FAMILY MEDICINE

## 2021-08-18 PROCEDURE — 99214 PR OFFICE/OUTPT VISIT, EST, LEVL IV, 30-39 MIN: ICD-10-PCS | Mod: S$PBB,,, | Performed by: FAMILY MEDICINE

## 2021-08-18 PROCEDURE — 83540 ASSAY OF IRON: CPT | Performed by: FAMILY MEDICINE

## 2021-08-18 PROCEDURE — 82728 ASSAY OF FERRITIN: CPT | Performed by: FAMILY MEDICINE

## 2021-08-18 PROCEDURE — 36415 COLL VENOUS BLD VENIPUNCTURE: CPT | Performed by: FAMILY MEDICINE

## 2021-08-18 PROCEDURE — 99214 OFFICE O/P EST MOD 30 MIN: CPT | Mod: PBBFAC | Performed by: FAMILY MEDICINE

## 2021-08-18 PROCEDURE — 82607 VITAMIN B-12: CPT | Performed by: FAMILY MEDICINE

## 2021-08-18 PROCEDURE — 99214 OFFICE O/P EST MOD 30 MIN: CPT | Mod: S$PBB,,, | Performed by: FAMILY MEDICINE

## 2021-08-18 PROCEDURE — 83735 ASSAY OF MAGNESIUM: CPT | Performed by: FAMILY MEDICINE

## 2021-08-18 PROCEDURE — 99999 PR PBB SHADOW E&M-EST. PATIENT-LVL IV: CPT | Mod: PBBFAC,,, | Performed by: FAMILY MEDICINE

## 2021-08-18 PROCEDURE — 80053 COMPREHEN METABOLIC PANEL: CPT | Performed by: FAMILY MEDICINE

## 2021-08-18 RX ORDER — PREGABALIN 75 MG/1
75 CAPSULE ORAL DAILY
Qty: 30 CAPSULE | Refills: 3 | Status: SHIPPED | OUTPATIENT
Start: 2021-08-18 | End: 2022-02-08 | Stop reason: SDUPTHER

## 2021-08-18 RX ORDER — PANTOPRAZOLE SODIUM 40 MG/1
40 TABLET, DELAYED RELEASE ORAL DAILY
Qty: 30 TABLET | Refills: 3 | Status: SHIPPED | OUTPATIENT
Start: 2021-08-18 | End: 2022-01-22

## 2021-08-19 ENCOUNTER — OFFICE VISIT (OUTPATIENT)
Dept: ORTHOPEDICS | Facility: CLINIC | Age: 71
End: 2021-08-19
Payer: MEDICARE

## 2021-08-19 VITALS
DIASTOLIC BLOOD PRESSURE: 82 MMHG | HEART RATE: 76 BPM | BODY MASS INDEX: 46.16 KG/M2 | WEIGHT: 229 LBS | HEIGHT: 59 IN | SYSTOLIC BLOOD PRESSURE: 131 MMHG

## 2021-08-19 DIAGNOSIS — M17.0 PRIMARY OSTEOARTHRITIS OF BOTH KNEES: Primary | ICD-10-CM

## 2021-08-19 PROCEDURE — 99999 PR PBB SHADOW E&M-EST. PATIENT-LVL III: ICD-10-PCS | Mod: PBBFAC,,, | Performed by: STUDENT IN AN ORGANIZED HEALTH CARE EDUCATION/TRAINING PROGRAM

## 2021-08-19 PROCEDURE — 99499 UNLISTED E&M SERVICE: CPT | Mod: S$PBB,,, | Performed by: STUDENT IN AN ORGANIZED HEALTH CARE EDUCATION/TRAINING PROGRAM

## 2021-08-19 PROCEDURE — 99499 NO LOS: ICD-10-PCS | Mod: S$PBB,,, | Performed by: STUDENT IN AN ORGANIZED HEALTH CARE EDUCATION/TRAINING PROGRAM

## 2021-08-19 PROCEDURE — 99213 OFFICE O/P EST LOW 20 MIN: CPT | Mod: PBBFAC,25 | Performed by: STUDENT IN AN ORGANIZED HEALTH CARE EDUCATION/TRAINING PROGRAM

## 2021-08-19 PROCEDURE — 20610 DRAIN/INJ JOINT/BURSA W/O US: CPT | Mod: 50,PBBFAC | Performed by: STUDENT IN AN ORGANIZED HEALTH CARE EDUCATION/TRAINING PROGRAM

## 2021-08-19 PROCEDURE — 99999 PR PBB SHADOW E&M-EST. PATIENT-LVL III: CPT | Mod: PBBFAC,,, | Performed by: STUDENT IN AN ORGANIZED HEALTH CARE EDUCATION/TRAINING PROGRAM

## 2021-08-19 PROCEDURE — 20610 LARGE JOINT ASPIRATION/INJECTION: BILATERAL KNEE: ICD-10-PCS | Mod: 50,S$PBB,, | Performed by: STUDENT IN AN ORGANIZED HEALTH CARE EDUCATION/TRAINING PROGRAM

## 2021-08-19 RX ADMIN — Medication 20 MG: at 10:08

## 2021-08-20 ENCOUNTER — OFFICE VISIT (OUTPATIENT)
Dept: GASTROENTEROLOGY | Facility: CLINIC | Age: 71
End: 2021-08-20
Payer: MEDICARE

## 2021-08-20 VITALS
WEIGHT: 231.06 LBS | HEART RATE: 66 BPM | DIASTOLIC BLOOD PRESSURE: 76 MMHG | HEIGHT: 59 IN | SYSTOLIC BLOOD PRESSURE: 140 MMHG | BODY MASS INDEX: 46.58 KG/M2

## 2021-08-20 DIAGNOSIS — E66.01 MORBID OBESITY WITH BMI OF 45.0-49.9, ADULT: Primary | ICD-10-CM

## 2021-08-20 DIAGNOSIS — K21.9 HIATAL HERNIA WITH GERD: ICD-10-CM

## 2021-08-20 DIAGNOSIS — Z71.89 ENCOUNTER FOR PRE-BARIATRIC SURGERY COUNSELING AND EDUCATION: ICD-10-CM

## 2021-08-20 DIAGNOSIS — D50.0 IRON DEFICIENCY ANEMIA DUE TO CHRONIC BLOOD LOSS: ICD-10-CM

## 2021-08-20 DIAGNOSIS — K62.5 RECTAL BLEEDING: ICD-10-CM

## 2021-08-20 DIAGNOSIS — K44.9 HIATAL HERNIA WITH GERD: ICD-10-CM

## 2021-08-20 PROCEDURE — 99214 PR OFFICE/OUTPT VISIT, EST, LEVL IV, 30-39 MIN: ICD-10-PCS | Mod: S$PBB,,, | Performed by: INTERNAL MEDICINE

## 2021-08-20 PROCEDURE — 99999 PR PBB SHADOW E&M-EST. PATIENT-LVL IV: CPT | Mod: PBBFAC,,, | Performed by: INTERNAL MEDICINE

## 2021-08-20 PROCEDURE — 99999 PR PBB SHADOW E&M-EST. PATIENT-LVL IV: ICD-10-PCS | Mod: PBBFAC,,, | Performed by: INTERNAL MEDICINE

## 2021-08-20 PROCEDURE — 99214 OFFICE O/P EST MOD 30 MIN: CPT | Mod: PBBFAC | Performed by: INTERNAL MEDICINE

## 2021-08-20 PROCEDURE — 99214 OFFICE O/P EST MOD 30 MIN: CPT | Mod: S$PBB,,, | Performed by: INTERNAL MEDICINE

## 2021-08-20 RX ORDER — SODIUM, POTASSIUM,MAG SULFATES 17.5-3.13G
1 SOLUTION, RECONSTITUTED, ORAL ORAL DAILY
Qty: 1 KIT | Refills: 0 | Status: SHIPPED | OUTPATIENT
Start: 2021-08-20 | End: 2021-08-22

## 2021-08-24 ENCOUNTER — PATIENT MESSAGE (OUTPATIENT)
Dept: SLEEP MEDICINE | Facility: CLINIC | Age: 71
End: 2021-08-24

## 2021-08-26 ENCOUNTER — PROCEDURE VISIT (OUTPATIENT)
Dept: ORTHOPEDICS | Facility: CLINIC | Age: 71
End: 2021-08-26
Payer: MEDICARE

## 2021-08-26 VITALS — HEIGHT: 59 IN | BODY MASS INDEX: 46.58 KG/M2 | WEIGHT: 231.06 LBS

## 2021-08-26 DIAGNOSIS — M17.0 PRIMARY OSTEOARTHRITIS OF BOTH KNEES: Primary | ICD-10-CM

## 2021-08-26 PROCEDURE — 20610 DRAIN/INJ JOINT/BURSA W/O US: CPT | Mod: 50,S$PBB,, | Performed by: PHYSICIAN ASSISTANT

## 2021-08-26 PROCEDURE — 20610 DRAIN/INJ JOINT/BURSA W/O US: CPT | Mod: 50,PBBFAC | Performed by: PHYSICIAN ASSISTANT

## 2021-08-26 PROCEDURE — 20610 LARGE JOINT ASPIRATION/INJECTION: BILATERAL KNEE: ICD-10-PCS | Mod: 50,S$PBB,, | Performed by: PHYSICIAN ASSISTANT

## 2021-08-26 RX ADMIN — Medication 20 MG: at 11:08

## 2021-08-31 ENCOUNTER — TELEPHONE (OUTPATIENT)
Dept: ORTHOPEDICS | Facility: CLINIC | Age: 71
End: 2021-08-31

## 2021-09-03 ENCOUNTER — PROCEDURE VISIT (OUTPATIENT)
Dept: ORTHOPEDICS | Facility: CLINIC | Age: 71
End: 2021-09-03
Payer: MEDICARE

## 2021-09-03 ENCOUNTER — TELEPHONE (OUTPATIENT)
Dept: ORTHOPEDICS | Facility: CLINIC | Age: 71
End: 2021-09-03

## 2021-09-03 VITALS — SYSTOLIC BLOOD PRESSURE: 134 MMHG | DIASTOLIC BLOOD PRESSURE: 82 MMHG | HEART RATE: 62 BPM

## 2021-09-03 DIAGNOSIS — M17.0 PRIMARY OSTEOARTHRITIS OF BOTH KNEES: Primary | ICD-10-CM

## 2021-09-03 PROCEDURE — 20610 LARGE JOINT ASPIRATION/INJECTION: BILATERAL KNEE: ICD-10-PCS | Mod: 50,S$PBB,, | Performed by: PHYSICIAN ASSISTANT

## 2021-09-03 PROCEDURE — 20610 DRAIN/INJ JOINT/BURSA W/O US: CPT | Mod: 50,S$PBB,, | Performed by: PHYSICIAN ASSISTANT

## 2021-09-03 PROCEDURE — 20610 DRAIN/INJ JOINT/BURSA W/O US: CPT | Mod: 50,PBBFAC | Performed by: PHYSICIAN ASSISTANT

## 2021-09-03 RX ADMIN — Medication 20 MG: at 11:09

## 2021-10-07 ENCOUNTER — PATIENT MESSAGE (OUTPATIENT)
Dept: ADMINISTRATIVE | Facility: HOSPITAL | Age: 71
End: 2021-10-07

## 2021-10-19 ENCOUNTER — PATIENT OUTREACH (OUTPATIENT)
Dept: ADMINISTRATIVE | Facility: OTHER | Age: 71
End: 2021-10-19

## 2021-10-19 ENCOUNTER — HOSPITAL ENCOUNTER (OUTPATIENT)
Dept: RADIOLOGY | Facility: HOSPITAL | Age: 71
Discharge: HOME | End: 2021-10-19
Attending: INTERNAL MEDICINE
Payer: MEDICARE

## 2021-10-19 DIAGNOSIS — I27.20 PULMONARY HYPERTENSION: ICD-10-CM

## 2021-10-19 PROCEDURE — 71046 X-RAY EXAM CHEST 2 VIEWS: CPT | Mod: 26,,, | Performed by: RADIOLOGY

## 2021-10-19 PROCEDURE — 71046 X-RAY EXAM CHEST 2 VIEWS: CPT | Mod: TC

## 2021-10-19 PROCEDURE — 71046 XR CHEST PA AND LATERAL: ICD-10-PCS | Mod: 26,,, | Performed by: RADIOLOGY

## 2021-10-20 ENCOUNTER — OFFICE VISIT (OUTPATIENT)
Dept: ORTHOPEDICS | Facility: CLINIC | Age: 71
End: 2021-10-20
Payer: MEDICARE

## 2021-10-20 DIAGNOSIS — M17.0 BILATERAL PRIMARY OSTEOARTHRITIS OF KNEE: Primary | ICD-10-CM

## 2021-10-20 PROCEDURE — 99441 PR PHYSICIAN TELEPHONE EVALUATION 5-10 MIN: ICD-10-PCS | Mod: 95,,, | Performed by: PHYSICIAN ASSISTANT

## 2021-10-20 PROCEDURE — 99441 PR PHYSICIAN TELEPHONE EVALUATION 5-10 MIN: CPT | Mod: 95,,, | Performed by: PHYSICIAN ASSISTANT

## 2021-10-26 RX ORDER — SODIUM, POTASSIUM,MAG SULFATES 17.5-3.13G
SOLUTION, RECONSTITUTED, ORAL ORAL
COMMUNITY
Start: 2021-09-02 | End: 2021-12-30 | Stop reason: ALTCHOICE

## 2021-11-01 ENCOUNTER — TELEPHONE (OUTPATIENT)
Dept: PREADMISSION TESTING | Facility: HOSPITAL | Age: 71
End: 2021-11-01
Payer: MEDICARE

## 2021-11-03 ENCOUNTER — HOSPITAL ENCOUNTER (OUTPATIENT)
Facility: HOSPITAL | Age: 71
Discharge: HOME | End: 2021-11-03
Attending: INTERNAL MEDICINE | Admitting: INTERNAL MEDICINE
Payer: MEDICARE

## 2021-11-03 VITALS
DIASTOLIC BLOOD PRESSURE: 70 MMHG | HEIGHT: 59 IN | HEART RATE: 71 BPM | OXYGEN SATURATION: 96 % | SYSTOLIC BLOOD PRESSURE: 145 MMHG | BODY MASS INDEX: 47.37 KG/M2 | RESPIRATION RATE: 20 BRPM | TEMPERATURE: 98 F | WEIGHT: 235 LBS

## 2021-11-03 PROCEDURE — 91037 ESOPH IMPED FUNCTION TEST: CPT | Mod: 26,,, | Performed by: INTERNAL MEDICINE

## 2021-11-03 PROCEDURE — 91010 ESOPHAGUS MOTILITY STUDY: CPT | Mod: 26,,, | Performed by: INTERNAL MEDICINE

## 2021-11-03 PROCEDURE — 25000003 PHARM REV CODE 250: Performed by: INTERNAL MEDICINE

## 2021-11-03 PROCEDURE — 91010 ESOPHAGUS MOTILITY STUDY: CPT

## 2021-11-03 PROCEDURE — 91010 PR ESOPHAGEAL MOTILITY STUDY, MA2METRY: ICD-10-PCS | Mod: 26,,, | Performed by: INTERNAL MEDICINE

## 2021-11-03 PROCEDURE — 91037 ESOPH IMPED FUNCTION TEST: CPT

## 2021-11-03 PROCEDURE — 91037 PR GERD TST W/ NASAL IMPEDENCE ELECTROD: ICD-10-PCS | Mod: 26,,, | Performed by: INTERNAL MEDICINE

## 2021-11-03 RX ORDER — LIDOCAINE HYDROCHLORIDE 20 MG/ML
2 SOLUTION OROPHARYNGEAL ONCE
Status: COMPLETED | OUTPATIENT
Start: 2021-11-03 | End: 2021-11-03

## 2021-11-03 RX ADMIN — LIDOCAINE HYDROCHLORIDE 2 ML: 20 SOLUTION ORAL; TOPICAL at 09:11

## 2021-11-06 RX ORDER — SODIUM CHLORIDE, SODIUM LACTATE, POTASSIUM CHLORIDE, CALCIUM CHLORIDE 600; 310; 30; 20 MG/100ML; MG/100ML; MG/100ML; MG/100ML
INJECTION, SOLUTION INTRAVENOUS CONTINUOUS
Status: ACTIVE | OUTPATIENT
Start: 2021-11-06

## 2021-11-11 ENCOUNTER — HOSPITAL ENCOUNTER (OUTPATIENT)
Facility: HOSPITAL | Age: 71
Discharge: HOME | End: 2021-11-11
Attending: INTERNAL MEDICINE | Admitting: INTERNAL MEDICINE
Payer: MEDICARE

## 2021-11-11 ENCOUNTER — ANESTHESIA (OUTPATIENT)
Dept: ENDOSCOPY | Facility: HOSPITAL | Age: 71
End: 2021-11-11
Payer: MEDICARE

## 2021-11-11 ENCOUNTER — ANESTHESIA EVENT (OUTPATIENT)
Dept: ENDOSCOPY | Facility: HOSPITAL | Age: 71
End: 2021-11-11
Payer: MEDICARE

## 2021-11-11 DIAGNOSIS — K62.5 RECTAL BLEEDING: Primary | ICD-10-CM

## 2021-11-11 PROCEDURE — 45385 COLONOSCOPY W/LESION REMOVAL: CPT | Mod: ,,, | Performed by: INTERNAL MEDICINE

## 2021-11-11 PROCEDURE — 63600175 PHARM REV CODE 636 W HCPCS: Performed by: NURSE ANESTHETIST, CERTIFIED REGISTERED

## 2021-11-11 PROCEDURE — 25000003 PHARM REV CODE 250: Performed by: NURSE ANESTHETIST, CERTIFIED REGISTERED

## 2021-11-11 PROCEDURE — 88305 TISSUE EXAM BY PATHOLOGIST: ICD-10-PCS | Mod: 26,,, | Performed by: PATHOLOGY

## 2021-11-11 PROCEDURE — 45385 COLONOSCOPY W/LESION REMOVAL: CPT | Performed by: INTERNAL MEDICINE

## 2021-11-11 PROCEDURE — 27201012 HC FORCEPS, HOT/COLD, DISP: Performed by: INTERNAL MEDICINE

## 2021-11-11 PROCEDURE — 37000008 HC ANESTHESIA 1ST 15 MINUTES: Performed by: INTERNAL MEDICINE

## 2021-11-11 PROCEDURE — 88305 TISSUE EXAM BY PATHOLOGIST: CPT | Mod: 26,,, | Performed by: PATHOLOGY

## 2021-11-11 PROCEDURE — 37000009 HC ANESTHESIA EA ADD 15 MINS: Performed by: INTERNAL MEDICINE

## 2021-11-11 PROCEDURE — 43239 EGD BIOPSY SINGLE/MULTIPLE: CPT | Mod: 51,,, | Performed by: INTERNAL MEDICINE

## 2021-11-11 PROCEDURE — 27201089 HC SNARE, DISP (ANY): Performed by: INTERNAL MEDICINE

## 2021-11-11 PROCEDURE — 43239 PR EGD, FLEX, W/BIOPSY, SGL/MULTI: ICD-10-PCS | Mod: 51,,, | Performed by: INTERNAL MEDICINE

## 2021-11-11 PROCEDURE — 45385 PR COLONOSCOPY,REMV LESN,SNARE: ICD-10-PCS | Mod: ,,, | Performed by: INTERNAL MEDICINE

## 2021-11-11 PROCEDURE — 43239 EGD BIOPSY SINGLE/MULTIPLE: CPT | Performed by: INTERNAL MEDICINE

## 2021-11-11 PROCEDURE — 88305 TISSUE EXAM BY PATHOLOGIST: CPT | Mod: 59 | Performed by: PATHOLOGY

## 2021-11-11 RX ORDER — PROPOFOL 10 MG/ML
VIAL (ML) INTRAVENOUS
Status: DISCONTINUED | OUTPATIENT
Start: 2021-11-11 | End: 2021-11-11

## 2021-11-11 RX ORDER — LIDOCAINE HYDROCHLORIDE 10 MG/ML
INJECTION, SOLUTION EPIDURAL; INFILTRATION; INTRACAUDAL; PERINEURAL
Status: DISCONTINUED | OUTPATIENT
Start: 2021-11-11 | End: 2021-11-11

## 2021-11-11 RX ADMIN — PROPOFOL 50 MG: 10 INJECTION, EMULSION INTRAVENOUS at 07:11

## 2021-11-11 RX ADMIN — SODIUM CHLORIDE, SODIUM LACTATE, POTASSIUM CHLORIDE, AND CALCIUM CHLORIDE: .6; .31; .03; .02 INJECTION, SOLUTION INTRAVENOUS at 06:11

## 2021-11-11 RX ADMIN — PROPOFOL 100 MG: 10 INJECTION, EMULSION INTRAVENOUS at 07:11

## 2021-11-11 RX ADMIN — LIDOCAINE HYDROCHLORIDE 50 MG: 10 INJECTION, SOLUTION EPIDURAL; INFILTRATION; INTRACAUDAL; PERINEURAL at 07:11

## 2021-11-12 VITALS
OXYGEN SATURATION: 99 % | DIASTOLIC BLOOD PRESSURE: 76 MMHG | BODY MASS INDEX: 47.37 KG/M2 | TEMPERATURE: 97 F | HEIGHT: 59 IN | WEIGHT: 235 LBS | RESPIRATION RATE: 20 BRPM | SYSTOLIC BLOOD PRESSURE: 139 MMHG | HEART RATE: 68 BPM

## 2021-11-16 LAB
FINAL PATHOLOGIC DIAGNOSIS: NORMAL
GROSS: NORMAL
Lab: NORMAL

## 2021-12-16 ENCOUNTER — OFFICE VISIT (OUTPATIENT)
Dept: GASTROENTEROLOGY | Facility: CLINIC | Age: 71
End: 2021-12-16
Payer: MEDICARE

## 2021-12-16 VITALS
WEIGHT: 238.88 LBS | HEIGHT: 59 IN | BODY MASS INDEX: 48.16 KG/M2 | OXYGEN SATURATION: 98 % | SYSTOLIC BLOOD PRESSURE: 140 MMHG | HEART RATE: 60 BPM | DIASTOLIC BLOOD PRESSURE: 100 MMHG

## 2021-12-16 DIAGNOSIS — K21.9 GASTROESOPHAGEAL REFLUX DISEASE WITHOUT ESOPHAGITIS: ICD-10-CM

## 2021-12-16 DIAGNOSIS — I10 PRIMARY HYPERTENSION: ICD-10-CM

## 2021-12-16 DIAGNOSIS — E66.01 MORBID OBESITY WITH BMI OF 45.0-49.9, ADULT: Primary | ICD-10-CM

## 2021-12-16 DIAGNOSIS — Z86.010 HX OF ADENOMATOUS COLONIC POLYPS: ICD-10-CM

## 2021-12-16 DIAGNOSIS — K64.4 EXTERNAL HEMORRHOIDS: ICD-10-CM

## 2021-12-16 PROCEDURE — 99999 PR PBB SHADOW E&M-EST. PATIENT-LVL IV: ICD-10-PCS | Mod: PBBFAC,,, | Performed by: INTERNAL MEDICINE

## 2021-12-16 PROCEDURE — 99999 PR PBB SHADOW E&M-EST. PATIENT-LVL IV: CPT | Mod: PBBFAC,,, | Performed by: INTERNAL MEDICINE

## 2021-12-16 PROCEDURE — 99213 OFFICE O/P EST LOW 20 MIN: CPT | Mod: S$PBB,,, | Performed by: INTERNAL MEDICINE

## 2021-12-16 PROCEDURE — 99214 OFFICE O/P EST MOD 30 MIN: CPT | Mod: PBBFAC | Performed by: INTERNAL MEDICINE

## 2021-12-16 PROCEDURE — 99213 PR OFFICE/OUTPT VISIT, EST, LEVL III, 20-29 MIN: ICD-10-PCS | Mod: S$PBB,,, | Performed by: INTERNAL MEDICINE

## 2021-12-27 ENCOUNTER — TELEPHONE (OUTPATIENT)
Dept: INTERNAL MEDICINE | Facility: CLINIC | Age: 71
End: 2021-12-27
Payer: COMMERCIAL

## 2021-12-28 ENCOUNTER — TELEPHONE (OUTPATIENT)
Dept: INTERNAL MEDICINE | Facility: CLINIC | Age: 71
End: 2021-12-28
Payer: COMMERCIAL

## 2021-12-28 RX ORDER — AMOXICILLIN AND CLAVULANATE POTASSIUM 875; 125 MG/1; MG/1
1 TABLET, FILM COATED ORAL 2 TIMES DAILY
COMMUNITY
Start: 2021-12-27 | End: 2022-01-10

## 2021-12-28 RX ORDER — BROMPHENIRAMINE MALEATE, PSEUDOEPHEDRINE HYDROCHLORIDE, AND DEXTROMETHORPHAN HYDROBROMIDE 2; 30; 10 MG/5ML; MG/5ML; MG/5ML
SYRUP ORAL
COMMUNITY
Start: 2021-12-28 | End: 2022-11-15

## 2021-12-28 RX ORDER — PREDNISONE 20 MG/1
40 TABLET ORAL DAILY
COMMUNITY
Start: 2021-12-27 | End: 2022-01-10

## 2021-12-29 ENCOUNTER — TELEPHONE (OUTPATIENT)
Dept: INTERNAL MEDICINE | Facility: CLINIC | Age: 71
End: 2021-12-29
Payer: COMMERCIAL

## 2021-12-29 DIAGNOSIS — J06.9 UPPER RESPIRATORY TRACT INFECTION DUE TO COVID-19 VIRUS: Primary | ICD-10-CM

## 2021-12-29 DIAGNOSIS — U07.1 UPPER RESPIRATORY TRACT INFECTION DUE TO COVID-19 VIRUS: Primary | ICD-10-CM

## 2021-12-30 ENCOUNTER — PATIENT MESSAGE (OUTPATIENT)
Dept: INFECTIOUS DISEASES | Facility: HOSPITAL | Age: 71
End: 2021-12-30
Payer: COMMERCIAL

## 2022-01-10 ENCOUNTER — OFFICE VISIT (OUTPATIENT)
Dept: INTERNAL MEDICINE | Facility: CLINIC | Age: 72
End: 2022-01-10
Payer: MEDICARE

## 2022-01-10 ENCOUNTER — OFFICE VISIT (OUTPATIENT)
Dept: CARDIOLOGY | Facility: CLINIC | Age: 72
End: 2022-01-10
Payer: MEDICARE

## 2022-01-10 ENCOUNTER — HOSPITAL ENCOUNTER (OUTPATIENT)
Dept: CARDIOLOGY | Facility: HOSPITAL | Age: 72
Discharge: HOME | End: 2022-01-10
Payer: MEDICARE

## 2022-01-10 VITALS
TEMPERATURE: 98 F | SYSTOLIC BLOOD PRESSURE: 134 MMHG | OXYGEN SATURATION: 99 % | HEART RATE: 118 BPM | HEART RATE: 89 BPM | HEIGHT: 59 IN | OXYGEN SATURATION: 96 % | WEIGHT: 229.75 LBS | WEIGHT: 229.75 LBS | BODY MASS INDEX: 46.4 KG/M2 | DIASTOLIC BLOOD PRESSURE: 68 MMHG | DIASTOLIC BLOOD PRESSURE: 74 MMHG | SYSTOLIC BLOOD PRESSURE: 116 MMHG | BODY MASS INDEX: 46.32 KG/M2

## 2022-01-10 DIAGNOSIS — R00.0 TACHYCARDIA: ICD-10-CM

## 2022-01-10 DIAGNOSIS — I49.9 IRREGULAR HEARTBEAT: Primary | ICD-10-CM

## 2022-01-10 DIAGNOSIS — E78.2 MIXED HYPERLIPIDEMIA: ICD-10-CM

## 2022-01-10 DIAGNOSIS — I70.0 ATHEROSCLEROSIS OF AORTA: ICD-10-CM

## 2022-01-10 DIAGNOSIS — I49.9 IRREGULAR HEARTBEAT: ICD-10-CM

## 2022-01-10 DIAGNOSIS — R00.2 PALPITATIONS: ICD-10-CM

## 2022-01-10 DIAGNOSIS — I27.20 PULMONARY HYPERTENSION: ICD-10-CM

## 2022-01-10 DIAGNOSIS — E66.01 MORBID OBESITY WITH BMI OF 45.0-49.9, ADULT: ICD-10-CM

## 2022-01-10 DIAGNOSIS — I10 ESSENTIAL HYPERTENSION: ICD-10-CM

## 2022-01-10 DIAGNOSIS — R00.0 TACHYCARDIA: Primary | ICD-10-CM

## 2022-01-10 DIAGNOSIS — R06.09 DYSPNEA ON EXERTION: ICD-10-CM

## 2022-01-10 PROCEDURE — 93010 EKG 12-LEAD: ICD-10-PCS | Mod: ,,, | Performed by: INTERNAL MEDICINE

## 2022-01-10 PROCEDURE — 93005 ELECTROCARDIOGRAM TRACING: CPT

## 2022-01-10 PROCEDURE — 99214 OFFICE O/P EST MOD 30 MIN: CPT | Mod: S$PBB,,, | Performed by: PHYSICIAN ASSISTANT

## 2022-01-10 PROCEDURE — 99999 PR PBB SHADOW E&M-EST. PATIENT-LVL III: CPT | Mod: PBBFAC,,, | Performed by: INTERNAL MEDICINE

## 2022-01-10 PROCEDURE — 99213 OFFICE O/P EST LOW 20 MIN: CPT | Mod: PBBFAC,25,27 | Performed by: INTERNAL MEDICINE

## 2022-01-10 PROCEDURE — 99215 OFFICE O/P EST HI 40 MIN: CPT | Mod: PBBFAC,25 | Performed by: PHYSICIAN ASSISTANT

## 2022-01-10 PROCEDURE — 99214 PR OFFICE/OUTPT VISIT, EST, LEVL IV, 30-39 MIN: ICD-10-PCS | Mod: S$PBB,,, | Performed by: PHYSICIAN ASSISTANT

## 2022-01-10 PROCEDURE — 99214 OFFICE O/P EST MOD 30 MIN: CPT | Mod: S$PBB,25,, | Performed by: INTERNAL MEDICINE

## 2022-01-10 PROCEDURE — 99999 PR PBB SHADOW E&M-EST. PATIENT-LVL V: CPT | Mod: PBBFAC,,, | Performed by: PHYSICIAN ASSISTANT

## 2022-01-10 PROCEDURE — 99999 PR PBB SHADOW E&M-EST. PATIENT-LVL III: ICD-10-PCS | Mod: PBBFAC,,, | Performed by: INTERNAL MEDICINE

## 2022-01-10 PROCEDURE — 93010 ELECTROCARDIOGRAM REPORT: CPT | Mod: ,,, | Performed by: INTERNAL MEDICINE

## 2022-01-10 PROCEDURE — 99214 PR OFFICE/OUTPT VISIT, EST, LEVL IV, 30-39 MIN: ICD-10-PCS | Mod: S$PBB,25,, | Performed by: INTERNAL MEDICINE

## 2022-01-10 PROCEDURE — 99999 PR PBB SHADOW E&M-EST. PATIENT-LVL V: ICD-10-PCS | Mod: PBBFAC,,, | Performed by: PHYSICIAN ASSISTANT

## 2022-01-10 NOTE — PROGRESS NOTES
Subjective:      Patient ID: Yoana Pardo is a 71 y.o. female.    Chief Complaint: Dizziness and Memory Loss (//)    Palpitations   This is a new problem. The current episode started in the past 7 days. The problem has been unchanged. Exacerbated by: COVID 19 diagnosis. Associated symptoms include chest fullness, chest pain and malaise/fatigue. Pertinent negatives include no anxiety, coughing, diaphoresis, dizziness, fever, irregular heartbeat, nausea, near-syncope, numbness, shortness of breath, syncope, vomiting or weakness. She has tried nothing for the symptoms.      Fast heart rate for the past week.   Also sharp pain in her chest on and off.   Feels off balance or lightheaded. No falls or syncope.   Some mild forgetfulness but not severe.     Patient Active Problem List   Diagnosis    Mixed hyperlipidemia    Pulmonary hypertension    MVP (mitral valve prolapse)    Hiatal hernia with GERD    History of positive PPD, untreated    Hemorrhoids    Essential hypertension    Peripheral neuropathy    Primary osteoarthritis of both knees    Morbid obesity with BMI of 45.0-49.9, adult    [place] on CPAP    Behaviorally induced insufficient sleep syndrome    Chronic seasonal allergic rhinitis    Atherosclerosis of aorta    Memory loss       Current Outpatient Medications:     acetaminophen (TYLENOL) 500 MG tablet, Take 500 mg by mouth every 6 (six) hours as needed for Pain., Disp: , Rfl:     aspirin (ECOTRIN) 81 MG EC tablet, Take 81 mg by mouth once daily., Disp: , Rfl:     brompheniramine-pseudoeph-DM (BROMFED DM) 2-30-10 mg/5 mL Syrp, Take by mouth., Disp: , Rfl:     ciclopirox (PENLAC) 8 % Soln, Apply topically nightly., Disp: 1 Bottle, Rfl: 10    furosemide (LASIX) 20 MG tablet, Take 1 tablet (20 mg total) by mouth 2 (two) times daily as needed., Disp: 60 tablet, Rfl: 1    hydroCHLOROthiazide (HYDRODIURIL) 25 MG tablet, Take 1 tablet by mouth once daily, Disp: 90 tablet, Rfl: 0     ipratropium (ATROVENT) 0.03 % nasal spray, 2 sprays by Nasal route every 8 (eight) hours as needed for Rhinitis., Disp: 30 mL, Rfl: 4    losartan (COZAAR) 100 MG tablet, Take 1 tablet by mouth once daily, Disp: 90 tablet, Rfl: 0    meloxicam (MOBIC) 7.5 MG tablet, TAKE 1 TO 2 TABLETS BY MOUTH ONCE DAILY AS NEEDED, Disp: 30 tablet, Rfl: 3    pantoprazole (PROTONIX) 40 MG tablet, Take 1 tablet (40 mg total) by mouth once daily., Disp: 30 tablet, Rfl: 3    pregabalin (LYRICA) 75 MG capsule, Take 1 capsule (75 mg total) by mouth once daily., Disp: 30 capsule, Rfl: 3    sildenafil (REVATIO) 20 mg Tab, Take 1 tablet (20 mg total) by mouth 3 (three) times daily., Disp: 90 tablet, Rfl: 11    simvastatin (ZOCOR) 20 MG tablet, Take 1 tablet (20 mg total) by mouth every evening., Disp: 90 tablet, Rfl: 3  No current facility-administered medications for this visit.    Facility-Administered Medications Ordered in Other Visits:     lactated ringers infusion, , Intravenous, Continuous, Deloris Galicia MD    Review of Systems   Constitutional: Positive for malaise/fatigue. Negative for activity change, appetite change, chills, diaphoresis, fatigue, fever and unexpected weight change.   HENT: Negative.  Negative for congestion, hearing loss, postnasal drip, rhinorrhea, sore throat, trouble swallowing and voice change.    Eyes: Negative.  Negative for visual disturbance.   Respiratory: Negative.  Negative for cough, choking, chest tightness and shortness of breath.    Cardiovascular: Positive for chest pain and palpitations. Negative for leg swelling, syncope and near-syncope.   Gastrointestinal: Negative for abdominal distention, abdominal pain, blood in stool, constipation, diarrhea, nausea and vomiting.   Endocrine: Negative for cold intolerance, heat intolerance, polydipsia and polyuria.   Genitourinary: Negative.  Negative for difficulty urinating and frequency.   Musculoskeletal: Negative for arthralgias, back  "pain, gait problem, joint swelling and myalgias.   Skin: Negative for color change, pallor, rash and wound.   Neurological: Positive for light-headedness. Negative for dizziness, tremors, seizures, syncope, facial asymmetry, speech difficulty, weakness, numbness and headaches.   Hematological: Negative for adenopathy.   Psychiatric/Behavioral: Positive for confusion and decreased concentration. Negative for agitation, behavioral problems, dysphoric mood, hallucinations, self-injury, sleep disturbance and suicidal ideas. The patient is not nervous/anxious and is not hyperactive.      Objective:   /68 (BP Location: Left arm, Patient Position: Sitting, BP Method: Large (Manual))   Pulse (!) 118   Temp 98.2 °F (36.8 °C) (Tympanic)   Ht 4' 11" (1.499 m)   Wt 104.2 kg (229 lb 11.5 oz)   SpO2 99%   BMI 46.40 kg/m²     Physical Exam  Vitals and nursing note reviewed.   Constitutional:       General: She is not in acute distress.     Appearance: Normal appearance. She is well-developed and well-nourished. She is not ill-appearing, toxic-appearing or diaphoretic.   HENT:      Head: Normocephalic and atraumatic.   Cardiovascular:      Rate and Rhythm: Tachycardia present. Rhythm irregular.      Pulses: Intact distal pulses.      Heart sounds: Normal heart sounds. No murmur heard.  No friction rub. No gallop.    Pulmonary:      Effort: Pulmonary effort is normal. No respiratory distress.      Breath sounds: Normal breath sounds. No wheezing or rales.   Musculoskeletal:         General: Normal range of motion.   Skin:     General: Skin is warm.      Capillary Refill: Capillary refill takes less than 2 seconds.      Findings: No rash.   Neurological:      Mental Status: She is alert and oriented to person, place, and time.   Psychiatric:         Mood and Affect: Mood and affect normal.         Behavior: Behavior normal.         Thought Content: Thought content normal.         Judgment: Judgment normal. "         Assessment:     1. Irregular heartbeat    2. Tachycardia      Plan:   Irregular heartbeat  -     EKG 12-lead; Future  -     Ambulatory referral/consult to Cardiology    Tachycardia  -     EKG 12-lead; Future  -     Ambulatory referral/consult to Cardiology    -cardiology today      Follow up if symptoms worsen or fail to improve.

## 2022-01-10 NOTE — PROGRESS NOTES
Subjective:   Patient ID:  Yoana Pardo is a 71 y.o. female who presents for follow-up of No chief complaint on file.  6/12/2020  A 68 yo obese female with htn hlp mvp pulmonary htn is referred from DR OROZCO for rhc. She has shortness of breath she does not feel rested despite cpap her pa pressures have increased. Has no angina no leg swelling.  Compared to previously she has been consistently using her cpap. Has been compliant with salt intake.     11/17/2020  No change in status she needs to have bariatric surgery she is compliant with meds htn controlled. Not compliant with cpap has no headache tia claudication  No leg swelling no chest pain. She has moderate pulmonary htn by cath.no shortness of breath.       Past Medical History:   Diagnosis Date    GERD (gastroesophageal reflux disease)      Hemorrhoids      History of positive PPD, untreated      Hx of cold sores      Hyperlipidemia      Hypertension      MVP (mitral valve prolapse)      Peripheral neuropathy      Pulmonary HTN       Dr Bailon    Sleep apnea       has  but not using CPAP         This is finds patient feeling generally well.  Intermittently having some palpitations recently and COVID will go ahead with an echo and Holter monitor.      Review of Systems   Constitutional: Negative for chills, diaphoresis, night sweats, weight gain and weight loss.   HENT: Negative for congestion, hoarse voice, sore throat and stridor.    Eyes: Negative for double vision and pain.   Cardiovascular: Negative for chest pain, claudication, cyanosis, dyspnea on exertion, irregular heartbeat, leg swelling, near-syncope, orthopnea, palpitations, paroxysmal nocturnal dyspnea and syncope.   Respiratory: Negative for cough, hemoptysis, shortness of breath, sleep disturbances due to breathing, snoring, sputum production and wheezing.    Endocrine: Negative for cold intolerance, heat intolerance and polydipsia.   Hematologic/Lymphatic: Negative for  bleeding problem. Does not bruise/bleed easily.   Skin: Negative for color change, dry skin and rash.   Musculoskeletal: Negative for joint swelling and muscle cramps.   Gastrointestinal: Negative for bloating, abdominal pain, constipation, diarrhea, dysphagia, melena, nausea and vomiting.   Genitourinary: Negative for flank pain and urgency.   Neurological: Negative for dizziness, focal weakness, headaches, light-headedness, loss of balance, seizures and weakness.   Psychiatric/Behavioral: Negative for altered mental status and memory loss. The patient is not nervous/anxious.      Family History   Problem Relation Age of Onset    Heart disease Mother     Obesity Mother     Kidney disease Father     Hypertension Father     Obesity Father     Heart disease Father     Diabetes Sister     Aneurysm Sister         AAA     Past Medical History:   Diagnosis Date    GERD (gastroesophageal reflux disease)     Hemorrhoids     History of positive PPD, untreated     Hx of cold sores     Hyperlipidemia     Hypertension     MVP (mitral valve prolapse)     Peripheral neuropathy     Pulmonary HTN     Dr Bailon    Sleep apnea     [place]-CPAP  uses intermittently     Social History     Socioeconomic History    Marital status:     Number of children: 3   Occupational History    Occupation: [employer] occupational      Employer: [employer]   Tobacco Use    Smoking status: Never Smoker    Smokeless tobacco: Never Used   Substance and Sexual Activity    Alcohol use: Yes     Alcohol/week: 1.0 standard drink     Types: 1 Shots of liquor per week    Drug use: No    Sexual activity: Not Currently     Partners: Male     Current Outpatient Medications on File Prior to Visit   Medication Sig Dispense Refill    acetaminophen (TYLENOL) 500 MG tablet Take 500 mg by mouth every 6 (six) hours as needed for Pain.      aspirin (ECOTRIN) 81 MG EC tablet Take 81 mg by mouth once daily.       brompheniramine-pseudoeph-DM (BROMFED DM) 2-30-10 mg/5 mL Syrp Take by mouth.      ciclopirox (PENLAC) 8 % Soln Apply topically nightly. 1 Bottle 10    furosemide (LASIX) 20 MG tablet Take 1 tablet (20 mg total) by mouth 2 (two) times daily as needed. 60 tablet 1    hydroCHLOROthiazide (HYDRODIURIL) 25 MG tablet Take 1 tablet by mouth once daily 90 tablet 0    ipratropium (ATROVENT) 0.03 % nasal spray 2 sprays by Nasal route every 8 (eight) hours as needed for Rhinitis. 30 mL 4    losartan (COZAAR) 100 MG tablet Take 1 tablet by mouth once daily 90 tablet 0    meloxicam (MOBIC) 7.5 MG tablet TAKE 1 TO 2 TABLETS BY MOUTH ONCE DAILY AS NEEDED 30 tablet 3    pantoprazole (PROTONIX) 40 MG tablet Take 1 tablet (40 mg total) by mouth once daily. 30 tablet 3    pregabalin (LYRICA) 75 MG capsule Take 1 capsule (75 mg total) by mouth once daily. 30 capsule 3    sildenafil (REVATIO) 20 mg Tab Take 1 tablet (20 mg total) by mouth 3 (three) times daily. 90 tablet 11    simvastatin (ZOCOR) 20 MG tablet Take 1 tablet (20 mg total) by mouth every evening. 90 tablet 3    [DISCONTINUED] amoxicillin-clavulanate 875-125mg (AUGMENTIN) 875-125 mg per tablet Take 1 tablet by mouth 2 (two) times daily.      [DISCONTINUED] gabapentin (NEURONTIN) 100 MG capsule TAKE 1 CAPSULE BY MOUTH NIGHTLY AS NEEDED 30 capsule 3    [DISCONTINUED] predniSONE (DELTASONE) 20 MG tablet Take 40 mg by mouth once daily.       Current Facility-Administered Medications on File Prior to Visit   Medication Dose Route Frequency Provider Last Rate Last Admin    lactated ringers infusion   Intravenous Continuous Deloris Galicia MD         Review of patient's allergies indicates:   Allergen Reactions    Lisinopril Other (See Comments)     Cough      Bactrim [sulfamethoxazole-trimethoprim] Itching    Zosyn [piperacillin-tazobactam] Hives     Pt received second dose of Zosyn and had hives on both arms. Benadryl given and pt continued to receive  zosyn with no issues. Hydralazine also given around the same time and discontinued.        Objective:     Physical Exam  Eyes:      Pupils: Pupils are equal, round, and reactive to light.   Neck:      Trachea: No tracheal deviation.   Cardiovascular:      Rate and Rhythm: Normal rate and regular rhythm.      Pulses: Intact distal pulses.           Carotid pulses are 2+ on the right side and 2+ on the left side.       Radial pulses are 2+ on the right side and 2+ on the left side.        Femoral pulses are 2+ on the right side and 2+ on the left side.       Popliteal pulses are 2+ on the right side and 2+ on the left side.        Dorsalis pedis pulses are 2+ on the right side and 2+ on the left side.        Posterior tibial pulses are 2+ on the right side and 2+ on the left side.      Heart sounds: Normal heart sounds. No murmur heard.  No friction rub. No gallop.    Pulmonary:      Effort: Pulmonary effort is normal. No respiratory distress.      Breath sounds: Normal breath sounds. No stridor. No wheezing or rales.   Chest:      Chest wall: No tenderness.   Abdominal:      General: There is no distension.      Tenderness: There is no abdominal tenderness. There is no rebound.   Musculoskeletal:         General: No tenderness or edema.      Cervical back: Normal range of motion.   Skin:     General: Skin is warm and dry.   Neurological:      Mental Status: She is alert and oriented to person, place, and time.        Show images for Echo Color Flow Doppler? Yes    Summary 4/30/2021    · The left ventricle is normal in size with concentric remodeling and normal systolic function.  · The estimated ejection fraction is 60%.  · Grade II left ventricular diastolic dysfunction.  · Normal right ventricular size with normal right ventricular systolic function.  · Severe left atrial enlargement.  · Mild mitral regurgitation.  · Moderate tricuspid regurgitation.  · Normal central venous pressure (3 mmHg).  · The estimated PA  systolic pressure is 51 mmHg.  · There is pulmonary hypertension.       EKG showed normal sinus rhythm with premature atrial contractions.    Assessment:     1. Pulmonary hypertension    2. Atherosclerosis of aorta    3. Essential hypertension    4. Morbid obesity with BMI of 45.0-49.9, adult    5. Mixed hyperlipidemia        Plan:     Pulmonary hypertension    Atherosclerosis of aorta    Essential hypertension    Morbid obesity with BMI of 45.0-49.9, adult    Mixed hyperlipidemia    Impression 1 pulmonary hypertension stable but may be altered given the fact recent COVID will have cardiac echo   2. Hypertension good control   3. Tachyarrhythmias will go ahead with a Holter monitor.  Follow-up evaluation after testing is performed.

## 2022-01-18 DIAGNOSIS — K44.9 HIATAL HERNIA WITH GERD: ICD-10-CM

## 2022-01-18 DIAGNOSIS — K21.9 HIATAL HERNIA WITH GERD: ICD-10-CM

## 2022-01-22 RX ORDER — PANTOPRAZOLE SODIUM 40 MG/1
TABLET, DELAYED RELEASE ORAL
Qty: 90 TABLET | Refills: 1 | Status: SHIPPED | OUTPATIENT
Start: 2022-01-22 | End: 2022-02-07

## 2022-01-23 NOTE — TELEPHONE ENCOUNTER
Refill Authorization Note   Yoana Pardo  is requesting a refill authorization.  Brief Assessment and Rationale for Refill:  Approve     Medication Therapy Plan:       Medication Reconciliation Completed: No   Comments:   --->Care Gap information included below if applicable.   Orders Placed This Encounter    pantoprazole (PROTONIX) 40 MG tablet      Requested Prescriptions   Signed Prescriptions Disp Refills    pantoprazole (PROTONIX) 40 MG tablet 90 tablet 1     Sig: Take 1 tablet by mouth once daily       Gastroenterology: Proton Pump Inhibitors 2 Passed - 1/22/2022  9:48 PM        Passed - Patient is at least 18 years old        Passed - Osteoporosis is not on problem list        Passed - An appropriate indication is on the problem list        Passed - Valid encounter within last 15 months     Recent Visits  Date Type Provider Dept   08/18/21 Office Visit Lynn Wiggins MD [facility]   02/18/21 Office Visit Lynn Wiggins MD [facility]   08/18/20 Office Visit Lynn Wiggisn MD [facility]   Showing recent visits within past 720 days and meeting all other requirements  Future Appointments  No visits were found meeting these conditions.  Showing future appointments within next 150 days and meeting all other requirements      Future Appointments              In 3 days ECHOCARDIOGRAM, [facility] [place] [facility] [place]    In 3 days HOLTER, [facility] [place] [facility] [place]    In 1 week Price Gil MD [facility] [place] [facility] [place]                    Appointments  past 12m or future 3m with PCP    Date Provider   Last Visit   8/18/2021 Lynn Wiggins MD   Next Visit   Visit date not found Lynn Wiggins MD   ED visits in past 90 days: 0     Note composed:9:50 PM 01/22/2022

## 2022-01-24 ENCOUNTER — PES CALL (OUTPATIENT)
Dept: ADMINISTRATIVE | Facility: CLINIC | Age: 72
End: 2022-01-24
Payer: COMMERCIAL

## 2022-01-25 ENCOUNTER — TELEPHONE (OUTPATIENT)
Dept: CARDIOLOGY | Facility: CLINIC | Age: 72
End: 2022-01-25
Payer: COMMERCIAL

## 2022-01-25 ENCOUNTER — HOSPITAL ENCOUNTER (OUTPATIENT)
Dept: CARDIOLOGY | Facility: HOSPITAL | Age: 72
Discharge: HOME | End: 2022-01-25
Attending: INTERNAL MEDICINE
Payer: MEDICARE

## 2022-01-25 VITALS
DIASTOLIC BLOOD PRESSURE: 74 MMHG | BODY MASS INDEX: 46.16 KG/M2 | WEIGHT: 229 LBS | HEIGHT: 59 IN | SYSTOLIC BLOOD PRESSURE: 116 MMHG

## 2022-01-25 DIAGNOSIS — R00.2 PALPITATIONS: ICD-10-CM

## 2022-01-25 DIAGNOSIS — R00.0 TACHYCARDIA: ICD-10-CM

## 2022-01-25 DIAGNOSIS — I27.20 PULMONARY HYPERTENSION: ICD-10-CM

## 2022-01-25 DIAGNOSIS — I10 ESSENTIAL HYPERTENSION: ICD-10-CM

## 2022-01-25 DIAGNOSIS — E66.01 MORBID OBESITY WITH BMI OF 45.0-49.9, ADULT: ICD-10-CM

## 2022-01-25 DIAGNOSIS — I70.0 ATHEROSCLEROSIS OF AORTA: ICD-10-CM

## 2022-01-25 DIAGNOSIS — E78.2 MIXED HYPERLIPIDEMIA: ICD-10-CM

## 2022-01-25 DIAGNOSIS — R06.09 DYSPNEA ON EXERTION: ICD-10-CM

## 2022-01-25 LAB
AORTIC ROOT ANNULUS: 2.54 CM
ASCENDING AORTA: 2.75 CM
AV INDEX (PROSTH): 0.78
AV MEAN GRADIENT: 4 MMHG
AV PEAK GRADIENT: 8 MMHG
AV VALVE AREA: 2.29 CM2
AV VELOCITY RATIO: 0.72
BSA FOR ECHO PROCEDURE: 2.08 M2
CV ECHO LV RWT: 0.41 CM
DOP CALC AO PEAK VEL: 1.4 M/S
DOP CALC AO VTI: 31.2 CM
DOP CALC LVOT AREA: 3 CM2
DOP CALC LVOT DIAMETER: 1.94 CM
DOP CALC LVOT PEAK VEL: 1.01 M/S
DOP CALC LVOT STROKE VOLUME: 71.5 CM3
DOP CALC RVOT PEAK VEL: 0.82 M/S
DOP CALC RVOT VTI: 20.1 CM
DOP CALCLVOT PEAK VEL VTI: 24.2 CM
E WAVE DECELERATION TIME: 129.47 MSEC
E/A RATIO: 1.95
ECHO EF ESTIMATED: 64 %
ECHO LV POSTERIOR WALL: 0.97 CM (ref 0.6–1.1)
EJECTION FRACTION: 60 %
FRACTIONAL SHORTENING: 35 % (ref 28–44)
HCV RNA # SERPL NAA+PROBE: 2073 MMHG/S
INTERVENTRICULAR SEPTUM: 1.03 CM (ref 0.6–1.1)
IVRT: 91.34 MSEC
LA MAJOR: 5.07 CM
LA MINOR: 4.85 CM
LA WIDTH: 5.72 CM
LEFT ATRIUM SIZE: 4.85 CM
LEFT ATRIUM VOLUME INDEX MOD: 59.2 ML/M2
LEFT ATRIUM VOLUME INDEX: 60 ML/M2
LEFT ATRIUM VOLUME MOD: 115.35 CM3
LEFT ATRIUM VOLUME: 116.9 CM3
LEFT INTERNAL DIMENSION IN SYSTOLE: 3.06 CM (ref 2.1–4)
LEFT VENTRICLE DIASTOLIC VOLUME INDEX: 52.98 ML/M2
LEFT VENTRICLE DIASTOLIC VOLUME: 103.32 ML
LEFT VENTRICLE MASS INDEX: 85 G/M2
LEFT VENTRICLE SYSTOLIC VOLUME INDEX: 18.9 ML/M2
LEFT VENTRICLE SYSTOLIC VOLUME: 36.77 ML
LEFT VENTRICULAR INTERNAL DIMENSION IN DIASTOLE: 4.72 CM (ref 3.5–6)
LEFT VENTRICULAR MASS: 165.59 G
LV SEPTAL E/E' RATIO: 15.2 M/S
LVOT MG: 2.62 MMHG
LVOT MV: 0.75 CM/S
MV PEAK A VEL: 0.39 M/S
MV PEAK E VEL: 0.76 M/S
MV STENOSIS PRESSURE HALF TIME: 37.55 MS
MV VALVE AREA P 1/2 METHOD: 5.86 CM2
PISA MRMAX VEL: 5.07 M/S
PISA TR MAX VEL: 4.1 M/S
PV MEAN GRADIENT: 1.42 MMHG
PV PEAK VELOCITY: 1.06 CM/S
RA MAJOR: 5.05 CM
RA WIDTH: 4 CM
RIGHT VENTRICULAR END-DIASTOLIC DIMENSION: 4.79 CM
SINUS: 2.82 CM
STJ: 2.71 CM
TDI SEPTAL: 0.05 M/S
TR MAX PG: 67 MMHG
TRICUSPID ANNULAR PLANE SYSTOLIC EXCURSION: 2.31 CM

## 2022-01-25 PROCEDURE — 93306 ECHO (CUPID ONLY): ICD-10-PCS | Mod: 26,,, | Performed by: INTERNAL MEDICINE

## 2022-01-25 PROCEDURE — 93226 XTRNL ECG REC<48 HR SCAN A/R: CPT

## 2022-01-25 PROCEDURE — 93227 HOLTER MONITOR - 48 HOUR (CUPID ONLY): ICD-10-PCS | Mod: ,,, | Performed by: INTERNAL MEDICINE

## 2022-01-25 PROCEDURE — 93227 XTRNL ECG REC<48 HR R&I: CPT | Mod: ,,, | Performed by: INTERNAL MEDICINE

## 2022-01-25 PROCEDURE — 93306 TTE W/DOPPLER COMPLETE: CPT

## 2022-01-25 PROCEDURE — 93306 TTE W/DOPPLER COMPLETE: CPT | Mod: 26,,, | Performed by: INTERNAL MEDICINE

## 2022-01-25 NOTE — TELEPHONE ENCOUNTER
Was calling pt to let her know that her echo shows good heart function with pulmonary hypertension and Dr gil will discuss treatment at next visit.    ----- Message from Price Gil MD sent at 1/25/2022  1:28 PM CST -----  Echo shows good heart function with pulmonary hypertension will discuss treatment at next visit

## 2022-01-28 ENCOUNTER — TELEPHONE (OUTPATIENT)
Dept: CARDIOLOGY | Facility: CLINIC | Age: 72
End: 2022-01-28
Payer: COMMERCIAL

## 2022-01-28 LAB
OHS CV EVENT MONITOR DAY: 0
OHS CV HOLTER LENGTH DECIMAL HOURS: 48
OHS CV HOLTER LENGTH HOURS: 48
OHS CV HOLTER LENGTH MINUTES: 0
OHS CV HOLTER SINUS AVERAGE HR: 78
OHS CV HOLTER SINUS MAX HR: 141
OHS CV HOLTER SINUS MIN HR: 54

## 2022-01-28 NOTE — TELEPHONE ENCOUNTER
The patient has been notified of this information and all questions answered.  ----- Message from Price Gil MD sent at 1/28/2022  8:20 AM CST -----  Mild to moderate numbers of premature beats but all single beats and stable.  No significant changes and no evidence that we need to add medications

## 2022-01-31 ENCOUNTER — OFFICE VISIT (OUTPATIENT)
Dept: CARDIOLOGY | Facility: CLINIC | Age: 72
End: 2022-01-31
Payer: MEDICARE

## 2022-01-31 VITALS
WEIGHT: 234.56 LBS | HEIGHT: 59 IN | HEART RATE: 73 BPM | BODY MASS INDEX: 47.28 KG/M2 | OXYGEN SATURATION: 97 % | DIASTOLIC BLOOD PRESSURE: 82 MMHG | SYSTOLIC BLOOD PRESSURE: 122 MMHG

## 2022-01-31 DIAGNOSIS — K44.9 HIATAL HERNIA WITH GERD: ICD-10-CM

## 2022-01-31 DIAGNOSIS — I10 ESSENTIAL HYPERTENSION: ICD-10-CM

## 2022-01-31 DIAGNOSIS — R06.09 DYSPNEA ON EXERTION: ICD-10-CM

## 2022-01-31 DIAGNOSIS — I49.9 IRREGULAR HEARTBEAT: ICD-10-CM

## 2022-01-31 DIAGNOSIS — E66.01 MORBID OBESITY WITH BMI OF 45.0-49.9, ADULT: ICD-10-CM

## 2022-01-31 DIAGNOSIS — R00.2 PALPITATIONS: ICD-10-CM

## 2022-01-31 DIAGNOSIS — I27.20 PULMONARY HYPERTENSION: Primary | ICD-10-CM

## 2022-01-31 DIAGNOSIS — I70.0 ATHEROSCLEROSIS OF AORTA: ICD-10-CM

## 2022-01-31 DIAGNOSIS — R00.0 TACHYCARDIA: ICD-10-CM

## 2022-01-31 DIAGNOSIS — K21.9 HIATAL HERNIA WITH GERD: ICD-10-CM

## 2022-01-31 PROCEDURE — 99999 PR PBB SHADOW E&M-EST. PATIENT-LVL IV: CPT | Mod: PBBFAC,,, | Performed by: INTERNAL MEDICINE

## 2022-01-31 PROCEDURE — 99214 PR OFFICE/OUTPT VISIT, EST, LEVL IV, 30-39 MIN: ICD-10-PCS | Mod: S$PBB,,, | Performed by: INTERNAL MEDICINE

## 2022-01-31 PROCEDURE — 99999 PR PBB SHADOW E&M-EST. PATIENT-LVL IV: ICD-10-PCS | Mod: PBBFAC,,, | Performed by: INTERNAL MEDICINE

## 2022-01-31 PROCEDURE — 99214 OFFICE O/P EST MOD 30 MIN: CPT | Mod: PBBFAC | Performed by: INTERNAL MEDICINE

## 2022-01-31 PROCEDURE — 99214 OFFICE O/P EST MOD 30 MIN: CPT | Mod: S$PBB,,, | Performed by: INTERNAL MEDICINE

## 2022-01-31 RX ORDER — FUROSEMIDE 20 MG/1
20 TABLET ORAL 2 TIMES DAILY PRN
Qty: 60 TABLET | Refills: 3 | Status: SHIPPED | OUTPATIENT
Start: 2022-01-31 | End: 2022-05-05 | Stop reason: SDUPTHER

## 2022-01-31 NOTE — PROGRESS NOTES
Subjective:   Patient ID:  Yoana Pardo is a 71 y.o. female who presents for follow-up of No chief complaint on file.  6/12/2020  A 68 yo obese female with htn hlp mvp pulmonary htn is referred from DR OROZCO for rhc. She has shortness of breath she does not feel rested despite cpap her pa pressures have increased. Has no angina no leg swelling.  Compared to previously she has been consistently using her cpap. Has been compliant with salt intake.     11/17/2020  No change in status she needs to have bariatric surgery she is compliant with meds htn controlled. Not compliant with cpap has no headache tia claudication  No leg swelling no chest pain. She has moderate pulmonary htn by cath.no shortness of breath.          Past Medical History:   Diagnosis Date    GERD (gastroesophageal reflux disease)      Hemorrhoids      History of positive PPD, untreated      Hx of cold sores      Hyperlipidemia      Hypertension      MVP (mitral valve prolapse)      Peripheral neuropathy      Pulmonary HTN       Dr Bailon    Sleep apnea       has  but not using CPAP            This is finds patient feeling generally well.  Intermittently having some palpitations recently and COVID will go ahead with an echo and Holter monitor.           Presents in the office and we reviewed today her cardiac echo which showed normal heart function but evidence of diastolic heart dysfunction.  Patient intermittently has edema and will treat her with diuretics as necessary.  Patient continues on sildenafil for pulmonary hypertension.  Cardiac echo also showed evidence of moderate pulmonary hypertension and stable heart function.  The monitor showed mild intermittent palpitations and no significant runs and otherwise stable no lightheadedness dizziness and no syncope or near syncopal episodes.      Review of Systems   Constitutional: Negative for chills, diaphoresis, night sweats, weight gain and weight loss.   HENT: Negative for  congestion, hoarse voice, sore throat and stridor.    Eyes: Negative for double vision and pain.   Cardiovascular: Negative for chest pain, claudication, cyanosis, dyspnea on exertion, irregular heartbeat, leg swelling, near-syncope, orthopnea, palpitations, paroxysmal nocturnal dyspnea and syncope.   Respiratory: Negative for cough, hemoptysis, shortness of breath, sleep disturbances due to breathing, snoring, sputum production and wheezing.    Endocrine: Negative for cold intolerance, heat intolerance and polydipsia.   Hematologic/Lymphatic: Negative for bleeding problem. Does not bruise/bleed easily.   Skin: Negative for color change, dry skin and rash.   Musculoskeletal: Negative for joint swelling and muscle cramps.   Gastrointestinal: Negative for bloating, abdominal pain, constipation, diarrhea, dysphagia, melena, nausea and vomiting.   Genitourinary: Negative for flank pain and urgency.   Neurological: Negative for dizziness, focal weakness, headaches, light-headedness, loss of balance, seizures and weakness.   Psychiatric/Behavioral: Negative for altered mental status and memory loss. The patient is not nervous/anxious.      Family History   Problem Relation Age of Onset    Heart disease Mother     Obesity Mother     Kidney disease Father     Hypertension Father     Obesity Father     Heart disease Father     Diabetes Sister     Aneurysm Sister         AAA     Past Medical History:   Diagnosis Date    GERD (gastroesophageal reflux disease)     Hemorrhoids     History of positive PPD, untreated     Hx of cold sores     Hyperlipidemia     Hypertension     MVP (mitral valve prolapse)     Peripheral neuropathy     Pulmonary HTN     Dr Bailon    Sleep apnea     [place]-CPAP  uses intermittently     Social History     Socioeconomic History    Marital status:     Number of children: 3   Occupational History    Occupation: [employer] occupational      Employer: [employer]    Tobacco Use    Smoking status: Never Smoker    Smokeless tobacco: Never Used   Substance and Sexual Activity    Alcohol use: Yes     Alcohol/week: 1.0 standard drink     Types: 1 Shots of liquor per week    Drug use: No    Sexual activity: Not Currently     Partners: Male     Current Outpatient Medications on File Prior to Visit   Medication Sig Dispense Refill    acetaminophen (TYLENOL) 500 MG tablet Take 500 mg by mouth every 6 (six) hours as needed for Pain.      aspirin (ECOTRIN) 81 MG EC tablet Take 81 mg by mouth once daily.      brompheniramine-pseudoeph-DM (BROMFED DM) 2-30-10 mg/5 mL Syrp Take by mouth.      ciclopirox (PENLAC) 8 % Soln Apply topically nightly. 1 Bottle 10    furosemide (LASIX) 20 MG tablet Take 1 tablet (20 mg total) by mouth 2 (two) times daily as needed. 60 tablet 1    hydroCHLOROthiazide (HYDRODIURIL) 25 MG tablet Take 1 tablet by mouth once daily 90 tablet 0    ipratropium (ATROVENT) 0.03 % nasal spray 2 sprays by Nasal route every 8 (eight) hours as needed for Rhinitis. 30 mL 4    losartan (COZAAR) 100 MG tablet Take 1 tablet by mouth once daily 90 tablet 0    meloxicam (MOBIC) 7.5 MG tablet TAKE 1 TO 2 TABLETS BY MOUTH ONCE DAILY AS NEEDED 30 tablet 3    pantoprazole (PROTONIX) 40 MG tablet Take 1 tablet by mouth once daily 90 tablet 1    pregabalin (LYRICA) 75 MG capsule Take 1 capsule (75 mg total) by mouth once daily. 30 capsule 3    sildenafil (REVATIO) 20 mg Tab Take 1 tablet (20 mg total) by mouth 3 (three) times daily. 90 tablet 11    simvastatin (ZOCOR) 20 MG tablet Take 1 tablet (20 mg total) by mouth every evening. 90 tablet 3    [DISCONTINUED] gabapentin (NEURONTIN) 100 MG capsule TAKE 1 CAPSULE BY MOUTH NIGHTLY AS NEEDED 30 capsule 3     Current Facility-Administered Medications on File Prior to Visit   Medication Dose Route Frequency Provider Last Rate Last Admin    lactated ringers infusion   Intravenous Continuous Deloris Galicia MD          Review of patient's allergies indicates:   Allergen Reactions    Lisinopril Other (See Comments)     Cough      Bactrim [sulfamethoxazole-trimethoprim] Itching    Zosyn [piperacillin-tazobactam] Hives     Pt received second dose of Zosyn and had hives on both arms. Benadryl given and pt continued to receive zosyn with no issues. Hydralazine also given around the same time and discontinued.        Objective:     Physical Exam  Eyes:      Pupils: Pupils are equal, round, and reactive to light.   Neck:      Trachea: No tracheal deviation.   Cardiovascular:      Rate and Rhythm: Normal rate and regular rhythm.      Pulses: Intact distal pulses.           Carotid pulses are 2+ on the right side and 2+ on the left side.       Radial pulses are 2+ on the right side and 2+ on the left side.        Femoral pulses are 2+ on the right side and 2+ on the left side.       Popliteal pulses are 2+ on the right side and 2+ on the left side.        Dorsalis pedis pulses are 2+ on the right side and 2+ on the left side.        Posterior tibial pulses are 2+ on the right side and 2+ on the left side.      Heart sounds: Normal heart sounds. No murmur heard.  No friction rub. No gallop.    Pulmonary:      Effort: Pulmonary effort is normal. No respiratory distress.      Breath sounds: Normal breath sounds. No stridor. No wheezing or rales.   Chest:      Chest wall: No tenderness.   Abdominal:      General: There is no distension.      Tenderness: There is no abdominal tenderness. There is no rebound.   Musculoskeletal:         General: No tenderness or edema.      Cervical back: Normal range of motion.   Skin:     General: Skin is warm and dry.   Neurological:      Mental Status: She is alert and oriented to person, place, and time.     Holter monitor from 01/25/2022  The diary was returned incomplete. The tape was adequate (0 days , 48 hours, 0 minutes).       Rhythm    Predominant Rhythm  Sinus rhythm with heart rates varying  between 54 and 141 BPM with an average of 78 BPM.     Maximum heart rate recorded at: 17:20 CST on day 1.     Minimum heart rate recorded at 12:53 CST on day 1.     PVC    Ventricular Arrhythmias  There were occasional monomorphic PVCs totalling 1028 and averaging 21.42 per hour. There were 13  monomorphic couplets. There were 14  monomorphic bigeminal cycles.     3 trigeminy one 4 beats run of nsvt. there are no sustained arrythmias..     PAC    Supraventricular Arrhythmias  There were very frequent monomorphic PACs totalling 44683 and averaging 588.27 per hour.     therea re frequent triplets 1314 atrial pairs  2238 bigeminy and 679 trigeminy  no sustained arrythmias.     Circadian    The circadian pattern of sinus rate variability followed a typical curve.     SA/AV    AV Node  The longest RR interval was 1800 msec.   · The cardiac echo from 01/25/2022  ·  Severe left atrial enlargement.  · The left ventricle is normal in size with normal systolic function.  · The estimated ejection fraction is 60%.  · Grade II left ventricular diastolic dysfunction.  · Normal right ventricular size with normal right ventricular systolic function.  · Mild right atrial enlargement.  · Moderate mitral regurgitation.  · Moderate to severe tricuspid regurgitation.  · There is moderate pulmonary hypertension.       Assessment:     1. Pulmonary hypertension    2. Palpitations    3. Hiatal hernia with GERD    4. Atherosclerosis of aorta    5. Dyspnea on exertion    6. Essential hypertension    7. Tachycardia    8. Morbid obesity with BMI of 45.0-49.9, adult    9. Irregular heartbeat        Plan:     Pulmonary hypertension    Palpitations    Hiatal hernia with GERD    Atherosclerosis of aorta    Dyspnea on exertion    Essential hypertension    Tachycardia    Morbid obesity with BMI of 45.0-49.9, adult    Irregular heartbeat      Impression 1 dyspnea exertion:  Most likely secondary to both pulmonary hypertension and diastolic heart  dysfunction stage II.  Tachycardia stable no significant arrhythmias  3. Peripheral edema stable today and will treat with diuretics as necessary.  Follow-up evaluation within 3 months and also lab tests in 1 month.  The patient and the daughter was present today all questions were answered today all and the patient has better understanding of both pulmonary hypertension as well as diastolic heart dysfunction which are problem for the patient.

## 2022-02-28 ENCOUNTER — LAB VISIT (OUTPATIENT)
Dept: LAB | Facility: HOSPITAL | Age: 72
End: 2022-02-28
Attending: INTERNAL MEDICINE
Payer: MEDICARE

## 2022-02-28 DIAGNOSIS — I70.0 ATHEROSCLEROSIS OF AORTA: ICD-10-CM

## 2022-02-28 DIAGNOSIS — R00.2 PALPITATIONS: ICD-10-CM

## 2022-02-28 DIAGNOSIS — I10 ESSENTIAL HYPERTENSION: ICD-10-CM

## 2022-02-28 DIAGNOSIS — R06.09 DYSPNEA ON EXERTION: ICD-10-CM

## 2022-02-28 DIAGNOSIS — I49.9 IRREGULAR HEARTBEAT: ICD-10-CM

## 2022-02-28 DIAGNOSIS — K44.9 HIATAL HERNIA WITH GERD: ICD-10-CM

## 2022-02-28 DIAGNOSIS — I27.20 PULMONARY HYPERTENSION: ICD-10-CM

## 2022-02-28 DIAGNOSIS — E66.01 MORBID OBESITY WITH BMI OF 45.0-49.9, ADULT: ICD-10-CM

## 2022-02-28 DIAGNOSIS — K21.9 HIATAL HERNIA WITH GERD: ICD-10-CM

## 2022-02-28 DIAGNOSIS — R00.0 TACHYCARDIA: ICD-10-CM

## 2022-02-28 LAB
ANION GAP SERPL CALC-SCNC: 12 MMOL/L (ref 8–16)
BUN SERPL-MCNC: 15 MG/DL (ref 8–23)
CALCIUM SERPL-MCNC: 9.2 MG/DL (ref 8.7–10.5)
CHLORIDE SERPL-SCNC: 99 MMOL/L (ref 95–110)
CO2 SERPL-SCNC: 29 MMOL/L (ref 23–29)
CREAT SERPL-MCNC: 0.8 MG/DL (ref 0.5–1.4)
EST. GFR  (AFRICAN AMERICAN): >60 ML/MIN/1.73 M^2
EST. GFR  (NON AFRICAN AMERICAN): >60 ML/MIN/1.73 M^2
GLUCOSE SERPL-MCNC: 110 MG/DL (ref 70–110)
POTASSIUM SERPL-SCNC: 3.3 MMOL/L (ref 3.5–5.1)
SODIUM SERPL-SCNC: 140 MMOL/L (ref 136–145)

## 2022-02-28 PROCEDURE — 80048 BASIC METABOLIC PNL TOTAL CA: CPT | Performed by: INTERNAL MEDICINE

## 2022-02-28 PROCEDURE — 36415 COLL VENOUS BLD VENIPUNCTURE: CPT | Performed by: INTERNAL MEDICINE

## 2022-03-01 ENCOUNTER — TELEPHONE (OUTPATIENT)
Dept: CARDIOLOGY | Facility: CLINIC | Age: 72
End: 2022-03-01
Payer: COMMERCIAL

## 2022-03-01 DIAGNOSIS — E87.6 HYPOKALEMIA: Primary | ICD-10-CM

## 2022-03-01 DIAGNOSIS — R00.2 PALPITATIONS: ICD-10-CM

## 2022-03-01 RX ORDER — POTASSIUM CHLORIDE 20 MEQ/1
20 TABLET, EXTENDED RELEASE ORAL ONCE
Qty: 6 TABLET | Refills: 0 | Status: SHIPPED | OUTPATIENT
Start: 2022-03-01 | End: 2022-03-01

## 2022-03-01 NOTE — TELEPHONE ENCOUNTER
----- Message from Price Gil MD sent at 3/1/2022  8:17 AM CST -----  Potassium levels a little low will call in some potassium supplements for the next 3 days and repeat blood test by the end of the week.

## 2022-03-03 ENCOUNTER — TELEPHONE (OUTPATIENT)
Dept: CARDIOLOGY | Facility: CLINIC | Age: 72
End: 2022-03-03
Payer: COMMERCIAL

## 2022-03-03 NOTE — TELEPHONE ENCOUNTER
I have tried to reach this pt and I have not been successful pb      ----- Message from Price Gil MD sent at 3/1/2022  8:17 AM CST -----  Potassium levels a little low will call in some potassium supplements for the next 3 days and repeat blood test by the end of the week.

## 2022-03-08 ENCOUNTER — TELEPHONE (OUTPATIENT)
Dept: CARDIOLOGY | Facility: CLINIC | Age: 72
End: 2022-03-08
Payer: COMMERCIAL

## 2022-03-11 NOTE — PROGRESS NOTES
Chart reviewed.   Immunizations: Triggered Imm Registry     Orders placed: n/a  Upcoming appts to satisfy JASE topics: n/a       Home

## 2022-05-05 DIAGNOSIS — I27.20 PULMONARY HYPERTENSION: ICD-10-CM

## 2022-05-05 RX ORDER — FUROSEMIDE 20 MG/1
20 TABLET ORAL 2 TIMES DAILY PRN
Qty: 60 TABLET | Refills: 3 | Status: SHIPPED | OUTPATIENT
Start: 2022-05-05 | End: 2023-03-24 | Stop reason: SDUPTHER

## 2022-05-10 ENCOUNTER — HOSPITAL ENCOUNTER (OUTPATIENT)
Dept: RADIOLOGY | Facility: HOSPITAL | Age: 72
Discharge: HOME | End: 2022-05-10
Attending: INTERNAL MEDICINE
Payer: MEDICARE

## 2022-05-10 ENCOUNTER — OFFICE VISIT (OUTPATIENT)
Dept: FAMILY MEDICINE | Facility: CLINIC | Age: 72
End: 2022-05-10
Payer: MEDICARE

## 2022-05-10 VITALS
HEART RATE: 76 BPM | DIASTOLIC BLOOD PRESSURE: 76 MMHG | TEMPERATURE: 98 F | RESPIRATION RATE: 16 BRPM | SYSTOLIC BLOOD PRESSURE: 120 MMHG | WEIGHT: 240.75 LBS | BODY MASS INDEX: 48.62 KG/M2

## 2022-05-10 DIAGNOSIS — J32.9 SINOBRONCHITIS: ICD-10-CM

## 2022-05-10 DIAGNOSIS — J40 SINOBRONCHITIS: ICD-10-CM

## 2022-05-10 PROCEDURE — 71046 X-RAY EXAM CHEST 2 VIEWS: CPT | Mod: 26,,, | Performed by: RADIOLOGY

## 2022-05-10 PROCEDURE — 99214 OFFICE O/P EST MOD 30 MIN: CPT | Mod: PBBFAC,PO,25 | Performed by: INTERNAL MEDICINE

## 2022-05-10 PROCEDURE — 99999 PR PBB SHADOW E&M-EST. PATIENT-LVL IV: CPT | Mod: PBBFAC,,, | Performed by: INTERNAL MEDICINE

## 2022-05-10 PROCEDURE — 71046 X-RAY EXAM CHEST 2 VIEWS: CPT | Mod: TC,FY,PO

## 2022-05-10 PROCEDURE — 71046 XR CHEST PA AND LATERAL: ICD-10-PCS | Mod: 26,,, | Performed by: RADIOLOGY

## 2022-05-10 PROCEDURE — 99213 OFFICE O/P EST LOW 20 MIN: CPT | Mod: S$PBB,,, | Performed by: INTERNAL MEDICINE

## 2022-05-10 PROCEDURE — 99213 PR OFFICE/OUTPT VISIT, EST, LEVL III, 20-29 MIN: ICD-10-PCS | Mod: S$PBB,,, | Performed by: INTERNAL MEDICINE

## 2022-05-10 PROCEDURE — 99999 PR PBB SHADOW E&M-EST. PATIENT-LVL IV: ICD-10-PCS | Mod: PBBFAC,,, | Performed by: INTERNAL MEDICINE

## 2022-05-10 RX ORDER — AZITHROMYCIN 250 MG/1
TABLET, FILM COATED ORAL
Qty: 6 TABLET | Refills: 0 | Status: SHIPPED | OUTPATIENT
Start: 2022-05-10 | End: 2022-11-15

## 2022-05-10 RX ORDER — PROMETHAZINE HYDROCHLORIDE AND DEXTROMETHORPHAN HYDROBROMIDE 6.25; 15 MG/5ML; MG/5ML
5 SYRUP ORAL 2 TIMES DAILY PRN
Qty: 180 ML | Refills: 0 | Status: SHIPPED | OUTPATIENT
Start: 2022-05-10 | End: 2022-05-20

## 2022-05-10 NOTE — PROGRESS NOTES
Subjective:       Patient ID: Yoana Pardo is a 71 y.o. female.    Chief Complaint: Sore Throat and Cough    Several weeks----on and off again sinus congestion, scratchy throat , drip and cough---------no fever or chills . No chest pain or SOB-----had covid 1-22    Sore Throat   Associated symptoms include congestion and coughing. Pertinent negatives include no abdominal pain, shortness of breath, stridor or vomiting.   Cough  Associated symptoms include postnasal drip and a sore throat. Pertinent negatives include no chest pain, chills, fever, myalgias, rhinorrhea, shortness of breath or wheezing.     Past Medical History:   Diagnosis Date    GERD (gastroesophageal reflux disease)     Hemorrhoids     History of positive PPD, untreated     Hx of cold sores     Hyperlipidemia     Hypertension     MVP (mitral valve prolapse)     Peripheral neuropathy     Pulmonary HTN     Dr Bailon    Sleep apnea     [place]-CPAP  uses intermittently     Past Surgical History:   Procedure Laterality Date    CHOLECYSTECTOMY      COLONOSCOPY N/A 11/11/2021    Procedure: COLONOSCOPY;  Surgeon: Deloris Galicia MD;  Location: [facility];  Service: Endoscopy;  Laterality: N/A;    DILATION AND CURETTAGE OF UTERUS      ESOPHAGEAL MOTILITY STUDY USING HIGH RESOLUTION MANOMETRY N/A 11/3/2021    Procedure: MOTILITY STUDY, ESOPHAGUS, USING HIGH RESOLUTION MANOMETRY;  Surgeon: Yaw Manuel RN;  Location: [facility];  Service: Endoscopy;  Laterality: N/A;    ESOPHAGOGASTRODUODENOSCOPY N/A 12/2/2020    Procedure: ESOPHAGOGASTRODUODENOSCOPY (EGD);  Surgeon: Evangelista Erickson MD;  Location: [facility];  Service: Endoscopy;  Laterality: N/A;    ESOPHAGOGASTRODUODENOSCOPY N/A 11/11/2021    Procedure: EGD (ESOPHAGOGASTRODUODENOSCOPY);  Surgeon: Deloris Galicia MD;  Location: [facility];  Service: Endoscopy;  Laterality: N/A;    RIGHT HEART CATHETERIZATION Right 6/15/2020    Procedure: INSERTION, CATHETER, RIGHT HEART;   Surgeon: Ashlie Belle MD;  Location: [facility] CATH LAB;  Service: Cardiology;  Laterality: Right;     Family History   Problem Relation Age of Onset    Heart disease Mother     Obesity Mother     Kidney disease Father     Hypertension Father     Obesity Father     Heart disease Father     Diabetes Sister     Aneurysm Sister         AAA     Social History     Socioeconomic History    Marital status:     Number of children: 3   Occupational History    Occupation: [employer]      Employer: [employer]   Tobacco Use    Smoking status: Never Smoker    Smokeless tobacco: Never Used   Substance and Sexual Activity    Alcohol use: Yes     Alcohol/week: 1.0 standard drink     Types: 1 Shots of liquor per week    Drug use: No    Sexual activity: Not Currently     Partners: Male     Review of Systems   Constitutional: Negative for chills and fever.   HENT: Positive for congestion, postnasal drip and sore throat. Negative for rhinorrhea.    Respiratory: Positive for cough. Negative for apnea, choking, chest tightness, shortness of breath, wheezing and stridor.    Cardiovascular: Negative for chest pain and palpitations.   Gastrointestinal: Negative for abdominal pain, nausea and vomiting.   Musculoskeletal: Negative for myalgias.   Neurological: Negative for dizziness and weakness.   Psychiatric/Behavioral: Negative for agitation, behavioral problems and confusion.       Objective:      Physical Exam  Vitals and nursing note reviewed.   Constitutional:       Appearance: Normal appearance.   HENT:      Mouth/Throat:      Mouth: Mucous membranes are moist.      Pharynx: No oropharyngeal exudate or posterior oropharyngeal erythema.   Cardiovascular:      Rate and Rhythm: Normal rate and regular rhythm.      Pulses: Normal pulses.      Heart sounds: Normal heart sounds.   Pulmonary:      Effort: Pulmonary effort is normal. No respiratory distress.      Breath sounds: Normal breath  sounds. No stridor. No wheezing, rhonchi or rales.   Chest:      Chest wall: No tenderness.   Abdominal:      General: Abdomen is flat.      Palpations: Abdomen is soft.   Musculoskeletal:      Cervical back: Normal range of motion and neck supple.   Neurological:      Mental Status: She is alert and oriented to person, place, and time.   Psychiatric:         Mood and Affect: Mood normal.         Behavior: Behavior normal.         Thought Content: Thought content normal.         Judgment: Judgment normal.         CMP  Sodium   Date Value Ref Range Status   02/28/2022 140 136 - 145 mmol/L Final     Potassium   Date Value Ref Range Status   02/28/2022 3.3 (L) 3.5 - 5.1 mmol/L Final     Chloride   Date Value Ref Range Status   02/28/2022 99 95 - 110 mmol/L Final     CO2   Date Value Ref Range Status   02/28/2022 29 23 - 29 mmol/L Final     Glucose   Date Value Ref Range Status   02/28/2022 110 70 - 110 mg/dL Final     BUN   Date Value Ref Range Status   02/28/2022 15 8 - 23 mg/dL Final     Creatinine   Date Value Ref Range Status   02/28/2022 0.8 0.5 - 1.4 mg/dL Final     Calcium   Date Value Ref Range Status   02/28/2022 9.2 8.7 - 10.5 mg/dL Final     Total Protein   Date Value Ref Range Status   08/18/2021 7.4 6.0 - 8.4 g/dL Final     Albumin   Date Value Ref Range Status   08/18/2021 3.5 3.5 - 5.2 g/dL Final     Total Bilirubin   Date Value Ref Range Status   08/18/2021 0.6 0.1 - 1.0 mg/dL Final     Comment:     For infants and newborns, interpretation of results should be based  on gestational age, weight and in agreement with clinical  observations.    Premature Infant recommended reference ranges:  Up to 24 hours.............<8.0 mg/dL  Up to 48 hours............<12.0 mg/dL  3-5 days..................<15.0 mg/dL  6-29 days.................<15.0 mg/dL       Alkaline Phosphatase   Date Value Ref Range Status   08/18/2021 83 55 - 135 U/L Final     AST   Date Value Ref Range Status   08/18/2021 29 10 - 40 U/L Final      ALT   Date Value Ref Range Status   08/18/2021 13 10 - 44 U/L Final     Anion Gap   Date Value Ref Range Status   02/28/2022 12 8 - 16 mmol/L Final     eGFR if    Date Value Ref Range Status   02/28/2022 >60.0 >60 mL/min/1.73 m^2 Final     eGFR if non    Date Value Ref Range Status   02/28/2022 >60.0 >60 mL/min/1.73 m^2 Final     Comment:     Calculation used to obtain the estimated glomerular filtration  rate (eGFR) is the CKD-EPI equation.        Lab Results   Component Value Date    WBC 6.02 08/18/2021    HGB 13.4 08/18/2021    HCT 43.0 08/18/2021    MCV 91 08/18/2021     08/18/2021     Lab Results   Component Value Date    CHOL 188 08/18/2021     Lab Results   Component Value Date    HDL 45 08/18/2021     Lab Results   Component Value Date    LDLCALC 86.6 08/18/2021     Lab Results   Component Value Date    TRIG 282 (H) 08/18/2021     Lab Results   Component Value Date    CHOLHDL 23.9 08/18/2021     Lab Results   Component Value Date    TSH 0.754 08/18/2021     Lab Results   Component Value Date    HGBA1C 5.6 01/18/2006     Assessment:       1. Sinobronchitis        Plan:   Sinobronchitis  -     X-Ray Chest PA And Lateral; Future; Expected date: 05/10/2022    Other orders  -     azithromycin (Z-JAE) 250 MG tablet; Take 2 tabs today then one tab daily for 4 days  Dispense: 6 tablet; Refill: 0  -     promethazine-dextromethorphan (PROMETHAZINE-DM) 6.25-15 mg/5 mL Syrp; Take 5 mLs by mouth 2 (two) times daily as needed.  Dispense: 180 mL; Refill: 0    As above----------call if persists-------go to er for worsening symptoms----------

## 2022-05-30 ENCOUNTER — OFFICE VISIT (OUTPATIENT)
Dept: CARDIOLOGY | Facility: CLINIC | Age: 72
End: 2022-05-30
Payer: MEDICARE

## 2022-05-30 VITALS
RESPIRATION RATE: 16 BRPM | DIASTOLIC BLOOD PRESSURE: 54 MMHG | HEIGHT: 59 IN | HEART RATE: 75 BPM | BODY MASS INDEX: 45.6 KG/M2 | OXYGEN SATURATION: 95 % | SYSTOLIC BLOOD PRESSURE: 92 MMHG | WEIGHT: 226.19 LBS

## 2022-05-30 DIAGNOSIS — I70.0 ATHEROSCLEROSIS OF AORTA: ICD-10-CM

## 2022-05-30 DIAGNOSIS — I10 ESSENTIAL HYPERTENSION: Primary | Chronic | ICD-10-CM

## 2022-05-30 DIAGNOSIS — I34.1 MVP (MITRAL VALVE PROLAPSE): ICD-10-CM

## 2022-05-30 DIAGNOSIS — E78.2 MIXED HYPERLIPIDEMIA: ICD-10-CM

## 2022-05-30 PROCEDURE — 99214 OFFICE O/P EST MOD 30 MIN: CPT | Mod: S$PBB,,, | Performed by: INTERNAL MEDICINE

## 2022-05-30 PROCEDURE — 99214 OFFICE O/P EST MOD 30 MIN: CPT | Mod: PBBFAC | Performed by: INTERNAL MEDICINE

## 2022-05-30 PROCEDURE — 99999 PR PBB SHADOW E&M-EST. PATIENT-LVL IV: CPT | Mod: PBBFAC,,, | Performed by: INTERNAL MEDICINE

## 2022-05-30 PROCEDURE — 99999 PR PBB SHADOW E&M-EST. PATIENT-LVL IV: ICD-10-PCS | Mod: PBBFAC,,, | Performed by: INTERNAL MEDICINE

## 2022-05-30 PROCEDURE — 99214 PR OFFICE/OUTPT VISIT, EST, LEVL IV, 30-39 MIN: ICD-10-PCS | Mod: S$PBB,,, | Performed by: INTERNAL MEDICINE

## 2022-05-30 RX ORDER — LOSARTAN POTASSIUM 50 MG/1
50 TABLET ORAL DAILY
Qty: 30 TABLET | Refills: 3 | Status: SHIPPED | OUTPATIENT
Start: 2022-05-30 | End: 2022-11-15 | Stop reason: SDUPTHER

## 2022-05-30 NOTE — PROGRESS NOTES
Subjective:   Patient ID:  Yoana Pardo is a 71 y.o. female who presents for follow-up of No chief complaint on file.  6/12/2020  A 70 yo obese female with htn hlp mvp pulmonary htn is referred from DR OROZCO for rhc. She has shortness of breath she does not feel rested despite cpap her pa pressures have increased. Has no angina no leg swelling.  Compared to previously she has been consistently using her cpap. Has been compliant with salt intake.     11/17/2020  No change in status she needs to have bariatric surgery she is compliant with meds htn controlled. Not compliant with cpap has no headache tia claudication  No leg swelling no chest pain. She has moderate pulmonary htn by cath.no shortness of breath.    Patient presents the office and actually has low blood pressure today will cut back losartan 50 mg daily.  Follow-up evaluation within 6 weeks.      Review of Systems   Constitutional: Negative for chills, diaphoresis, night sweats, weight gain and weight loss.   HENT: Negative for congestion, hoarse voice, sore throat and stridor.    Eyes: Negative for double vision and pain.   Cardiovascular: Negative for chest pain, claudication, cyanosis, dyspnea on exertion, irregular heartbeat, leg swelling, near-syncope, orthopnea, palpitations, paroxysmal nocturnal dyspnea and syncope.   Respiratory: Negative for cough, hemoptysis, shortness of breath, sleep disturbances due to breathing, snoring, sputum production and wheezing.    Endocrine: Negative for cold intolerance, heat intolerance and polydipsia.   Hematologic/Lymphatic: Negative for bleeding problem. Does not bruise/bleed easily.   Skin: Negative for color change, dry skin and rash.   Musculoskeletal: Negative for joint swelling and muscle cramps.   Gastrointestinal: Negative for bloating, abdominal pain, constipation, diarrhea, dysphagia, melena, nausea and vomiting.   Genitourinary: Negative for flank pain and urgency.   Neurological: Negative  for dizziness, focal weakness, headaches, light-headedness, loss of balance, seizures and weakness.   Psychiatric/Behavioral: Negative for altered mental status and memory loss. The patient is not nervous/anxious.      Family History   Problem Relation Age of Onset    Heart disease Mother     Obesity Mother     Kidney disease Father     Hypertension Father     Obesity Father     Heart disease Father     Diabetes Sister     Aneurysm Sister         AAA     Past Medical History:   Diagnosis Date    GERD (gastroesophageal reflux disease)     Hemorrhoids     History of positive PPD, untreated     Hx of cold sores     Hyperlipidemia     Hypertension     MVP (mitral valve prolapse)     Peripheral neuropathy     Pulmonary HTN     Dr Bailon    Sleep apnea     [place]-CPAP  uses intermittently     Social History     Socioeconomic History    Marital status:     Number of children: 3   Occupational History    Occupation: [employer]      Employer: [employer]   Tobacco Use    Smoking status: Never Smoker    Smokeless tobacco: Never Used   Substance and Sexual Activity    Alcohol use: Yes     Alcohol/week: 1.0 standard drink     Types: 1 Shots of liquor per week    Drug use: No    Sexual activity: Not Currently     Partners: Male     Current Outpatient Medications on File Prior to Visit   Medication Sig Dispense Refill    acetaminophen (TYLENOL) 500 MG tablet Take 500 mg by mouth every 6 (six) hours as needed for Pain.      aspirin (ECOTRIN) 81 MG EC tablet Take 81 mg by mouth once daily.      azithromycin (Z-JAE) 250 MG tablet Take 2 tabs today then one tab daily for 4 days 6 tablet 0    brompheniramine-pseudoeph-DM (BROMFED DM) 2-30-10 mg/5 mL Syrp Take by mouth.      ciclopirox (PENLAC) 8 % Soln Apply topically nightly. 1 Bottle 10    furosemide (LASIX) 20 MG tablet Take 1 tablet (20 mg total) by mouth 2 (two) times daily as needed (as needed). 60 tablet 3     hydroCHLOROthiazide (HYDRODIURIL) 25 MG tablet Take 1 tablet (25 mg total) by mouth once daily. 90 tablet 1    ipratropium (ATROVENT) 0.03 % nasal spray 2 sprays by Nasal route every 8 (eight) hours as needed for Rhinitis. (Patient not taking: Reported on 5/10/2022) 30 mL 4    losartan (COZAAR) 100 MG tablet Take 1 tablet (100 mg total) by mouth once daily. 90 tablet 1    meloxicam (MOBIC) 7.5 MG tablet TAKE 1 TO 2 TABLETS BY MOUTH ONCE DAILY AS NEEDED 30 tablet 3    pantoprazole (PROTONIX) 40 MG tablet Take 1 tablet (40 mg total) by mouth once daily. 90 tablet 1    pregabalin (LYRICA) 75 MG capsule Take 1 capsule (75 mg total) by mouth once daily. 30 capsule 3    sildenafil (REVATIO) 20 mg Tab Take 1 tablet (20 mg total) by mouth 3 (three) times daily. 90 tablet 11    simvastatin (ZOCOR) 20 MG tablet Take 1 tablet (20 mg total) by mouth every evening. 90 tablet 1    [DISCONTINUED] gabapentin (NEURONTIN) 100 MG capsule TAKE 1 CAPSULE BY MOUTH NIGHTLY AS NEEDED 30 capsule 3     Current Facility-Administered Medications on File Prior to Visit   Medication Dose Route Frequency Provider Last Rate Last Admin    lactated ringers infusion   Intravenous Continuous Deloris Galicia MD         Review of patient's allergies indicates:   Allergen Reactions    Lisinopril Other (See Comments)     Cough      Bactrim [sulfamethoxazole-trimethoprim] Itching    Zosyn [piperacillin-tazobactam] Hives     Pt received second dose of Zosyn and had hives on both arms. Benadryl given and pt continued to receive zosyn with no issues. Hydralazine also given around the same time and discontinued.        Objective:     Physical Exam  Eyes:      Pupils: Pupils are equal, round, and reactive to light.   Neck:      Trachea: No tracheal deviation.   Cardiovascular:      Rate and Rhythm: Normal rate and regular rhythm.      Pulses: Intact distal pulses.           Carotid pulses are 2+ on the right side and 2+ on the left side.        Radial pulses are 2+ on the right side and 2+ on the left side.        Femoral pulses are 2+ on the right side and 2+ on the left side.       Popliteal pulses are 2+ on the right side and 2+ on the left side.        Dorsalis pedis pulses are 2+ on the right side and 2+ on the left side.        Posterior tibial pulses are 2+ on the right side and 2+ on the left side.      Heart sounds: Normal heart sounds. No murmur heard.    No friction rub. No gallop.   Pulmonary:      Effort: Pulmonary effort is normal. No respiratory distress.      Breath sounds: Normal breath sounds. No stridor. No wheezing or rales.   Chest:      Chest wall: No tenderness.   Abdominal:      General: There is no distension.      Tenderness: There is no abdominal tenderness. There is no rebound.   Musculoskeletal:         General: No tenderness.      Cervical back: Normal range of motion.   Skin:     General: Skin is warm and dry.   Neurological:      Mental Status: She is alert and oriented to person, place, and time.     Cardiac echo 01/10/2022:  · Severe left atrial enlargement.  · The left ventricle is normal in size with normal systolic function.  · The estimated ejection fraction is 60%.  · Grade II left ventricular diastolic dysfunction.  · Normal right ventricular size with normal right ventricular systolic function.  · Mild right atrial enlargement.  · Moderate mitral regurgitation.  · Moderate to severe tricuspid regurgitation.  · There is moderate pulmonary hypertension.        Assessment:     1. Essential hypertension    2. MVP (mitral valve prolapse)    3. Mixed hyperlipidemia    4. Atherosclerosis of aorta        Plan:     Essential hypertension    MVP (mitral valve prolapse)    Mixed hyperlipidemia    Atherosclerosis of aorta    Impression 1 hypertension blood pressure low today will drop back on losartan 50 mg daily she will continue HydroDIURIL for the time being follow-up evaluation within 6 weeks sooner if there are acute  recurrence symptoms.

## 2022-07-02 ENCOUNTER — OFFICE VISIT (OUTPATIENT)
Dept: URGENT CARE | Facility: CLINIC | Age: 72
End: 2022-07-02
Payer: MEDICARE

## 2022-07-02 VITALS
WEIGHT: 226 LBS | OXYGEN SATURATION: 96 % | HEIGHT: 59 IN | SYSTOLIC BLOOD PRESSURE: 152 MMHG | BODY MASS INDEX: 45.56 KG/M2 | DIASTOLIC BLOOD PRESSURE: 82 MMHG | TEMPERATURE: 96 F | HEART RATE: 62 BPM

## 2022-07-02 DIAGNOSIS — K04.7 DENTAL ABSCESS: Primary | ICD-10-CM

## 2022-07-02 DIAGNOSIS — R05.9 COUGH: ICD-10-CM

## 2022-07-02 PROCEDURE — 40800 DRAINAGE OF MOUTH LESION: CPT | Mod: S$GLB,,, | Performed by: PHYSICIAN ASSISTANT

## 2022-07-02 PROCEDURE — 40800 INCISION & DRAINAGE: ICD-10-PCS | Mod: S$GLB,,, | Performed by: PHYSICIAN ASSISTANT

## 2022-07-02 PROCEDURE — 99214 PR OFFICE/OUTPT VISIT, EST, LEVL IV, 30-39 MIN: ICD-10-PCS | Mod: 25,S$GLB,, | Performed by: PHYSICIAN ASSISTANT

## 2022-07-02 PROCEDURE — 99214 OFFICE O/P EST MOD 30 MIN: CPT | Mod: 25,S$GLB,, | Performed by: PHYSICIAN ASSISTANT

## 2022-07-02 RX ORDER — CLINDAMYCIN HYDROCHLORIDE 150 MG/1
450 CAPSULE ORAL 3 TIMES DAILY
Qty: 90 CAPSULE | Refills: 0 | Status: SHIPPED | OUTPATIENT
Start: 2022-07-02 | End: 2022-07-12

## 2022-07-02 RX ORDER — LIDOCAINE HYDROCHLORIDE 20 MG/ML
5 SOLUTION OROPHARYNGEAL
Status: COMPLETED | OUTPATIENT
Start: 2022-07-02 | End: 2022-07-02

## 2022-07-02 RX ORDER — BENZONATATE 100 MG/1
100 CAPSULE ORAL 3 TIMES DAILY PRN
Qty: 30 CAPSULE | Refills: 0 | Status: SHIPPED | OUTPATIENT
Start: 2022-07-02 | End: 2022-07-12

## 2022-07-02 RX ADMIN — LIDOCAINE HYDROCHLORIDE 5 ML: 20 SOLUTION OROPHARYNGEAL at 12:07

## 2022-07-02 NOTE — PROCEDURES
"Incision & Drainage    Date/Time: 7/2/2022 12:15 PM  Performed by: Leila Wilson PA-C  Authorized by: Leila Wilson PA-C     Time out: Immediately prior to procedure a "time out" was called to verify the correct patient, procedure, equipment, support staff and site/side marked as required.    Consent Done?:  Yes (Verbal)    Type:  Abscess  Body area:  Mouth  Location details:  Vestibule of mouth  Anesthesia method: viscous lidocaine.  Local anesthetic: topical anesthetic  Anesthetic total (ml):  5  Risk factor:  Underlying major vessel  Scalpel size: 18 gauge needle   Incision type:  Single straight  Incision depth: dermal    Complexity:  Simple  Drainage:  Pus and bloody  Drainage amount:  Scant  Wound treatment:  Incision and wound left open  Patient tolerance:  Patient tolerated the procedure well with no immediate complications      "

## 2022-07-02 NOTE — PROGRESS NOTES
"Subjective:       Patient ID: Yoana Pardo is a 71 y.o. female.    Vitals:  height is 4' 11" (1.499 m) and weight is 102.5 kg (226 lb). Her tympanic temperature is 96.3 °F (35.7 °C). Her blood pressure is 152/82 (abnormal) and her pulse is 62. Her oxygen saturation is 96%.     Chief Complaint: Jaw Pain and Cough (Off and on for a few months)    Jaw pain started on yesterday along with swelling, states that she does have a toothache that can be causing the pain     Cough  The current episode started more than 1 month ago. The problem occurs constantly. Cough characteristics: dry cough. Associated symptoms include headaches and postnasal drip. She has tried prescription cough suppressant for the symptoms.   Other  The current episode started yesterday. Associated symptoms include coughing and headaches. Associated symptoms comments: Jaw pain/swelling  . Exacerbated by: lying down        HENT: Positive for postnasal drip.    Respiratory: Positive for cough.    Neurological: Positive for headaches.       Objective:      Physical Exam   HENT:   Head: Normocephalic.   Ears:   Right Ear: Tympanic membrane normal.   Left Ear: Tympanic membrane normal.   Nose: Nose normal. No rhinorrhea or congestion.   Mouth/Throat: Uvula is midline and mucous membranes are normal. Mucous membranes are moist. No oral lesions. Abnormal dentition. Dental abscesses present. No uvula swelling. Posterior oropharyngeal erythema present. No oropharyngeal exudate.       Abscess in vestibule above tooth 6      Comments: Abscess in vestibule above tooth 6  Eyes: Conjunctivae are normal. Pupils are equal, round, and reactive to light.   Neck: No neck rigidity present.   Cardiovascular: Normal rate.   Abdominal: Normal appearance.   Musculoskeletal:      Cervical back: She exhibits no tenderness.   Lymphadenopathy:     She has cervical adenopathy.   Neurological: She is alert.   Nursing note and vitals reviewed.        Assessment:       1. " Dental abscess    2. Cough        Here with right sided facial swelling and dental pain. She reports having a broken tooth on right upper side of mouth. She has abscess present above tooth 6 in vestibule. I performed I&D on the abscess after using viscous lidocaine. There was little drainage present. I will start the patient on clindamycin and have her follow up with dentist. She denies fever or chills or trouble opening mouth. She has been struggling with cough due to bronchitis. Lungs are clear to auscultation. She has tried promethazine with little relief. I will prescribe tessalon. She was instructed to hydrate and return to the clinic if new or worsening symptoms occur.    Plan:         Dental abscess  -     LIDOcaine HCl 2% oral solution 5 mL  -     clindamycin (CLEOCIN) 150 MG capsule; Take 3 capsules (450 mg total) by mouth 3 (three) times daily. for 10 days  Dispense: 90 capsule; Refill: 0  -     Incision & Drainage    Cough  -     benzonatate (TESSALON) 100 MG capsule; Take 1 capsule (100 mg total) by mouth 3 (three) times daily as needed for Cough.  Dispense: 30 capsule; Refill: 0    Other orders  -     Cancel: Removal of IUD

## 2022-07-05 ENCOUNTER — TELEPHONE (OUTPATIENT)
Dept: URGENT CARE | Facility: CLINIC | Age: 72
End: 2022-07-05
Payer: COMMERCIAL

## 2022-07-11 ENCOUNTER — OFFICE VISIT (OUTPATIENT)
Dept: CARDIOLOGY | Facility: CLINIC | Age: 72
End: 2022-07-11
Payer: MEDICARE

## 2022-07-11 VITALS
SYSTOLIC BLOOD PRESSURE: 124 MMHG | OXYGEN SATURATION: 98 % | HEIGHT: 59 IN | WEIGHT: 227.06 LBS | HEART RATE: 68 BPM | DIASTOLIC BLOOD PRESSURE: 78 MMHG | BODY MASS INDEX: 45.77 KG/M2

## 2022-07-11 DIAGNOSIS — E78.2 MIXED HYPERLIPIDEMIA: ICD-10-CM

## 2022-07-11 DIAGNOSIS — I34.1 MVP (MITRAL VALVE PROLAPSE): ICD-10-CM

## 2022-07-11 DIAGNOSIS — I70.0 ATHEROSCLEROSIS OF AORTA: ICD-10-CM

## 2022-07-11 DIAGNOSIS — I10 ESSENTIAL HYPERTENSION: Chronic | ICD-10-CM

## 2022-07-11 DIAGNOSIS — I20.89 STABLE ANGINA PECTORIS: Primary | ICD-10-CM

## 2022-07-11 PROCEDURE — 93010 ELECTROCARDIOGRAM REPORT: CPT | Mod: ,,, | Performed by: INTERNAL MEDICINE

## 2022-07-11 PROCEDURE — 99999 PR PBB SHADOW E&M-EST. PATIENT-LVL III: ICD-10-PCS | Mod: PBBFAC,,, | Performed by: INTERNAL MEDICINE

## 2022-07-11 PROCEDURE — 99214 OFFICE O/P EST MOD 30 MIN: CPT | Mod: S$PBB,25,, | Performed by: INTERNAL MEDICINE

## 2022-07-11 PROCEDURE — 99999 PR PBB SHADOW E&M-EST. PATIENT-LVL III: CPT | Mod: PBBFAC,,, | Performed by: INTERNAL MEDICINE

## 2022-07-11 PROCEDURE — 93010 EKG 12-LEAD: ICD-10-PCS | Mod: ,,, | Performed by: INTERNAL MEDICINE

## 2022-07-11 PROCEDURE — 93005 ELECTROCARDIOGRAM TRACING: CPT

## 2022-07-11 PROCEDURE — 99213 OFFICE O/P EST LOW 20 MIN: CPT | Mod: PBBFAC | Performed by: INTERNAL MEDICINE

## 2022-07-11 PROCEDURE — 99214 PR OFFICE/OUTPT VISIT, EST, LEVL IV, 30-39 MIN: ICD-10-PCS | Mod: S$PBB,25,, | Performed by: INTERNAL MEDICINE

## 2022-07-11 NOTE — PROGRESS NOTES
Subjective:   Patient ID:  Yoana Pardo is a 71 y.o. female who presents for follow-up of No chief complaint on file.  A 70 yo obese female with htn hlp mvp pulmonary htn is referred from DR OROZCO for rhc. She has shortness of breath she does not feel rested despite cpap her pa pressures have increased. Has no angina no leg swelling.  Compared to previously she has been consistently using her cpap. Has been compliant with salt intake.     11/17/2020  No change in status she needs to have bariatric surgery she is compliant with meds htn controlled. Not compliant with cpap has no headache tia claudication  No leg swelling no chest pain. She has moderate pulmonary htn by cath.no shortness of breath.     Patient presents the office and actually has low blood pressure today will cut back losartan 50 mg daily.  Follow-up evaluation within 6 weeks.       Blood pressure under better control today however the patient complains of chest pain EKG pending the and stable patient will have follow-up nuclear stress test.      Review of Systems   Constitutional: Negative for chills, diaphoresis, night sweats, weight gain and weight loss.   HENT: Negative for congestion, hoarse voice, sore throat and stridor.    Eyes: Negative for double vision and pain.   Cardiovascular: Negative for chest pain, claudication, cyanosis, dyspnea on exertion, irregular heartbeat, leg swelling, near-syncope, orthopnea, palpitations, paroxysmal nocturnal dyspnea and syncope.   Respiratory: Negative for cough, hemoptysis, shortness of breath, sleep disturbances due to breathing, snoring, sputum production and wheezing.    Endocrine: Negative for cold intolerance, heat intolerance and polydipsia.   Hematologic/Lymphatic: Negative for bleeding problem. Does not bruise/bleed easily.   Skin: Negative for color change, dry skin and rash.   Musculoskeletal: Negative for joint swelling and muscle cramps.   Gastrointestinal: Negative for bloating,  abdominal pain, constipation, diarrhea, dysphagia, melena, nausea and vomiting.   Genitourinary: Negative for flank pain and urgency.   Neurological: Negative for dizziness, focal weakness, headaches, light-headedness, loss of balance, seizures and weakness.   Psychiatric/Behavioral: Negative for altered mental status and memory loss. The patient is not nervous/anxious.      Family History   Problem Relation Age of Onset    Heart disease Mother     Obesity Mother     Kidney disease Father     Hypertension Father     Obesity Father     Heart disease Father     Diabetes Sister     Aneurysm Sister         AAA     Past Medical History:   Diagnosis Date    GERD (gastroesophageal reflux disease)     Hemorrhoids     History of positive PPD, untreated     Hx of cold sores     Hyperlipidemia     Hypertension     MVP (mitral valve prolapse)     Peripheral neuropathy     Pulmonary HTN     Dr Bailon    Sleep apnea     [place]CPAP  uses intermittently     Social History     Socioeconomic History    Marital status:     Number of children: 3   Occupational History    Occupation: [employer]      Employer: [employer]   Tobacco Use    Smoking status: Never Smoker    Smokeless tobacco: Never Used   Substance and Sexual Activity    Alcohol use: Yes     Alcohol/week: 1.0 standard drink     Types: 1 Shots of liquor per week    Drug use: No    Sexual activity: Not Currently     Partners: Male     Current Outpatient Medications on File Prior to Visit   Medication Sig Dispense Refill    acetaminophen (TYLENOL) 500 MG tablet Take 500 mg by mouth every 6 (six) hours as needed for Pain.      aspirin (ECOTRIN) 81 MG EC tablet Take 81 mg by mouth once daily.      azithromycin (Z-JAE) 250 MG tablet Take 2 tabs today then one tab daily for 4 days (Patient not taking: Reported on 7/2/2022) 6 tablet 0    benzonatate (TESSALON) 100 MG capsule Take 1 capsule (100 mg total) by mouth 3  (three) times daily as needed for Cough. 30 capsule 0    brompheniramine-pseudoeph-DM (BROMFED DM) 2-30-10 mg/5 mL Syrp Take by mouth.      ciclopirox (PENLAC) 8 % Soln Apply topically nightly. 1 Bottle 10    clindamycin (CLEOCIN) 150 MG capsule Take 3 capsules (450 mg total) by mouth 3 (three) times daily. for 10 days 90 capsule 0    furosemide (LASIX) 20 MG tablet Take 1 tablet (20 mg total) by mouth 2 (two) times daily as needed (as needed). 60 tablet 3    hydroCHLOROthiazide (HYDRODIURIL) 25 MG tablet Take 1 tablet (25 mg total) by mouth once daily. 90 tablet 1    ipratropium (ATROVENT) 0.03 % nasal spray 2 sprays by Nasal route every 8 (eight) hours as needed for Rhinitis. 30 mL 4    losartan (COZAAR) 50 MG tablet Take 1 tablet (50 mg total) by mouth once daily. 30 tablet 3    meloxicam (MOBIC) 7.5 MG tablet TAKE 1 TO 2 TABLETS BY MOUTH ONCE DAILY AS NEEDED 30 tablet 3    pantoprazole (PROTONIX) 40 MG tablet Take 1 tablet (40 mg total) by mouth once daily. 90 tablet 1    pregabalin (LYRICA) 75 MG capsule Take 1 capsule (75 mg total) by mouth once daily. 30 capsule 3    sildenafil (REVATIO) 20 mg Tab Take 1 tablet (20 mg total) by mouth 3 (three) times daily. 90 tablet 11    simvastatin (ZOCOR) 20 MG tablet Take 1 tablet (20 mg total) by mouth every evening. 90 tablet 1    [DISCONTINUED] gabapentin (NEURONTIN) 100 MG capsule TAKE 1 CAPSULE BY MOUTH NIGHTLY AS NEEDED 30 capsule 3     Current Facility-Administered Medications on File Prior to Visit   Medication Dose Route Frequency Provider Last Rate Last Admin    lactated ringers infusion   Intravenous Continuous Deloris Galicia MD         Review of patient's allergies indicates:   Allergen Reactions    Lisinopril Other (See Comments)     Cough      Bactrim [sulfamethoxazole-trimethoprim] Itching    Zosyn [piperacillin-tazobactam] Hives     Pt received second dose of Zosyn and had hives on both arms. Benadryl given and pt continued to  receive zosyn with no issues. Hydralazine also given around the same time and discontinued.        Objective:     Physical Exam  Eyes:      Pupils: Pupils are equal, round, and reactive to light.   Neck:      Trachea: No tracheal deviation.   Cardiovascular:      Rate and Rhythm: Normal rate and regular rhythm.      Pulses: Intact distal pulses.           Carotid pulses are 2+ on the right side and 2+ on the left side.       Radial pulses are 2+ on the right side and 2+ on the left side.        Femoral pulses are 2+ on the right side and 2+ on the left side.       Popliteal pulses are 2+ on the right side and 2+ on the left side.        Dorsalis pedis pulses are 2+ on the right side and 2+ on the left side.        Posterior tibial pulses are 2+ on the right side and 2+ on the left side.      Heart sounds: Normal heart sounds. No murmur heard.    No friction rub. No gallop.   Pulmonary:      Effort: Pulmonary effort is normal. No respiratory distress.      Breath sounds: Normal breath sounds. No stridor. No wheezing or rales.   Chest:      Chest wall: No tenderness.   Abdominal:      General: There is no distension.      Tenderness: There is no abdominal tenderness. There is no rebound.   Musculoskeletal:         General: No tenderness.      Cervical back: Normal range of motion.   Skin:     General: Skin is warm and dry.   Neurological:      Mental Status: She is alert and oriented to person, place, and time.     EKG shows normal sinus,This EKG was personally reviewed by myself, I agree with the interpretation.  Rhythm within normal limits.    Assessment:     1. Atherosclerosis of aorta    2. Essential hypertension    3. Mixed hyperlipidemia    4. MVP (mitral valve prolapse)        Plan:     Atherosclerosis of aorta    Essential hypertension    Mixed hyperlipidemia    MVP (mitral valve prolapse)    Impression 1. Chest pressure:  stable today will follow-up with nuclear stress test.  2. Mixed hyperlipidemia stable 3  hypertension stable  Follow-up after testing is performed.

## 2022-07-30 ENCOUNTER — PES CALL (OUTPATIENT)
Dept: ADMINISTRATIVE | Facility: CLINIC | Age: 72
End: 2022-07-30
Payer: MEDICARE

## 2022-08-03 ENCOUNTER — PATIENT MESSAGE (OUTPATIENT)
Dept: CARDIOLOGY | Facility: CLINIC | Age: 72
End: 2022-08-03
Payer: MEDICARE

## 2022-08-03 ENCOUNTER — HOSPITAL ENCOUNTER (OUTPATIENT)
Dept: RADIOLOGY | Facility: HOSPITAL | Age: 72
Discharge: HOME | End: 2022-08-03
Attending: INTERNAL MEDICINE
Payer: MEDICARE

## 2022-08-03 ENCOUNTER — HOSPITAL ENCOUNTER (OUTPATIENT)
Dept: CARDIOLOGY | Facility: HOSPITAL | Age: 72
Discharge: HOME | End: 2022-08-03
Attending: INTERNAL MEDICINE
Payer: MEDICARE

## 2022-08-03 DIAGNOSIS — E78.2 MIXED HYPERLIPIDEMIA: ICD-10-CM

## 2022-08-03 DIAGNOSIS — I34.1 MVP (MITRAL VALVE PROLAPSE): ICD-10-CM

## 2022-08-03 DIAGNOSIS — I70.0 ATHEROSCLEROSIS OF AORTA: ICD-10-CM

## 2022-08-03 DIAGNOSIS — I20.89 STABLE ANGINA PECTORIS: ICD-10-CM

## 2022-08-03 DIAGNOSIS — I10 ESSENTIAL HYPERTENSION: Chronic | ICD-10-CM

## 2022-08-03 LAB
CV STRESS BASE HR: 67 BPM
DIASTOLIC BLOOD PRESSURE: 72 MMHG
NUC REST EJECTION FRACTION: 70
NUC STRESS EJECTION FRACTION: 70 %
OHS CV CPX 85 PERCENT MAX PREDICTED HEART RATE MALE: 123
OHS CV CPX MAX PREDICTED HEART RATE: 144
OHS CV CPX PATIENT IS FEMALE: 1
OHS CV CPX PATIENT IS MALE: 0
OHS CV CPX PEAK DIASTOLIC BLOOD PRESSURE: 55 MMHG
OHS CV CPX PEAK HEAR RATE: 86 BPM
OHS CV CPX PEAK RATE PRESSURE PRODUCT: NORMAL
OHS CV CPX PEAK SYSTOLIC BLOOD PRESSURE: 141 MMHG
OHS CV CPX PERCENT MAX PREDICTED HEART RATE ACHIEVED: 60
OHS CV CPX RATE PRESSURE PRODUCT PRESENTING: 8241
STRESS ECHO POST EXERCISE DUR SEC: 57 SECONDS
SYSTOLIC BLOOD PRESSURE: 123 MMHG

## 2022-08-03 PROCEDURE — 93016 CV STRESS TEST SUPVJ ONLY: CPT | Mod: ,,, | Performed by: INTERNAL MEDICINE

## 2022-08-03 PROCEDURE — A9502 TC99M TETROFOSMIN: HCPCS

## 2022-08-03 PROCEDURE — 93018 CV STRESS TEST I&R ONLY: CPT | Mod: ,,, | Performed by: INTERNAL MEDICINE

## 2022-08-03 PROCEDURE — 93016 STRESS TEST WITH MYOCARDIAL PERFUSION (CUPID ONLY): ICD-10-PCS | Mod: ,,, | Performed by: INTERNAL MEDICINE

## 2022-08-03 PROCEDURE — 78452 HT MUSCLE IMAGE SPECT MULT: CPT | Mod: 26,,, | Performed by: INTERNAL MEDICINE

## 2022-08-03 PROCEDURE — 63600175 PHARM REV CODE 636 W HCPCS: Performed by: INTERNAL MEDICINE

## 2022-08-03 PROCEDURE — 93017 CV STRESS TEST TRACING ONLY: CPT

## 2022-08-03 PROCEDURE — 93018 STRESS TEST WITH MYOCARDIAL PERFUSION (CUPID ONLY): ICD-10-PCS | Mod: ,,, | Performed by: INTERNAL MEDICINE

## 2022-08-03 PROCEDURE — 78452 STRESS TEST WITH MYOCARDIAL PERFUSION (CUPID ONLY): ICD-10-PCS | Mod: 26,,, | Performed by: INTERNAL MEDICINE

## 2022-08-03 RX ORDER — REGADENOSON 0.08 MG/ML
0.4 INJECTION, SOLUTION INTRAVENOUS ONCE
Status: COMPLETED | OUTPATIENT
Start: 2022-08-03 | End: 2022-08-03

## 2022-08-03 RX ADMIN — REGADENOSON 0.4 MG: 0.08 INJECTION, SOLUTION INTRAVENOUS at 11:08

## 2022-08-16 DIAGNOSIS — M47.26 OSTEOARTHRITIS OF SPINE WITH RADICULOPATHY, LUMBAR REGION: ICD-10-CM

## 2022-08-16 RX ORDER — PREGABALIN 75 MG/1
CAPSULE ORAL
Qty: 30 CAPSULE | Refills: 0 | Status: SHIPPED | OUTPATIENT
Start: 2022-08-16 | End: 2022-11-15 | Stop reason: SDUPTHER

## 2022-08-16 NOTE — TELEPHONE ENCOUNTER
Care Due:                  Date            Visit Type   Department     Provider  --------------------------------------------------------------------------------                                EP -                              PRIMARY      [facility]  Lynn Mainampati  Last Visit: 08-      CARE ([facility])   MEDICINE       Feliciano  Next Visit: None Scheduled  None         None Found                                                            Last  Test          Frequency    Reason                     Performed    Due Date  --------------------------------------------------------------------------------    Office Visit  12 months..  hydroCHLOROthiazide,       08- 08-                             pantoprazole, simvastatin    CMP.........  12 months..  simvastatin..............  08- 08-    Lipid Panel.  12 months..  simvastatin..............  08- 08-    [facility] Embedded Care Gaps. Reference number: [id number]. 8/16/2022   5:23:12 PM CDT

## 2022-08-17 ENCOUNTER — OFFICE VISIT (OUTPATIENT)
Dept: CARDIOLOGY | Facility: CLINIC | Age: 72
End: 2022-08-17
Payer: MEDICARE

## 2022-08-17 VITALS
OXYGEN SATURATION: 98 % | DIASTOLIC BLOOD PRESSURE: 74 MMHG | BODY MASS INDEX: 45.33 KG/M2 | RESPIRATION RATE: 16 BRPM | HEART RATE: 62 BPM | HEIGHT: 59 IN | WEIGHT: 224.88 LBS | SYSTOLIC BLOOD PRESSURE: 110 MMHG

## 2022-08-17 DIAGNOSIS — I70.0 ATHEROSCLEROSIS OF AORTA: Primary | ICD-10-CM

## 2022-08-17 DIAGNOSIS — R94.2 DIFFUSION CAPACITY OF LUNG (DL), DECREASED: ICD-10-CM

## 2022-08-17 DIAGNOSIS — I20.89 STABLE ANGINA PECTORIS: ICD-10-CM

## 2022-08-17 DIAGNOSIS — I34.1 MVP (MITRAL VALVE PROLAPSE): ICD-10-CM

## 2022-08-17 DIAGNOSIS — R00.0 TACHYCARDIA: ICD-10-CM

## 2022-08-17 DIAGNOSIS — G62.9 PERIPHERAL POLYNEUROPATHY: ICD-10-CM

## 2022-08-17 DIAGNOSIS — R06.09 DYSPNEA ON EXERTION: ICD-10-CM

## 2022-08-17 DIAGNOSIS — I10 ESSENTIAL HYPERTENSION: ICD-10-CM

## 2022-08-17 DIAGNOSIS — I49.9 IRREGULAR HEARTBEAT: ICD-10-CM

## 2022-08-17 DIAGNOSIS — Z90.49 S/P LAPAROSCOPIC CHOLECYSTECTOMY: ICD-10-CM

## 2022-08-17 DIAGNOSIS — E78.2 MIXED HYPERLIPIDEMIA: ICD-10-CM

## 2022-08-17 DIAGNOSIS — G47.33 OSA ON CPAP: ICD-10-CM

## 2022-08-17 DIAGNOSIS — E66.01 MORBID OBESITY WITH BMI OF 45.0-49.9, ADULT: ICD-10-CM

## 2022-08-17 DIAGNOSIS — I27.20 PULMONARY HYPERTENSION: ICD-10-CM

## 2022-08-17 PROCEDURE — 99999 PR PBB SHADOW E&M-EST. PATIENT-LVL IV: ICD-10-PCS | Mod: PBBFAC,,, | Performed by: INTERNAL MEDICINE

## 2022-08-17 PROCEDURE — 99999 PR PBB SHADOW E&M-EST. PATIENT-LVL IV: CPT | Mod: PBBFAC,,, | Performed by: INTERNAL MEDICINE

## 2022-08-17 PROCEDURE — 99214 PR OFFICE/OUTPT VISIT, EST, LEVL IV, 30-39 MIN: ICD-10-PCS | Mod: S$PBB,,, | Performed by: INTERNAL MEDICINE

## 2022-08-17 PROCEDURE — 99214 OFFICE O/P EST MOD 30 MIN: CPT | Mod: PBBFAC | Performed by: INTERNAL MEDICINE

## 2022-08-17 PROCEDURE — 99214 OFFICE O/P EST MOD 30 MIN: CPT | Mod: S$PBB,,, | Performed by: INTERNAL MEDICINE

## 2022-08-17 NOTE — PROGRESS NOTES
Subjective:   Patient ID:  Yoana Pardo is a 72 y.o. female who presents for follow-up of No chief complaint on file.  A 68 yo obese female with htn hlp mvp pulmonary htn is referred from DR OROZCO for rhc. She has shortness of breath she does not feel rested despite cpap her pa pressures have increased. Has no angina no leg swelling.  Compared to previously she has been consistently using her cpap. Has been compliant with salt intake.     11/17/2020  No change in status she needs to have bariatric surgery she is compliant with meds htn controlled. Not compliant with cpap has no headache tia claudication  No leg swelling no chest pain. She has moderate pulmonary htn by cath.no shortness of breath.     Patient presents the office and actually has low blood pressure today will cut back losartan 50 mg daily.  Follow-up evaluation within 6 weeks.        Blood pressure under better control today however the patient complains of chest pain EKG pending the and stable patient will have follow-up nuclear stress test.       This patient presents to the office in much improved no acute shortness of breath doing well current medications.  Nuclear stress test showed no evidence of cardiac ischemia LV function showed normal heart function with mild-to-moderate pulmonary hypertension being treated.      Review of Systems   Constitutional: Negative for chills, diaphoresis, night sweats, weight gain and weight loss.   HENT: Negative for congestion, hoarse voice, sore throat and stridor.    Eyes: Negative for double vision and pain.   Cardiovascular: Negative for chest pain, claudication, cyanosis, dyspnea on exertion, irregular heartbeat, leg swelling, near-syncope, orthopnea, palpitations, paroxysmal nocturnal dyspnea and syncope.   Respiratory: Negative for cough, hemoptysis, shortness of breath, sleep disturbances due to breathing, snoring, sputum production and wheezing.    Endocrine: Negative for cold intolerance,  heat intolerance and polydipsia.   Hematologic/Lymphatic: Negative for bleeding problem. Does not bruise/bleed easily.   Skin: Negative for color change, dry skin and rash.   Musculoskeletal: Negative for joint swelling and muscle cramps.   Gastrointestinal: Negative for bloating, abdominal pain, constipation, diarrhea, dysphagia, melena, nausea and vomiting.   Genitourinary: Negative for flank pain and urgency.   Neurological: Negative for dizziness, focal weakness, headaches, light-headedness, loss of balance, seizures and weakness.   Psychiatric/Behavioral: Negative for altered mental status and memory loss. The patient is not nervous/anxious.      Family History   Problem Relation Age of Onset    Heart disease Mother     Obesity Mother     Kidney disease Father     Hypertension Father     Obesity Father     Heart disease Father     Diabetes Sister     Aneurysm Sister         AAA     Past Medical History:   Diagnosis Date    GERD (gastroesophageal reflux disease)     Hemorrhoids     History of positive PPD, untreated     Hx of cold sores     Hyperlipidemia     Hypertension     MVP (mitral valve prolapse)     Peripheral neuropathy     Pulmonary HTN     Dr Bailon    Sleep apnea     [place]CPAP  uses intermittently     Social History     Socioeconomic History    Marital status:     Number of children: 3   Occupational History    Occupation: [employer]      Employer: [employer]   Tobacco Use    Smoking status: Never Smoker    Smokeless tobacco: Never Used   Substance and Sexual Activity    Alcohol use: Yes     Alcohol/week: 1.0 standard drink     Types: 1 Shots of liquor per week    Drug use: No    Sexual activity: Not Currently     Partners: Male     Current Outpatient Medications on File Prior to Visit   Medication Sig Dispense Refill    acetaminophen (TYLENOL) 500 MG tablet Take 500 mg by mouth every 6 (six) hours as needed for Pain.      aspirin  (ECOTRIN) 81 MG EC tablet Take 81 mg by mouth once daily.      azithromycin (Z-JAE) 250 MG tablet Take 2 tabs today then one tab daily for 4 days (Patient not taking: No sig reported) 6 tablet 0    brompheniramine-pseudoeph-DM (BROMFED DM) 2-30-10 mg/5 mL Syrp Take by mouth.      ciclopirox (PENLAC) 8 % Soln Apply topically nightly. 1 Bottle 10    furosemide (LASIX) 20 MG tablet Take 1 tablet (20 mg total) by mouth 2 (two) times daily as needed (as needed). 60 tablet 3    hydroCHLOROthiazide (HYDRODIURIL) 25 MG tablet Take 1 tablet (25 mg total) by mouth once daily. 90 tablet 1    ipratropium (ATROVENT) 0.03 % nasal spray 2 sprays by Nasal route every 8 (eight) hours as needed for Rhinitis. 30 mL 4    losartan (COZAAR) 50 MG tablet Take 1 tablet (50 mg total) by mouth once daily. 30 tablet 3    meloxicam (MOBIC) 7.5 MG tablet TAKE 1 TO 2 TABLETS BY MOUTH ONCE DAILY AS NEEDED 30 tablet 3    pantoprazole (PROTONIX) 40 MG tablet Take 1 tablet (40 mg total) by mouth once daily. 90 tablet 1    pregabalin (LYRICA) 75 MG capsule Take 1 capsule by mouth once daily 30 capsule 0    sildenafil (REVATIO) 20 mg Tab Take 1 tablet (20 mg total) by mouth 3 (three) times daily. 90 tablet 11    simvastatin (ZOCOR) 20 MG tablet Take 1 tablet (20 mg total) by mouth every evening. 90 tablet 1    [DISCONTINUED] gabapentin (NEURONTIN) 100 MG capsule TAKE 1 CAPSULE BY MOUTH NIGHTLY AS NEEDED 30 capsule 3    [DISCONTINUED] pregabalin (LYRICA) 75 MG capsule Take 1 capsule (75 mg total) by mouth once daily. 30 capsule 3     Current Facility-Administered Medications on File Prior to Visit   Medication Dose Route Frequency Provider Last Rate Last Admin    lactated ringers infusion   Intravenous Continuous Deloris Galicia MD         Review of patient's allergies indicates:   Allergen Reactions    Lisinopril Other (See Comments)     Cough      Bactrim [sulfamethoxazole-trimethoprim] Itching    Zosyn  [piperacillin-tazobactam] Hives     Pt received second dose of Zosyn and had hives on both arms. Benadryl given and pt continued to receive zosyn with no issues. Hydralazine also given around the same time and discontinued.        Objective:     Physical Exam  Eyes:      Pupils: Pupils are equal, round, and reactive to light.   Neck:      Trachea: No tracheal deviation.   Cardiovascular:      Rate and Rhythm: Normal rate and regular rhythm.      Pulses: Intact distal pulses.           Carotid pulses are 2+ on the right side and 2+ on the left side.       Radial pulses are 2+ on the right side and 2+ on the left side.        Femoral pulses are 2+ on the right side and 2+ on the left side.       Popliteal pulses are 2+ on the right side and 2+ on the left side.        Dorsalis pedis pulses are 2+ on the right side and 2+ on the left side.        Posterior tibial pulses are 2+ on the right side and 2+ on the left side.      Heart sounds: Normal heart sounds. No murmur heard.    No friction rub. No gallop.   Pulmonary:      Effort: Pulmonary effort is normal. No respiratory distress.      Breath sounds: Normal breath sounds. No stridor. No wheezing or rales.   Chest:      Chest wall: No tenderness.   Abdominal:      General: There is no distension.      Tenderness: There is no abdominal tenderness. There is no rebound.   Musculoskeletal:         General: No tenderness.      Cervical back: Normal range of motion.   Skin:     General: Skin is warm and dry.   Neurological:      Mental Status: She is alert and oriented to person, place, and time.         Normal myocardial perfusion scan. There is no evidence of myocardial ischemia or infarction.    The gated perfusion images showed an ejection fraction of > 70% at rest. The gated perfusion images showed an ejection fraction of > 70% post stress.    The EKG portion of this study is negative for ischemia.    The patient reported no chest pain during the stress test.     During stress, occasional PACs are noted.         Assessment:     1. Atherosclerosis of aorta    2. Stable angina pectoris    3. Irregular heartbeat    4. Diffusion capacity of lung (dl), decreased    5. Essential hypertension    6. Peripheral polyneuropathy    7. Dyspnea on exertion    8. Mixed hyperlipidemia    9. Tachycardia    10. [place] CPAP    11. MVP (mitral valve prolapse)    12. Morbid obesity with BMI of 45.0-49.9, adult    13. S/P laparoscopic cholecystectomy    14. Pulmonary hypertension        Plan:     Atherosclerosis of aorta    Stable angina pectoris    Irregular heartbeat    Diffusion capacity of lung (dl), decreased    Essential hypertension    Peripheral polyneuropathy    Dyspnea on exertion    Mixed hyperlipidemia    Tachycardia    [place] on CPAP    MVP (mitral valve prolapse)    Morbid obesity with BMI of 45.0-49.9, adult    S/P laparoscopic cholecystectomy    Pulmonary hypertension    Impression 1 tachycardia resolved   2. Pulmonary hypertension stable on current medications   3. Chest discomfort resolved normal stresses nodes cardiac ischemia also questions answered patient on current medications of evaluation again in 3-4 months.

## 2022-10-31 DIAGNOSIS — I27.20 PULMONARY HYPERTENSION: ICD-10-CM

## 2022-11-02 RX ORDER — SILDENAFIL CITRATE 20 MG/1
20 TABLET ORAL 3 TIMES DAILY
Qty: 30 TABLET | Refills: 0 | Status: SHIPPED | OUTPATIENT
Start: 2022-11-02 | End: 2023-07-07 | Stop reason: SDUPTHER

## 2022-11-15 ENCOUNTER — OFFICE VISIT (OUTPATIENT)
Dept: INTERNAL MEDICINE | Facility: CLINIC | Age: 72
End: 2022-11-15
Payer: MEDICARE

## 2022-11-15 VITALS
DIASTOLIC BLOOD PRESSURE: 86 MMHG | BODY MASS INDEX: 46.09 KG/M2 | HEIGHT: 59 IN | RESPIRATION RATE: 18 BRPM | OXYGEN SATURATION: 97 % | HEART RATE: 61 BPM | SYSTOLIC BLOOD PRESSURE: 138 MMHG | WEIGHT: 228.63 LBS

## 2022-11-15 DIAGNOSIS — J30.2 CHRONIC SEASONAL ALLERGIC RHINITIS: ICD-10-CM

## 2022-11-15 DIAGNOSIS — K44.9 HIATAL HERNIA WITH GERD: ICD-10-CM

## 2022-11-15 DIAGNOSIS — I70.0 ATHEROSCLEROSIS OF AORTA: ICD-10-CM

## 2022-11-15 DIAGNOSIS — Z12.31 SCREENING MAMMOGRAM FOR HIGH-RISK PATIENT: ICD-10-CM

## 2022-11-15 DIAGNOSIS — M17.0 PRIMARY OSTEOARTHRITIS OF BOTH KNEES: ICD-10-CM

## 2022-11-15 DIAGNOSIS — I34.1 MVP (MITRAL VALVE PROLAPSE): ICD-10-CM

## 2022-11-15 DIAGNOSIS — M47.26 OSTEOARTHRITIS OF SPINE WITH RADICULOPATHY, LUMBAR REGION: ICD-10-CM

## 2022-11-15 DIAGNOSIS — I10 ESSENTIAL HYPERTENSION: Primary | Chronic | ICD-10-CM

## 2022-11-15 DIAGNOSIS — E78.2 MIXED HYPERLIPIDEMIA: ICD-10-CM

## 2022-11-15 DIAGNOSIS — G47.33 OSA ON CPAP: ICD-10-CM

## 2022-11-15 DIAGNOSIS — G62.9 PERIPHERAL POLYNEUROPATHY: ICD-10-CM

## 2022-11-15 DIAGNOSIS — K21.9 HIATAL HERNIA WITH GERD: ICD-10-CM

## 2022-11-15 DIAGNOSIS — E66.01 MORBID OBESITY WITH BMI OF 45.0-49.9, ADULT: ICD-10-CM

## 2022-11-15 DIAGNOSIS — I27.20 PULMONARY HYPERTENSION: ICD-10-CM

## 2022-11-15 PROBLEM — J40 SINOBRONCHITIS: Status: RESOLVED | Noted: 2022-05-10 | Resolved: 2022-11-15

## 2022-11-15 PROBLEM — J32.9 SINOBRONCHITIS: Status: RESOLVED | Noted: 2022-05-10 | Resolved: 2022-11-15

## 2022-11-15 LAB
BASOPHILS # BLD AUTO: 0.04 K/UL (ref 0–0.2)
BASOPHILS NFR BLD: 0.6 % (ref 0–1.9)
BILIRUB UR QL STRIP: NEGATIVE
CLARITY UR: CLEAR
COLOR UR: YELLOW
DIFFERENTIAL METHOD: ABNORMAL
EOSINOPHIL # BLD AUTO: 0.1 K/UL (ref 0–0.5)
EOSINOPHIL NFR BLD: 1.6 % (ref 0–8)
ERYTHROCYTE [DISTWIDTH] IN BLOOD BY AUTOMATED COUNT: 13.2 % (ref 11.5–14.5)
GLUCOSE UR QL STRIP: NEGATIVE
HCT VFR BLD AUTO: 42.4 % (ref 37–48.5)
HGB BLD-MCNC: 13.2 G/DL (ref 12–16)
HGB UR QL STRIP: NEGATIVE
IMM GRANULOCYTES # BLD AUTO: 0.01 K/UL (ref 0–0.04)
IMM GRANULOCYTES NFR BLD AUTO: 0.2 % (ref 0–0.5)
KETONES UR QL STRIP: NEGATIVE
LEUKOCYTE ESTERASE UR QL STRIP: NEGATIVE
LYMPHOCYTES # BLD AUTO: 3.6 K/UL (ref 1–4.8)
LYMPHOCYTES NFR BLD: 56.4 % (ref 18–48)
MCH RBC QN AUTO: 29.3 PG (ref 27–31)
MCHC RBC AUTO-ENTMCNC: 31.1 G/DL (ref 32–36)
MCV RBC AUTO: 94 FL (ref 82–98)
MONOCYTES # BLD AUTO: 0.5 K/UL (ref 0.3–1)
MONOCYTES NFR BLD: 7.7 % (ref 4–15)
NEUTROPHILS # BLD AUTO: 2.2 K/UL (ref 1.8–7.7)
NEUTROPHILS NFR BLD: 33.5 % (ref 38–73)
NITRITE UR QL STRIP: NEGATIVE
NRBC BLD-RTO: 0 /100 WBC
PH UR STRIP: 7 [PH] (ref 5–8)
PLATELET # BLD AUTO: 300 K/UL (ref 150–450)
PMV BLD AUTO: 10 FL (ref 9.2–12.9)
PROT UR QL STRIP: NEGATIVE
RBC # BLD AUTO: 4.5 M/UL (ref 4–5.4)
SP GR UR STRIP: 1.01 (ref 1–1.03)
URN SPEC COLLECT METH UR: NORMAL
WBC # BLD AUTO: 6.4 K/UL (ref 3.9–12.7)

## 2022-11-15 PROCEDURE — 80053 COMPREHEN METABOLIC PANEL: CPT | Performed by: FAMILY MEDICINE

## 2022-11-15 PROCEDURE — 99999 PR PBB SHADOW E&M-EST. PATIENT-LVL IV: CPT | Mod: PBBFAC,,, | Performed by: FAMILY MEDICINE

## 2022-11-15 PROCEDURE — 99214 PR OFFICE/OUTPT VISIT, EST, LEVL IV, 30-39 MIN: ICD-10-PCS | Mod: 25,S$PBB,, | Performed by: FAMILY MEDICINE

## 2022-11-15 PROCEDURE — 99999 PR PBB SHADOW E&M-EST. PATIENT-LVL IV: ICD-10-PCS | Mod: PBBFAC,,, | Performed by: FAMILY MEDICINE

## 2022-11-15 PROCEDURE — 80061 LIPID PANEL: CPT | Performed by: FAMILY MEDICINE

## 2022-11-15 PROCEDURE — 99214 OFFICE O/P EST MOD 30 MIN: CPT | Mod: PBBFAC | Performed by: FAMILY MEDICINE

## 2022-11-15 PROCEDURE — 84443 ASSAY THYROID STIM HORMONE: CPT | Performed by: FAMILY MEDICINE

## 2022-11-15 PROCEDURE — 81003 URINALYSIS AUTO W/O SCOPE: CPT | Performed by: FAMILY MEDICINE

## 2022-11-15 PROCEDURE — 85025 COMPLETE CBC W/AUTO DIFF WBC: CPT | Performed by: FAMILY MEDICINE

## 2022-11-15 PROCEDURE — 99214 OFFICE O/P EST MOD 30 MIN: CPT | Mod: 25,S$PBB,, | Performed by: FAMILY MEDICINE

## 2022-11-15 RX ORDER — PREGABALIN 75 MG/1
75 CAPSULE ORAL DAILY
Qty: 30 CAPSULE | Refills: 3 | Status: SHIPPED | OUTPATIENT
Start: 2022-11-15 | End: 2023-05-15 | Stop reason: SDUPTHER

## 2022-11-15 RX ORDER — LEVALBUTEROL TARTRATE 45 UG/1
AEROSOL, METERED ORAL
COMMUNITY
Start: 2021-12-27 | End: 2023-09-12

## 2022-11-15 RX ORDER — LOSARTAN POTASSIUM 50 MG/1
50 TABLET ORAL DAILY
Qty: 90 TABLET | Refills: 1 | Status: SHIPPED | OUTPATIENT
Start: 2022-11-15 | End: 2023-03-24 | Stop reason: SDUPTHER

## 2022-11-15 NOTE — PROGRESS NOTES
Subjective:       Patient ID: Yoana Pardo is a 72 y.o. female.    Chief Complaint: Referral (General Surgery/Hernia)    72-year-old  female patient with Patient Active Problem List:     Mixed hyperlipidemia     Pulmonary hypertension     MVP (mitral valve prolapse)     Hiatal hernia with GERD     History of positive PPD, untreated     Hemorrhoids     Essential hypertension     Peripheral neuropathy     Primary osteoarthritis of both knees     Morbid obesity with BMI of 45.0-49.9, adult     [place] on CPAP     Behaviorally induced insufficient sleep syndrome     Chronic seasonal allergic rhinitis     Atherosclerosis of aorta     Memory loss  Here for follow-up on chronic medical conditions and reports that patient has been having discomfort to her chest secondary to hiatal hernia having difficulty with swallowing solids and liquids, and would like to consider hernia repair.  Patient had CT scans done in 2018 showing large hiatal hernia but was not ready for surgery at that time  Patient had been to Cardiology several times secondary to chest tightness due to hiatal hernia and reports that it resolves in a minute or 2 after eating but has been extremely painful  Denies any chest pain or difficulty breathing with palpitations and reports sharp shooting pains to the legs-took Lyrica under past, would like to get a refill    Review of Systems   Constitutional:  Negative for activity change, fatigue and unexpected weight change.   HENT:  Negative for hearing loss, rhinorrhea and trouble swallowing.    Eyes:  Negative for discharge and visual disturbance.   Respiratory:  Positive for chest tightness. Negative for shortness of breath and wheezing.    Cardiovascular:  Negative for chest pain, palpitations and leg swelling.   Gastrointestinal:  Negative for abdominal pain, blood in stool, constipation, diarrhea, nausea and vomiting.   Endocrine: Negative for polydipsia and polyuria.   Genitourinary:   "Negative for difficulty urinating, dysuria, hematuria and menstrual problem.   Musculoskeletal:  Positive for myalgias. Negative for arthralgias, joint swelling and neck pain.   Skin:  Negative for rash.   Neurological:  Positive for numbness. Negative for weakness, light-headedness and headaches.   Psychiatric/Behavioral:  Negative for confusion, dysphoric mood and sleep disturbance.        /86 (BP Location: Left arm, Patient Position: Sitting, BP Method: Large (Manual))   Pulse 61   Resp 18   Ht 4' 11" (1.499 m)   Wt 103.7 kg (228 lb 9.9 oz)   SpO2 97%   BMI 46.18 kg/m²   Objective:      Physical Exam  Constitutional:       Appearance: She is well-developed.   HENT:      Head: Normocephalic and atraumatic.   Cardiovascular:      Rate and Rhythm: Normal rate and regular rhythm.      Heart sounds: Normal heart sounds. No murmur heard.  Pulmonary:      Effort: Pulmonary effort is normal.      Breath sounds: Normal breath sounds. No wheezing.   Abdominal:      General: Bowel sounds are normal.      Palpations: Abdomen is soft.      Tenderness: There is no abdominal tenderness.   Skin:     General: Skin is warm and dry.      Findings: No rash.   Neurological:      Mental Status: She is alert and oriented to person, place, and time.   Psychiatric:         Mood and Affect: Mood normal.         Assessment/Plan:   1. Essential hypertension  - Comprehensive Metabolic Panel; Future  - Lipid Panel; Future  - TSH; Future  - Urinalysis, Reflex to Urine Culture Urine, Clean Catch; Future  - losartan (COZAAR) 50 MG tablet; Take 1 tablet (50 mg total) by mouth once daily.  Dispense: 90 tablet; Refill: 1  Blood pressure mildly elevated but stable currently taking losartan 50 mg and hydrochlorothiazide 25 mg  Refill given on losartan today    2. Mixed hyperlipidemia  - Lipid Panel; Future  Currently taking simvastatin 20 mg daily    3. Hiatal hernia with GERD  - CBC Auto Differential; Future  - Ambulatory " referral/consult to General Surgery; Future  Reviewed previous imaging showing large hiatal hernia  Will refer to general surgery    4. Pulmonary hypertension    5. [place] CPAP  Stable    6. Chronic seasonal allergic rhinitis  Clinically doing well on nasal sprays    7. Atherosclerosis of aorta  Stable    8. Primary osteoarthritis of both knees  Graded exercise regimen recommended    9. Morbid obesity with BMI of 45.0-49.9, adult  Lifestyle modifications discussed to lose weight with BMI 46      11. Screening mammogram for high-risk patient  - Mammo Digital Screening Bilat w/ Neo; Future  Due for mammogram    12. Osteoarthritis of spine with radiculopathy, lumbar region  - pregabalin (LYRICA) 75 MG capsule; Take 1 capsule (75 mg total) by mouth once daily.  Dispense: 30 capsule; Refill: 3    13. Peripheral polyneuropathy  - pregabalin (LYRICA) 75 MG capsule; Take 1 capsule (75 mg total) by mouth once daily.  Dispense: 30 capsule; Refill: 3   Refill given on Lyrica  Graded exercise regimen recommended    Flu shot given

## 2022-11-16 LAB
ALBUMIN SERPL BCP-MCNC: 3.9 G/DL (ref 3.5–5.2)
ALP SERPL-CCNC: 85 U/L (ref 55–135)
ALT SERPL W/O P-5'-P-CCNC: 8 U/L (ref 10–44)
ANION GAP SERPL CALC-SCNC: 12 MMOL/L (ref 8–16)
AST SERPL-CCNC: 17 U/L (ref 10–40)
BILIRUB SERPL-MCNC: 0.7 MG/DL (ref 0.1–1)
BUN SERPL-MCNC: 11 MG/DL (ref 8–23)
CALCIUM SERPL-MCNC: 9.4 MG/DL (ref 8.7–10.5)
CHLORIDE SERPL-SCNC: 100 MMOL/L (ref 95–110)
CHOLEST SERPL-MCNC: 214 MG/DL (ref 120–199)
CHOLEST/HDLC SERPL: 4.4 {RATIO} (ref 2–5)
CO2 SERPL-SCNC: 28 MMOL/L (ref 23–29)
CREAT SERPL-MCNC: 0.8 MG/DL (ref 0.5–1.4)
EST. GFR  (NO RACE VARIABLE): >60 ML/MIN/1.73 M^2
GLUCOSE SERPL-MCNC: 86 MG/DL (ref 70–110)
HDLC SERPL-MCNC: 49 MG/DL (ref 40–75)
HDLC SERPL: 22.9 % (ref 20–50)
LDLC SERPL CALC-MCNC: 127.4 MG/DL (ref 63–159)
NONHDLC SERPL-MCNC: 165 MG/DL
POTASSIUM SERPL-SCNC: 3.7 MMOL/L (ref 3.5–5.1)
PROT SERPL-MCNC: 7.9 G/DL (ref 6–8.4)
SODIUM SERPL-SCNC: 140 MMOL/L (ref 136–145)
TRIGL SERPL-MCNC: 188 MG/DL (ref 30–150)
TSH SERPL DL<=0.005 MIU/L-ACNC: 0.82 UIU/ML (ref 0.4–4)

## 2022-11-21 ENCOUNTER — OFFICE VISIT (OUTPATIENT)
Dept: CARDIOLOGY | Facility: CLINIC | Age: 72
End: 2022-11-21
Payer: MEDICARE

## 2022-11-21 ENCOUNTER — IMMUNIZATION (OUTPATIENT)
Dept: INTERNAL MEDICINE | Facility: CLINIC | Age: 72
End: 2022-11-21
Payer: MEDICARE

## 2022-11-21 VITALS
HEART RATE: 60 BPM | DIASTOLIC BLOOD PRESSURE: 75 MMHG | WEIGHT: 230 LBS | SYSTOLIC BLOOD PRESSURE: 125 MMHG | OXYGEN SATURATION: 99 % | BODY MASS INDEX: 46.45 KG/M2

## 2022-11-21 DIAGNOSIS — R00.2 PALPITATIONS: ICD-10-CM

## 2022-11-21 DIAGNOSIS — R00.0 TACHYCARDIA: ICD-10-CM

## 2022-11-21 DIAGNOSIS — I49.9 IRREGULAR HEARTBEAT: ICD-10-CM

## 2022-11-21 DIAGNOSIS — I27.20 PULMONARY HYPERTENSION: ICD-10-CM

## 2022-11-21 DIAGNOSIS — R06.09 DYSPNEA ON EXERTION: ICD-10-CM

## 2022-11-21 DIAGNOSIS — I34.1 MVP (MITRAL VALVE PROLAPSE): ICD-10-CM

## 2022-11-21 DIAGNOSIS — I20.89 STABLE ANGINA PECTORIS: ICD-10-CM

## 2022-11-21 DIAGNOSIS — E78.2 MIXED HYPERLIPIDEMIA: ICD-10-CM

## 2022-11-21 DIAGNOSIS — G47.33 OSA ON CPAP: ICD-10-CM

## 2022-11-21 DIAGNOSIS — I70.0 ATHEROSCLEROSIS OF AORTA: Primary | ICD-10-CM

## 2022-11-21 DIAGNOSIS — I10 ESSENTIAL HYPERTENSION: ICD-10-CM

## 2022-11-21 PROCEDURE — 99214 PR OFFICE/OUTPT VISIT, EST, LEVL IV, 30-39 MIN: ICD-10-PCS | Mod: S$PBB,,, | Performed by: INTERNAL MEDICINE

## 2022-11-21 PROCEDURE — 99999 PR PBB SHADOW E&M-EST. PATIENT-LVL IV: ICD-10-PCS | Mod: PBBFAC,,, | Performed by: INTERNAL MEDICINE

## 2022-11-21 PROCEDURE — 99999 PR PBB SHADOW E&M-EST. PATIENT-LVL IV: CPT | Mod: PBBFAC,,, | Performed by: INTERNAL MEDICINE

## 2022-11-21 PROCEDURE — G0008 ADMIN INFLUENZA VIRUS VAC: HCPCS | Mod: PBBFAC

## 2022-11-21 PROCEDURE — 99214 OFFICE O/P EST MOD 30 MIN: CPT | Mod: S$PBB,,, | Performed by: INTERNAL MEDICINE

## 2022-11-21 PROCEDURE — 99214 OFFICE O/P EST MOD 30 MIN: CPT | Mod: PBBFAC | Performed by: INTERNAL MEDICINE

## 2022-11-21 NOTE — PROGRESS NOTES
Subjective:   Patient ID:  Yoana Pardo is a 72 y.o. female who presents for follow-up of No chief complaint on file.  A 71 yo obese female with htn hlp mvp pulmonary htn is referred from DR OROZCO for rhc. She has shortness of breath she does not feel rested despite cpap her pa pressures have increased. Has no angina no leg swelling.  Compared to previously she has been consistently using her cpap. Has been compliant with salt intake.     11/17/2020  No change in status she needs to have bariatric surgery she is compliant with meds htn controlled. Not compliant with cpap has no headache tia claudication  No leg swelling no chest pain. She has moderate pulmonary htn by cath.no shortness of breath.     Patient presents the office and actually has low blood pressure today will cut back losartan 50 mg daily.  Follow-up evaluation within 6 weeks.        Blood pressure under better control today however the patient complains of chest pain EKG pending the and stable patient will have follow-up nuclear stress test.        This patient presents to the office in much improved no acute shortness of breath doing well current medications.  Nuclear stress test showed no evidence of cardiac ischemia LV function showed normal heart function with mild-to-moderate pulmonary hypertension being treated.   NO FOCAL CNS SYMPTOMS OR SIGNS TO SUGGEST TIA OR STROKE  NO ANGINA OR EQUIVALENT  NO UNUSUAL HERNÁNDEZ. NO ORTHOPNEA OR PND  NO PALPITATIONS  NO NEAR SYNCOPE OR SYNCOPE  NO EDEMA. NO CALVE TENDERNESS      11/21/22-today the patient presents in the office with stable heart rate blood pressure on reduced losartan.  Overall doing well no exertional symptoms chest pain shortness of breath.  The patient is planning eventually to have hiatal hernia surgery and I think she would be cleared to have that done in the future    Review of Systems   Constitutional: Negative for chills, diaphoresis, night sweats, weight gain and weight loss.    HENT:  Negative for congestion, hoarse voice, sore throat and stridor.    Eyes:  Negative for double vision and pain.   Cardiovascular:  Negative for chest pain, claudication, cyanosis, dyspnea on exertion, irregular heartbeat, leg swelling, near-syncope, orthopnea, palpitations, paroxysmal nocturnal dyspnea and syncope.   Respiratory:  Negative for cough, hemoptysis, shortness of breath, sleep disturbances due to breathing, snoring, sputum production and wheezing.    Endocrine: Negative for cold intolerance, heat intolerance and polydipsia.   Hematologic/Lymphatic: Negative for bleeding problem. Does not bruise/bleed easily.   Skin:  Negative for color change, dry skin and rash.   Musculoskeletal:  Negative for joint swelling and muscle cramps.   Gastrointestinal:  Negative for bloating, abdominal pain, constipation, diarrhea, dysphagia, melena, nausea and vomiting.   Genitourinary:  Negative for flank pain and urgency.   Neurological:  Negative for dizziness, focal weakness, headaches, light-headedness, loss of balance, seizures and weakness.   Psychiatric/Behavioral:  Negative for altered mental status and memory loss. The patient is not nervous/anxious.    Family History   Problem Relation Age of Onset    Heart disease Mother     Obesity Mother     Kidney disease Father     Hypertension Father     Obesity Father     Heart disease Father     Diabetes Sister     Aneurysm Sister         AAA     Past Medical History:   Diagnosis Date    GERD (gastroesophageal reflux disease)     Hemorrhoids     History of positive PPD, untreated     Hx of cold sores     Hyperlipidemia     Hypertension     MVP (mitral valve prolapse)     Peripheral neuropathy     Pulmonary HTN     Dr Bailon    Sleep apnea     [place]-CPAP  uses intermittently     Social History     Socioeconomic History    Marital status:     Number of children: 3   Occupational History    Occupation: [employer] occupational      Employer: [employer]   Tobacco Use    Smoking status: Never    Smokeless tobacco: Never   Substance and Sexual Activity    Alcohol use: Yes     Alcohol/week: 1.0 standard drink     Types: 1 Shots of liquor per week    Drug use: No    Sexual activity: Not Currently     Partners: Male     Current Outpatient Medications on File Prior to Visit   Medication Sig Dispense Refill    acetaminophen (TYLENOL) 500 MG tablet Take 500 mg by mouth every 6 (six) hours as needed for Pain.      aspirin (ECOTRIN) 81 MG EC tablet Take 81 mg by mouth once daily.      ciclopirox (PENLAC) 8 % Soln Apply topically nightly. 1 Bottle 10    furosemide (LASIX) 20 MG tablet Take 1 tablet (20 mg total) by mouth 2 (two) times daily as needed (as needed). 60 tablet 3    hydroCHLOROthiazide (HYDRODIURIL) 25 MG tablet Take 1 tablet (25 mg total) by mouth once daily. 90 tablet 0    ipratropium (ATROVENT) 0.03 % nasal spray 2 sprays by Nasal route every 8 (eight) hours as needed for Rhinitis. 30 mL 4    levalbuterol (XOPENEX HFA) 45 mcg/actuation inhaler Xopenex HFA Take 1-2 puff(s) (inhalation) every 4-6 hours PRN - Wheezing [id number] HFA aerosol inhaler every 4-6 hours  inhalation No set duration recorded No set duration amount recorded active 45 mcg/actuation      losartan (COZAAR) 50 MG tablet Take 1 tablet (50 mg total) by mouth once daily. 90 tablet 1    pantoprazole (PROTONIX) 40 MG tablet Take 1 tablet (40 mg total) by mouth once daily. 90 tablet 0    pregabalin (LYRICA) 75 MG capsule Take 1 capsule (75 mg total) by mouth once daily. 30 capsule 3    sildenafil (REVATIO) 20 mg Tab Take 1 tablet (20 mg total) by mouth 3 (three) times daily. 30 tablet 0    simvastatin (ZOCOR) 20 MG tablet Take 1 tablet (20 mg total) by mouth every evening. 90 tablet 1    [DISCONTINUED] gabapentin (NEURONTIN) 100 MG capsule TAKE 1 CAPSULE BY MOUTH NIGHTLY AS NEEDED 30 capsule 3     Current Facility-Administered Medications on File Prior to Visit   Medication Dose Route Frequency  Provider Last Rate Last Admin    lactated ringers infusion   Intravenous Continuous Deloris Galicia MD         Review of patient's allergies indicates:   Allergen Reactions    Lisinopril Other (See Comments)     Cough      Bactrim [sulfamethoxazole-trimethoprim] Itching    Zosyn [piperacillin-tazobactam] Hives     Pt received second dose of Zosyn and had hives on both arms. Benadryl given and pt continued to receive zosyn with no issues. Hydralazine also given around the same time and discontinued.        Objective:     Physical Exam  Eyes:      Pupils: Pupils are equal, round, and reactive to light.   Neck:      Trachea: No tracheal deviation.   Cardiovascular:      Rate and Rhythm: Normal rate and regular rhythm.      Pulses: Intact distal pulses.           Carotid pulses are 2+ on the right side and 2+ on the left side.       Radial pulses are 2+ on the right side and 2+ on the left side.        Femoral pulses are 2+ on the right side and 2+ on the left side.       Popliteal pulses are 2+ on the right side and 2+ on the left side.        Dorsalis pedis pulses are 2+ on the right side and 2+ on the left side.        Posterior tibial pulses are 2+ on the right side and 2+ on the left side.      Heart sounds: Normal heart sounds. No murmur heard.    No friction rub. No gallop.   Pulmonary:      Effort: Pulmonary effort is normal. No respiratory distress.      Breath sounds: Normal breath sounds. No stridor. No wheezing or rales.   Chest:      Chest wall: No tenderness.   Abdominal:      General: There is no distension.      Tenderness: There is no abdominal tenderness. There is no rebound.   Musculoskeletal:         General: No tenderness.      Cervical back: Normal range of motion.   Skin:     General: Skin is warm and dry.   Neurological:      Mental Status: She is alert and oriented to person, place, and time.       8/3/22    Normal myocardial perfusion scan. There is no evidence of myocardial ischemia  or infarction.    The gated perfusion images showed an ejection fraction of > 70% at rest. The gated perfusion images showed an ejection fraction of > 70% post stress.    The EKG portion of this study is negative for ischemia.    The patient reported no chest pain during the stress test.    During stress, occasional PACs are noted.     Assessment:     1. Atherosclerosis of aorta    2. Tachycardia    3. Essential hypertension    4. Stable angina pectoris    5. [place] on CPAP    6. Pulmonary hypertension    7. Irregular heartbeat    8. MVP (mitral valve prolapse)    9. Dyspnea on exertion    10. Palpitations        Plan:     Atherosclerosis of aorta    Tachycardia    Essential hypertension    Stable angina pectoris    [place] on CPAP    Pulmonary hypertension    Irregular heartbeat    MVP (mitral valve prolapse)    Dyspnea on exertion    Palpitations      Impression 1 hypertension stable current doses of losartan 50 mg daily.    2 stable angina pectoris   3. Pulmonary hypertension stable this time 4. Dyspnea exertion stable no acute changes.    All questions answered patient doing well this time current medications follow-up evaluation in 2 months and will review for preop evaluation for hiatal hernia surgery in the future.

## 2022-11-30 ENCOUNTER — OFFICE VISIT (OUTPATIENT)
Dept: SURGERY | Facility: CLINIC | Age: 72
End: 2022-11-30
Payer: MEDICARE

## 2022-11-30 VITALS
HEART RATE: 75 BPM | SYSTOLIC BLOOD PRESSURE: 116 MMHG | BODY MASS INDEX: 46.22 KG/M2 | DIASTOLIC BLOOD PRESSURE: 74 MMHG | WEIGHT: 229.25 LBS | HEIGHT: 59 IN | TEMPERATURE: 98 F

## 2022-11-30 DIAGNOSIS — K21.9 HIATAL HERNIA WITH GERD: ICD-10-CM

## 2022-11-30 DIAGNOSIS — K44.9 HIATAL HERNIA WITH GERD: ICD-10-CM

## 2022-11-30 PROCEDURE — 99999 PR PBB SHADOW E&M-EST. PATIENT-LVL IV: ICD-10-PCS | Mod: PBBFAC,,,

## 2022-11-30 PROCEDURE — 99215 OFFICE O/P EST HI 40 MIN: CPT | Mod: S$PBB,,,

## 2022-11-30 PROCEDURE — 99215 PR OFFICE/OUTPT VISIT, EST, LEVL V, 40-54 MIN: ICD-10-PCS | Mod: S$PBB,,,

## 2022-11-30 PROCEDURE — 99214 OFFICE O/P EST MOD 30 MIN: CPT | Mod: PBBFAC

## 2022-11-30 PROCEDURE — 99999 PR PBB SHADOW E&M-EST. PATIENT-LVL IV: CPT | Mod: PBBFAC,,,

## 2022-11-30 NOTE — PROGRESS NOTES
History & Physical    SUBJECTIVE:     History of Present Illness:  Patient is a 72 y.o. female presents for evaluation of a hiatal hernia that has been present for several years. She recently visited with a bariatric surgeon in [facility] and was told she could not undergo bariatric surgery until it was fixed. She reports burning chest pain consistent with indigestion, worse when lying down at night. She takes Protonix 40 mg once daily. She also reports dysphagia to both solids and liquids. Her past abdominal surgical history includes cholecystectomy. She underwent an EGD last year showing a hiatal hernia but no evidence of esophagitis as well as manometry with normal esophageal motility. Of note, the patient also has a medical history significant for atrial enlargement and pulmonary hypertension. She is otherwise feeling well and denies fevers, chills, nausea, and vomiting.     Chief Complaint   Patient presents with    Consult     Hiatal hernia       Review of patient's allergies indicates:   Allergen Reactions    Lisinopril Other (See Comments)     Cough      Bactrim [sulfamethoxazole-trimethoprim] Itching    Zosyn [piperacillin-tazobactam] Hives     Pt received second dose of Zosyn and had hives on both arms. Benadryl given and pt continued to receive zosyn with no issues. Hydralazine also given around the same time and discontinued.        Current Outpatient Medications   Medication Sig Dispense Refill    acetaminophen (TYLENOL) 500 MG tablet Take 500 mg by mouth every 6 (six) hours as needed for Pain.      aspirin (ECOTRIN) 81 MG EC tablet Take 81 mg by mouth once daily.      ciclopirox (PENLAC) 8 % Soln Apply topically nightly. 1 Bottle 10    furosemide (LASIX) 20 MG tablet Take 1 tablet (20 mg total) by mouth 2 (two) times daily as needed (as needed). 60 tablet 3    hydroCHLOROthiazide (HYDRODIURIL) 25 MG tablet Take 1 tablet (25 mg total) by mouth once daily. 90 tablet 0    ipratropium (ATROVENT) 0.03 % nasal  spray 2 sprays by Nasal route every 8 (eight) hours as needed for Rhinitis. 30 mL 4    levalbuterol (XOPENEX HFA) 45 mcg/actuation inhaler Xopenex HFA Take 1-2 puff(s) (inhalation) every 4-6 hours PRN - Wheezing [id number] HFA aerosol inhaler every 4-6 hours  inhalation No set duration recorded No set duration amount recorded active 45 mcg/actuation      losartan (COZAAR) 50 MG tablet Take 1 tablet (50 mg total) by mouth once daily. 90 tablet 1    pantoprazole (PROTONIX) 40 MG tablet Take 1 tablet (40 mg total) by mouth once daily. 90 tablet 0    pregabalin (LYRICA) 75 MG capsule Take 1 capsule (75 mg total) by mouth once daily. 30 capsule 3    sildenafil (REVATIO) 20 mg Tab Take 1 tablet (20 mg total) by mouth 3 (three) times daily. 30 tablet 0    simvastatin (ZOCOR) 20 MG tablet Take 1 tablet (20 mg total) by mouth every evening. 90 tablet 1     No current facility-administered medications for this visit.     Facility-Administered Medications Ordered in Other Visits   Medication Dose Route Frequency Provider Last Rate Last Admin    lactated ringers infusion   Intravenous Continuous Deloris Galicia MD           Past Medical History:   Diagnosis Date    GERD (gastroesophageal reflux disease)     Hemorrhoids     History of positive PPD, untreated     Hx of cold sores     Hyperlipidemia     Hypertension     MVP (mitral valve prolapse)     Peripheral neuropathy     Pulmonary HTN     Dr Bailon    Sleep apnea     [place]-CPAP  uses intermittently     Past Surgical History:   Procedure Laterality Date    CHOLECYSTECTOMY      COLONOSCOPY N/A 11/11/2021    Procedure: COLONOSCOPY;  Surgeon: Deloris Galicia MD;  Location: [facility];  Service: Endoscopy;  Laterality: N/A;    DILATION AND CURETTAGE OF UTERUS      ESOPHAGEAL MOTILITY STUDY USING HIGH RESOLUTION MANOMETRY N/A 11/3/2021    Procedure: MOTILITY STUDY, ESOPHAGUS, USING HIGH RESOLUTION MANOMETRY;  Surgeon: Yaw Manuel RN;  Location: [facility];   Service: Endoscopy;  Laterality: N/A;    ESOPHAGOGASTRODUODENOSCOPY N/A 12/2/2020    Procedure: ESOPHAGOGASTRODUODENOSCOPY (EGD);  Surgeon: Evangelista Erickson MD;  Location: [facility] ENDO;  Service: Endoscopy;  Laterality: N/A;    ESOPHAGOGASTRODUODENOSCOPY N/A 11/11/2021    Procedure: EGD (ESOPHAGOGASTRODUODENOSCOPY);  Surgeon: Deloris Galicia MD;  Location: [facility] ENDO;  Service: Endoscopy;  Laterality: N/A;    RIGHT HEART CATHETERIZATION Right 6/15/2020    Procedure: INSERTION, CATHETER, RIGHT HEART;  Surgeon: Ashlie Belle MD;  Location: [facility] CATH LAB;  Service: Cardiology;  Laterality: Right;     Family History   Problem Relation Age of Onset    Heart disease Mother     Obesity Mother     Kidney disease Father     Hypertension Father     Obesity Father     Heart disease Father     Diabetes Sister     Aneurysm Sister         AAA     Social History     Tobacco Use    Smoking status: Never    Smokeless tobacco: Never   Substance Use Topics    Alcohol use: Yes     Alcohol/week: 1.0 standard drink     Types: 1 Shots of liquor per week    Drug use: No        Review of Systems:  Review of Systems   Constitutional:  Negative for chills, fatigue, fever and unexpected weight change.   Respiratory:  Negative for cough, shortness of breath, wheezing and stridor.    Cardiovascular:  Positive for chest pain (Related to reflux, follows with cardiology). Negative for palpitations and leg swelling.   Gastrointestinal:  Negative for abdominal distention, abdominal pain, anal bleeding, blood in stool, constipation, diarrhea, nausea, rectal pain and vomiting.        Dysphagia, Indigestion   Genitourinary:  Negative for difficulty urinating, dysuria, frequency, hematuria and urgency.   Skin:  Negative for color change, pallor, rash and wound.   Hematological:  Does not bruise/bleed easily.     OBJECTIVE:     Vital Signs (Most Recent)  Temp: 98.2 °F (36.8 °C) (11/30/22 1106)  Pulse: 75 (11/30/22 1106)  BP: 116/74 (11/30/22 1106)  4'  "11" (1.499 m)  104 kg (229 lb 4.5 oz)     Physical Exam:  Physical Exam  Vitals reviewed.   Constitutional:       General: She is not in acute distress.     Appearance: She is well-developed. She is obese. She is not ill-appearing.   HENT:      Head: Normocephalic and atraumatic.      Right Ear: External ear normal.      Left Ear: External ear normal.   Eyes:      Extraocular Movements: Extraocular movements intact.      Conjunctiva/sclera: Conjunctivae normal.   Cardiovascular:      Rate and Rhythm: Normal rate and regular rhythm.      Heart sounds: Normal heart sounds.   Pulmonary:      Effort: Pulmonary effort is normal. No respiratory distress.      Breath sounds: Normal breath sounds. No stridor. No wheezing, rhonchi or rales.   Abdominal:      General: There is no distension.      Palpations: Abdomen is soft.      Tenderness: There is no abdominal tenderness.      Comments: No TTP. Scars from previous cholecystectomy   Musculoskeletal:      Cervical back: Neck supple.   Skin:     General: Skin is warm and dry.   Neurological:      Mental Status: She is alert and oriented to person, place, and time.       Laboratory  CMP  Sodium   Date Value Ref Range Status   11/15/2022 140 136 - 145 mmol/L Final     Potassium   Date Value Ref Range Status   11/15/2022 3.7 3.5 - 5.1 mmol/L Final     Chloride   Date Value Ref Range Status   11/15/2022 100 95 - 110 mmol/L Final     CO2   Date Value Ref Range Status   11/15/2022 28 23 - 29 mmol/L Final     Glucose   Date Value Ref Range Status   11/15/2022 86 70 - 110 mg/dL Final     BUN   Date Value Ref Range Status   11/15/2022 11 8 - 23 mg/dL Final     Creatinine   Date Value Ref Range Status   11/15/2022 0.8 0.5 - 1.4 mg/dL Final     Calcium   Date Value Ref Range Status   11/15/2022 9.4 8.7 - 10.5 mg/dL Final     Total Protein   Date Value Ref Range Status   11/15/2022 7.9 6.0 - 8.4 g/dL Final     Albumin   Date Value Ref Range Status   11/15/2022 3.9 3.5 - 5.2 g/dL Final "     Total Bilirubin   Date Value Ref Range Status   11/15/2022 0.7 0.1 - 1.0 mg/dL Final     Comment:     For infants and newborns, interpretation of results should be based  on gestational age, weight and in agreement with clinical  observations.    Premature Infant recommended reference ranges:  Up to 24 hours.............<8.0 mg/dL  Up to 48 hours............<12.0 mg/dL  3-5 days..................<15.0 mg/dL  6-29 days.................<15.0 mg/dL       Alkaline Phosphatase   Date Value Ref Range Status   11/15/2022 85 55 - 135 U/L Final     AST   Date Value Ref Range Status   11/15/2022 17 10 - 40 U/L Final     ALT   Date Value Ref Range Status   11/15/2022 8 (L) 10 - 44 U/L Final     Anion Gap   Date Value Ref Range Status   11/15/2022 12 8 - 16 mmol/L Final     eGFR   Date Value Ref Range Status   11/15/2022 >60.0 >60 mL/min/1.73 m^2 Final        Diagnostic Results:  EGD, manometry, and most recent chest x-ray reviewed.     ASSESSMENT/PLAN:     71 y/o female with large hiatal hernia with GERD.     - Discussed anatomy and pathophysiology of hiatal hernias. Discussed method of repairing hiatal hernia and typical recovery.   - Will plan for pH study to prove esophagitis/objective evidence of GERD given no definitive evidence on EGD pathology.  - Discussed that patient is very high risk for recurrence of hiatal hernia if repaired given body habits. Strongly encouraged weight loss and offered dietician referral. Patient already sees a dietician.   - RTC after pH study to discuss results.     Latoya Guadarrama PA-C  [facility]    I spent a total of 45 minutes on the day of the visit.This includes face to face time and non-face to face time preparing to see the patient (eg, review of tests), obtaining and/or reviewing separately obtained history, documenting clinical information in the electronic or other health record, independently interpreting results and communicating results to the  patient/family/caregiver, or care coordinator.

## 2022-12-07 ENCOUNTER — TELEPHONE (OUTPATIENT)
Dept: ADMINISTRATIVE | Facility: HOSPITAL | Age: 72
End: 2022-12-07
Payer: MEDICARE

## 2022-12-15 ENCOUNTER — HOSPITAL ENCOUNTER (OUTPATIENT)
Dept: RADIOLOGY | Facility: HOSPITAL | Age: 72
Discharge: HOME | End: 2022-12-15
Attending: FAMILY MEDICINE
Payer: MEDICARE

## 2022-12-15 VITALS — HEIGHT: 59 IN | BODY MASS INDEX: 46.31 KG/M2

## 2022-12-15 DIAGNOSIS — Z12.31 SCREENING MAMMOGRAM FOR HIGH-RISK PATIENT: ICD-10-CM

## 2022-12-15 PROCEDURE — 77063 MAMMO DIGITAL SCREENING BILAT WITH TOMO: ICD-10-PCS | Mod: 26,,, | Performed by: RADIOLOGY

## 2022-12-15 PROCEDURE — 77067 SCR MAMMO BI INCL CAD: CPT | Mod: TC

## 2022-12-15 PROCEDURE — 77067 MAMMO DIGITAL SCREENING BILAT WITH TOMO: ICD-10-PCS | Mod: 26,,, | Performed by: RADIOLOGY

## 2022-12-15 PROCEDURE — 77067 SCR MAMMO BI INCL CAD: CPT | Mod: 26,,, | Performed by: RADIOLOGY

## 2022-12-15 PROCEDURE — 77063 BREAST TOMOSYNTHESIS BI: CPT | Mod: TC

## 2022-12-15 PROCEDURE — 77063 BREAST TOMOSYNTHESIS BI: CPT | Mod: 26,,, | Performed by: RADIOLOGY

## 2023-01-09 ENCOUNTER — PES CALL (OUTPATIENT)
Dept: ADMINISTRATIVE | Facility: CLINIC | Age: 73
End: 2023-01-09
Payer: MEDICARE

## 2023-01-18 ENCOUNTER — PES CALL (OUTPATIENT)
Dept: ADMINISTRATIVE | Facility: CLINIC | Age: 73
End: 2023-01-18
Payer: MEDICARE

## 2023-01-23 ENCOUNTER — OFFICE VISIT (OUTPATIENT)
Dept: CARDIOLOGY | Facility: CLINIC | Age: 73
End: 2023-01-23
Payer: MEDICARE

## 2023-01-23 ENCOUNTER — TELEPHONE (OUTPATIENT)
Dept: ENDOSCOPY | Facility: HOSPITAL | Age: 73
End: 2023-01-23
Payer: MEDICARE

## 2023-01-23 VITALS
HEIGHT: 59 IN | BODY MASS INDEX: 46.49 KG/M2 | OXYGEN SATURATION: 98 % | HEART RATE: 66 BPM | WEIGHT: 230.63 LBS | SYSTOLIC BLOOD PRESSURE: 128 MMHG | DIASTOLIC BLOOD PRESSURE: 60 MMHG

## 2023-01-23 DIAGNOSIS — I10 ESSENTIAL HYPERTENSION: ICD-10-CM

## 2023-01-23 DIAGNOSIS — E66.01 MORBID OBESITY WITH BMI OF 45.0-49.9, ADULT: ICD-10-CM

## 2023-01-23 DIAGNOSIS — K44.9 HIATAL HERNIA WITH GERD: ICD-10-CM

## 2023-01-23 DIAGNOSIS — K21.9 HIATAL HERNIA WITH GERD: ICD-10-CM

## 2023-01-23 DIAGNOSIS — I70.0 ATHEROSCLEROSIS OF AORTA: Primary | ICD-10-CM

## 2023-01-23 DIAGNOSIS — I20.89 STABLE ANGINA PECTORIS: ICD-10-CM

## 2023-01-23 DIAGNOSIS — R00.0 TACHYCARDIA: ICD-10-CM

## 2023-01-23 DIAGNOSIS — I34.1 MVP (MITRAL VALVE PROLAPSE): ICD-10-CM

## 2023-01-23 DIAGNOSIS — R00.2 PALPITATIONS: ICD-10-CM

## 2023-01-23 DIAGNOSIS — R06.09 DYSPNEA ON EXERTION: ICD-10-CM

## 2023-01-23 DIAGNOSIS — Z90.49 S/P LAPAROSCOPIC CHOLECYSTECTOMY: ICD-10-CM

## 2023-01-23 DIAGNOSIS — R94.2 DIFFUSION CAPACITY OF LUNG (DL), DECREASED: ICD-10-CM

## 2023-01-23 DIAGNOSIS — E78.2 MIXED HYPERLIPIDEMIA: ICD-10-CM

## 2023-01-23 PROCEDURE — 99214 OFFICE O/P EST MOD 30 MIN: CPT | Mod: S$PBB,,, | Performed by: INTERNAL MEDICINE

## 2023-01-23 PROCEDURE — 99999 PR PBB SHADOW E&M-EST. PATIENT-LVL IV: CPT | Mod: PBBFAC,,, | Performed by: INTERNAL MEDICINE

## 2023-01-23 PROCEDURE — 99214 OFFICE O/P EST MOD 30 MIN: CPT | Mod: PBBFAC | Performed by: INTERNAL MEDICINE

## 2023-01-23 PROCEDURE — 99999 PR PBB SHADOW E&M-EST. PATIENT-LVL IV: ICD-10-PCS | Mod: PBBFAC,,, | Performed by: INTERNAL MEDICINE

## 2023-01-23 PROCEDURE — 99214 PR OFFICE/OUTPT VISIT, EST, LEVL IV, 30-39 MIN: ICD-10-PCS | Mod: S$PBB,,, | Performed by: INTERNAL MEDICINE

## 2023-01-23 NOTE — PROGRESS NOTES
Subjective:   Patient ID:  Yoana Pardo is a 72 y.o. female who presents for follow-up of No chief complaint on file.    A 71 yo obese female with htn hlp mvp pulmonary htn is referred from DR OROZCO for rhc. She has shortness of breath she does not feel rested despite cpap her pa pressures have increased. Has no angina no leg swelling.  Compared to previously she has been consistently using her cpap. Has been compliant with salt intake.     11/17/2020  No change in status she needs to have bariatric surgery she is compliant with meds htn controlled. Not compliant with cpap has no headache tia claudication  No leg swelling no chest pain. She has moderate pulmonary htn by cath.no shortness of breath.     Patient presents the office and actually has low blood pressure today will cut back losartan 50 mg daily.  Follow-up evaluation within 6 weeks.        Blood pressure under better control today however the patient complains of chest pain EKG pending the and stable patient will have follow-up nuclear stress test.        This patient presents to the office in much improved no acute shortness of breath doing well current medications.  Nuclear stress test showed no evidence of cardiac ischemia LV function showed normal heart function with mild-to-moderate pulmonary hypertension being treated.   NO FOCAL CNS SYMPTOMS OR SIGNS TO SUGGEST TIA OR STROKE  NO ANGINA OR EQUIVALENT  NO UNUSUAL HERNÁNDEZ. NO ORTHOPNEA OR PND  NO PALPITATIONS  NO NEAR SYNCOPE OR SYNCOPE  NO EDEMA. NO CALVE TENDERNESS        11/21/22-today the patient presents in the office with stable heart rate blood pressure on reduced losartan.  Overall doing well no exertional symptoms chest pain shortness of breath.  The patient is planning eventually to have hiatal hernia surgery and I think she would be cleared to have that done in the future  01/23/2023:   Overall the patient is doing well blood pressure is stable patient is planned have endoscopy this  week.  This is pre gastric bypass surgery.  Overall she is stable blood pressure is under good control she continues on current doses of losartan and HydroDIURIL.  Otherwise stable this time.  No exertional chest pain prior nuclear stress test showed no evidence cardiac ischemia.      Review of Systems   Constitutional: Negative for chills, diaphoresis, night sweats, weight gain and weight loss.   HENT:  Negative for congestion, hoarse voice, sore throat and stridor.    Eyes:  Negative for double vision and pain.   Cardiovascular:  Negative for chest pain, claudication, cyanosis, dyspnea on exertion, irregular heartbeat, leg swelling, near-syncope, orthopnea, palpitations, paroxysmal nocturnal dyspnea and syncope.   Respiratory:  Negative for cough, hemoptysis, shortness of breath, sleep disturbances due to breathing, snoring, sputum production and wheezing.    Endocrine: Negative for cold intolerance, heat intolerance and polydipsia.   Hematologic/Lymphatic: Negative for bleeding problem. Does not bruise/bleed easily.   Skin:  Negative for color change, dry skin and rash.   Musculoskeletal:  Negative for joint swelling and muscle cramps.   Gastrointestinal:  Negative for bloating, abdominal pain, constipation, diarrhea, dysphagia, melena, nausea and vomiting.   Genitourinary:  Negative for flank pain and urgency.   Neurological:  Negative for dizziness, focal weakness, headaches, light-headedness, loss of balance, seizures and weakness.   Psychiatric/Behavioral:  Negative for altered mental status and memory loss. The patient is not nervous/anxious.    Family History   Problem Relation Age of Onset    Heart disease Mother     Obesity Mother     Kidney disease Father     Hypertension Father     Obesity Father     Heart disease Father     Diabetes Sister     Aneurysm Sister         AAA     Past Medical History:   Diagnosis Date    GERD (gastroesophageal reflux disease)     Hemorrhoids     History of  positive PPD, untreated     Hx of cold sores     Hyperlipidemia     Hypertension     MVP (mitral valve prolapse)     Peripheral neuropathy     Pulmonary HTN     Dr Bailon    Sleep apnea     [place]-CPAP  uses intermittently     Social History     Socioeconomic History    Marital status:     Number of children: 3   Occupational History    Occupation: [employer] occupational      Employer: [employer]   Tobacco Use    Smoking status: Never    Smokeless tobacco: Never   Substance and Sexual Activity    Alcohol use: Yes     Alcohol/week: 1.0 standard drink     Types: 1 Shots of liquor per week    Drug use: No    Sexual activity: Not Currently     Partners: Male     Current Outpatient Medications on File Prior to Visit   Medication Sig Dispense Refill    acetaminophen (TYLENOL) 500 MG tablet Take 500 mg by mouth every 6 (six) hours as needed for Pain.      aspirin (ECOTRIN) 81 MG EC tablet Take 81 mg by mouth once daily.      ciclopirox (PENLAC) 8 % Soln Apply topically nightly. 1 Bottle 10    furosemide (LASIX) 20 MG tablet Take 1 tablet (20 mg total) by mouth 2 (two) times daily as needed (as needed). 60 tablet 3    hydroCHLOROthiazide (HYDRODIURIL) 25 MG tablet Take 1 tablet (25 mg total) by mouth once daily. 90 tablet 0    ipratropium (ATROVENT) 0.03 % nasal spray 2 sprays by Nasal route every 8 (eight) hours as needed for Rhinitis. 30 mL 4    levalbuterol (XOPENEX HFA) 45 mcg/actuation inhaler Xopenex HFA Take 1-2 puff(s) (inhalation) every 4-6 hours PRN - Wheezing [id number] HFA aerosol inhaler every 4-6 hours  inhalation No set duration recorded No set duration amount recorded active 45 mcg/actuation      losartan (COZAAR) 50 MG tablet Take 1 tablet (50 mg total) by mouth once daily. 90 tablet 1    pantoprazole (PROTONIX) 40 MG tablet Take 1 tablet (40 mg total) by mouth once daily. 90 tablet 0    pregabalin (LYRICA) 75 MG capsule Take 1 capsule (75 mg total) by mouth  once daily. 30 capsule 3    sildenafil (REVATIO) 20 mg Tab Take 1 tablet (20 mg total) by mouth 3 (three) times daily. 30 tablet 0    simvastatin (ZOCOR) 20 MG tablet Take 1 tablet (20 mg total) by mouth every evening. 90 tablet 1    [DISCONTINUED] gabapentin (NEURONTIN) 100 MG capsule TAKE 1 CAPSULE BY MOUTH NIGHTLY AS NEEDED 30 capsule 3     Current Facility-Administered Medications on File Prior to Visit   Medication Dose Route Frequency Provider Last Rate Last Admin    lactated ringers infusion   Intravenous Continuous Deloris Galicia MD         Review of patient's allergies indicates:   Allergen Reactions    Lisinopril Other (See Comments)     Cough      Bactrim [sulfamethoxazole-trimethoprim] Itching    Zosyn [piperacillin-tazobactam] Hives     Pt received second dose of Zosyn and had hives on both arms. Benadryl given and pt continued to receive zosyn with no issues. Hydralazine also given around the same time and discontinued.        Objective:     Physical Exam  Eyes:      Pupils: Pupils are equal, round, and reactive to light.   Neck:      Trachea: No tracheal deviation.   Cardiovascular:      Rate and Rhythm: Normal rate and regular rhythm.      Pulses: Intact distal pulses.           Carotid pulses are 2+ on the right side and 2+ on the left side.       Radial pulses are 2+ on the right side and 2+ on the left side.        Femoral pulses are 2+ on the right side and 2+ on the left side.       Popliteal pulses are 2+ on the right side and 2+ on the left side.        Dorsalis pedis pulses are 2+ on the right side and 2+ on the left side.        Posterior tibial pulses are 2+ on the right side and 2+ on the left side.      Heart sounds: Normal heart sounds. No murmur heard.    No friction rub. No gallop.   Pulmonary:      Effort: Pulmonary effort is normal. No respiratory distress.      Breath sounds: Normal breath sounds. No stridor. No wheezing or rales.   Chest:      Chest wall: No  tenderness.   Abdominal:      General: There is no distension.      Tenderness: There is no abdominal tenderness. There is no rebound.   Musculoskeletal:         General: No tenderness.      Cervical back: Normal range of motion.   Skin:     General: Skin is warm and dry.   Neurological:      Mental Status: She is alert and oriented to person, place, and time.       Nuclear stress test 08/03/2022:   Normal myocardial perfusion scan. There is no evidence of myocardial ischemia or infarction.    The gated perfusion images showed an ejection fraction of > 70% at rest. The gated perfusion images showed an ejection fraction of > 70% post stress.    The EKG portion of this study is negative for ischemia.    The patient reported no chest pain during the stress test.    During stress, occasional PACs are noted.  Assessment:     1. Atherosclerosis of aorta    2. Dyspnea on exertion    3. Hiatal hernia with GERD    4. Tachycardia    5. Palpitations    6. Morbid obesity with BMI of 45.0-49.9, adult    7. Essential hypertension    8. S/P laparoscopic cholecystectomy    9. Mixed hyperlipidemia    10. Stable angina pectoris    11. MVP (mitral valve prolapse)    12. Diffusion capacity of lung (dl), decreased        Plan:     Atherosclerosis of aorta    Dyspnea on exertion    Hiatal hernia with GERD    Tachycardia    Palpitations    Morbid obesity with BMI of 45.0-49.9, adult    Essential hypertension    S/P laparoscopic cholecystectomy    Mixed hyperlipidemia    Stable angina pectoris    MVP (mitral valve prolapse)    Diffusion capacity of lung (dl), decreased      Impression 1. Hypertension good control with losartan 100 hydrochlorothiazide.    2. Hyperlipidemia stable   3 stable chest discomfort negative stress test for cardiac ischemia  Preop patient is cleared for upcoming endoscopy at low cardiac risk, continue all current medications follow-up evaluation after after 2 months.  Sooner if there are acute changes.

## 2023-01-23 NOTE — TELEPHONE ENCOUNTER
PLEASE ADVISE BRYAN KOHLI WAS SEEN IN CARD.CLINIC TODAY 1/23/23.THIS PATIENT IS SCHEDULED FOR EGD Thursday 1/26/23. NEED CARDIAC CLEARANCE. THANKS

## 2023-01-25 ENCOUNTER — ANESTHESIA EVENT (OUTPATIENT)
Dept: ENDOSCOPY | Facility: HOSPITAL | Age: 73
End: 2023-01-25
Payer: MEDICARE

## 2023-01-26 ENCOUNTER — ANESTHESIA (OUTPATIENT)
Dept: ENDOSCOPY | Facility: HOSPITAL | Age: 73
End: 2023-01-26
Payer: MEDICARE

## 2023-01-26 ENCOUNTER — HOSPITAL ENCOUNTER (OUTPATIENT)
Facility: HOSPITAL | Age: 73
Discharge: HOME | End: 2023-01-26
Attending: INTERNAL MEDICINE | Admitting: INTERNAL MEDICINE
Payer: MEDICARE

## 2023-01-26 VITALS
RESPIRATION RATE: 16 BRPM | OXYGEN SATURATION: 96 % | WEIGHT: 230 LBS | HEART RATE: 74 BPM | HEIGHT: 59 IN | TEMPERATURE: 98 F | SYSTOLIC BLOOD PRESSURE: 146 MMHG | BODY MASS INDEX: 46.37 KG/M2 | DIASTOLIC BLOOD PRESSURE: 76 MMHG

## 2023-01-26 DIAGNOSIS — K21.9 GERD (GASTROESOPHAGEAL REFLUX DISEASE): ICD-10-CM

## 2023-01-26 PROCEDURE — 88305 TISSUE EXAM BY PATHOLOGIST: CPT | Performed by: PATHOLOGY

## 2023-01-26 PROCEDURE — 88305 TISSUE EXAM BY PATHOLOGIST: CPT | Mod: 26,,, | Performed by: PATHOLOGY

## 2023-01-26 PROCEDURE — 43239 PR EGD, FLEX, W/BIOPSY, SGL/MULTI: ICD-10-PCS | Mod: ,,, | Performed by: INTERNAL MEDICINE

## 2023-01-26 PROCEDURE — 63600175 PHARM REV CODE 636 W HCPCS: Performed by: NURSE ANESTHETIST, CERTIFIED REGISTERED

## 2023-01-26 PROCEDURE — 25000003 PHARM REV CODE 250: Performed by: NURSE ANESTHETIST, CERTIFIED REGISTERED

## 2023-01-26 PROCEDURE — 88342 IMHCHEM/IMCYTCHM 1ST ANTB: CPT | Mod: 26,,, | Performed by: PATHOLOGY

## 2023-01-26 PROCEDURE — 37000009 HC ANESTHESIA EA ADD 15 MINS: Performed by: INTERNAL MEDICINE

## 2023-01-26 PROCEDURE — 88342 CHG IMMUNOCYTOCHEMISTRY: ICD-10-PCS | Mod: 26,,, | Performed by: PATHOLOGY

## 2023-01-26 PROCEDURE — 43239 EGD BIOPSY SINGLE/MULTIPLE: CPT | Mod: ,,, | Performed by: INTERNAL MEDICINE

## 2023-01-26 PROCEDURE — 27201012 HC FORCEPS, HOT/COLD, DISP: Performed by: INTERNAL MEDICINE

## 2023-01-26 PROCEDURE — 37000008 HC ANESTHESIA 1ST 15 MINUTES: Performed by: INTERNAL MEDICINE

## 2023-01-26 PROCEDURE — 43239 EGD BIOPSY SINGLE/MULTIPLE: CPT | Performed by: INTERNAL MEDICINE

## 2023-01-26 PROCEDURE — 88305 TISSUE EXAM BY PATHOLOGIST: ICD-10-PCS | Mod: 26,,, | Performed by: PATHOLOGY

## 2023-01-26 RX ORDER — SODIUM CHLORIDE, SODIUM LACTATE, POTASSIUM CHLORIDE, CALCIUM CHLORIDE 600; 310; 30; 20 MG/100ML; MG/100ML; MG/100ML; MG/100ML
INJECTION, SOLUTION INTRAVENOUS CONTINUOUS
Status: DISCONTINUED | OUTPATIENT
Start: 2023-01-26 | End: 2023-01-26 | Stop reason: HOSPADM

## 2023-01-26 RX ORDER — PROPOFOL 10 MG/ML
VIAL (ML) INTRAVENOUS
Status: DISCONTINUED | OUTPATIENT
Start: 2023-01-26 | End: 2023-01-26

## 2023-01-26 RX ORDER — SODIUM CHLORIDE, SODIUM LACTATE, POTASSIUM CHLORIDE, CALCIUM CHLORIDE 600; 310; 30; 20 MG/100ML; MG/100ML; MG/100ML; MG/100ML
INJECTION, SOLUTION INTRAVENOUS CONTINUOUS PRN
Status: DISCONTINUED | OUTPATIENT
Start: 2023-01-26 | End: 2023-01-26

## 2023-01-26 RX ORDER — LIDOCAINE HYDROCHLORIDE 10 MG/ML
INJECTION, SOLUTION EPIDURAL; INFILTRATION; INTRACAUDAL; PERINEURAL
Status: DISCONTINUED | OUTPATIENT
Start: 2023-01-26 | End: 2023-01-26

## 2023-01-26 RX ADMIN — PROPOFOL 20 MG: 10 INJECTION, EMULSION INTRAVENOUS at 07:01

## 2023-01-26 RX ADMIN — PROPOFOL 100 MG: 10 INJECTION, EMULSION INTRAVENOUS at 07:01

## 2023-01-26 RX ADMIN — PROPOFOL 30 MG: 10 INJECTION, EMULSION INTRAVENOUS at 07:01

## 2023-01-26 RX ADMIN — GLYCOPYRROLATE 0.2 MG: 0.2 INJECTION, SOLUTION INTRAMUSCULAR; INTRAVITREAL at 07:01

## 2023-01-26 RX ADMIN — SODIUM CHLORIDE, SODIUM LACTATE, POTASSIUM CHLORIDE, AND CALCIUM CHLORIDE: .6; .31; .03; .02 INJECTION, SOLUTION INTRAVENOUS at 07:01

## 2023-01-26 RX ADMIN — LIDOCAINE HYDROCHLORIDE 50 MG: 10 INJECTION, SOLUTION EPIDURAL; INFILTRATION; INTRACAUDAL; PERINEURAL at 07:01

## 2023-01-26 NOTE — TRANSFER OF CARE
"Anesthesia Transfer of Care Note    Patient: Yoana Pardo    Procedure(s) Performed: Procedure(s) (LRB):  EGD (ESOPHAGOGASTRODUODENOSCOPY) (N/A)    Patient location: PACU    Anesthesia Type: MAC    Transport from OR: Transported from OR on room air with adequate spontaneous ventilation    Post pain: adequate analgesia    Post assessment: no apparent anesthetic complications and tolerated procedure well    Post vital signs: stable    Level of consciousness: sedated    Nausea/Vomiting: no nausea/vomiting    Complications: none    Transfer of care protocol was followed      Last vitals:   Visit Vitals  BP (!) 167/77 (BP Location: Left arm, Patient Position: Lying)   Pulse 60   Temp 36.5 °C (97.7 °F)   Resp 13   Ht 4' 11" (1.499 m)   Wt 104.3 kg (230 lb)   SpO2 97%   Breastfeeding No   BMI 46.45 kg/m²     "

## 2023-01-26 NOTE — ANESTHESIA PREPROCEDURE EVALUATION
01/26/2023  Yoana Pardo is a 72 y.o., female.      Pre-op Assessment    I have reviewed the Patient Summary Reports.     I have reviewed the Nursing Notes. I have reviewed the NPO Status.   I have reviewed the Medications.     Review of Systems  Anesthesia Hx:  No problems with previous Anesthesia  Denies Family Hx of Anesthesia complications.   Denies Personal Hx of Anesthesia complications.   Social:  Social Alcohol Use, Non-Smoker    Hematology/Oncology:  Hematology Normal   Oncology Normal     Cardiovascular:   Hypertension Valvular problems/Murmurs, MVP Denies MI.   Denies CABG/stent.   Denies CHF. hyperlipidemia ECG has been reviewed. ekg 7/2022:  Normal sinus rhythm 64  Nonspecific T wave abnormality   Abnormal ECG   When compared with ECG of 10-ANDREA-2022 15:43,   Premature ventricular complexes are no longer Present   Premature supraventricular complexes are no longer Present    Stress 8/2022:    Normal myocardial perfusion scan. There is no evidence of myocardial ischemia or infarction.    The gated perfusion images showed an ejection fraction of > 70% at rest. The gated perfusion images showed an ejection fraction of > 70% post stress.    The EKG portion of this study is negative for ischemia.    The patient reported no chest pain during the stress test.    During stress, occasional PACs are noted   Pulmonary:   Denies COPD.  Denies Asthma. Sleep Apnea pulm htn   Renal/:  Renal/ Normal     Hepatic/GI:   Hiatal Hernia, GERD Denies Liver Disease. Denies Hepatitis.    Musculoskeletal:   Arthritis     Neurological:   Denies CVA. Denies Seizures. Memory loss  Peripheral Neuropathy    Endocrine:   Denies Diabetes. Denies Hypothyroidism. Denies Hyperthyroidism.  Morbid Obesity / BMI > 40  Psych:   Behaviorally induced insufficient sleep syndrome         Physical  Exam    Airway:  Mallampati: II   Mouth Opening: Normal    Dental:Multiple broken teeth  Chest/Lungs:  Clear to auscultation, Normal Respiratory Rate    Heart:  Rate: Normal  Rhythm: Regular Rhythm        Anesthesia Plan  Type of Anesthesia, risks & benefits discussed:    Anesthesia Type: MAC  Intra-op Monitoring Plan: Standard ASA Monitors  Induction:  IV  Informed Consent: Informed consent signed with the Patient and all parties understand the risks and agree with anesthesia plan.  All questions answered.   ASA Score: 3  Day of Surgery Review of History & Physical: H&P Update referred to the surgeon/provider.    Ready For Surgery From Anesthesia Perspective.     .

## 2023-01-26 NOTE — [employer] PATIENT INSTRUCTIONS
Discharge Summary/Instructions after an Endoscopic Procedure  Patient Name: Yoana Pardo  Patient MRN: [id number]  Patient YOB: 1950 Thursday, January 26, 2023 Jasvir Robert MD  Dear patient,  As a result of recent federal legislation (The Federal Cures Act), you may   receive lab or pathology results from your procedure in your [facility]   account before your physician is able to contact you. Your physician or   their representative will relay the results to you with their   recommendations at their soonest availability.  Thank you,  RESTRICTIONS:  During your procedure today, you received medications for sedation.  These   medications may affect your judgment, balance and coordination.  Therefore,   for 24 hours, you have the following restrictions:   - DO NOT drive a car, operate machinery, make legal/financial decisions,   sign important papers or drink alcohol.    ACTIVITY:  Today: no heavy lifting, straining or running due to procedural   sedation/anesthesia.  The following day: return to full activity including work.  DIET:  Eat and drink normally unless instructed otherwise.     TREATMENT FOR COMMON SIDE EFFECTS:  - Mild abdominal pain, nausea, belching, bloating or excessive gas:  rest,   eat lightly and use a heating pad.  - Sore Throat: treat with throat lozenges and/or gargle with warm salt   water.  - Because air was used during the procedure, expelling large amounts of air   from your rectum or belching is normal.  - If a bowel prep was taken, you may not have a bowel movement for 1-3 days.    This is normal.  SYMPTOMS TO WATCH FOR AND REPORT TO YOUR PHYSICIAN:  1. Abdominal pain or bloating, other than gas cramps.  2. Chest pain.  3. Back pain.  4. Signs of infection such as: chills or fever occurring within 24 hours   after the procedure.  5. Rectal bleeding, which would show as bright red, maroon, or black stools.   (A tablespoon of blood from the rectum is not serious,  especially if   hemorrhoids are present.)  6. Vomiting.  7. Weakness or dizziness.  GO DIRECTLY TO THE NEAREST EMERGENCY ROOM IF YOU HAVE ANY OF THE FOLLOWING:      Difficulty breathing              Chills and/or fever over 101 F   Persistent vomiting and/or vomiting blood   Severe abdominal pain   Severe chest pain   Black, tarry stools   Bleeding- more than one tablespoon   Any other symptom or condition that you feel may need urgent attention  Your doctor recommends these additional instructions:  If any biopsies were taken, your doctors clinic will contact you in 1 to 2   weeks with any results.  - Patient has a contact number available for emergencies.  The signs and   symptoms of potential delayed complications were discussed with the   patient.  Return to normal activities tomorrow.  Written discharge   instructions were provided to the patient.   - Resume previous diet.   - Continue plan as advised post Bravo placement  - Return to referring physician.   - Discharge patient to home (via wheelchair).  For questions, problems or results please call your physician Jasvir Robert MD at Work:  [phone]  If you have any questions about the above instructions, call the GI   department at [phone] or call the endoscopy unit at [phone]   from 7am until 3 pm.  [facility] [place], EMERGENCY ROOM PHONE NUMBER:   [phone]  IF A COMPLICATION OR EMERGENCY SITUATION ARISES AND YOU ARE UNABLE TO REACH   YOUR PHYSICIAN - GO DIRECTLY TO THE EMERGENCY ROOM.  I have read or have had read to me these discharge instructions for my   procedure and have received a written copy.  I understand these   instructions and will follow-up with my physician if I have any questions.     __________________________________       _____________________________________  Nurse Signature                                          Patient/Designated   Responsible Party Signature  Jasvir Robert  MD Jasvir Robert MD  1/26/2023 8:03:36 AM  This report has been verified and signed electronically.  Dear patient,  As a result of recent federal legislation (The Federal Cures Act), you may   receive lab or pathology results from your procedure in your [facility]   account before your physician is able to contact you. Your physician or   their representative will relay the results to you with their   recommendations at their soonest availability.  Thank you,  PROVATION

## 2023-01-26 NOTE — DISCHARGE SUMMARY
[facility] - Endoscopy (Hospital)  Discharge Note  Short Stay    Procedure(s) (LRB):  EGD (ESOPHAGOGASTRODUODENOSCOPY) (N/A)      OUTCOME: Patient tolerated treatment/procedure well without complication and is now ready for discharge.    DISPOSITION: Home or Self Care    FINAL DIAGNOSIS:  <principal problem not specified>    FOLLOWUP: With primary care provider    DISCHARGE INSTRUCTIONS:  No discharge procedures on file.     TIME SPENT ON DISCHARGE: 20   minutes

## 2023-01-26 NOTE — PLAN OF CARE
Yaw,RN at bedside to instruct patient(awake) and spouse on BRAVO device and use, literature with device , both v/u

## 2023-01-26 NOTE — H&P
Short Stay Endoscopy History and Physical    PCP - Lynn Wiggins MD  Referring Physician - Latoya Guadarrama PA-C  [id number] [facility]  [place]  [place] [address]    Procedure - Endoscopy  ASA - per anesthesia  Mallampati - per anesthesia  History of Anesthesia problems - no  Family history Anesthesia problems -  no   Plan of anesthesia - General    HPI  72 y.o. female  Reason for procedure: GERD for Ph Metry          ROS:  Constitutional: No fevers, chills, No weight loss  CV: No chest pain  Pulm: No cough, No shortness of breath  GI: see HPI    Medical History:  has a past medical history of GERD (gastroesophageal reflux disease), Hemorrhoids, History of positive PPD, untreated, cold sores, Hyperlipidemia, Hypertension, MVP (mitral valve prolapse), Peripheral neuropathy, Pulmonary HTN, and Sleep apnea.    Surgical History:  has a past surgical history that includes Dilation and curettage of uterus; Cholecystectomy; Right heart catheterization (Right, 6/15/2020); Esophagogastroduodenoscopy (N/A, 12/2/2020); Esophageal motility study using high resolution manometry (N/A, 11/3/2021); Esophagogastroduodenoscopy (N/A, 11/11/2021); and Colonoscopy (N/A, 11/11/2021).    Family History: family history includes Aneurysm in her sister; Diabetes in her sister; Heart disease in her father and mother; Hypertension in her father; Kidney disease in her father; Obesity in her father and mother..    Social History:  reports that she has never smoked. She has never used smokeless tobacco. She reports current alcohol use of about 1.0 standard drink per week. She reports that she does not use drugs.    Review of patient's allergies indicates:   Allergen Reactions    Lisinopril Other (See Comments)     Cough      Bactrim [sulfamethoxazole-trimethoprim] Itching    Zosyn [piperacillin-tazobactam] Hives     Pt received second dose of Zosyn and had hives on both arms. Benadryl given and pt continued to receive zosyn with no issues.  Hydralazine also given around the same time and discontinued.        Medications:   Medications Prior to Admission   Medication Sig Dispense Refill Last Dose    acetaminophen (TYLENOL) 500 MG tablet Take 500 mg by mouth every 6 (six) hours as needed for Pain.   Past Month    aspirin (ECOTRIN) 81 MG EC tablet Take 81 mg by mouth once daily.   Past Week    ciclopirox (PENLAC) 8 % Soln Apply topically nightly. 1 Bottle 10 Past Month    furosemide (LASIX) 20 MG tablet Take 1 tablet (20 mg total) by mouth 2 (two) times daily as needed (as needed). 60 tablet 3 Past Month    hydroCHLOROthiazide (HYDRODIURIL) 25 MG tablet Take 1 tablet (25 mg total) by mouth once daily. 90 tablet 0 1/26/2023    ipratropium (ATROVENT) 0.03 % nasal spray 2 sprays by Nasal route every 8 (eight) hours as needed for Rhinitis. 30 mL 4 Past Month    losartan (COZAAR) 50 MG tablet Take 1 tablet (50 mg total) by mouth once daily. 90 tablet 1 1/26/2023    pantoprazole (PROTONIX) 40 MG tablet Take 1 tablet (40 mg total) by mouth once daily. 90 tablet 0 1/25/2023    pregabalin (LYRICA) 75 MG capsule Take 1 capsule (75 mg total) by mouth once daily. 30 capsule 3 Past Week    sildenafil (REVATIO) 20 mg Tab Take 1 tablet (20 mg total) by mouth 3 (three) times daily. 30 tablet 0 Past Week    simvastatin (ZOCOR) 20 MG tablet Take 1 tablet (20 mg total) by mouth every evening. 90 tablet 1 Past Week    levalbuterol (XOPENEX HFA) 45 mcg/actuation inhaler Xopenex HFA Take 1-2 puff(s) (inhalation) every 4-6 hours PRN - Wheezing [id number] HFA aerosol inhaler every 4-6 hours  inhalation No set duration recorded No set duration amount recorded active 45 mcg/actuation   More than a month       Physical Exam:    Vital Signs:   Vitals:    01/26/23 0649   BP: (!) 167/77   Pulse: 60   Resp: 13   Temp: 97.7 °F (36.5 °C)       General Appearance: Well appearing in no acute distress  Abdomen: Soft, non tender, non distended with normal bowel sounds, no masses    Labs:  Lab  Results   Component Value Date    WBC 6.40 11/15/2022    HGB 13.2 11/15/2022    HCT 42.4 11/15/2022     11/15/2022    CHOL 214 (H) 11/15/2022    TRIG 188 (H) 11/15/2022    HDL 49 11/15/2022    ALT 8 (L) 11/15/2022    AST 17 11/15/2022     11/15/2022    K 3.7 11/15/2022     11/15/2022    CREATININE 0.8 11/15/2022    BUN 11 11/15/2022    CO2 28 11/15/2022    TSH 0.821 11/15/2022    INR 1.0 06/15/2020    HGBA1C 5.6 01/18/2006       I have explained the risks and benefits of this endoscopic procedure to the patient including but not limited to bleeding, inflammation, infection, perforation, and death.    SEDATION PLAN: per anesthesia      History reviewed, vital signs satisfactory, cardiopulmonary status satisfactory, sedation options, risks and plans have been discussed with the patient  All their questions were answered and the patient agrees to the sedation procedures as planned and the patient is deemed an appropriate candidate for the sedation as planned.    Procedure explained to patient, informed consent obtained and placed in chart.        Jasvir Robert MD

## 2023-01-26 NOTE — PLAN OF CARE
MD at bedside to discuss results with pt and family. BURT XAVIER.   Pt denies pain/nausea  Discharge orders noted

## 2023-01-26 NOTE — ANESTHESIA POSTPROCEDURE EVALUATION
Anesthesia Post Evaluation    Patient: Yoana Pardo    Procedure(s) Performed: Procedure(s) (LRB):  EGD (ESOPHAGOGASTRODUODENOSCOPY) (N/A)    Final Anesthesia Type: MAC      Patient location during evaluation: PACU  Patient participation: No - Unable to Participate, Sedation  Level of consciousness: sedated  Post-procedure vital signs: reviewed and stable  Pain management: adequate  Airway patency: patent    PONV status at discharge: No PONV  Anesthetic complications: no      Cardiovascular status: blood pressure returned to baseline and hemodynamically stable  Respiratory status: unassisted and spontaneous ventilation  Hydration status: euvolemic  Follow-up not needed.              No case tracking events are documented in the log.      Pain/Fredi Score: No data recorded

## 2023-01-26 NOTE — PLAN OF CARE
Reviewed BRAVO device w/Pt and spouse  V/U. Able to teach back  Handout in packet  Device on patient once dressed

## 2023-02-02 LAB
FINAL PATHOLOGIC DIAGNOSIS: NORMAL
Lab: NORMAL

## 2023-02-03 ENCOUNTER — PATIENT MESSAGE (OUTPATIENT)
Dept: HEPATOLOGY | Facility: CLINIC | Age: 73
End: 2023-02-03
Payer: MEDICARE

## 2023-02-10 NOTE — PROVATION PATIENT INSTRUCTIONS
Discharge Summary/Instructions after an Endoscopic Procedure  Patient Name: Yoana Pardo  Patient MRN: [id number]  Patient YOB: 1950 Thursday, January 26, 2023  Zachary Clifford MD  Dear patient,  As a result of recent federal legislation (The Federal Cures Act), you may   receive lab or pathology results from your procedure in your [facility]   account before your physician is able to contact you. Your physician or   their representative will relay the results to you with their   recommendations at their soonest availability.  Thank you,  RESTRICTIONS:  During your procedure today, you received medications for sedation.  These   medications may affect your judgment, balance and coordination.  Therefore,   for 24 hours, you have the following restrictions:   - DO NOT drive a car, operate machinery, make legal/financial decisions,   sign important papers or drink alcohol.    ACTIVITY:  Today: no heavy lifting, straining or running due to procedural   sedation/anesthesia.  The following day: return to full activity including work.  DIET:  Eat and drink normally unless instructed otherwise.     TREATMENT FOR COMMON SIDE EFFECTS:  - Mild abdominal pain, nausea, belching, bloating or excessive gas:  rest,   eat lightly and use a heating pad.  - Sore Throat: treat with throat lozenges and/or gargle with warm salt   water.  - Because air was used during the procedure, expelling large amounts of air   from your rectum or belching is normal.  - If a bowel prep was taken, you may not have a bowel movement for 1-3 days.    This is normal.  SYMPTOMS TO WATCH FOR AND REPORT TO YOUR PHYSICIAN:  1. Abdominal pain or bloating, other than gas cramps.  2. Chest pain.  3. Back pain.  4. Signs of infection such as: chills or fever occurring within 24 hours   after the procedure.  5. Rectal bleeding, which would show as bright red, maroon, or black stools.   (A tablespoon of blood from the rectum is not serious,  especially if   hemorrhoids are present.)  6. Vomiting.  7. Weakness or dizziness.  GO DIRECTLY TO THE NEAREST EMERGENCY ROOM IF YOU HAVE ANY OF THE FOLLOWING:      Difficulty breathing              Chills and/or fever over 101 F   Persistent vomiting and/or vomiting blood   Severe abdominal pain   Severe chest pain   Black, tarry stools   Bleeding- more than one tablespoon   Any other symptom or condition that you feel may need urgent attention  Your doctor recommends these additional instructions:  If any biopsies were taken, your doctors clinic will contact you in 1 to 2   weeks with any results.  - Discharge patient to home.   - Resume previous diet.   - Continue present medications.   - Return to referring physician.  For questions, problems or results please call your physician Zachary Clifford MD at Work:  [phone]  If you have any questions about the above instructions, call the GI   department at [phone] or call the endoscopy unit at [phone]   from 7am until 3 pm.  [facility] [place], EMERGENCY ROOM PHONE NUMBER:   [phone]  IF A COMPLICATION OR EMERGENCY SITUATION ARISES AND YOU ARE UNABLE TO REACH   YOUR PHYSICIAN - GO DIRECTLY TO THE EMERGENCY ROOM.  I have read or have had read to me these discharge instructions for my   procedure and have received a written copy.  I understand these   instructions and will follow-up with my physician if I have any questions.     __________________________________       _____________________________________  Nurse Signature                                          Patient/Designated   Responsible Party Signature  Zachary Clifford MD  2/10/2023 9:36:52 AM  This report has been verified and signed electronically.  Dear patient,  As a result of recent federal legislation (The Federal Cures Act), you may   receive lab or pathology results from your procedure in your [facility]   account before your physician is able to  contact you. Your physician or   their representative will relay the results to you with their   recommendations at their soonest availability.  Thank you,  PROVATION

## 2023-02-13 ENCOUNTER — TELEPHONE (OUTPATIENT)
Dept: SURGERY | Facility: CLINIC | Age: 73
End: 2023-02-13
Payer: MEDICARE

## 2023-02-13 NOTE — TELEPHONE ENCOUNTER
----- Message from Latoya Guadarrama PA-C sent at 2/13/2023  8:46 AM CST -----  Can we get his patient  in with Dr. Romero in a week or two to discuss results of recent EGD and pH study and further discuss surgical intervention? Thanks!    Latoya Guadarrama PA-C  [facility]

## 2023-02-13 NOTE — TELEPHONE ENCOUNTER
Called patient in regards to scheduling follow-up appointment with Dr. Romero to discuss recent test results and surgical intervention. Scheduled patient for next Monday 2/20 at 1:00 PM. Patient verbalized understanding.

## 2023-02-20 ENCOUNTER — OFFICE VISIT (OUTPATIENT)
Dept: SURGERY | Facility: CLINIC | Age: 73
End: 2023-02-20
Payer: MEDICARE

## 2023-02-20 VITALS
SYSTOLIC BLOOD PRESSURE: 137 MMHG | DIASTOLIC BLOOD PRESSURE: 85 MMHG | HEIGHT: 59 IN | HEART RATE: 63 BPM | BODY MASS INDEX: 47.11 KG/M2 | WEIGHT: 233.69 LBS | TEMPERATURE: 98 F

## 2023-02-20 DIAGNOSIS — K44.9 HIATAL HERNIA WITH GERD: Primary | ICD-10-CM

## 2023-02-20 DIAGNOSIS — K21.9 HIATAL HERNIA WITH GERD: Primary | ICD-10-CM

## 2023-02-20 PROCEDURE — 99215 PR OFFICE/OUTPT VISIT, EST, LEVL V, 40-54 MIN: ICD-10-PCS | Mod: S$PBB,,, | Performed by: SURGERY

## 2023-02-20 PROCEDURE — 99213 OFFICE O/P EST LOW 20 MIN: CPT | Mod: PBBFAC | Performed by: SURGERY

## 2023-02-20 PROCEDURE — 99999 PR PBB SHADOW E&M-EST. PATIENT-LVL III: CPT | Mod: PBBFAC,,, | Performed by: SURGERY

## 2023-02-20 PROCEDURE — 99999 PR PBB SHADOW E&M-EST. PATIENT-LVL III: ICD-10-PCS | Mod: PBBFAC,,, | Performed by: SURGERY

## 2023-02-20 PROCEDURE — 99215 OFFICE O/P EST HI 40 MIN: CPT | Mod: S$PBB,,, | Performed by: SURGERY

## 2023-02-20 NOTE — PROGRESS NOTES
History & Physical    SUBJECTIVE:     History of Present Illness:  Patient is a 72 y.o. female presents to follow-up Bravo pH study.  She recently underwent EGD once again showing moderate size hiatal hernia.  PH study reported as normal however patient did not breast button to record her symptoms during the testing.  She reports being unclear on what she was supposed to do during this.  She also states she did not eat or drink a lot after the probe was placed but did have some of the event she normally does.  Her biggest complaints or food sticking in her lower chest regurgitation and heartburn.  She reports this happens with solids or liquids.    Initially presents for evaluation of a hiatal hernia that has been present for several years. She recently visited with a bariatric surgeon in [facility] and was told she could not undergo bariatric surgery until it was fixed. She reports burning chest pain consistent with indigestion, worse when lying down at night. She takes Protonix 40 mg once daily. She also reports dysphagia to both solids and liquids. Her past abdominal surgical history includes cholecystectomy. She underwent an EGD last year showing a hiatal hernia but no evidence of esophagitis as well as manometry with normal esophageal motility. Of note, the patient also has a medical history significant for atrial enlargement and pulmonary hypertension. She is otherwise feeling well and denies fevers, chills, nausea, and vomiting.     No chief complaint on file.      Review of patient's allergies indicates:   Allergen Reactions    Lisinopril Other (See Comments)     Cough      Bactrim [sulfamethoxazole-trimethoprim] Itching    Zosyn [piperacillin-tazobactam] Hives     Pt received second dose of Zosyn and had hives on both arms. Benadryl given and pt continued to receive zosyn with no issues. Hydralazine also given around the same time and discontinued.        Current Outpatient Medications   Medication Sig Dispense  Refill    acetaminophen (TYLENOL) 500 MG tablet Take 500 mg by mouth every 6 (six) hours as needed for Pain.      aspirin (ECOTRIN) 81 MG EC tablet Take 81 mg by mouth once daily.      ciclopirox (PENLAC) 8 % Soln Apply topically nightly. 1 Bottle 10    furosemide (LASIX) 20 MG tablet Take 1 tablet (20 mg total) by mouth 2 (two) times daily as needed (as needed). 60 tablet 3    hydroCHLOROthiazide (HYDRODIURIL) 25 MG tablet Take 1 tablet (25 mg total) by mouth once daily. 90 tablet 0    ipratropium (ATROVENT) 0.03 % nasal spray 2 sprays by Nasal route every 8 (eight) hours as needed for Rhinitis. 30 mL 4    levalbuterol (XOPENEX HFA) 45 mcg/actuation inhaler Xopenex HFA Take 1-2 puff(s) (inhalation) every 4-6 hours PRN - Wheezing [id number] HFA aerosol inhaler every 4-6 hours  inhalation No set duration recorded No set duration amount recorded active 45 mcg/actuation      losartan (COZAAR) 50 MG tablet Take 1 tablet (50 mg total) by mouth once daily. 90 tablet 1    pantoprazole (PROTONIX) 40 MG tablet Take 1 tablet (40 mg total) by mouth once daily. 90 tablet 0    pregabalin (LYRICA) 75 MG capsule Take 1 capsule (75 mg total) by mouth once daily. 30 capsule 3    sildenafil (REVATIO) 20 mg Tab Take 1 tablet (20 mg total) by mouth 3 (three) times daily. 30 tablet 0    simvastatin (ZOCOR) 20 MG tablet Take 1 tablet (20 mg total) by mouth every evening. 90 tablet 1     No current facility-administered medications for this visit.     Facility-Administered Medications Ordered in Other Visits   Medication Dose Route Frequency Provider Last Rate Last Admin    lactated ringers infusion   Intravenous Continuous Deloris Galicia MD           Past Medical History:   Diagnosis Date    GERD (gastroesophageal reflux disease)     Hemorrhoids     History of positive PPD, untreated     Hx of cold sores     Hyperlipidemia     Hypertension     MVP (mitral valve prolapse)     Peripheral neuropathy     Pulmonary HTN       U[place]do    Sleep apnea     [place]-CPAP  uses intermittently     Past Surgical History:   Procedure Laterality Date    CHOLECYSTECTOMY      COLONOSCOPY N/A 11/11/2021    Procedure: COLONOSCOPY;  Surgeon: Deloris Galicia MD;  Location: [facility];  Service: Endoscopy;  Laterality: N/A;    DILATION AND CURETTAGE OF UTERUS      ESOPHAGEAL MOTILITY STUDY USING HIGH RESOLUTION MANOMETRY N/A 11/3/2021    Procedure: MOTILITY STUDY, ESOPHAGUS, USING HIGH RESOLUTION MANOMETRY;  Surgeon: Yaw Manuel RN;  Location: [facility];  Service: Endoscopy;  Laterality: N/A;    ESOPHAGOGASTRODUODENOSCOPY N/A 12/2/2020    Procedure: ESOPHAGOGASTRODUODENOSCOPY (EGD);  Surgeon: Evangelista Erickson MD;  Location: [facility];  Service: Endoscopy;  Laterality: N/A;    ESOPHAGOGASTRODUODENOSCOPY N/A 11/11/2021    Procedure: EGD (ESOPHAGOGASTRODUODENOSCOPY);  Surgeon: Deloris Galicia MD;  Location: [facility];  Service: Endoscopy;  Laterality: N/A;    ESOPHAGOGASTRODUODENOSCOPY N/A 1/26/2023    Procedure: EGD (ESOPHAGOGASTRODUODENOSCOPY);  Surgeon: Jasvir Robert MD;  Location: [facility];  Service: Endoscopy;  Laterality: N/A;  EGD with BRAVO (ON PPI); Hiatial hernia, GERD    RIGHT HEART CATHETERIZATION Right 6/15/2020    Procedure: INSERTION, CATHETER, RIGHT HEART;  Surgeon: Ashlie Belle MD;  Location: [facility] CATH LAB;  Service: Cardiology;  Laterality: Right;     Family History   Problem Relation Age of Onset    Heart disease Mother     Obesity Mother     Kidney disease Father     Hypertension Father     Obesity Father     Heart disease Father     Diabetes Sister     Aneurysm Sister         AAA     Social History     Tobacco Use    Smoking status: Never    Smokeless tobacco: Never   Substance Use Topics    Alcohol use: Yes     Alcohol/week: 1.0 standard drink     Types: 1 Shots of liquor per week    Drug use: No        Review of Systems:  Review of Systems   Constitutional:  Negative for chills, fatigue, fever and unexpected  "weight change.   Respiratory:  Negative for cough, shortness of breath, wheezing and stridor.    Cardiovascular:  Positive for chest pain (Related to reflux, follows with cardiology). Negative for palpitations and leg swelling.   Gastrointestinal:  Negative for abdominal distention, abdominal pain, anal bleeding, blood in stool, constipation, diarrhea, nausea, rectal pain and vomiting.        Dysphagia, Indigestion   Genitourinary:  Negative for difficulty urinating, dysuria, frequency, hematuria and urgency.   Skin:  Negative for color change, pallor, rash and wound.   Hematological:  Does not bruise/bleed easily.     OBJECTIVE:     Vital Signs (Most Recent)  Temp: 98 °F (36.7 °C) (02/20/23 1311)  Pulse: 63 (02/20/23 1311)  BP: 137/85 (02/20/23 1311)  4' 11" (1.499 m)  106 kg (233 lb 11 oz)     Physical Exam:  Physical Exam  Vitals reviewed.   Constitutional:       General: She is not in acute distress.     Appearance: She is well-developed. She is obese. She is not ill-appearing.   HENT:      Head: Normocephalic and atraumatic.      Right Ear: External ear normal.      Left Ear: External ear normal.   Eyes:      Extraocular Movements: Extraocular movements intact.      Conjunctiva/sclera: Conjunctivae normal.   Cardiovascular:      Rate and Rhythm: Normal rate and regular rhythm.      Heart sounds: Normal heart sounds.   Pulmonary:      Effort: Pulmonary effort is normal. No respiratory distress.      Breath sounds: Normal breath sounds. No stridor. No wheezing, rhonchi or rales.   Abdominal:      General: There is no distension.      Palpations: Abdomen is soft.      Tenderness: There is no abdominal tenderness.      Comments: No TTP. Scars from previous cholecystectomy   Musculoskeletal:      Cervical back: Neck supple.   Skin:     General: Skin is warm and dry.   Neurological:      Mental Status: She is alert and oriented to person, place, and time.       Laboratory  CMP  Sodium   Date Value Ref Range Status "   11/15/2022 140 136 - 145 mmol/L Final     Potassium   Date Value Ref Range Status   11/15/2022 3.7 3.5 - 5.1 mmol/L Final     Chloride   Date Value Ref Range Status   11/15/2022 100 95 - 110 mmol/L Final     CO2   Date Value Ref Range Status   11/15/2022 28 23 - 29 mmol/L Final     Glucose   Date Value Ref Range Status   11/15/2022 86 70 - 110 mg/dL Final     BUN   Date Value Ref Range Status   11/15/2022 11 8 - 23 mg/dL Final     Creatinine   Date Value Ref Range Status   11/15/2022 0.8 0.5 - 1.4 mg/dL Final     Calcium   Date Value Ref Range Status   11/15/2022 9.4 8.7 - 10.5 mg/dL Final     Total Protein   Date Value Ref Range Status   11/15/2022 7.9 6.0 - 8.4 g/dL Final     Albumin   Date Value Ref Range Status   11/15/2022 3.9 3.5 - 5.2 g/dL Final     Total Bilirubin   Date Value Ref Range Status   11/15/2022 0.7 0.1 - 1.0 mg/dL Final     Comment:     For infants and newborns, interpretation of results should be based  on gestational age, weight and in agreement with clinical  observations.    Premature Infant recommended reference ranges:  Up to 24 hours.............<8.0 mg/dL  Up to 48 hours............<12.0 mg/dL  3-5 days..................<15.0 mg/dL  6-29 days.................<15.0 mg/dL       Alkaline Phosphatase   Date Value Ref Range Status   11/15/2022 85 55 - 135 U/L Final     AST   Date Value Ref Range Status   11/15/2022 17 10 - 40 U/L Final     ALT   Date Value Ref Range Status   11/15/2022 8 (L) 10 - 44 U/L Final     Anion Gap   Date Value Ref Range Status   11/15/2022 12 8 - 16 mmol/L Final     eGFR   Date Value Ref Range Status   11/15/2022 >60.0 >60 mL/min/1.73 m^2 Final        Diagnostic Results:  EGD:  Impression:            - Large hiatal hernia.                          - Z-line regular, 31 cm from the incisors.                          - Erythematous mucosa in the prepyloric region of                          the stomach. Biopsied.                          - Normal examined duodenum.  Bravo capsule placed                          for PH metry     Manometry:  Impression:            - Normal esophageal manometry.     Bravo pH:  Findings:        DAY 1 ACID REFLUX ANALYSIS:        - Acid Exposure with pH < 4.0:        Total Percent Time: 1.1 %.        - Number of Reflux Episodes:        Total Refluxes: 11        Number of reflux episodes > 5 minutes: 1.        - Longest reflux episode: 5 minutes        - Symptom Correlation: Reflux episodes did not correlate with        symptoms noted during the study.        - See the [employer] data report for further details.        DAY 2 ACID REFLUX ANALYSIS:        - Acid Exposure Time(s) for pH < 4.0:        Total Percent Time: 0.9 %.        - Number of Reflux Episodes:        Total Refluxes: 23        Number of reflux episodes > 5 minutes: 0.        - Longest reflux episode: 2 minutes        - Symptom Correlation: Reflux episodes did not correlate with        symptoms noted during the study.        - See the [employer] data report for further details.   Impression:            - Normal ambulatory esophageal pH study.                          - Reflux episodes did not correlate with                          regurgitation, heartburn, chest pain, or belching                          symptoms noted during the study.  ASSESSMENT/PLAN:     71 y/o female with large hiatal hernia with dysphagia/GERD.     - EGD, manometry, Bravo pH study results reviewed with patient.  On her most recent Bravo pH study although reported as normal.  She reports not recording her symptoms and being unclear on her instructions.  With her history does seem as so she has some dysphagia as well as reflux.  -in the past she had pursued bariatric surgery but after previous discussions with her family she was no longer interested in pursuing this.  We discussed about options for treatment of her symptoms if she was no longer pursuing bariatric surgery would be hiatal hernia repair with  fundoplication.  Hiatal hernia repair with fundoplication discussed risks and benefits of surgery with patient as well as expectations.  She would like to discuss this with her family prior to moving forward with surgery.  We did discuss cardiac clearance prior to surgery.  -morbid obesity increased risk for recurrence in can potentially improve some of her symptoms as well as risks with weight loss, although she does have obesity she carries a significant portion of her weight in her upper thighs so while she is at a higher risk for recurrence I feel she is still a candidate for surgery at her current weight with the understanding that some of her risks are increased.    After discussing with her family she will call to let us know how she would like to proceed and if she would like to move forward with surgery.  I spent a total of 45 minutes on the day of the visit.This includes face to face time and non-face to face time preparing to see the patient (eg, review of tests), obtaining and/or reviewing separately obtained history, documenting clinical information in the electronic or other health record, independently interpreting results and communicating results to the patient/family/caregiver, or care coordinator.

## 2023-03-10 ENCOUNTER — TELEPHONE (OUTPATIENT)
Dept: ADMINISTRATIVE | Facility: HOSPITAL | Age: 73
End: 2023-03-10
Payer: MEDICARE

## 2023-03-24 ENCOUNTER — OFFICE VISIT (OUTPATIENT)
Dept: CARDIOLOGY | Facility: CLINIC | Age: 73
End: 2023-03-24
Payer: MEDICARE

## 2023-03-24 VITALS
HEART RATE: 75 BPM | OXYGEN SATURATION: 98 % | HEIGHT: 59 IN | WEIGHT: 232.38 LBS | BODY MASS INDEX: 46.85 KG/M2 | SYSTOLIC BLOOD PRESSURE: 140 MMHG | DIASTOLIC BLOOD PRESSURE: 70 MMHG

## 2023-03-24 DIAGNOSIS — I20.89 STABLE ANGINA PECTORIS: ICD-10-CM

## 2023-03-24 DIAGNOSIS — E78.2 MIXED HYPERLIPIDEMIA: ICD-10-CM

## 2023-03-24 DIAGNOSIS — R00.0 TACHYCARDIA: ICD-10-CM

## 2023-03-24 DIAGNOSIS — R00.2 PALPITATIONS: ICD-10-CM

## 2023-03-24 DIAGNOSIS — E66.01 MORBID OBESITY WITH BMI OF 45.0-49.9, ADULT: ICD-10-CM

## 2023-03-24 DIAGNOSIS — R94.2 DIFFUSION CAPACITY OF LUNG (DL), DECREASED: ICD-10-CM

## 2023-03-24 DIAGNOSIS — I27.20 PULMONARY HYPERTENSION: ICD-10-CM

## 2023-03-24 DIAGNOSIS — G62.9 PERIPHERAL POLYNEUROPATHY: ICD-10-CM

## 2023-03-24 DIAGNOSIS — I70.0 ATHEROSCLEROSIS OF AORTA: Primary | ICD-10-CM

## 2023-03-24 DIAGNOSIS — I34.1 MVP (MITRAL VALVE PROLAPSE): ICD-10-CM

## 2023-03-24 DIAGNOSIS — I10 ESSENTIAL HYPERTENSION: ICD-10-CM

## 2023-03-24 DIAGNOSIS — I49.9 IRREGULAR HEARTBEAT: ICD-10-CM

## 2023-03-24 DIAGNOSIS — E87.6 HYPOKALEMIA: ICD-10-CM

## 2023-03-24 DIAGNOSIS — R06.09 DYSPNEA ON EXERTION: ICD-10-CM

## 2023-03-24 DIAGNOSIS — G47.33 OSA ON CPAP: ICD-10-CM

## 2023-03-24 PROCEDURE — 99214 PR OFFICE/OUTPT VISIT, EST, LEVL IV, 30-39 MIN: ICD-10-PCS | Mod: S$PBB,,, | Performed by: INTERNAL MEDICINE

## 2023-03-24 PROCEDURE — 99214 OFFICE O/P EST MOD 30 MIN: CPT | Mod: S$PBB,,, | Performed by: INTERNAL MEDICINE

## 2023-03-24 PROCEDURE — 99213 OFFICE O/P EST LOW 20 MIN: CPT | Mod: PBBFAC | Performed by: INTERNAL MEDICINE

## 2023-03-24 PROCEDURE — 99999 PR PBB SHADOW E&M-EST. PATIENT-LVL III: ICD-10-PCS | Mod: PBBFAC,,, | Performed by: INTERNAL MEDICINE

## 2023-03-24 PROCEDURE — 99999 PR PBB SHADOW E&M-EST. PATIENT-LVL III: CPT | Mod: PBBFAC,,, | Performed by: INTERNAL MEDICINE

## 2023-03-24 RX ORDER — SIMVASTATIN 20 MG/1
20 TABLET, FILM COATED ORAL NIGHTLY
Qty: 90 TABLET | Refills: 3 | Status: SHIPPED | OUTPATIENT
Start: 2023-03-24 | End: 2024-04-01 | Stop reason: SDUPTHER

## 2023-03-24 RX ORDER — FUROSEMIDE 20 MG/1
20 TABLET ORAL 2 TIMES DAILY PRN
Qty: 60 TABLET | Refills: 3 | Status: SHIPPED | OUTPATIENT
Start: 2023-03-24 | End: 2023-07-20 | Stop reason: SDUPTHER

## 2023-03-24 RX ORDER — LOSARTAN POTASSIUM 50 MG/1
50 TABLET ORAL DAILY
Qty: 90 TABLET | Refills: 3 | Status: SHIPPED | OUTPATIENT
Start: 2023-03-24 | End: 2024-04-01

## 2023-03-24 RX ORDER — HYDROCHLOROTHIAZIDE 25 MG/1
25 TABLET ORAL DAILY
Qty: 90 TABLET | Refills: 3 | Status: SHIPPED | OUTPATIENT
Start: 2023-03-24 | End: 2024-04-01

## 2023-03-24 NOTE — PROGRESS NOTES
Subjective:   Patient ID:  Yoana Pardo is a 72 y.o. female who presents for follow-up of No chief complaint on file.      03/24/2023:   Overall patient is doing well no exertional symptoms all medications reviewed renewed as necessary.  Patient is stable no edema patient states she is had good compliance of medications will recheck all of her medications again today.    All medicines reviewed and renewed.  I think she ran out of some of her pills including the statin medication and some of her blood pressure medications.  Follow-up evaluation in the fall.  A 73 yo obese female with htn hlp mvp pulmonary htn is referred from DR OROZCO for rhc. She has shortness of breath she does not feel rested despite cpap her pa pressures have increased. Has no angina no leg swelling.  Compared to previously she has been consistently using her cpap. Has been compliant with salt intake.     11/17/2020  No change in status she needs to have bariatric surgery she is compliant with meds htn controlled. Not compliant with cpap has no headache tia claudication  No leg swelling no chest pain. She has moderate pulmonary htn by cath.no shortness of breath.     Patient presents the office and actually has low blood pressure today will cut back losartan 50 mg daily.  Follow-up evaluation within 6 weeks.        Blood pressure under better control today however the patient complains of chest pain EKG pending the and stable patient will have follow-up nuclear stress test.        This patient presents to the office in much improved no acute shortness of breath doing well current medications.  Nuclear stress test showed no evidence of cardiac ischemia LV function showed normal heart function with mild-to-moderate pulmonary hypertension being treated.   NO FOCAL CNS SYMPTOMS OR SIGNS TO SUGGEST TIA OR STROKE  NO ANGINA OR EQUIVALENT  NO UNUSUAL HERNÁNDEZ. NO ORTHOPNEA OR PND  NO PALPITATIONS  NO NEAR SYNCOPE OR SYNCOPE  NO EDEMA. NO CALVE  TENDERNESS        11/21/22-today the patient presents in the office with stable heart rate blood pressure on reduced losartan.  Overall doing well no exertional symptoms chest pain shortness of breath.  The patient is planning eventually to have hiatal hernia surgery and I think she would be cleared to have that done in the future  01/23/2023:   Overall the patient is doing well blood pressure is stable patient is planned have endoscopy this week.  This is pre gastric bypass surgery.  Overall she is stable blood pressure is under good control she continues on current doses of losartan and HydroDIURIL.  Otherwise stable this time.  No exertional chest pain prior nuclear stress test showed no evidence cardiac ischemia.       Review of Systems   Constitutional: Negative for chills, diaphoresis, night sweats, weight gain and weight loss.   HENT:  Negative for congestion, hoarse voice, sore throat and stridor.    Eyes:  Negative for double vision and pain.   Cardiovascular:  Negative for chest pain, claudication, cyanosis, dyspnea on exertion, irregular heartbeat, leg swelling, near-syncope, orthopnea, palpitations, paroxysmal nocturnal dyspnea and syncope.   Respiratory:  Negative for cough, hemoptysis, shortness of breath, sleep disturbances due to breathing, snoring, sputum production and wheezing.    Endocrine: Negative for cold intolerance, heat intolerance and polydipsia.   Hematologic/Lymphatic: Negative for bleeding problem. Does not bruise/bleed easily.   Skin:  Negative for color change, dry skin and rash.   Musculoskeletal:  Negative for joint swelling and muscle cramps.   Gastrointestinal:  Negative for bloating, abdominal pain, constipation, diarrhea, dysphagia, melena, nausea and vomiting.   Genitourinary:  Negative for flank pain and urgency.   Neurological:  Negative for dizziness, focal weakness, headaches, light-headedness, loss of balance, seizures and weakness.   Psychiatric/Behavioral:  Negative for  altered mental status and memory loss. The patient is not nervous/anxious.    Family History   Problem Relation Age of Onset    Heart disease Mother     Obesity Mother     Kidney disease Father     Hypertension Father     Obesity Father     Heart disease Father     Diabetes Sister     Aneurysm Sister         AAA     Past Medical History:   Diagnosis Date    GERD (gastroesophageal reflux disease)     Hemorrhoids     History of positive PPD, untreated     Hx of cold sores     Hyperlipidemia     Hypertension     MVP (mitral valve prolapse)     Peripheral neuropathy     Pulmonary HTN     Dr Bailon    Sleep apnea     [place]-CPAP  uses intermittently     Social History     Socioeconomic History    Marital status:     Number of children: 3   Occupational History    Occupation: [employer]      Employer: [employer]   Tobacco Use    Smoking status: Never    Smokeless tobacco: Never   Substance and Sexual Activity    Alcohol use: Yes     Alcohol/week: 1.0 standard drink     Types: 1 Shots of liquor per week    Drug use: No    Sexual activity: Not Currently     Partners: Male     Current Outpatient Medications on File Prior to Visit   Medication Sig Dispense Refill    acetaminophen (TYLENOL) 500 MG tablet Take 500 mg by mouth every 6 (six) hours as needed for Pain.      aspirin (ECOTRIN) 81 MG EC tablet Take 81 mg by mouth once daily.      ciclopirox (PENLAC) 8 % Soln Apply topically nightly. 1 Bottle 10    furosemide (LASIX) 20 MG tablet Take 1 tablet (20 mg total) by mouth 2 (two) times daily as needed (as needed). 60 tablet 3    hydroCHLOROthiazide (HYDRODIURIL) 25 MG tablet Take 1 tablet by mouth once daily 90 tablet 0    ipratropium (ATROVENT) 0.03 % nasal spray 2 sprays by Nasal route every 8 (eight) hours as needed for Rhinitis. 30 mL 4    levalbuterol (XOPENEX HFA) 45 mcg/actuation inhaler Xopenex HFA Take 1-2 puff(s) (inhalation) every 4-6 hours PRN - Wheezing [id number] HFA aerosol  inhaler every 4-6 hours  inhalation No set duration recorded No set duration amount recorded active 45 mcg/actuation      losartan (COZAAR) 50 MG tablet Take 1 tablet (50 mg total) by mouth once daily. 90 tablet 1    pantoprazole (PROTONIX) 40 MG tablet Take 1 tablet by mouth once daily 90 tablet 2    pregabalin (LYRICA) 75 MG capsule Take 1 capsule (75 mg total) by mouth once daily. 30 capsule 3    sildenafil (REVATIO) 20 mg Tab Take 1 tablet (20 mg total) by mouth 3 (three) times daily. 30 tablet 0    simvastatin (ZOCOR) 20 MG tablet Take 1 tablet (20 mg total) by mouth every evening. 90 tablet 1    [DISCONTINUED] gabapentin (NEURONTIN) 100 MG capsule TAKE 1 CAPSULE BY MOUTH NIGHTLY AS NEEDED 30 capsule 3     Current Facility-Administered Medications on File Prior to Visit   Medication Dose Route Frequency Provider Last Rate Last Admin    lactated ringers infusion   Intravenous Continuous Deloris Galicia MD         Review of patient's allergies indicates:   Allergen Reactions    Lisinopril Other (See Comments)     Cough      Bactrim [sulfamethoxazole-trimethoprim] Itching    Zosyn [piperacillin-tazobactam] Hives     Pt received second dose of Zosyn and had hives on both arms. Benadryl given and pt continued to receive zosyn with no issues. Hydralazine also given around the same time and discontinued.        Objective:     Physical Exam  Eyes:      Pupils: Pupils are equal, round, and reactive to light.   Neck:      Trachea: No tracheal deviation.   Cardiovascular:      Rate and Rhythm: Normal rate and regular rhythm.      Pulses: Intact distal pulses.           Carotid pulses are 2+ on the right side and 2+ on the left side.       Radial pulses are 2+ on the right side and 2+ on the left side.        Femoral pulses are 2+ on the right side and 2+ on the left side.       Popliteal pulses are 2+ on the right side and 2+ on the left side.        Dorsalis pedis pulses are 2+ on the right side and 2+ on the  left side.        Posterior tibial pulses are 2+ on the right side and 2+ on the left side.      Heart sounds: Normal heart sounds. No murmur heard.    No friction rub. No gallop.   Pulmonary:      Effort: Pulmonary effort is normal. No respiratory distress.      Breath sounds: Normal breath sounds. No stridor. No wheezing or rales.   Chest:      Chest wall: No tenderness.   Abdominal:      General: There is no distension.      Tenderness: There is no abdominal tenderness. There is no rebound.   Musculoskeletal:         General: No tenderness.      Cervical back: Normal range of motion.   Skin:     General: Skin is warm and dry.   Neurological:      Mental Status: She is alert and oriented to person, place, and time.     Assessment:     1. Atherosclerosis of aorta    2. Palpitations    3. Mixed hyperlipidemia    4. [place] on CPAP    5. Hypokalemia    6. Morbid obesity with BMI of 45.0-49.9, adult    7. Dyspnea on exertion    8. Stable angina pectoris    9. Pulmonary hypertension    10. Essential hypertension    11. MVP (mitral valve prolapse)    12. Irregular heartbeat    13. Tachycardia    14. Diffusion capacity of lung (dl), decreased    15. Peripheral polyneuropathy        Plan:     Atherosclerosis of aorta    Palpitations    Mixed hyperlipidemia    [place] on CPAP    Hypokalemia    Morbid obesity with BMI of 45.0-49.9, adult    Dyspnea on exertion    Stable angina pectoris    Pulmonary hypertension    Essential hypertension    MVP (mitral valve prolapse)    Irregular heartbeat    Tachycardia    Diffusion capacity of lung (dl), decreased    Peripheral polyneuropathy      Impression 1 hypertension stable current medications.  Patient continues on HydroDIURIL and losartan.  2. Tachyarrhythmias stable   3. Pulmonary hypertension on sildenafil   4. Hyperlipidemia stable on statin medication.  All medications reviewed renewed as necessary follow-up evaluation again in 4-6 months.

## 2023-05-02 ENCOUNTER — TELEPHONE (OUTPATIENT)
Dept: ADMINISTRATIVE | Facility: HOSPITAL | Age: 73
End: 2023-05-02
Payer: MEDICARE

## 2023-05-08 NOTE — DISCHARGE INSTRUCTIONS
General Information:    1. Do not drink alcoholic beverages including beer for 24 hours or as long as you are on pain medication..  2. Do not drive a motor vehicle, operate machinery or power tools, or signs legal papers for 24 hours or as long as you are on pain medication.   3. You may experience light-headedness, dizziness, and sleepiness following surgery. Please do not stay alone. A responsible adult should be with you for this 24 hour period.  4. Go home and rest.  5. Progress slowly to a normal diet unless instructed.  Otherwise, begin with liquids such as soft drinks, then soup and crackers working up to solid foods. Drink plenty of nonalcoholic fluids.  6. Certain anesthetics and pain medications produce nausea and vomiting in certain individuals. If nausea becomes a problem at home, call you doctor.  7. A nurse will be calling you sometime after surgery. Do not be alarmed. This is our way of finding out how you are doing.  8. Several times every hour while you are awake, take 2-3 deep breaths and cough. If you had stomach surgery hold a pillow or rolled towel firmly against your stomach before you cough. This will help with any pain the cough might cause.  9. Several times every hour while you are awake, pump and flex your feet 5-6 times and do foot circles. This will help prevent blood clots.  10. Call your doctor for severe pain, bleeding, fever, or signs or symptoms of infection (pain, swelling, redness, foul odor, drainage).  11. You can contact your doctor anytime by [phone] for the [facility] (at [facility]) or [phone] for the [facility] on [facility].   [email or web address] is another way to contact your doctor if you are an active participant online with [facility].  12. Continue Nozin provided at discharge twice daily for 7 days or until the incision is healed.  See pamphlet or box for instructions.    lethargic/somnolent/confused

## 2023-05-15 ENCOUNTER — OFFICE VISIT (OUTPATIENT)
Dept: INTERNAL MEDICINE | Facility: CLINIC | Age: 73
End: 2023-05-15
Payer: MEDICARE

## 2023-05-15 VITALS
HEART RATE: 56 BPM | WEIGHT: 237 LBS | TEMPERATURE: 96 F | BODY MASS INDEX: 47.78 KG/M2 | DIASTOLIC BLOOD PRESSURE: 78 MMHG | HEIGHT: 59 IN | OXYGEN SATURATION: 98 % | SYSTOLIC BLOOD PRESSURE: 134 MMHG

## 2023-05-15 DIAGNOSIS — Z79.899 OTHER LONG TERM (CURRENT) DRUG THERAPY: ICD-10-CM

## 2023-05-15 DIAGNOSIS — K44.9 HIATAL HERNIA WITH GERD: ICD-10-CM

## 2023-05-15 DIAGNOSIS — G47.33 OSA ON CPAP: ICD-10-CM

## 2023-05-15 DIAGNOSIS — G62.9 PERIPHERAL POLYNEUROPATHY: ICD-10-CM

## 2023-05-15 DIAGNOSIS — I70.0 ATHEROSCLEROSIS OF AORTA: ICD-10-CM

## 2023-05-15 DIAGNOSIS — J30.2 CHRONIC SEASONAL ALLERGIC RHINITIS: ICD-10-CM

## 2023-05-15 DIAGNOSIS — M17.0 PRIMARY OSTEOARTHRITIS OF BOTH KNEES: ICD-10-CM

## 2023-05-15 DIAGNOSIS — E78.2 MIXED HYPERLIPIDEMIA: ICD-10-CM

## 2023-05-15 DIAGNOSIS — E66.01 MORBID OBESITY WITH BMI OF 45.0-49.9, ADULT: ICD-10-CM

## 2023-05-15 DIAGNOSIS — I27.20 PULMONARY HYPERTENSION: ICD-10-CM

## 2023-05-15 DIAGNOSIS — I10 ESSENTIAL HYPERTENSION: Primary | Chronic | ICD-10-CM

## 2023-05-15 DIAGNOSIS — K21.9 HIATAL HERNIA WITH GERD: ICD-10-CM

## 2023-05-15 DIAGNOSIS — M47.26 OSTEOARTHRITIS OF SPINE WITH RADICULOPATHY, LUMBAR REGION: ICD-10-CM

## 2023-05-15 LAB
ALBUMIN SERPL BCP-MCNC: 3.8 G/DL (ref 3.5–5.2)
ALP SERPL-CCNC: 73 U/L (ref 55–135)
ALT SERPL W/O P-5'-P-CCNC: 10 U/L (ref 10–44)
ANION GAP SERPL CALC-SCNC: 11 MMOL/L (ref 8–16)
AST SERPL-CCNC: 17 U/L (ref 10–40)
BILIRUB SERPL-MCNC: 0.5 MG/DL (ref 0.1–1)
BUN SERPL-MCNC: 14 MG/DL (ref 8–23)
CALCIUM SERPL-MCNC: 9.3 MG/DL (ref 8.7–10.5)
CHLORIDE SERPL-SCNC: 104 MMOL/L (ref 95–110)
CHOLEST SERPL-MCNC: 185 MG/DL (ref 120–199)
CHOLEST/HDLC SERPL: 4.1 {RATIO} (ref 2–5)
CO2 SERPL-SCNC: 27 MMOL/L (ref 23–29)
CREAT SERPL-MCNC: 0.8 MG/DL (ref 0.5–1.4)
EST. GFR  (NO RACE VARIABLE): >60 ML/MIN/1.73 M^2
ESTIMATED AVG GLUCOSE: 120 MG/DL (ref 68–131)
GLUCOSE SERPL-MCNC: 87 MG/DL (ref 70–110)
HBA1C MFR BLD: 5.8 % (ref 4–5.6)
HDLC SERPL-MCNC: 45 MG/DL (ref 40–75)
HDLC SERPL: 24.3 % (ref 20–50)
LDLC SERPL CALC-MCNC: 111.6 MG/DL (ref 63–159)
NONHDLC SERPL-MCNC: 140 MG/DL
POTASSIUM SERPL-SCNC: 4.3 MMOL/L (ref 3.5–5.1)
PROT SERPL-MCNC: 7.6 G/DL (ref 6–8.4)
SODIUM SERPL-SCNC: 142 MMOL/L (ref 136–145)
TRIGL SERPL-MCNC: 142 MG/DL (ref 30–150)

## 2023-05-15 PROCEDURE — 99999 PR PBB SHADOW E&M-EST. PATIENT-LVL III: ICD-10-PCS | Mod: PBBFAC,,, | Performed by: FAMILY MEDICINE

## 2023-05-15 PROCEDURE — 80053 COMPREHEN METABOLIC PANEL: CPT | Performed by: FAMILY MEDICINE

## 2023-05-15 PROCEDURE — 99213 OFFICE O/P EST LOW 20 MIN: CPT | Mod: PBBFAC | Performed by: FAMILY MEDICINE

## 2023-05-15 PROCEDURE — 80061 LIPID PANEL: CPT | Performed by: FAMILY MEDICINE

## 2023-05-15 PROCEDURE — 83036 HEMOGLOBIN GLYCOSYLATED A1C: CPT | Performed by: FAMILY MEDICINE

## 2023-05-15 PROCEDURE — 99214 OFFICE O/P EST MOD 30 MIN: CPT | Mod: S$PBB,,, | Performed by: FAMILY MEDICINE

## 2023-05-15 PROCEDURE — 99214 PR OFFICE/OUTPT VISIT, EST, LEVL IV, 30-39 MIN: ICD-10-PCS | Mod: S$PBB,,, | Performed by: FAMILY MEDICINE

## 2023-05-15 PROCEDURE — 84443 ASSAY THYROID STIM HORMONE: CPT | Performed by: FAMILY MEDICINE

## 2023-05-15 PROCEDURE — 99999 PR PBB SHADOW E&M-EST. PATIENT-LVL III: CPT | Mod: PBBFAC,,, | Performed by: FAMILY MEDICINE

## 2023-05-15 RX ORDER — PREGABALIN 75 MG/1
75 CAPSULE ORAL DAILY
Qty: 30 CAPSULE | Refills: 3 | Status: SHIPPED | OUTPATIENT
Start: 2023-05-15 | End: 2023-11-15

## 2023-05-15 NOTE — PROGRESS NOTES
"Subjective:       Patient ID: Yoana Pardo is a 72 y.o. female.    Chief Complaint: Follow-up    72-year-old  female patient with Patient Active Problem List:     Mixed hyperlipidemia     Pulmonary hypertension     MVP (mitral valve prolapse)     Hiatal hernia with GERD     History of positive PPD, untreated     Hemorrhoids     Essential hypertension     Peripheral neuropathy     Primary osteoarthritis of both knees     Morbid obesity with BMI of 45.0-49.9, adult     [place] on CPAP     Behaviorally induced insufficient sleep syndrome     Chronic seasonal allergic rhinitis     Atherosclerosis of aorta     Memory loss  Here for follow-up on chronic medical conditions and reports that she has been taking her medications regular  Secondary to chronic low back pain requesting refill on Lyrica  Denies any chest pain or difficulty breathing with palpitations  Patient has not been exercising lately other than occasional walking and would like to consider taking medications like Ozempic for weight loss    Review of Systems   Constitutional:  Negative for fatigue.   Eyes:  Negative for visual disturbance.   Respiratory:  Negative for shortness of breath.    Cardiovascular:  Negative for chest pain and leg swelling.   Gastrointestinal:  Negative for abdominal pain, nausea and vomiting.   Musculoskeletal:  Positive for back pain and myalgias.   Skin:  Negative for rash.   Neurological:  Positive for numbness. Negative for weakness, light-headedness and headaches.   Psychiatric/Behavioral:  Negative for sleep disturbance.        /78 (BP Location: Left arm, Patient Position: Sitting, BP Method: Large (Manual))   Pulse (!) 56   Temp 96 °F (35.6 °C) (Tympanic)   Ht 4' 11" (1.499 m)   Wt 107.5 kg (236 lb 15.9 oz)   SpO2 98%   BMI 47.87 kg/m²   Objective:      Physical Exam  Constitutional:       Appearance: She is well-developed.   HENT:      Head: Normocephalic and atraumatic.   Cardiovascular:      " Rate and Rhythm: Normal rate and regular rhythm.      Heart sounds: Normal heart sounds. No murmur heard.  Pulmonary:      Effort: Pulmonary effort is normal.      Breath sounds: Normal breath sounds. No wheezing.   Abdominal:      General: Bowel sounds are normal.      Palpations: Abdomen is soft.      Tenderness: There is no abdominal tenderness.   Musculoskeletal:         General: Tenderness present.      Comments: Positive for paraspinal lumbar muscle tenderness in the midline   Skin:     General: Skin is warm and dry.      Findings: No rash.   Neurological:      Mental Status: She is alert and oriented to person, place, and time.   Psychiatric:         Mood and Affect: Mood normal.         Assessment/Plan:   1. Essential hypertension  -     Comprehensive Metabolic Panel; Future; Expected date: 05/15/2023  -     Lipid Panel; Future; Expected date: 05/15/2023  -     TSH; Future; Expected date: 05/15/2023  Blood pressure is stable currently on hydrochlorothiazide 25 mg and losartan 50 mg daily    2. Mixed hyperlipidemia  -     Lipid Panel; Future; Expected date: 05/15/2023  Currently taking simvastatin 20 mg daily    3. Hiatal hernia with GERD  Stable on pantoprazole 40 mg daily    4. Pulmonary hypertension  Overview:  RA: 16/ 15/ 14 RV: 55/ 8/ 14 PA: 55/ 23/ 37 PWP: 20/ 18/ 18 .   Cardiac output was 3.9. Cardiac index is 1.9 L/min/m2.  Currently taking furosemide as needed    5. [place] on CPAP  Stable    6. Chronic seasonal allergic rhinitis    7. Atherosclerosis of aorta  Continue aspirin and statins    8. Primary osteoarthritis of both knees  Graded exercise regimen recommended    9. Morbid obesity with BMI of 45.0-49.9, adult  -     Hemoglobin A1C; Future; Expected date: 05/15/2023  Lifestyle modifications recommended to lose weight with BMI 47    10. Osteoarthritis of spine with radiculopathy, lumbar region  11. Peripheral polyneuropathy  -     pregabalin (LYRICA) 75 MG capsule; Take 1 capsule (75 mg total) by  mouth once daily.  Dispense: 30 capsule; Refill: 3  Lyrica prescribed today for symptomatic relief      12. Other long term (current) drug therapy  -     Hemoglobin A1C; Future; Expected date: 05/15/2023

## 2023-05-16 LAB — TSH SERPL DL<=0.005 MIU/L-ACNC: 0.85 UIU/ML (ref 0.4–4)

## 2023-05-26 ENCOUNTER — PES CALL (OUTPATIENT)
Dept: ADMINISTRATIVE | Facility: CLINIC | Age: 73
End: 2023-05-26
Payer: MEDICARE

## 2023-06-26 ENCOUNTER — TELEPHONE (OUTPATIENT)
Dept: ADMINISTRATIVE | Facility: HOSPITAL | Age: 73
End: 2023-06-26
Payer: MEDICARE

## 2023-07-07 ENCOUNTER — OFFICE VISIT (OUTPATIENT)
Dept: PULMONOLOGY | Facility: CLINIC | Age: 73
End: 2023-07-07
Payer: MEDICARE

## 2023-07-07 ENCOUNTER — HOSPITAL ENCOUNTER (OUTPATIENT)
Dept: RADIOLOGY | Facility: HOSPITAL | Age: 73
Discharge: HOME | End: 2023-07-07
Attending: INTERNAL MEDICINE
Payer: MEDICARE

## 2023-07-07 ENCOUNTER — CLINICAL SUPPORT (OUTPATIENT)
Dept: PULMONOLOGY | Facility: CLINIC | Age: 73
End: 2023-07-07
Payer: MEDICARE

## 2023-07-07 VITALS
WEIGHT: 237.63 LBS | OXYGEN SATURATION: 95 % | BODY MASS INDEX: 47.91 KG/M2 | SYSTOLIC BLOOD PRESSURE: 118 MMHG | HEIGHT: 59 IN | HEART RATE: 65 BPM | DIASTOLIC BLOOD PRESSURE: 64 MMHG | RESPIRATION RATE: 17 BRPM

## 2023-07-07 DIAGNOSIS — G47.33 OSA ON CPAP: ICD-10-CM

## 2023-07-07 DIAGNOSIS — E66.01 MORBID OBESITY WITH BMI OF 45.0-49.9, ADULT: ICD-10-CM

## 2023-07-07 DIAGNOSIS — I27.20 PULMONARY HYPERTENSION: Primary | ICD-10-CM

## 2023-07-07 DIAGNOSIS — I27.20 PULMONARY HYPERTENSION: ICD-10-CM

## 2023-07-07 DIAGNOSIS — E78.2 MIXED HYPERLIPIDEMIA: ICD-10-CM

## 2023-07-07 DIAGNOSIS — I70.0 ATHEROSCLEROSIS OF AORTA: ICD-10-CM

## 2023-07-07 DIAGNOSIS — I10 ESSENTIAL HYPERTENSION: Chronic | ICD-10-CM

## 2023-07-07 PROCEDURE — 99999 PR PBB SHADOW E&M-EST. PATIENT-LVL I: ICD-10-PCS | Mod: PBBFAC,,,

## 2023-07-07 PROCEDURE — 99999 PR PBB SHADOW E&M-EST. PATIENT-LVL V: CPT | Mod: PBBFAC,,, | Performed by: INTERNAL MEDICINE

## 2023-07-07 PROCEDURE — 99999 PR PBB SHADOW E&M-EST. PATIENT-LVL I: CPT | Mod: PBBFAC,,,

## 2023-07-07 PROCEDURE — 99211 OFF/OP EST MAY X REQ PHY/QHP: CPT | Mod: PBBFAC,25

## 2023-07-07 PROCEDURE — 71046 XR CHEST PA AND LATERAL: ICD-10-PCS | Mod: 26,,, | Performed by: RADIOLOGY

## 2023-07-07 PROCEDURE — 99204 OFFICE O/P NEW MOD 45 MIN: CPT | Mod: S$PBB,,, | Performed by: INTERNAL MEDICINE

## 2023-07-07 PROCEDURE — 94762 N-INVAS EAR/PLS OXIMTRY CONT: CPT | Mod: PBBFAC

## 2023-07-07 PROCEDURE — 99204 PR OFFICE/OUTPT VISIT, NEW, LEVL IV, 45-59 MIN: ICD-10-PCS | Mod: S$PBB,,, | Performed by: INTERNAL MEDICINE

## 2023-07-07 PROCEDURE — 99215 OFFICE O/P EST HI 40 MIN: CPT | Mod: PBBFAC,25,27 | Performed by: INTERNAL MEDICINE

## 2023-07-07 PROCEDURE — 99999 PR PBB SHADOW E&M-EST. PATIENT-LVL V: ICD-10-PCS | Mod: PBBFAC,,, | Performed by: INTERNAL MEDICINE

## 2023-07-07 PROCEDURE — 71046 X-RAY EXAM CHEST 2 VIEWS: CPT | Mod: TC

## 2023-07-07 PROCEDURE — 71046 X-RAY EXAM CHEST 2 VIEWS: CPT | Mod: 26,,, | Performed by: RADIOLOGY

## 2023-07-07 RX ORDER — SILDENAFIL CITRATE 20 MG/1
20 TABLET ORAL 3 TIMES DAILY
Qty: 270 TABLET | Refills: 3 | Status: ACTIVE | OUTPATIENT
Start: 2023-07-07 | End: 2023-08-03

## 2023-07-07 NOTE — ASSESSMENT & PLAN NOTE
Patient would benefit from weight loss and has tried to set realistic goals to achieve success. Lifestyle changes were discussed on eating healthy, exercising at least 150 minutes weekly, and reducing sedentary behavior.   Discussed the risk factors associated with obesity: Arthritis/[place]/Diabetes/Fatty Liver/Cardiovascular disease/GERD/HTN/HLP.

## 2023-07-07 NOTE — ASSESSMENT & PLAN NOTE
Currently has abandoned CPAP  INSPIRE not option due to BMI  Risks of untreated [place] have been reviewed  Will offer Night O2 in lieu

## 2023-07-07 NOTE — PROGRESS NOTES
Subjective:       Patient ID: Yoana Pardo is a 72 y.o. female.  Patient Active Problem List   Diagnosis    Mixed hyperlipidemia    Pulmonary hypertension    MVP (mitral valve prolapse)    Hiatal hernia with GERD    History of positive PPD, untreated    Hemorrhoids    Essential hypertension    Peripheral neuropathy    Primary osteoarthritis of both knees    Morbid obesity with BMI of 45.0-49.9, adult    [place] on CPAP    Behaviorally induced insufficient sleep syndrome    Chronic seasonal allergic rhinitis    Atherosclerosis of aorta    Memory loss     Immunization History   Administered Date(s) Administered    COVID-19, MRNA, LN-S, PF (MODERNA FULL 0.5 ML DOSE) 03/03/2021, 03/31/2021    Influenza (FLUAD) - Quadrivalent - Adjuvanted - PF *Preferred* (65+) 10/30/2020, 11/21/2022    Influenza - High Dose - PF (65 years and older) 09/25/2018, 10/25/2019    Influenza - Quadrivalent 10/29/2014    Influenza - Quadrivalent - PF *Preferred* (6 months and older) 10/27/2015, 10/03/2016    Influenza Split 11/22/2006, 11/06/2008, 10/27/2009, 10/19/2012    Pneumococcal Conjugate - 13 Valent 10/15/2018    Pneumococcal Polysaccharide - 23 Valent 10/28/2019    Tdap 08/29/2016        EPWORTH SLEEPINESS SCALE 7/7/2023   Sitting and reading 2   Watching TV 2   Sitting, inactive in a public place (e.g. a theatre or a meeting) 0   As a passenger in a car for an hour without a break 3   Lying down to rest in the afternoon when circumstances permit 3   Sitting and talking to someone 0   Sitting quietly after a lunch without alcohol 3   In a car, while stopped for a few minutes in traffic 0   Total score 13            mRC  []  0: Dyspnea only with strenuous exercise  [x] +1:Dyspnea when hurrying or walking up a slight hill  [] +2:Walks slower than people of the same age because of dyspnea or has to stop for breath when walking at own pace  [] +3:Stops for breath after walking 100 yards (91 m) or after a few minutes  [] +4:Too dyspneic  to leave house or breathless when dressing      NYHA    [] Class I: Patients with no limitation of activities; they suffer no symptoms from ordinary activities  [x] Class II: Patients with slight, mild limitation of activity; they are comfortable with rest or with mild exertion.  [] Class III:Patients with marked limitation of activity; they are comfortable only at rest.  [] Class IV: Patients who should be at complete rest, confined to bed or chair; any physical activity brings on discomfort and symptoms occur at rest.          Chief Complaint: Sleep Apnea    Ms. Yoana Pardo is 70 y.o.    Last visit 10/30/2020  Follow up after ECHO: PA pressure down to 51 from 57  Sirometry: Normal, FEv1 and FVC imprived  On REVATIO  Last RHC:   6MWD distance improved  Hypoxemia and [place] treated with CPAP  Not using consistently, irregular sleep routine   [place] score is 8  Weight stable Now 230lb  Wake time 6-7 am  Occasional naps  Bariatric surgery cancelled due to hernia  I have reviewed the patient's medical history in detail and updated the computerized patient record.       07/07/2023  Last visit 06/10/2021  Since retired  Had COVID  Has been off Revatio, out of refills  Also not currently using CPAP  Says gave her cough  Last echo 2020  Albany 13  Had oxygen in past after gall bladder surgery  Options for [place]: INSPIRE  BMI is contraindication  Weight loss optione  Will explore GLP 1  options with PCP  A1C was 5.8          Review of Systems   Constitutional:  Negative for fatigue.   HENT:  Negative for postnasal drip, rhinorrhea and congestion.    Respiratory:  Positive for apnea (CPAP : Not using). Negative for snoring, sputum production, shortness of breath, wheezing, pleurisy and use of rescue inhaler.             Cardiovascular: Negative.    Genitourinary: Negative.    Endocrine: endocrine negative    Musculoskeletal: Negative.    Skin:  Negative for rash.   Neurological: Negative.    Psychiatric/Behavioral:   "Negative for sleep disturbance.    All other systems reviewed and are negative.    Objective:       Vitals:    07/07/23 1035   BP: 118/64   Pulse: 65   Resp: 17   SpO2: 95%   Weight: 107.8 kg (237 lb 10.5 oz)   Height: 4' 11" (1.499 m)       Physical Exam   Constitutional: She is oriented to person, place, and time. She appears well-developed and well-nourished.     She is obese.   HENT:   Head: Normocephalic.   Nose: No mucosal edema. No polyps.   Mouth/Throat: Oropharynx is clear and moist. No oropharyngeal exudate. Mallampati Score: IV.   Neck: No tracheal deviation present. No thyromegaly present.   Cardiovascular: Normal rate and regular rhythm.   No murmur heard.  Pulmonary/Chest: Normal expansion, symmetric chest wall expansion, effort normal and breath sounds normal.   Abdominal: Soft. Bowel sounds are normal.   Musculoskeletal:         General: No edema. Normal range of motion.      Cervical back: Normal range of motion and neck supple.   Lymphadenopathy:     She has no cervical adenopathy.   Neurological: She is alert and oriented to person, place, and time. Gait normal.   Skin: Skin is warm and dry. No rash noted. No erythema. Nails show no clubbing.   Psychiatric: She has a normal mood and affect.   Nursing note and vitals reviewed.  Personal Diagnostic Review  DOWNLOAD  Not using CPAP             Assessment:       Problem List Items Addressed This Visit       [place] on CPAP     Currently has abandoned CPAP  INSPIRE not option due to BMI  Risks of untreated [place] have been reviewed  Will offer Night O2 in lieu         Relevant Orders    Polysomnogram (CPAP will be added if patient meets diagnostic criteria.)    Pulmonary hypertension - Primary     Minimal dyspnea  Refill  REVATIO    Repeat 6MWD and echo     Hopefully weight loss will help.         Relevant Medications    sildenafil (REVATIO) 20 mg Tab    Other Relevant Orders    Echo    Stress test, pulmonary    Complete PFT without bronchodilator - Clinic    " Polysomnogram (CPAP will be added if patient meets diagnostic criteria.)    PULSE OXIMETRY OVERNIGHT    Ambulatory referral/consult to Bariatric Medicine    X-Ray Chest PA And Lateral (Completed)    Essential hypertension (Chronic)     On Cozaar + HCTZ         Atherosclerosis of aorta    Mixed hyperlipidemia     On ZOCOR         Morbid obesity with BMI of 45.0-49.9, adult     Patient would benefit from weight loss and has tried to set realistic goals to achieve success. Lifestyle changes were discussed on eating healthy, exercising at least 150 minutes weekly, and reducing sedentary behavior.   Discussed the risk factors associated with obesity: Arthritis/[place]/Diabetes/Fatty Liver/Cardiovascular disease/GERD/HTN/HLP.          Relevant Orders    Ambulatory referral/consult to Bariatric Medicine     Plan:       Continue REVATIO            Follow up in about 2 months (around 9/7/2023), or echo, 6MWD, PFT, CXR, ONSAT, PSG, refill meds, ref bariatiric med.    This note was prepared using voice recognition system and is likely to have sound alike errors that may have been overlooked even after proof reading.  Please call me with any questions    Discussed diagnosis, its evaluation, treatment and usual course. All questions answered.    Thank you for the courtesy of participating in the care of this patient    Ernie Mcintosh MD      Complications of untreated sleep apnea discussed with pateint in detail including but not limited to  high blood pressure, heart attack, stroke, obesity,  diabetes and worsening heart failure. Patient voiced understanding.

## 2023-07-08 ENCOUNTER — TELEPHONE (OUTPATIENT)
Dept: ADMINISTRATIVE | Facility: HOSPITAL | Age: 73
End: 2023-07-08
Payer: MEDICARE

## 2023-07-10 ENCOUNTER — HOSPITAL ENCOUNTER (OUTPATIENT)
Dept: SLEEP MEDICINE | Facility: HOSPITAL | Age: 73
Discharge: HOME | End: 2023-07-10
Attending: INTERNAL MEDICINE
Payer: MEDICARE

## 2023-07-10 DIAGNOSIS — G47.33 OSA ON CPAP: ICD-10-CM

## 2023-07-10 DIAGNOSIS — I27.20 PULMONARY HYPERTENSION: Primary | ICD-10-CM

## 2023-07-10 DIAGNOSIS — I27.20 PULMONARY HYPERTENSION: ICD-10-CM

## 2023-07-10 PROCEDURE — 95811 POLYSOM 6/>YRS CPAP 4/> PARM: CPT

## 2023-07-10 NOTE — PROGRESS NOTES
Test needs repeat     There is  significant nocturnal oxygen desaturation,  Clinical correlation is advised, and  Recommend overnight polysomnography if clinically indicated    Orders Placed This Encounter   Procedures    PULSE OXIMETRY OVERNIGHT     Standing Status:   Future     Standing Expiration Date:   7/10/2024     Order Specific Question:   Reason for Test?     Answer:   O2 Qualification     Order Specific Question:   Symptoms:     Answer:   Daytime Fatigue

## 2023-07-10 NOTE — PROCEDURES
[facility]  [address] [facility]. * [place] [address]  Telephone: [phone]  Test date: 23 Start: 23 01:00:12 Yoana Pardo  Doctor: CARIE Mcintosh MD End: 23 06:33:36 [id number]  Oximetry: Summary Report  Comments: On Room Air; Interpret with caution. <1 hour recording session noted.  Recording time: 05:33:24 Highest pulse: 157 Highest SpO2: 98%  Excluded samplin:45:52 Lowest pulse: 36 Lowest SpO2: 77%  Total valid samplin:47:32 Mean pulse: 77 Mean SpO2: 89.5%  1 S.D.: 8.5 1 S.D.: 2.4  Time with SpO2<90: 0:25:24, 53.4%  Time with SpO2<80: 0:00:08, 0.3%  Time with SpO2<70: 0:00:00, 0.0%  Time with SpO2<60: 0:00:00, 0.0%  Time with SpO2<89: 0:12:16, 25.8%  Time with SpO2 =>90: 0:22:08, 46.6%  Time with SpO2=>80 & <90: 0:25:16, 53.2%  Time with SpO2=>70 & <80: 0:00:08, 0.3%  Time with SpO2=>60 & <70: 0:00:00, 0.0%  The longest continuous time with saturation <=88 was 00:00:32, which started at  23 05:57:44.  A desaturation event was defined as a decrease of saturation by 4 or more.  One event was excluded due to artifact.  There was one desaturation event over 3 minutes duration.  There were 23 desaturation events of less than 3 minutes duration during which:  The mean high was 92.7%. The mean low was 85.1%.  The number of these events that were:  > 0 & <10 seconds: 2 > 0 seconds: 23  =>10 & <20 seconds: 11 =>10 seconds: 21  =>20 & <30 seconds: 6 =>20 seconds: 10  =>30 & <40 seconds: 0 =>30 seconds: 4  =>40 & <50 seconds: 0 =>40 seconds: 4  =>50 & <60 seconds: 1 =>50 seconds: 4  =>60 seconds: 3 =>60 seconds: 3  The mean length of desaturation events that were >=10 sec & <=3 mins was: 35.2 sec.  Desaturation event index (events >=10 sec per sampled hour): 26.5  Desaturation event index (events >= 0 sec per sampled hour): 29.0      OVERNIGHT OXIMETRY REPORT:    Dictated by: Ernie Mcintosh MD  Test date: 2023  Dictated on: 07/10/2023      Comment: This  test was performed on Room Air     A desaturation event was defined as a decrease of saturation by 4 or more.    REPORT SUMMARY  Total valid samplin:47:32   High SpO2: 98%    Low SpO2: 77%    Mean SpO2  89.5 %  Cumulative time with oxygen saturation less than 88% (TC88): 0:12:16    CLINICAL INTERPRETATION   There is  significant nocturnal oxygen desaturation,  Clinical correlation is advised, and  Recommend overnight polysomnography if clinically indicated    Total 04:45:52 time was excluded    Test needs to be repeated    Medicare Criteria Comments:   Oximetry test results suggest the patient falls under Medicare Group 1 Criteria. ( Arterial oxygen saturation at or below 88% for at least 5 minutes taken during sleep)  Ernie Mcintosh MD    Details about Medicare Group Criteria coverage can be found at http://www.cms.hhs.gov/manuals/downloads/

## 2023-07-10 NOTE — Clinical Note
DIAGNOSIS: Obstructive sleep apnea (adult) (pediatric) / G47.33  RECOMMENDATIONS:  1. Weight loss 2. Therapy with CPAP 16.0 cmwp with mask of choice 3. Optimize cough symptoms 4. BIPAP may be considered either for hypoxemia or pressure intolerance

## 2023-07-12 ENCOUNTER — TELEPHONE (OUTPATIENT)
Dept: PHARMACY | Facility: CLINIC | Age: 73
End: 2023-07-12
Payer: MEDICARE

## 2023-07-12 NOTE — TELEPHONE ENCOUNTER
Hello, this is Joe Boykin, clinical pharmacist with [facility] that is part of your care team.  We have begun working on your prescription, Sildenafil, that your doctor has sent us. Our next steps include:     Working with your insurance company to obtain approval for your medication  Working with you to ensure your medication is affordable     We will be calling you along the way with updates on your medication but if you have any concerns or receive information that you would like to discuss please reach us at [phone].    Welcome call outcome: Left voicemail

## 2023-07-14 PROCEDURE — 95811 POLYSOM 6/>YRS CPAP 4/> PARM: CPT | Mod: 26,,, | Performed by: INTERNAL MEDICINE

## 2023-07-14 PROCEDURE — 95811 PR POLYSOMNOGRAPHY W/CPAP: ICD-10-PCS | Mod: 26,,, | Performed by: INTERNAL MEDICINE

## 2023-07-14 NOTE — PROCEDURES
"Patient Name: Yoana Pardo Study Date: 7/10/2023   YOB: 1950 Study Type: SPLIT   Age: 72 year Patient #: [id number]   Sex: Female Billing ID: -   Height: 4' 11" Interpreting Physician: Ernie Mcintosh MD   Weight: 237.0 lbs Recording Tech: Yves Allen   BMI: 48.4 Scoring Tech: Stephany Flores       PATIENT: Yoana Pardo  study Date: 7/10/2023  Referring Physician: Ernie Mcintosh MD    Indications for Polysomnography: The patient is a 72 year old Female who is 4' 11" and weighs 237.0 lbs.  Her BMI equals 48.4.  A diagnostic polysomnogram was performed to evaluate for -.  After 127.0 minutes of sleep time the patient exhibited sufficient respiratory events qualifying her for a CPAP trial which was then initiated.    Known [place] , [place] 13. Pulmonary hypertension, Non adherent with CPAP, asked about inspire.   Home sleep study 09/27/2016: AHI 33.3/hr( 205 events)  CPAP titration: 10/12/2016:  CPAP 12 cmwp was optimal  Bed time 12 AM, Sleep latency 60 minutes, wake time 6 am. No restless legs or sleep attacks. No signs of narcolepsy. Deliberately sleep less to get things done. Naps for 30-60 minutes. Note worthy stress.     Polysomnogram Data: A full night polysomnogram was performed recording the standard physiologic parameters including EEG, EOG, EMG, EKG, nasal and oral airflow.  Respiratory parameters of chest and abdominal movements are recorded with Peizo-Crystal motion transducers.  Oxygen saturation was recorded by pulse oximetry.      Sleep Architecture: The total recording time of the diagnostic portion of the study was 150.8 minutes.  The total sleep time was 127.0 minutes.  During the diagnostic portion of the study, the patient spent 5.5% of total sleep time in Stage N1, 68.1% in Stage N2, 15.0% in Stages N3, and 11.4% in REM.   Sleep latency was 11.8 minutes.  REM latency was 78.5 minutes.  Sleep Efficiency was 84.2%.  Sleep Maintenance Efficiency was 98.1%.  Total wake " time was 24.0 minutes for a total wake percentage of 15.9%.    At 12:54:00 AM the patient was placed on PAP treatment and was titrated at pressures ranging from 6* cm/H20 with supplemental oxygen at - up to 16* cm/H20 with supplemental oxygen at -.  The total recording time of the treatment portion of the study was 246.5 minutes.  The total sleep time was 221.5 minutes.  During the treatment portion of the study, the patient spent 6.5% of total sleep time in Stage N1, 59.8% in Stage N2, 12.9% in Stages N3, and 20.8% in REM.   Sleep latency was 12.5 minutes.  REM latency was 85.0 minutes.  Sleep Efficiency was 89.9%.  Sleep Maintenance Efficiency was 93.9%.  Total wake time was 25.0 minutes for a total wake percentage of 10.1%.  (*=CPAP)    Respiratory Events: During the diagnostic portion of the study, the polysomnogram revealed a presence of 2 obstructive, 17 central, and 1 mixed apneas resulting in an Apnea index of 9.4 events per hour.  There were 27 hypopneas (using 3% desaturation criteria) resulting in a Hypopnea index of 12.8 events per hour.  The combined Apnea/Hypopnea index (using 3% desaturation criteria for Hypopneas) was 22.2 events per hour.  Baseline oxygen saturation was 92.8%.  The lowest oxygen saturation was 68.0%.      During the treatment portion of the study, the polysomnogram revealed a presence of - obstructive, 7 central, and - mixed apneas resulting in an Apnea index of 1.9 events per hour. There were 33 hypopneas (using 3% desaturation criteria) resulting in a Hypopnea index of 8.9 events per hour.  The combined Apnea/Hypopnea index (using 3% desaturation criteria for Hypopneas) was 10.8 events per hour.  Baseline oxygen saturation was 92.6%.  The lowest oxygen saturation was 80.0%.      LIMB ACTIVITY: During the diagnostic portion of the study, there were - limb movements recorded.  Of this total, - were classified as PLMs.  Of the PLMs, - were associated with arousals.  The Limb Movement  "index was - per hour while the PLM index was - per hour.    During the treatment portion of the study, there were - limb movements recorded.  Of this total, - were classified as PLMs.  Of the PLMs, - were associated with arousals.  The Limb Movement index was - per hour while the PLM index was - per hour.    CARDIAC SUMMARY: During the diagnostic portion of the study, the average pulse rate was 90.2 bpm.  The minimum pulse rate was 54.0 bpm while the maximum pulse rate was 129.0 bpm.    During the treatment portion of the study, the average pulse rate was 80.9 bpm.  The minimum pulse rate was 60.0 bpm while the maximum pulse rate was 110.0 bpm.     CONCLUSION:    Overall AHI was 22.2/hr Moderate obstructive sleep apnea during diagnostic with SpO2 [place] 68%  Split night criteria was met  Mask leak and cough noted  Flex set at; 3, Mask & Size: ResMed Airfit F20 (S) Humidity: 4  EKG: occasional PVC  CPAP 16 cm was well tolerated    DIAGNOSIS: Obstructive sleep apnea (adult) (pediatric) / G47.33    RECOMMENDATIONS:    Weight loss  Therapy with CPAP 16.0 cmwp with mask of choice  Optimize cough symptoms  BIPAP may be considered either for hypoxemia or pressure intolerance      Dear Ernie Mcintosh MD  [id number] [facility]  [place] [id number]/Lynn Wiggins MD         The sleep study that you ordered is complete.  You have ordered sleep LAB services to perform the sleep study for Yoana Pardo .      Please find Sleep Study result in  the "Media tab" of Chart Review menu.        You can look  for the report in the  Media by the document type "Sleep Study Documents". Alphabetizing  "Document type" column helps to find the SLEEP STUDY report  Faster.       As the ordering provider, you are responsible for reviewing the results and implementing a treatment plan with your patient.    If you need a Sleep Medicine provider to explain the sleep study findings and arrange treatment for the patient, please refer " "patient for consultation to our Sleep Clinic via [facility] with Ambulatory Consult Sleep.     To do that please place an order for an  "Ambulatory Consult Sleep" -  order , it will go to our clinic work queue for our staff  to contact the patient for an appointment.      For any questions, please contact our sleep lab  staff at [phone] to talk to clinical staff          Ernie Mcintosh MD   "

## 2023-07-20 DIAGNOSIS — I27.20 PULMONARY HYPERTENSION: ICD-10-CM

## 2023-07-21 RX ORDER — FUROSEMIDE 20 MG/1
20 TABLET ORAL 2 TIMES DAILY PRN
Qty: 60 TABLET | Refills: 3 | Status: SHIPPED | OUTPATIENT
Start: 2023-07-21 | End: 2024-04-01 | Stop reason: SDUPTHER

## 2023-07-28 ENCOUNTER — SPECIALTY PHARMACY (OUTPATIENT)
Dept: PHARMACY | Facility: CLINIC | Age: 73
End: 2023-07-28
Payer: MEDICARE

## 2023-07-28 ENCOUNTER — PATIENT MESSAGE (OUTPATIENT)
Dept: PHARMACY | Facility: CLINIC | Age: 73
End: 2023-07-28
Payer: MEDICARE

## 2023-07-28 NOTE — TELEPHONE ENCOUNTER
[facility] unable to contact patient to discuss FA for Sildenafil. [facility] informed vis staff message. Closing referral at [facility].

## 2023-07-29 DIAGNOSIS — I27.20 PULMONARY HYPERTENSION: Primary | ICD-10-CM

## 2023-08-03 RX ORDER — SILDENAFIL CITRATE 20 MG/1
20 TABLET ORAL 3 TIMES DAILY
Qty: 30 TABLET | Refills: 11 | Status: SHIPPED | OUTPATIENT
Start: 2023-08-03 | End: 2023-12-27

## 2023-09-12 ENCOUNTER — OFFICE VISIT (OUTPATIENT)
Dept: HOME HEALTH SERVICES | Facility: CLINIC | Age: 73
End: 2023-09-12
Payer: MEDICARE

## 2023-09-12 VITALS
DIASTOLIC BLOOD PRESSURE: 94 MMHG | BODY MASS INDEX: 48.79 KG/M2 | OXYGEN SATURATION: 95 % | SYSTOLIC BLOOD PRESSURE: 144 MMHG | TEMPERATURE: 97 F | WEIGHT: 242 LBS | HEART RATE: 68 BPM | HEIGHT: 59 IN

## 2023-09-12 DIAGNOSIS — E78.2 MIXED HYPERLIPIDEMIA: ICD-10-CM

## 2023-09-12 DIAGNOSIS — I10 ESSENTIAL HYPERTENSION: Chronic | ICD-10-CM

## 2023-09-12 DIAGNOSIS — R73.03 PREDIABETES: ICD-10-CM

## 2023-09-12 DIAGNOSIS — R06.2 WHEEZING: ICD-10-CM

## 2023-09-12 DIAGNOSIS — I70.0 ATHEROSCLEROSIS OF AORTA: ICD-10-CM

## 2023-09-12 DIAGNOSIS — K44.9 HIATAL HERNIA WITH GERD: ICD-10-CM

## 2023-09-12 DIAGNOSIS — J30.9 ALLERGIC RHINITIS, UNSPECIFIED SEASONALITY, UNSPECIFIED TRIGGER: ICD-10-CM

## 2023-09-12 DIAGNOSIS — Z13.31 POSITIVE DEPRESSION SCREENING: ICD-10-CM

## 2023-09-12 DIAGNOSIS — Z00.00 ENCOUNTER FOR PREVENTIVE HEALTH EXAMINATION: Primary | ICD-10-CM

## 2023-09-12 DIAGNOSIS — F33.1 MODERATE EPISODE OF RECURRENT MAJOR DEPRESSIVE DISORDER: ICD-10-CM

## 2023-09-12 DIAGNOSIS — R26.9 ABNORMALITY OF GAIT AND MOBILITY: ICD-10-CM

## 2023-09-12 DIAGNOSIS — I34.1 MVP (MITRAL VALVE PROLAPSE): ICD-10-CM

## 2023-09-12 DIAGNOSIS — K21.9 HIATAL HERNIA WITH GERD: ICD-10-CM

## 2023-09-12 DIAGNOSIS — M17.0 PRIMARY OSTEOARTHRITIS OF BOTH KNEES: ICD-10-CM

## 2023-09-12 DIAGNOSIS — I27.20 PULMONARY HYPERTENSION: ICD-10-CM

## 2023-09-12 DIAGNOSIS — G47.33 OSA (OBSTRUCTIVE SLEEP APNEA): ICD-10-CM

## 2023-09-12 DIAGNOSIS — E66.01 MORBID OBESITY WITH BMI OF 45.0-49.9, ADULT: ICD-10-CM

## 2023-09-12 DIAGNOSIS — F51.12 BEHAVIORALLY INDUCED INSUFFICIENT SLEEP SYNDROME: ICD-10-CM

## 2023-09-12 DIAGNOSIS — J30.2 CHRONIC SEASONAL ALLERGIC RHINITIS: ICD-10-CM

## 2023-09-12 DIAGNOSIS — R41.3 MEMORY LOSS: ICD-10-CM

## 2023-09-12 DIAGNOSIS — G62.9 PERIPHERAL POLYNEUROPATHY: ICD-10-CM

## 2023-09-12 PROCEDURE — G0439 PR MEDICARE ANNUAL WELLNESS SUBSEQUENT VISIT: ICD-10-PCS | Mod: S$GLB,,, | Performed by: NURSE PRACTITIONER

## 2023-09-12 PROCEDURE — G0439 PPPS, SUBSEQ VISIT: HCPCS | Mod: S$GLB,,, | Performed by: NURSE PRACTITIONER

## 2023-09-12 RX ORDER — IPRATROPIUM BROMIDE 21 UG/1
2 SPRAY, METERED NASAL EVERY 8 HOURS PRN
Qty: 30 ML | Refills: 4 | Status: SHIPPED | OUTPATIENT
Start: 2023-09-12

## 2023-09-12 RX ORDER — ALBUTEROL SULFATE 90 UG/1
2 AEROSOL, METERED RESPIRATORY (INHALATION) EVERY 6 HOURS PRN
Qty: 6.7 G | Refills: 1 | Status: SHIPPED | OUTPATIENT
Start: 2023-09-12

## 2023-09-12 NOTE — PROGRESS NOTES
"Yoana Pardo presented for Medicare AW today. The following components were reviewed and updated:    Medical history  Family History  Social history  Allergies and Current Medications  Health Risk Assessment  Health Maintenance  Care Team    **See Completed Assessments for Annual Wellness visit with in the encounter summary    The following assessments were completed:  Depression Screening  Cognitive function Screening      Timed Get Up Test  Whisper Test    Vitals:    09/12/23 1152   BP: (!) 144/94   Pulse: 68   Temp: 97.1 °F (36.2 °C)   TempSrc: Temporal   SpO2: 95%   Weight: 109.8 kg (242 lb)   Height: 4' 11" (1.499 m)     Body mass index is 48.88 kg/m².   ]    Physical Exam  Vitals reviewed.   Constitutional:       Appearance: Normal appearance. She is obese.   HENT:      Head: Normocephalic and atraumatic.   Cardiovascular:      Rate and Rhythm: Normal rate and regular rhythm.      Pulses: Normal pulses.      Heart sounds: Normal heart sounds.   Pulmonary:      Effort: Pulmonary effort is normal.      Breath sounds: Normal breath sounds.   Musculoskeletal:         General: Normal range of motion.      Cervical back: Normal range of motion and neck supple.      Right lower leg: Edema present.      Left lower leg: Edema present.   Skin:     General: Skin is warm and dry.   Neurological:      Mental Status: She is alert and oriented to person, place, and time.      Gait: Gait abnormal.   Psychiatric:         Mood and Affect: Mood normal.         Behavior: Behavior normal.          Outpatient Medications Marked as Taking for the 9/12/23 encounter (Office Visit) with Vivi Kitchen FNP   Medication Sig Dispense Refill    acetaminophen (TYLENOL) 500 MG tablet Take 500 mg by mouth every 6 (six) hours as needed for Pain.      aspirin (ECOTRIN) 81 MG EC tablet Take 81 mg by mouth once daily.      furosemide (LASIX) 20 MG tablet Take 1 tablet (20 mg total) by mouth 2 (two) times daily as needed (as needed). 60 " tablet 3    hydroCHLOROthiazide (HYDRODIURIL) 25 MG tablet Take 1 tablet (25 mg total) by mouth once daily. 90 tablet 3    pantoprazole (PROTONIX) 40 MG tablet Take 1 tablet by mouth once daily 90 tablet 2    pregabalin (LYRICA) 75 MG capsule Take 1 capsule (75 mg total) by mouth once daily. 30 capsule 3    sildenafil (REVATIO) 20 mg Tab TAKE 1 TABLET BY MOUTH THREE TIMES DAILY 30 tablet 11    simvastatin (ZOCOR) 20 MG tablet Take 1 tablet (20 mg total) by mouth every evening. 90 tablet 3        Diagnoses and health risks identified today and associated recommendations/orders:  1. Encounter for preventive health examination  Medicare awv complete. Health maintenance:  Shingles vaccines, COVID-19 3rd vaccine, flu vaccine due-encouraged patient to obtain at a pharmacy.    2. Pulmonary hypertension  Chronic and stable on BP medication.  Continue current management.  See med list above. Recommend low sodium diet. Follow up with PCP, cardiology, and pulmonology.     3. Atherosclerosis of aorta  Chronic and stable on statin medication. Continue current. F/u with pcp.      4. Morbid obesity with BMI of 45.0-49.9, adult  Chronic. Recommend diet and exercise to lose weight. Follow up with your PCP as planned to discuss adjustments to your treatment plan.      5. Moderate episode of recurrent major depressive disorder;  Positive depression screening  Moderate depression with a score of 13 on phq-9. Pt denies any si. She declined psychology/psychiatry referral and medications.  Patient states contributing factor is her son-in-law passed away and she feels like she is not able to support her daughter emotionally.  Recommend f/u with pcp.     7. Essential hypertension  Chronic. BP elevated today. Patient is asymptomatic. Instructed patient to check BP daily, keep BP log, and call doctor if BP greater than 140/90.  Recommend low sodium diet. Follow up with PCP.       8. Mixed hyperlipidemia  Lab Results   Component Value Date     "CHOL 185 05/15/2023    CHOL 214 (H) 11/15/2022    CHOL 188 08/18/2021     Lab Results   Component Value Date    HDL 45 05/15/2023    HDL 49 11/15/2022    HDL 45 08/18/2021     Lab Results   Component Value Date    LDLCALC 111.6 05/15/2023    LDLCALC 127.4 11/15/2022    LDLCALC 86.6 08/18/2021     No results found for: "DLDL"  Lab Results   Component Value Date    TRIG 142 05/15/2023    TRIG 188 (H) 11/15/2022    TRIG 282 (H) 08/18/2021       f1   Lab Results   Component Value Date    CHOLHDL 24.3 05/15/2023    CHOLHDL 22.9 11/15/2022    CHOLHDL 23.9 08/18/2021    Chronic and stable on statin medication. Continue current. F/u with pcp.      9. MVP (mitral valve prolapse)  Chronic and stable. Continue current management. See med list above. Follow up with cardiology.      10. Peripheral polyneuropathy  Chronic and stable. Continue current management. See med list above. Follow up with PCP.     11. Memory loss  table. Continue current management. Follow up with PCP.     12. Chronic seasonal allergic rhinitis  Chronic and stable. Continue current management. See med list above. Follow up with PCP.     13. Hiatal hernia with GERD  Chronic and stable. Continue current management. See med list above. Follow up with PCP.     14. Primary osteoarthritis of both knees  Chronic and stable. Continue current management. See med list above. Follow up with PCP.     15. [place] (obstructive sleep apnea)  Chronic.  Patient is not using CPAP.  Discussed negative side effects of not using CPAP.  Follow up with pulmonology.    16. Wheezing  Pt c/o wheezing and sob a few times a day with walking. No abnormal bs on assessment. O2 sat sitting 98%. She stood up to walk for the walking test and I heard a wheeze so I put the pulse ox on her for the walk. Her o2 sat at the end of 20 steps was 90%. I sent in an albuterol inhaler prn.   But then again later realized it's likely fluid related since She does also have leg swelling which worsened in " July. She is on lasix prn and she has been taking about every other day. She was taking lasix everyday but started taking it every other day about 2 months ago. I told her to take lasix everyday. This message sent to Dr. Mcintosh and Dr. Gil. F/u with them. Echo scheduled soon along with cardiology visit is scheduled this month.   - albuterol (VENTOLIN HFA) 90 mcg/actuation inhaler; Inhale 2 puffs into the lungs every 6 (six) hours as needed for Wheezing or Shortness of Breath. Rescue  Dispense: 6.7 g; Refill: 1    17. Behaviorally induced insufficient sleep syndrome  Chronic.  Recommended patient avoid daytime naps and use CPAP at night.  Follow up with pulmonology and pcp.     18. Allergic rhinitis, unspecified seasonality, unspecified trigger  Chronic and stable. Continue current management. See med list above. Follow up with PCP.   - ipratropium (ATROVENT) 21 mcg (0.03 %) nasal spray; 2 sprays by Each Nostril route every 8 (eight) hours as needed for Rhinitis.  Dispense: 30 mL; Refill: 4    19. Abnormality of gait and mobility  Chronic. Fall precautions recommended and discussed. Follow up with PCP.      20. Prediabetes   Latest Reference Range & Units 09/17/04 08:54 01/18/06 09:08 05/15/23 15:22   Hemoglobin A1C External 4.0 - 5.6 % 5.6 5.6 5.8 (H)   Estimated Avg Glucose 68 - 131 mg/dL   120   (H): Data is abnormally high  New problem.  Recommend diabetic diet and regular exercise most days a week for least 30 min. follow up with PCP.        Provided Yoana with a 5-10 year written screening schedule and personal prevention plan. Recommendations were developed using the USPSTF age appropriate recommendations. Education, counseling, and referrals were provided as needed.  After Visit Summary printed and given to patient which includes a list of additional screenings\tests needed.    Follow up in about 1 year (around 9/12/2024) for annual wellness visit.      BRIAN Joya    I have used clinical  judgement based on duration and functional status to consider definite necessity for treatment. See above. Pt declined psychiatry/psychology referral .   I offered to discuss advanced care planning, including how to pick a person who would make decisions for you if you were unable to make them for yourself, called a health care power of , and what kind of decisions you might make such as use of life sustaining treatments such as ventilators and tube feeding when faced with a life limiting illness recorded on a living will that they will need to know. (How you want to be cared for as you near the end of your natural life)     X Patient is interested in learning more about how to make advanced directives.  I provided them paperwork and offered to discuss this with them.

## 2023-09-12 NOTE — Clinical Note
Bp 144/94 BP elevated today. Patient is asymptomatic. Instructed patient to check BP daily, keep BP log, and call doctor if BP greater than 140/90.

## 2023-09-12 NOTE — PATIENT INSTRUCTIONS
Counseling and Referral of Other Preventative  (Italic type indicates deductible and co-insurance are waived)    Patient Name: Yoana Pardo  Today's Date: 9/12/2023    Health Maintenance       Date Due Completion Date    Shingles Vaccine (1 of 2) Never done ---    COVID-19 Vaccine (3 - Moderna series) 05/26/2021 3/31/2021    Influenza Vaccine (1) 09/01/2023 11/21/2022    Mammogram 12/15/2023 12/15/2022    Override on 10/25/2012: Done    High Dose Statin 05/15/2024 5/15/2023    Aspirin/Antiplatelet Therapy 05/15/2024 5/15/2023    Lipid Panel 05/15/2024 5/15/2023    DEXA Scan 06/26/2024 6/26/2019    Override on 1/14/2008: Done (REPEAT 5 YRS)    Override on 9/23/2004: Done    TETANUS VACCINE 08/29/2026 8/29/2016    Colorectal Cancer Screening 11/11/2026 11/11/2021    Override on 10/21/2004: Done        No orders of the defined types were placed in this encounter.    The following information is provided to all patients.  This information is to help you find resources for any of the problems found today that may be affecting your health:                Living healthy guide: www.[facility].[place].gov      Understanding Diabetes: www.diabetes.org      Eating healthy: www.cdc.gov/healthyweight      CDC home safety checklist: www.cdc.gov/steadi/patient.html      Agency on Aging: www.goea.[place].[facility]      Alcoholics anonymous (AA): www.aa.org      Physical Activity: www.moody.nih.gov/cb8ztfx      Tobacco use: www.quitwithusla.org

## 2023-09-12 NOTE — Clinical Note
Medicare awv complete. Health maintenance:  Shingles vaccines, COVID-19 3rd vaccine, flu vaccine due-encouraged patient to obtain at a pharmacy.  Moderate depression with a score of 13 on phq-9. Pt denies any si. She declined psychology/psychiatry referral and medications.  Patient states contributing factor is her son-in-law passed away and she feels like she is not able to support her daughter emotionally.  Recommend f/u with pcp.   Pt c/o wheezing and sob a few times a day with walking. No abnormal bs on assessment. O2 sat sitting 98%. She stood up to walk for the walking test and I heard a wheeze so I put the pulse ox on her for the walk. Her o2 sat at the end of 20 steps was 90%. I sent in an albuterol inhaler prn.  But then again later realized it's likely fluid related since She does also have leg swelling which worsened in July. She is on lasix prn and she has been taking about every other day.. I told her to take lasix everyday.

## 2023-09-13 ENCOUNTER — PATIENT MESSAGE (OUTPATIENT)
Dept: INTERNAL MEDICINE | Facility: CLINIC | Age: 73
End: 2023-09-13
Payer: MEDICARE

## 2023-09-18 ENCOUNTER — TELEPHONE (OUTPATIENT)
Dept: PULMONOLOGY | Facility: CLINIC | Age: 73
End: 2023-09-18
Payer: MEDICARE

## 2023-09-18 NOTE — TELEPHONE ENCOUNTER
----- Message from Karolyn Richardson sent at 9/18/2023  2:17 PM CDT -----  Contact: [phone]  Type:  Sooner Apoointment Request    Caller is requesting a sooner appointment.  Caller declined first available appointment listed below.  Caller will not accept being placed on the waitlist and is requesting a message be sent to doctor.  Name of Caller:Yoana   When is the first available appointment?jan 2024  Symptoms:follow up   Would the patient rather a call back or [facility]? Call back   Best Call Back Number:[phone]  Additional Information: n/a      Thanks KB

## 2023-09-22 DIAGNOSIS — R00.2 PALPITATIONS: Primary | ICD-10-CM

## 2023-09-24 NOTE — PROGRESS NOTES
Subjective:   Patient ID:  Yoana Pardo is a 73 y.o. female who presents for follow-up of No chief complaint on file.     03/24/2023:   Overall patient is doing well no exertional symptoms all medications reviewed renewed as necessary.  Patient is stable no edema patient states she is had good compliance of medications will recheck all of her medications again today.    All medicines reviewed and renewed.  I think she ran out of some of her pills including the statin medication and some of her blood pressure medications.  Follow-up evaluation in the fall.  A 73 yo obese female with htn hlp mvp pulmonary htn is referred from DR OROZCO for rhc. She has shortness of breath she does not feel rested despite cpap her pa pressures have increased. Has no angina no leg swelling.  Compared to previously she has been consistently using her cpap. Has been compliant with salt intake.     11/17/2020  No change in status she needs to have bariatric surgery she is compliant with meds htn controlled. Not compliant with cpap has no headache tia claudication  No leg swelling no chest pain. She has moderate pulmonary htn by cath.no shortness of breath.     Patient presents the office and actually has low blood pressure today will cut back losartan 50 mg daily.  Follow-up evaluation within 6 weeks.        Blood pressure under better control today however the patient complains of chest pain EKG pending the and stable patient will have follow-up nuclear stress test.        This patient presents to the office in much improved no acute shortness of breath doing well current medications.  Nuclear stress test showed no evidence of cardiac ischemia LV function showed normal heart function with mild-to-moderate pulmonary hypertension being treated.   NO FOCAL CNS SYMPTOMS OR SIGNS TO SUGGEST TIA OR STROKE  NO ANGINA OR EQUIVALENT  NO UNUSUAL HERNÁNDEZ. NO ORTHOPNEA OR PND  NO PALPITATIONS  NO NEAR SYNCOPE OR SYNCOPE  NO EDEMA. NO CALVE  TENDERNESS        11/21/22-today the patient presents in the office with stable heart rate blood pressure on reduced losartan.  Overall doing well no exertional symptoms chest pain shortness of breath.  The patient is planning eventually to have hiatal hernia surgery and I think she would be cleared to have that done in the future  01/23/2023:   Overall the patient is doing well blood pressure is stable patient is planned have endoscopy this week.  This is pre gastric bypass surgery.  Overall she is stable blood pressure is under good control she continues on current doses of losartan and HydroDIURIL.  Otherwise stable this time.  No exertional chest pain prior nuclear stress test showed no evidence cardiac ischemia.       09/25/2023 overall patient doing well no exertional symptoms chest pain shortness breath intermittent edema she is using diuretics p.r.n. otherwise heart rate blood pressure stable current medications no other complaints today echo is pending was just done earlier today.  Not released.        Review of Systems   Constitutional: Negative for chills, diaphoresis, night sweats, weight gain and weight loss.   HENT:  Negative for congestion, hoarse voice, sore throat and stridor.    Eyes:  Negative for double vision and pain.   Cardiovascular:  Negative for chest pain, claudication, cyanosis, dyspnea on exertion, irregular heartbeat, leg swelling, near-syncope, orthopnea, palpitations, paroxysmal nocturnal dyspnea and syncope.   Respiratory:  Negative for cough, hemoptysis, shortness of breath, sleep disturbances due to breathing, snoring, sputum production and wheezing.    Endocrine: Negative for cold intolerance, heat intolerance and polydipsia.   Hematologic/Lymphatic: Negative for bleeding problem. Does not bruise/bleed easily.   Skin:  Negative for color change, dry skin and rash.   Musculoskeletal:  Negative for joint swelling and muscle cramps.   Gastrointestinal:  Negative for bloating,  abdominal pain, constipation, diarrhea, dysphagia, melena, nausea and vomiting.   Genitourinary:  Negative for flank pain and urgency.   Neurological:  Negative for dizziness, focal weakness, headaches, light-headedness, loss of balance, seizures and weakness.   Psychiatric/Behavioral:  Negative for altered mental status and memory loss. The patient is not nervous/anxious.    Family History   Problem Relation Age of Onset    Heart disease Mother     Obesity Mother     Kidney disease Father     Hypertension Father     Obesity Father     Heart disease Father     Diabetes Sister     Aneurysm Sister         AAA     Past Medical History:   Diagnosis Date    GERD (gastroesophageal reflux disease)     Hemorrhoids     History of positive PPD, untreated     Hx of cold sores     Hyperlipidemia     Hypertension     MVP (mitral valve prolapse)     Peripheral neuropathy     Pulmonary HTN     Dr Bailon    Sleep apnea     [place]-CPAP  uses intermittently     Social History     Socioeconomic History    Marital status:     Number of children: 3   Occupational History    Occupation: "[employer]" occupational      Employer: [employer]   Tobacco Use    Smoking status: Never    Smokeless tobacco: Never   Substance and Sexual Activity    Alcohol use: Yes     Alcohol/week: 1.0 standard drink of alcohol     Types: 1 Shots of liquor per week     Comment: 2-3 shots of liquor once or twice a month    Drug use: No    Sexual activity: Not Currently     Partners: Male     Social Determinants of Health     Financial Resource Strain: Medium Risk (9/12/2023)    Overall Financial Resource Strain (CARDIA)     Difficulty of Paying Living Expenses: Somewhat hard   Food Insecurity: No Food Insecurity (9/12/2023)    Hunger Vital Sign     Worried About Running Out of Food in the Last Year: Never true     Ran Out of Food in the Last Year: Never true   Transportation Needs: No Transportation Needs (9/12/2023)    PRAPARE - Transportation      Lack of Transportation (Medical): No     Lack of Transportation (Non-Medical): No   Physical Activity: Insufficiently Active (9/12/2023)    Exercise Vital Sign     Days of Exercise per Week: 2 days     Minutes of Exercise per Session: 10 min   Stress: Stress Concern Present (9/12/2023)    [employer] - Occupational Stress Questionnaire     Feeling of Stress : To some extent   Social Connections: Moderately Integrated (9/12/2023)    Social Connection and Isolation Panel [NHANES]     Frequency of Communication with Friends and Family: More than three times a week     Frequency of Social Gatherings with Friends and Family: Once a week     Attends [employer] Services: 1 to 4 times per year     Active Member of Clubs or Organizations: No     Attends Club or Organization Meetings: Never     Marital Status:    Housing Stability: Low Risk  (9/12/2023)    Housing Stability Vital Sign     Unable to Pay for Housing in the Last Year: No     Number of Places Lived in the Last Year: 1     Unstable Housing in the Last Year: No     Current Outpatient Medications on File Prior to Visit   Medication Sig Dispense Refill    acetaminophen (TYLENOL) 500 MG tablet Take 500 mg by mouth every 6 (six) hours as needed for Pain.      albuterol (VENTOLIN HFA) 90 mcg/actuation inhaler Inhale 2 puffs into the lungs every 6 (six) hours as needed for Wheezing or Shortness of Breath. Rescue 6.7 g 1    aspirin (ECOTRIN) 81 MG EC tablet Take 81 mg by mouth once daily.      furosemide (LASIX) 20 MG tablet Take 1 tablet (20 mg total) by mouth 2 (two) times daily as needed (as needed). 60 tablet 3    hydroCHLOROthiazide (HYDRODIURIL) 25 MG tablet Take 1 tablet (25 mg total) by mouth once daily. 90 tablet 3    ipratropium (ATROVENT) 21 mcg (0.03 %) nasal spray 2 sprays by Each Nostril route every 8 (eight) hours as needed for Rhinitis. 30 mL 4    losartan (COZAAR) 50 MG tablet Take 1 tablet (50 mg total) by mouth  once daily. 90 tablet 3    pantoprazole (PROTONIX) 40 MG tablet Take 1 tablet by mouth once daily 90 tablet 2    pregabalin (LYRICA) 75 MG capsule Take 1 capsule (75 mg total) by mouth once daily. 30 capsule 3    sildenafil (REVATIO) 20 mg Tab TAKE 1 TABLET BY MOUTH THREE TIMES DAILY 30 tablet 11    simvastatin (ZOCOR) 20 MG tablet Take 1 tablet (20 mg total) by mouth every evening. 90 tablet 3    [DISCONTINUED] gabapentin (NEURONTIN) 100 MG capsule TAKE 1 CAPSULE BY MOUTH NIGHTLY AS NEEDED 30 capsule 3     Current Facility-Administered Medications on File Prior to Visit   Medication Dose Route Frequency Provider Last Rate Last Admin    lactated ringers infusion   Intravenous Continuous Deloris Galicia MD         Review of patient's allergies indicates:   Allergen Reactions    Lisinopril Other (See Comments)     Cough      Bactrim [sulfamethoxazole-trimethoprim] Itching    Zosyn [piperacillin-tazobactam] Hives     Pt received second dose of Zosyn and had hives on both arms. Benadryl given and pt continued to receive zosyn with no issues. Hydralazine also given around the same time and discontinued.        Objective:     Physical Exam  Eyes:      Pupils: Pupils are equal, round, and reactive to light.   Neck:      Trachea: No tracheal deviation.   Cardiovascular:      Rate and Rhythm: Normal rate and regular rhythm.      Pulses: Intact distal pulses.           Carotid pulses are 2+ on the right side and 2+ on the left side.       Radial pulses are 2+ on the right side and 2+ on the left side.        Femoral pulses are 2+ on the right side and 2+ on the left side.       Popliteal pulses are 2+ on the right side and 2+ on the left side.        Dorsalis pedis pulses are 2+ on the right side and 2+ on the left side.        Posterior tibial pulses are 2+ on the right side and 2+ on the left side.      Heart sounds: Normal heart sounds. No murmur heard.     No friction rub. No gallop.   Pulmonary:      Effort:  Pulmonary effort is normal. No respiratory distress.      Breath sounds: Normal breath sounds. No stridor. No wheezing or rales.   Chest:      Chest wall: No tenderness.   Abdominal:      General: There is no distension.      Tenderness: There is no abdominal tenderness. There is no rebound.   Musculoskeletal:         General: No tenderness.      Cervical back: Normal range of motion.   Skin:     General: Skin is warm and dry.   Neurological:      Mental Status: She is alert and oriented to person, place, and time.   EKG today shows normal sinus rhythm several PVCs otherwise within normal limits.This EKG was personally reviewed by myself, I agree with the interpretation.  Assessment:     1. Palpitations    2. Hypokalemia    3. Pulmonary hypertension    4. Tachycardia    5. S/P laparoscopic cholecystectomy    6. Atherosclerosis of aorta    7. Essential hypertension    8. Diffusion capacity of lung (dl), decreased    9. Mixed hyperlipidemia    10. Dyspnea on exertion    11. MVP (mitral valve prolapse)    12. Peripheral polyneuropathy    13. [place] on CPAP    14. Stable angina pectoris    15. Irregular heartbeat    16. Hiatal hernia with GERD        Plan:     Palpitations    Hypokalemia    Pulmonary hypertension    Tachycardia    S/P laparoscopic cholecystectomy    Atherosclerosis of aorta    Essential hypertension    Diffusion capacity of lung (dl), decreased    Mixed hyperlipidemia    Dyspnea on exertion    MVP (mitral valve prolapse)    Peripheral polyneuropathy    [place] on CPAP    Stable angina pectoris    Irregular heartbeat    Hiatal hernia with GERD      Impression 1 palpitations secondary to PVCs all stable   2. Mitral prolapse stable   3 diastolic heart dysfunction repeat cardiac echo and re-evaluation.    Peripheral edema stable diuretics p.r.n. and otherwise stable   Follow-up evaluation 6 months sooner if acute recurrence symptoms all medicines reviewed and renewed as necessary.

## 2023-09-25 ENCOUNTER — OFFICE VISIT (OUTPATIENT)
Dept: CARDIOLOGY | Facility: CLINIC | Age: 73
End: 2023-09-25
Payer: MEDICARE

## 2023-09-25 ENCOUNTER — HOSPITAL ENCOUNTER (OUTPATIENT)
Dept: CARDIOLOGY | Facility: HOSPITAL | Age: 73
Discharge: HOME | End: 2023-09-25
Attending: INTERNAL MEDICINE
Payer: MEDICARE

## 2023-09-25 ENCOUNTER — CLINICAL SUPPORT (OUTPATIENT)
Dept: PULMONOLOGY | Facility: CLINIC | Age: 73
End: 2023-09-25
Payer: MEDICARE

## 2023-09-25 VITALS
WEIGHT: 242 LBS | HEART RATE: 77 BPM | OXYGEN SATURATION: 96 % | HEIGHT: 59 IN | WEIGHT: 242.94 LBS | BODY MASS INDEX: 48.79 KG/M2 | DIASTOLIC BLOOD PRESSURE: 82 MMHG | SYSTOLIC BLOOD PRESSURE: 130 MMHG | BODY MASS INDEX: 49.07 KG/M2

## 2023-09-25 VITALS — WEIGHT: 242.06 LBS | BODY MASS INDEX: 48.8 KG/M2 | HEIGHT: 59 IN

## 2023-09-25 DIAGNOSIS — I27.20 PULMONARY HYPERTENSION: ICD-10-CM

## 2023-09-25 DIAGNOSIS — R94.2 DIFFUSION CAPACITY OF LUNG (DL), DECREASED: ICD-10-CM

## 2023-09-25 DIAGNOSIS — Z90.49 S/P LAPAROSCOPIC CHOLECYSTECTOMY: ICD-10-CM

## 2023-09-25 DIAGNOSIS — I34.1 MVP (MITRAL VALVE PROLAPSE): ICD-10-CM

## 2023-09-25 DIAGNOSIS — R06.09 DYSPNEA ON EXERTION: ICD-10-CM

## 2023-09-25 DIAGNOSIS — R00.2 PALPITATIONS: ICD-10-CM

## 2023-09-25 DIAGNOSIS — R00.0 TACHYCARDIA: ICD-10-CM

## 2023-09-25 DIAGNOSIS — K21.9 HIATAL HERNIA WITH GERD: ICD-10-CM

## 2023-09-25 DIAGNOSIS — G62.9 PERIPHERAL POLYNEUROPATHY: ICD-10-CM

## 2023-09-25 DIAGNOSIS — E78.2 MIXED HYPERLIPIDEMIA: ICD-10-CM

## 2023-09-25 DIAGNOSIS — I49.9 IRREGULAR HEARTBEAT: ICD-10-CM

## 2023-09-25 DIAGNOSIS — I10 ESSENTIAL HYPERTENSION: ICD-10-CM

## 2023-09-25 DIAGNOSIS — E87.6 HYPOKALEMIA: ICD-10-CM

## 2023-09-25 DIAGNOSIS — I70.0 ATHEROSCLEROSIS OF AORTA: ICD-10-CM

## 2023-09-25 DIAGNOSIS — K44.9 HIATAL HERNIA WITH GERD: ICD-10-CM

## 2023-09-25 DIAGNOSIS — I20.89 STABLE ANGINA PECTORIS: ICD-10-CM

## 2023-09-25 DIAGNOSIS — G47.33 OSA ON CPAP: ICD-10-CM

## 2023-09-25 DIAGNOSIS — R00.2 PALPITATIONS: Primary | ICD-10-CM

## 2023-09-25 LAB
AV INDEX (PROSTH): 0.68
AV MEAN GRADIENT: 4 MMHG
AV PEAK GRADIENT: 8 MMHG
AV VALVE AREA BY VELOCITY RATIO: 2.06 CM²
AV VALVE AREA: 2.11 CM²
AV VELOCITY RATIO: 0.66
BSA FOR ECHO PROCEDURE: 2.14 M2
CV ECHO LV RWT: 0.7 CM
DOP CALC AO PEAK VEL: 1.37 M/S
DOP CALC AO VTI: 33 CM
DOP CALC LVOT AREA: 3.1 CM2
DOP CALC LVOT DIAMETER: 1.99 CM
DOP CALC LVOT PEAK VEL: 0.91 M/S
DOP CALC LVOT STROKE VOLUME: 69.63 CM3
DOP CALC RVOT PEAK VEL: 0.87 M/S
DOP CALC RVOT VTI: 17.2 CM
DOP CALCLVOT PEAK VEL VTI: 22.4 CM
E WAVE DECELERATION TIME: 202.95 MSEC
E/A RATIO: 1.26
E/E' RATIO: 8.11 M/S
ECHO LV POSTERIOR WALL: 1.32 CM (ref 0.6–1.1)
FRACTIONAL SHORTENING: 31 % (ref 28–44)
INTERVENTRICULAR SEPTUM: 1.31 CM (ref 0.6–1.1)
IVRT: 59.94 MSEC
LA MAJOR: 6.22 CM
LA MINOR: 6.29 CM
LA WIDTH: 5.7 CM
LEFT ATRIUM SIZE: 4.02 CM
LEFT ATRIUM VOLUME INDEX MOD: 43.1 ML/M2
LEFT ATRIUM VOLUME INDEX: 60.9 ML/M2
LEFT ATRIUM VOLUME MOD: 86.27 CM3
LEFT ATRIUM VOLUME: 121.82 CM3
LEFT INTERNAL DIMENSION IN SYSTOLE: 2.59 CM (ref 2.1–4)
LEFT VENTRICLE DIASTOLIC VOLUME INDEX: 30.24 ML/M2
LEFT VENTRICLE DIASTOLIC VOLUME: 60.48 ML
LEFT VENTRICLE MASS INDEX: 87 G/M2
LEFT VENTRICLE SYSTOLIC VOLUME INDEX: 12.1 ML/M2
LEFT VENTRICLE SYSTOLIC VOLUME: 24.28 ML
LEFT VENTRICULAR INTERNAL DIMENSION IN DIASTOLE: 3.76 CM (ref 3.5–6)
LEFT VENTRICULAR MASS: 173.46 G
LV LATERAL E/E' RATIO: 7.3 M/S
LV SEPTAL E/E' RATIO: 9.13 M/S
LVOT MG: 1.97 MMHG
LVOT MV: 0.66 CM/S
MV PEAK A VEL: 0.58 M/S
MV PEAK E VEL: 0.73 M/S
PISA TR MAX VEL: 3.83 M/S
PV MEAN GRADIENT: 1 MMHG
PV PEAK GRADIENT: 6 MMHG
PV PEAK VELOCITY: 1.25 M/S
RA MAJOR: 5.39 CM
RA PRESSURE ESTIMATED: 3 MMHG
RA WIDTH: 3.64 CM
RIGHT VENTRICULAR END-DIASTOLIC DIMENSION: 3.38 CM
RV TB RVSP: 7 MMHG
SINUS: 2.68 CM
STJ: 2.65 CM
TDI LATERAL: 0.1 M/S
TDI SEPTAL: 0.08 M/S
TDI: 0.09 M/S
TR MAX PG: 59 MMHG
TRICUSPID ANNULAR PLANE SYSTOLIC EXCURSION: 2.05 CM
TV REST PULMONARY ARTERY PRESSURE: 62 MMHG
Z-SCORE OF LEFT VENTRICULAR DIMENSION IN END DIASTOLE: -4.39
Z-SCORE OF LEFT VENTRICULAR DIMENSION IN END SYSTOLE: -2.56

## 2023-09-25 PROCEDURE — 94618 PULMONARY STRESS TESTING: CPT | Mod: PBBFAC

## 2023-09-25 PROCEDURE — 99214 PR OFFICE/OUTPT VISIT, EST, LEVL IV, 30-39 MIN: ICD-10-PCS | Mod: S$PBB,,, | Performed by: INTERNAL MEDICINE

## 2023-09-25 PROCEDURE — 99214 OFFICE O/P EST MOD 30 MIN: CPT | Mod: S$PBB,,, | Performed by: INTERNAL MEDICINE

## 2023-09-25 PROCEDURE — 94729 DIFFUSING CAPACITY: CPT | Mod: PBBFAC

## 2023-09-25 PROCEDURE — 99999 PR PBB SHADOW E&M-EST. PATIENT-LVL III: CPT | Mod: PBBFAC,,, | Performed by: INTERNAL MEDICINE

## 2023-09-25 PROCEDURE — 99999 PR PBB SHADOW E&M-EST. PATIENT-LVL III: ICD-10-PCS | Mod: PBBFAC,,, | Performed by: INTERNAL MEDICINE

## 2023-09-25 PROCEDURE — 99999 PR PBB SHADOW E&M-EST. PATIENT-LVL I: ICD-10-PCS | Mod: PBBFAC,,,

## 2023-09-25 PROCEDURE — 99211 OFF/OP EST MAY X REQ PHY/QHP: CPT | Mod: PBBFAC,25,27

## 2023-09-25 PROCEDURE — 93005 ELECTROCARDIOGRAM TRACING: CPT

## 2023-09-25 PROCEDURE — 93306 TTE W/DOPPLER COMPLETE: CPT | Mod: 26,,, | Performed by: INTERNAL MEDICINE

## 2023-09-25 PROCEDURE — 94726 PLETHYSMOGRAPHY LUNG VOLUMES: CPT | Mod: PBBFAC

## 2023-09-25 PROCEDURE — 94010 BREATHING CAPACITY TEST: CPT | Mod: PBBFAC

## 2023-09-25 PROCEDURE — 99213 OFFICE O/P EST LOW 20 MIN: CPT | Mod: PBBFAC,25 | Performed by: INTERNAL MEDICINE

## 2023-09-25 PROCEDURE — 99999 PR PBB SHADOW E&M-EST. PATIENT-LVL I: CPT | Mod: PBBFAC,,,

## 2023-09-25 PROCEDURE — 93010 ELECTROCARDIOGRAM REPORT: CPT | Mod: ,,, | Performed by: INTERNAL MEDICINE

## 2023-09-25 PROCEDURE — 93010 EKG 12-LEAD: ICD-10-PCS | Mod: ,,, | Performed by: INTERNAL MEDICINE

## 2023-09-25 PROCEDURE — 93306 ECHO (CUPID ONLY): ICD-10-PCS | Mod: 26,,, | Performed by: INTERNAL MEDICINE

## 2023-09-25 PROCEDURE — 93306 TTE W/DOPPLER COMPLETE: CPT

## 2023-09-25 NOTE — PROCEDURES
[facility]     SUMMARY     Ordering Provider: Dayna BARTHOLOMEW   Interpreting Provider: Dr storm  Performing nurse/tech/RT: Freedom Bah RRT  Diagnosis: Pulmonary Hypertension  Height: 4' 11" (149.9 cm)  Weight: 109.8 kg (242 lb 1 oz)  BMI (Calculated): 48.9   Patient Race:             Phase Oxygen Assessment Supplemental O2 Heart   Rate Blood Pressure Marcelle Dyspnea Scale Rating   Resting 96 % Room Air 75 bpm 144/77 0   Exercise        Minute        1 92 % Room Air 96 bpm     2 92 % Room Air 107 bpm     3 92 % Room Air 108 bpm     4 96 % Room Air 114 bpm     5 94 % Room Air 111 bpm     6  92 % Room Air 12 bpm 171/77 4   Recovery        Minute        1 96 % Room Air 113 bpm     2 99 % Room Air 96 bpm     3 99 % Room Air 94 bpm     4 99 % Room Air 99 bpm 141/80 2     Six Minute Walk Summary  6MWT Status: completed without stopping  Patient Reported: No complaints     Interpretation:  Did the patient stop or pause?: No            Total Time Walked (Calculated): 360 seconds  Final Partial Lap Distance (feet): 100 feet  Total Distance Meters (Calculated): 335.28 meters  Predicted Distance Meters (Calculated): 321.47 meters  Percentage of Predicted (Calculated): 104.3  Peak VO2 (Calculated): 14.04  Mets: 4.01  Has The Patient Had a Previous Six Minute Walk Test?: Yes       Previous 6MWT Results  Has The Patient Had a Previous Six Minute Walk Test?: Yes  Date of Previous Test: 04/30/21  Total Time Walked: 360 seconds  Total Distance (meters): 426.7  Predicted Distance (meters): 354.3 meters  Percentage of Predicted: 120.4  Percent Change (Calculated): 0.21      Six minute walk distance is 335.28m /321.47 meters (104.3 % predicted) with light.Patient did complete the study, walking 360 seconds of the 360 second test . During exercise, there was no  significant desaturation while breathing room air .Lowest oxygen saturation was 92% .Maximum heart rate during exercise was " 114 bpm which is 76 % of maximum predicted heart rate of 149 bpm. Blood pressure increased significantly and Heart rate remained stable Based upon age and body mass index, exercise capacity is normal.  Peak VO2 during walking was 14.04 ml/kg/min which is 44 % of predicted Peak VO2 max of 32 ml/kg/min based on a resting heart rate of 75/min.    Patient had a previous study. Since the previous study in 04/30/2021, exercise capacity may be somewhat worse.

## 2023-09-28 LAB
BRPFT: NORMAL
DLCO ADJ PRE: 8.58 ML/(MIN*MMHG)
DLCO SINGLE BREATH LLN: 12.34
DLCO SINGLE BREATH PRE REF: 47.5 %
DLCO SINGLE BREATH REF: 18.07
DLCOC SBVA LLN: 2.64
DLCOC SBVA PRE REF: 97.7 %
DLCOC SBVA REF: 4.4
DLCOC SINGLE BREATH LLN: 12.34
DLCOC SINGLE BREATH PRE REF: 47.5 %
DLCOC SINGLE BREATH REF: 18.07
DLCOVA LLN: 2.64
DLCOVA PRE REF: 97.7 %
DLCOVA PRE: 4.3 ML/(MIN*MMHG*L)
DLCOVA REF: 4.4
DLVAADJ PRE: 4.3 ML/(MIN*MMHG*L)
ERV LLN: -16449.42
ERV PRE REF: 54.7 %
ERV REF: 0.58
FEF 25 75 LLN: 0.54
FEF 25 75 PRE REF: 34.9 %
FEF 25 75 REF: 1.44
FEV1 FVC LLN: 66
FEV1 FVC PRE REF: 85.5 %
FEV1 FVC REF: 79
FEV1 LLN: 1.1
FEV1 PRE REF: 58.9 %
FEV1 REF: 1.58
FRCPLETH LLN: 1.61
FRCPLETH PREREF: 81 %
FRCPLETH REF: 2.43
FVC LLN: 1.41
FVC PRE REF: 68.7 %
FVC REF: 2.01
IVC PRE: 1.17 L
IVC SINGLE BREATH LLN: 1.41
IVC SINGLE BREATH PRE REF: 58.2 %
IVC SINGLE BREATH REF: 2.01
MVV LLN: 53
MVV PRE REF: 54.8 %
MVV REF: 62
PEF LLN: 2.28
PEF PRE REF: 85.7 %
PEF REF: 3.99
PRE DLCO: 8.58 ML/(MIN*MMHG)
PRE ERV: 0.32 L
PRE FEF 25 75: 0.5 L/S
PRE FET 100: 9.23 SEC
PRE FEV1 FVC: 67.63 %
PRE FEV1: 0.93 L
PRE FRC PL: 1.97 L
PRE FVC: 1.38 L
PRE MVV: 34 L/MIN
PRE PEF: 3.42 L/S
PRE RV: 1.74 L
PRE TLC: 3.45 L
RAW LLN: 3.06
RAW PRE REF: 221.1 %
RAW PRE: 6.76 CMH2O*S/L
RAW REF: 3.06
RV LLN: 1.28
RV PRE REF: 93.9 %
RV REF: 1.85
RVTLC LLN: 34
RVTLC PRE REF: 117 %
RVTLC PRE: 50.41 %
RVTLC REF: 43
TLC LLN: 3.12
TLC PRE REF: 83.9 %
TLC REF: 4.11
VA PRE: 2 L
VA SINGLE BREATH LLN: 3.96
VA SINGLE BREATH PRE REF: 50.5 %
VA SINGLE BREATH REF: 3.96
VC LLN: 1.41
VC PRE REF: 85.2 %
VC PRE: 1.71 L
VC REF: 2.01
VTGRAWPRE: 2.22 L

## 2023-09-28 PROCEDURE — 94010 BREATHING CAPACITY TEST: CPT | Mod: 26,S$PBB,, | Performed by: INTERNAL MEDICINE

## 2023-09-28 PROCEDURE — 94726 PLETHYSMOGRAPHY LUNG VOLUMES: CPT | Mod: 26,S$PBB,, | Performed by: INTERNAL MEDICINE

## 2023-09-28 PROCEDURE — 94010 BREATHING CAPACITY TEST: ICD-10-PCS | Mod: 26,S$PBB,, | Performed by: INTERNAL MEDICINE

## 2023-09-28 PROCEDURE — 94726 PULM FUNCT TST PLETHYSMOGRAP: ICD-10-PCS | Mod: 26,S$PBB,, | Performed by: INTERNAL MEDICINE

## 2023-09-28 PROCEDURE — 94729 PR C02/MEMBANE DIFFUSE CAPACITY: ICD-10-PCS | Mod: 26,S$PBB,, | Performed by: INTERNAL MEDICINE

## 2023-09-28 PROCEDURE — 94729 DIFFUSING CAPACITY: CPT | Mod: 26,S$PBB,, | Performed by: INTERNAL MEDICINE

## 2023-09-29 ENCOUNTER — OFFICE VISIT (OUTPATIENT)
Dept: PULMONOLOGY | Facility: CLINIC | Age: 73
End: 2023-09-29
Payer: MEDICARE

## 2023-09-29 VITALS
BODY MASS INDEX: 47.82 KG/M2 | HEART RATE: 91 BPM | DIASTOLIC BLOOD PRESSURE: 82 MMHG | WEIGHT: 237.19 LBS | HEIGHT: 59 IN | OXYGEN SATURATION: 95 % | SYSTOLIC BLOOD PRESSURE: 138 MMHG | RESPIRATION RATE: 18 BRPM

## 2023-09-29 DIAGNOSIS — R06.02 SOB (SHORTNESS OF BREATH): ICD-10-CM

## 2023-09-29 DIAGNOSIS — I27.20 PULMONARY HYPERTENSION: Primary | ICD-10-CM

## 2023-09-29 DIAGNOSIS — I70.0 ATHEROSCLEROSIS OF AORTA: ICD-10-CM

## 2023-09-29 DIAGNOSIS — G47.33 OSA (OBSTRUCTIVE SLEEP APNEA): ICD-10-CM

## 2023-09-29 DIAGNOSIS — R94.2 DIFFUSION CAPACITY OF LUNG (DL), DECREASED: ICD-10-CM

## 2023-09-29 DIAGNOSIS — R05.8 NOCTURNAL COUGH: ICD-10-CM

## 2023-09-29 DIAGNOSIS — I10 ESSENTIAL HYPERTENSION: Chronic | ICD-10-CM

## 2023-09-29 DIAGNOSIS — E78.2 MIXED HYPERLIPIDEMIA: ICD-10-CM

## 2023-09-29 DIAGNOSIS — R73.03 PREDIABETES: ICD-10-CM

## 2023-09-29 DIAGNOSIS — E66.01 MORBID OBESITY WITH BMI OF 45.0-49.9, ADULT: ICD-10-CM

## 2023-09-29 PROCEDURE — 99214 PR OFFICE/OUTPT VISIT, EST, LEVL IV, 30-39 MIN: ICD-10-PCS | Mod: S$PBB,,, | Performed by: INTERNAL MEDICINE

## 2023-09-29 PROCEDURE — 99999 PR PBB SHADOW E&M-EST. PATIENT-LVL IV: CPT | Mod: PBBFAC,,, | Performed by: INTERNAL MEDICINE

## 2023-09-29 PROCEDURE — 99214 OFFICE O/P EST MOD 30 MIN: CPT | Mod: PBBFAC | Performed by: INTERNAL MEDICINE

## 2023-09-29 PROCEDURE — 99999 PR PBB SHADOW E&M-EST. PATIENT-LVL IV: ICD-10-PCS | Mod: PBBFAC,,, | Performed by: INTERNAL MEDICINE

## 2023-09-29 PROCEDURE — 99214 OFFICE O/P EST MOD 30 MIN: CPT | Mod: S$PBB,,, | Performed by: INTERNAL MEDICINE

## 2023-09-29 RX ORDER — BENZONATATE 200 MG/1
200 CAPSULE ORAL 3 TIMES DAILY PRN
Qty: 90 CAPSULE | Refills: 11 | Status: SHIPPED | OUTPATIENT
Start: 2023-09-29 | End: 2024-09-28

## 2023-09-29 NOTE — ASSESSMENT & PLAN NOTE
Discussed sleep study report   Discussed risks of untreated sleep apnea 13 cardiometabolic symptoms.    Also affecting pulmonary hypertension  Orders placed

## 2023-09-29 NOTE — PROGRESS NOTES
Subjective:       Patient ID: Yoana Pardo is a 73 y.o. female.  Patient Active Problem List   Diagnosis    Mixed hyperlipidemia    Pulmonary hypertension    MVP (mitral valve prolapse)    Hiatal hernia with GERD    History of positive PPD, untreated    Hemorrhoids    Essential hypertension    Peripheral neuropathy    Primary osteoarthritis of both knees    Morbid obesity with BMI of 45.0-49.9, adult    [place] (obstructive sleep apnea)    Behaviorally induced insufficient sleep syndrome    Chronic seasonal allergic rhinitis    Atherosclerosis of aorta    Diffusion capacity of lung (dl), decreased    Memory loss    Moderate episode of recurrent major depressive disorder    Prediabetes     Immunization History   Administered Date(s) Administered    COVID-19, MRNA, LN-S, PF (MODERNA FULL 0.5 ML DOSE) 03/03/2021, 03/31/2021    Influenza (FLUAD) - Quadrivalent - Adjuvanted - PF *Preferred* (65+) 10/30/2020, 11/21/2022    Influenza - High Dose - PF (65 years and older) 09/25/2018, 10/25/2019    Influenza - Quadrivalent 10/29/2014    Influenza - Quadrivalent - PF *Preferred* (6 months and older) 10/27/2015, 10/03/2016    Influenza Split 11/22/2006, 11/06/2008, 10/27/2009, 10/19/2012    Pneumococcal Conjugate - 13 Valent 10/15/2018    Pneumococcal Polysaccharide - 23 Valent 10/28/2019    Tdap 08/29/2016 9/29/2023     8:26 AM   EPWORTH SLEEPINESS SCALE   Sitting and reading 2   Watching TV 2   Sitting, inactive in a public place (e.g. a theatre or a meeting) 2   As a passenger in a car for an hour without a break 3   Lying down to rest in the afternoon when circumstances permit 3   Sitting and talking to someone 0   Sitting quietly after a lunch without alcohol 2   In a car, while stopped for a few minutes in traffic 0   Total score 14         mRC  []  0: Dyspnea only with strenuous exercise  [x] +1:Dyspnea when hurrying or walking up a slight hill  [] +2:Walks slower than people of the same age because of  dyspnea or has to stop for breath when walking at own pace  [] +3:Stops for breath after walking 100 yards (91 m) or after a few minutes  [] +4:Too dyspneic to leave house or breathless when dressing      NYHA    [] Class I: Patients with no limitation of activities; they suffer no symptoms from ordinary activities  [x] Class II: Patients with slight, mild limitation of activity; they are comfortable with rest or with mild exertion.  [] Class III:Patients with marked limitation of activity; they are comfortable only at rest.  [] Class IV: Patients who should be at complete rest, confined to bed or chair; any physical activity brings on discomfort and symptoms occur at rest.          Chief Complaint: Pulmonary Hypertension    Ms. Yoana Pardo is 70 y.o.    Last visit 10/30/2020  Follow up after ECHO: PA pressure down to 51 from 57  Sirometry: Normal, FEv1 and FVC imprived  On REVATIO  Last RHC:   6MWD distance improved  Hypoxemia and [place] treated with CPAP  Not using consistently, irregular sleep routine   [place] score is 8  Weight stable Now 230lb  Wake time 6-7 am  Occasional naps  Bariatric surgery cancelled due to hernia  I have reviewed the patient's medical history in detail and updated the computerized patient record.       07/07/2023  Last visit 06/10/2021  Since retired  Had COVID  Has been off Revatio, out of refills  Also not currently using CPAP  Says gave her cough  Last echo 2020  [place] 13  Had oxygen in past after gall bladder surgery  Options for [place]: INSPIRE  BMI is contraindication  Weight loss optione  Will explore GLP 1  options with PCP  A1C was 5.8      09/29/2023  Followup  Reveiwed results  ONSAT: SpO2 hadley 77%  PFT: low MVV: 54.8%, FVC 1.38L( 68.7%), DLCo 8.58( 47.5%)  TLC was normal 3.45L( 83.9%)  6MWD: No desaturation: 335.28m( 104.3%)    EKG reveiwed  ECHO PASP 62  Adherence with REVATIO   PSG showed high sleep effuecncy  Diagnostic AHI 22.2/hr   CPAP 16 cm was well tolerated  "      Review of Systems   Constitutional:  Negative for fatigue.   HENT:  Negative for postnasal drip, rhinorrhea and congestion.    Respiratory:  Positive for apnea (CPAP : Not using). Negative for snoring, sputum production, shortness of breath, wheezing, pleurisy and use of rescue inhaler.             Cardiovascular: Negative.    Genitourinary: Negative.    Endocrine: endocrine negative    Musculoskeletal: Negative.    Skin:  Negative for rash.   Neurological: Negative.    Psychiatric/Behavioral:  Negative for sleep disturbance.    All other systems reviewed and are negative.      Objective:       Vitals:    09/29/23 0825   BP: 138/82   Pulse: 91   Resp: 18   SpO2: 95%   Weight: 107.6 kg (237 lb 3.4 oz)   Height: 4' 11" (1.499 m)         Physical Exam   Constitutional: She is oriented to person, place, and time. She appears well-developed and well-nourished.     She is obese.   HENT:   Head: Normocephalic.   Nose: No mucosal edema. No polyps.   Mouth/Throat: Oropharynx is clear and moist. No oropharyngeal exudate. Mallampati Score: IV.   Neck: No tracheal deviation present. No thyromegaly present.   Cardiovascular: Normal rate and regular rhythm.   No murmur heard.  Pulmonary/Chest: Normal expansion, symmetric chest wall expansion, effort normal and breath sounds normal.   Abdominal: Soft. Bowel sounds are normal.   Musculoskeletal:         General: No edema. Normal range of motion.      Cervical back: Normal range of motion and neck supple.   Lymphadenopathy:     She has no cervical adenopathy.   Neurological: She is alert and oriented to person, place, and time. Gait normal.   Skin: Skin is warm and dry. No rash noted. No erythema. Nails show no clubbing.   Psychiatric: She has a normal mood and affect.   Nursing note and vitals reviewed.    Personal Diagnostic Review  Echo    Left Ventricle: The left ventricle is normal in size. Normal wall   thickness. There is mild concentric hypertrophy. Normal wall motion. " There   is normal systolic function with a visually estimated ejection fraction of   60 - 65%. There is normal diastolic function.    Left Atrium: Left atrium is severely dilated.    Right Ventricle: Normal right ventricular cavity size. Wall thickness   is normal. Right ventricle wall motion  is normal. Systolic function is   normal.    Tricuspid Valve: There is moderate regurgitation.    Pulmonary Artery: There is moderate pulmonary hypertension. The   estimated pulmonary artery systolic pressure is 62 mmHg.    IVC/SVC: Normal venous pressure at 3 mmHg.               Phase Oxygen Assessment Supplemental O2 Heart   Rate Blood Pressure Marcelle Dyspnea Scale Rating   Resting 96 % Room Air 75 bpm 144/77 0   Exercise        Minute        1 92 % Room Air 96 bpm     2 92 % Room Air 107 bpm     3 92 % Room Air 108 bpm     4 96 % Room Air 114 bpm     5 94 % Room Air 111 bpm     6  92 % Room Air 12 bpm 171/77 4   Recovery        Minute        1 96 % Room Air 113 bpm     2 99 % Room Air 96 bpm     3 99 % Room Air 94 bpm     4 99 % Room Air 99 bpm 141/80 2     Six Minute Walk Summary  6MWT Status: completed without stopping  Patient Reported: No complaints     Interpretation:  Did the patient stop or pause?: No               Total Time Walked (Calculated): 360 seconds  Final Partial Lap Distance (feet): 100 feet  Total Distance Meters (Calculated): 335.28 meters  Predicted Distance Meters (Calculated): 321.47 meters  Percentage of Predicted (Calculated): 104.3  Peak VO2 (Calculated): 14.04  Mets: 4.01  Has The Patient Had a Previous Six Minute Walk Test?: Yes       Previous 6MWT Results  Has The Patient Had a Previous Six Minute Walk Test?: Yes  Date of Previous Test: 04/30/21  Total Time Walked: 360 seconds  Total Distance (meters): 426.7  Predicted Distance (meters): 354.3 meters  Percentage of Predicted: 120.4  Percent Change (Calculated): 0.21    No Plateau before 15 seconds.Patient unable to reach plateau.Inconsistent  "effort.Normal lung volumes.   Â    (TLC > or equal to LLN)Nonspecific ventilatory limitation due to reduced forced vital capacity (FVC) and normal total lung capacity (TLC).   Â    ?Clinical correlation with body habits and neuromuscular status suggested.Moderate reduction in diffusion capacity - unadjusted for hemoglobin. (DLCO > or equal to 40% and < 60% predicted). Maximum voluntary ventilation is moderately reduced. (MVV%   predicted is 51% to 65% of predicted)   Â    FEV1 and FVC declined since prior stud    Patient Name: Yoana Pardo Study Date: 7/10/2023   YOB: 1950 Study Type: SPLIT   Age: 72 year Patient #: [id number]   Sex: Female Billing ID: -   Height: 4' 11" Interpreting Physician: Ernie Mcintosh MD   Weight: 237.0 lbs Recording Tech: Yves Allen   BMI: 48.4 Scoring Tech: Stephany Flores         PATIENT: Yoana Pardo  STUDY DATE: 7/10/2023  REFERRING PHYSICIAN: Ernie Mcintosh MD     INDICATIONS FOR POLYSOMNOGRAPHY: The patient is a 72 year old Female who is 4' 11" and weighs 237.0 lbs.  Her BMI equals 48.4.  A diagnostic polysomnogram was performed to evaluate for -.  After 127.0 minutes of sleep time the patient exhibited sufficient respiratory events qualifying her for a CPAP trial which was then initiated.    Known [place] , [place] 13. Pulmonary hypertension, Non adherent with CPAP, asked about inspire.   Home sleep study 09/27/2016: AHI 33.3/hr( 205 events)  CPAP titration: 10/12/2016:  CPAP 12 cmwp was optimal  Bed time 12 AM, Sleep latency 60 minutes, wake time 6 am. No restless legs or sleep attacks. No signs of narcolepsy. Deliberately sleep less to get things done. Naps for 30-60 minutes. Note worthy stress.      POLYSOMNOGRAM DATA: A full night polysomnogram was performed recording the standard physiologic parameters including EEG, EOG, EMG, EKG, nasal and oral airflow.  Respiratory parameters of chest and abdominal movements are recorded with Peizo-Crystal " motion transducers.  Oxygen saturation was recorded by pulse oximetry.       SLEEP ARCHITECTURE: The total recording time of the diagnostic portion of the study was 150.8 minutes.  The total sleep time was 127.0 minutes.  During the diagnostic portion of the study, the patient spent 5.5% of total sleep time in Stage N1, 68.1% in Stage N2, 15.0% in Stages N3, and 11.4% in REM.   Sleep latency was 11.8 minutes.  REM latency was 78.5 minutes.  Sleep Efficiency was 84.2%.  Sleep Maintenance Efficiency was 98.1%.  Total wake time was 24.0 minutes for a total wake percentage of 15.9%.     At 12:54:00 AM the patient was placed on PAP treatment and was titrated at pressures ranging from 6* cm/H20 with supplemental oxygen at - up to 16* cm/H20 with supplemental oxygen at -.  The total recording time of the treatment portion of the study was 246.5 minutes.  The total sleep time was 221.5 minutes.  During the treatment portion of the study, the patient spent 6.5% of total sleep time in Stage N1, 59.8% in Stage N2, 12.9% in Stages N3, and 20.8% in REM.   Sleep latency was 12.5 minutes.  REM latency was 85.0 minutes.  Sleep Efficiency was 89.9%.  Sleep Maintenance Efficiency was 93.9%.  Total wake time was 25.0 minutes for a total wake percentage of 10.1%.  (*=CPAP)     RESPIRATORY EVENTS: During the diagnostic portion of the study, the polysomnogram revealed a presence of 2 obstructive, 17 central, and 1 mixed apneas resulting in an Apnea index of 9.4 events per hour.  There were 27 hypopneas (using 3% desaturation criteria) resulting in a Hypopnea index of 12.8 events per hour.  The combined Apnea/Hypopnea index (using 3% desaturation criteria for Hypopneas) was 22.2 events per hour.  Baseline oxygen saturation was 92.8%.  The lowest oxygen saturation was 68.0%.       During the treatment portion of the study, the polysomnogram revealed a presence of - obstructive, 7 central, and - mixed apneas resulting in an Apnea index of  1.9 events per hour. There were 33 hypopneas (using 3% desaturation criteria) resulting in a Hypopnea index of 8.9 events per hour.  The combined Apnea/Hypopnea index (using 3% desaturation criteria for Hypopneas) was 10.8 events per hour.  Baseline oxygen saturation was 92.6%.  The lowest oxygen saturation was 80.0%.       LIMB ACTIVITY: During the diagnostic portion of the study, there were - limb movements recorded.  Of this total, - were classified as PLMs.  Of the PLMs, - were associated with arousals.  The Limb Movement index was - per hour while the PLM index was - per hour.     During the treatment portion of the study, there were - limb movements recorded.  Of this total, - were classified as PLMs.  Of the PLMs, - were associated with arousals.  The Limb Movement index was - per hour while the PLM index was - per hour.     CARDIAC SUMMARY: During the diagnostic portion of the study, the average pulse rate was 90.2 bpm.  The minimum pulse rate was 54.0 bpm while the maximum pulse rate was 129.0 bpm.     During the treatment portion of the study, the average pulse rate was 80.9 bpm.  The minimum pulse rate was 60.0 bpm while the maximum pulse rate was 110.0 bpm.      CONCLUSION:     Overall AHI was 22.2/hr Moderate obstructive sleep apnea during diagnostic with SpO2 hadley 68%  Split night criteria was met  Mask leak and cough noted  Flex set at; 3, Mask & Size: ResMed Airfit F20 (S)          Humidity: 4  EKG: occasional PVC  CPAP 16 cm was well tolerated     DIAGNOSIS: Obstructive sleep apnea (adult) (pediatric) / G47.33     RECOMMENDATIONS:     Weight loss  Therapy with CPAP 16.0 cmwp with mask of choice  Optimize cough symptoms  BIPAP may be considered either for hypoxemia or pressure intolerance    Assessment:       Problem List Items Addressed This Visit       [place] (obstructive sleep apnea)     Discussed sleep study report   Discussed risks of untreated sleep apnea 13 cardiometabolic symptoms.    Also  affecting pulmonary hypertension  Orders placed         Relevant Orders    CPAP FOR HOME USE    Pulmonary hypertension - Primary     Minimal dyspnea  Functional class 1  WHO group 2  Repeat 6MWD: Normal exercise capacity no desaturation     2D echo PA pressure estimated 67     Hopefully weight loss will help.         Essential hypertension (Chronic)     On Cozaar + HCTZ         Atherosclerosis of aorta    Prediabetes    Mixed hyperlipidemia     Stable on simvastatin         Morbid obesity with BMI of 45.0-49.9, adult     Weight loss and exercise         Diffusion capacity of lung (dl), decreased     Other Visit Diagnoses       Nocturnal cough        Relevant Medications    benzonatate (TESSALON) 200 MG capsule    SOB (shortness of breath)        Relevant Medications    benzonatate (TESSALON) 200 MG capsule    Other Relevant Orders    Fraction of  Nitric Oxide          Plan:       Continue REVATIO  Discussed the importance of treatment of the obstructive sleep apnea will improve her situation regarding the pulmonary hypertension   New CPAP machine ordered   Fractional exhaled nitric oxide and Tessalon Perles ordered             Follow up in about 10 weeks (around 2023), or weight loss, adherence with therapy, New CPAP ordered, Feno today.    This note was prepared using voice recognition system and is likely to have sound alike errors that may have been overlooked even after proof reading.  Please call me with any questions    Discussed diagnosis, its evaluation, treatment and usual course. All questions answered.    Thank you for the courtesy of participating in the care of this patient    Ernie Mcintosh MD      Complications of untreated sleep apnea discussed with pateint in detail including but not limited to  high blood pressure, heart attack, stroke, obesity,  diabetes and worsening heart failure. Patient voiced understanding.

## 2023-09-29 NOTE — ASSESSMENT & PLAN NOTE
Minimal dyspnea  Functional class 1  WHO group 2  Repeat 6MWD: Normal exercise capacity no desaturation     2D echo PA pressure estimated 67     Hopefully weight loss will help.

## 2023-11-02 NOTE — TELEPHONE ENCOUNTER
Gyne Visit    Visit provider:  Misti WASSERMAN, CNP  Collaborating Physician: Leroy Munoz DO  Chaperone:  Renata Parmar CMA    Sonal Robison is a 65 year old   female  who presents for her annual well woman exam.  Denies any gyn concerns today.    She would like a pap today.   Hx thyroid cancer in past and wants to be screened for cervical cancer yearly..   LMP age  2015, no bleeding since then.   Hx endometrial ablation that stopped her bleeding for only a few months., done just before menopause.          GYNE ROS:                        Vaginal/Vulvar symptoms:  wnl                       Dyspareunia: NA    Dysuria: Denies        Pap History: Negative for intraepithelial lesion or malignancy, HPV negative  22                        History of abnormal pap :  Once many years ago colpo    Sexual History: is not sexually active        ROS:                        No headaches                       No unexplained chest pain, dyspnea, SOB                       No abdominal pain                       No bowel or bladder complaints                       Review of all other systems negative        Past Medical History:   Diagnosis Date   • HLD (hyperlipidemia) 2/10/2015   • Osteopenia 3/29/2016   • Papillary thyroid carcinoma (CMD) 2016    Overview:  Dx: 2016   • Postsurgical hypothyroidism 2007       Past Surgical History:   Procedure Laterality Date   •  section, low transverse     • Endometrial ablation thermal     • Myomectomy     • Thyroidectomy     • Tonsillectomy     • Uterine fibroid surgery     • Vaginal delivery      x3        Mammogram:   Last date: 22            Result:  negative    Screenings:  Dexa Scan:   Osteopenia, 2023  Colonoscopy:  States up to date, 1-2 yrs ago.    Allergies: Reviewed     PREVENTATIVE MEDICINE:                        Exercise pattern:     walking, weights on line class                       Domestic violence:  fearful for safety of  No new care gaps identified.  Powered by [employer] by .[employer]. Reference number: [id number].   1/18/2022 3:57:00 PM CST   self or family:  NO                       Supplements taken:  Calcium , vitamin d      Depression Screening:   PHQ 2/9 score 0  Review Flowsheet  More data exists       8/2/2023   PHQ 2/9 Score   Adult PHQ 2 Score 0   Adult PHQ 2 Interpretation No further screening needed   Little interest or pleasure in activity? Not at all   Feeling down, depressed or hopeless? Not at all       DEPRESSION ASSESSMENT/PLAN:  Depression screening is negative no further plan needed.        OBJECTIVE:                       No LMP recorded. Patient is postmenopausal.                       /80   Pulse 88   Temp 97.6 °F (36.4 °C) (Temporal)   Ht 5' 6\" (1.676 m)   Wt 83.9 kg (185 lb)   BMI 29.86 kg/m²   BSA 1.93 m²                        Alert and oriented x 3.                        General exam: Patient appears well, vital signs normal.                        Neck supple, no adenopathy or masses noted in neck or supraclavicular regions.  Thyroid is not enlarged.                        Chest is clear.                        Heart sounds are normal without murmur. RRR.                        Abdomen is normal, soft without masses, organomegaly or tenderness.                        Skin: no visible rashes or suspicious skin lesions noted.                       Breast Exam:           Inspection neg. No nipple discharge or bleeding. No mass palpated and no tenderness.  Breasts are symmetrical Axilla neg.                                                                             Pelvic Exam:                                             -- External Exam: EGBUS normal.   Hair distribution wnl                                            -- Speculum exam: Vagina  pale, atrophic.  Vaginal discharge: wnl. Cervix normal,                                            -- Bimanual exam: No CMT, uterus normal size, shape and consistency.                                             -- Adnexea without mass or tenderness                                             -- Rectovaginal exam normal tone, no masses                                            -- Anus wnl                       Pap smear                                              -- obtained and sent to lab in thin prep vial                         STD screen  -- Not done    I have personally reviewed and analyzed  history within patient's chart.          ASSESSMENT:  GYN exam completed   postmenopause   pap test    PLAN:  · Declined flu vaccine  · Reviewed healthy diet and exercise, BMI with patient.    · Cardiovascular health being followed by PCP  · Dexa scan:  Not indicated.  · Mammograms: Not indicated. Already scheduled.  · BSE reviewed.    Return for annual GYN exam in one year or earlier with any additional concerns.       Misti Nation, CNP

## 2023-11-15 ENCOUNTER — OFFICE VISIT (OUTPATIENT)
Dept: INTERNAL MEDICINE | Facility: CLINIC | Age: 73
End: 2023-11-15
Payer: MEDICARE

## 2023-11-15 ENCOUNTER — HOSPITAL ENCOUNTER (OUTPATIENT)
Dept: RADIOLOGY | Facility: HOSPITAL | Age: 73
Discharge: HOME | End: 2023-11-15
Attending: FAMILY MEDICINE
Payer: MEDICARE

## 2023-11-15 ENCOUNTER — TELEPHONE (OUTPATIENT)
Dept: PULMONOLOGY | Facility: CLINIC | Age: 73
End: 2023-11-15
Payer: MEDICARE

## 2023-11-15 ENCOUNTER — CLINICAL SUPPORT (OUTPATIENT)
Dept: PULMONOLOGY | Facility: CLINIC | Age: 73
End: 2023-11-15
Payer: MEDICARE

## 2023-11-15 VITALS
BODY MASS INDEX: 47.2 KG/M2 | SYSTOLIC BLOOD PRESSURE: 126 MMHG | HEART RATE: 61 BPM | DIASTOLIC BLOOD PRESSURE: 78 MMHG | WEIGHT: 234.13 LBS | HEIGHT: 59 IN | OXYGEN SATURATION: 100 % | RESPIRATION RATE: 20 BRPM

## 2023-11-15 DIAGNOSIS — K21.9 HIATAL HERNIA WITH GERD: ICD-10-CM

## 2023-11-15 DIAGNOSIS — R10.9 ABDOMINAL CRAMPING: ICD-10-CM

## 2023-11-15 DIAGNOSIS — Z23 NEED FOR VACCINATION: ICD-10-CM

## 2023-11-15 DIAGNOSIS — E66.01 MORBID OBESITY WITH BMI OF 45.0-49.9, ADULT: ICD-10-CM

## 2023-11-15 DIAGNOSIS — J30.2 CHRONIC SEASONAL ALLERGIC RHINITIS: ICD-10-CM

## 2023-11-15 DIAGNOSIS — G47.33 OSA (OBSTRUCTIVE SLEEP APNEA): ICD-10-CM

## 2023-11-15 DIAGNOSIS — R73.03 PREDIABETES: ICD-10-CM

## 2023-11-15 DIAGNOSIS — Z12.31 SCREENING MAMMOGRAM FOR HIGH-RISK PATIENT: ICD-10-CM

## 2023-11-15 DIAGNOSIS — K44.9 HIATAL HERNIA WITH GERD: ICD-10-CM

## 2023-11-15 DIAGNOSIS — I70.0 ATHEROSCLEROSIS OF AORTA: ICD-10-CM

## 2023-11-15 DIAGNOSIS — R06.02 SOB (SHORTNESS OF BREATH): ICD-10-CM

## 2023-11-15 DIAGNOSIS — F33.1 MODERATE EPISODE OF RECURRENT MAJOR DEPRESSIVE DISORDER: ICD-10-CM

## 2023-11-15 DIAGNOSIS — G47.33 OSA (OBSTRUCTIVE SLEEP APNEA): Primary | ICD-10-CM

## 2023-11-15 DIAGNOSIS — I10 ESSENTIAL HYPERTENSION: Primary | Chronic | ICD-10-CM

## 2023-11-15 DIAGNOSIS — K92.1 HEMATOCHEZIA: ICD-10-CM

## 2023-11-15 DIAGNOSIS — E78.2 MIXED HYPERLIPIDEMIA: ICD-10-CM

## 2023-11-15 DIAGNOSIS — M17.0 PRIMARY OSTEOARTHRITIS OF BOTH KNEES: ICD-10-CM

## 2023-11-15 PROCEDURE — 99999PBSHW PR PBB SHADOW TECHNICAL ONLY FILED TO HB: Mod: PBBFAC,,,

## 2023-11-15 PROCEDURE — 99999PBSHW FLU VACCINE - QUADRIVALENT - ADJUVANTED: ICD-10-PCS | Mod: PBBFAC,,,

## 2023-11-15 PROCEDURE — 99214 PR OFFICE/OUTPT VISIT, EST, LEVL IV, 30-39 MIN: ICD-10-PCS | Mod: 25,S$PBB,, | Performed by: FAMILY MEDICINE

## 2023-11-15 PROCEDURE — 99999PBSHW PR PBB SHADOW TECHNICAL ONLY FILED TO HB: ICD-10-PCS | Mod: PBBFAC,,,

## 2023-11-15 PROCEDURE — 99999PBSHW FLU VACCINE - QUADRIVALENT - ADJUVANTED: Mod: PBBFAC,,,

## 2023-11-15 PROCEDURE — 99999 PR PBB SHADOW E&M-EST. PATIENT-LVL IV: CPT | Mod: PBBFAC,,, | Performed by: FAMILY MEDICINE

## 2023-11-15 PROCEDURE — 95012 NITRIC OXIDE EXP GAS DETER: CPT | Mod: PBBFAC

## 2023-11-15 PROCEDURE — 99214 OFFICE O/P EST MOD 30 MIN: CPT | Mod: 25,S$PBB,, | Performed by: FAMILY MEDICINE

## 2023-11-15 PROCEDURE — 74019 RADEX ABDOMEN 2 VIEWS: CPT | Mod: TC

## 2023-11-15 PROCEDURE — 99214 OFFICE O/P EST MOD 30 MIN: CPT | Mod: PBBFAC,25 | Performed by: FAMILY MEDICINE

## 2023-11-15 PROCEDURE — G0008 ADMIN INFLUENZA VIRUS VAC: HCPCS | Mod: PBBFAC

## 2023-11-15 PROCEDURE — 99999 PR PBB SHADOW E&M-EST. PATIENT-LVL IV: ICD-10-PCS | Mod: PBBFAC,,, | Performed by: FAMILY MEDICINE

## 2023-11-15 PROCEDURE — 74019 RADEX ABDOMEN 2 VIEWS: CPT | Mod: 26,,, | Performed by: RADIOLOGY

## 2023-11-15 PROCEDURE — 74019 XR ABDOMEN FLAT AND ERECT: ICD-10-PCS | Mod: 26,,, | Performed by: RADIOLOGY

## 2023-11-15 RX ORDER — PANTOPRAZOLE SODIUM 40 MG/1
TABLET, DELAYED RELEASE ORAL
Qty: 90 TABLET | Refills: 1 | Status: SHIPPED | OUTPATIENT
Start: 2023-11-15

## 2023-11-15 RX ORDER — SEMAGLUTIDE 0.68 MG/ML
0.25 INJECTION, SOLUTION SUBCUTANEOUS
Qty: 3 ML | Refills: 3 | Status: SHIPPED | OUTPATIENT
Start: 2023-11-15

## 2023-11-15 NOTE — PROGRESS NOTES
Subjective:       Patient ID: Yoana Pardo is a 73 y.o. female.    Chief Complaint: Follow-up    73-year-old  female patient with Patient Active Problem List:     Mixed hyperlipidemia     Pulmonary hypertension     MVP (mitral valve prolapse)     Hiatal hernia with GERD     History of positive PPD, untreated     Hemorrhoids     Essential hypertension     Peripheral neuropathy     Primary osteoarthritis of both knees     Morbid obesity with BMI of 45.0-49.9, adult     INDIANA (obstructive sleep apnea)     Behaviorally induced insufficient sleep syndrome     Chronic seasonal allergic rhinitis     Atherosclerosis of aorta     Diffusion capacity of lung (dl), decreased     Memory loss     Moderate episode of recurrent major depressive disorder     Prediabetes  Here for follow-up on chronic medical conditions and reports that patient has been taking her medications regularly but continues to gain weight and would like to get started on Ozempic, patient is ready to pay out-of-pocket   Denies of any chest pain or difficulty breathing with palpitations but reports that occasionally she is been noticing bright red blood in the stool, with underlying hemorrhoids as per colonoscopy done 2 years ago.   Denies any constipation or straining to have a bowel movement, reports abdominal cramping off and on lately      Review of Systems   Constitutional:  Positive for unexpected weight change. Negative for appetite change, fatigue and fever.   Eyes:  Negative for visual disturbance.   Respiratory:  Negative for shortness of breath.    Cardiovascular:  Negative for chest pain and leg swelling.   Gastrointestinal:  Positive for abdominal pain and blood in stool. Negative for constipation, diarrhea, nausea and vomiting.   Musculoskeletal:  Negative for myalgias.   Skin:  Negative for rash.   Neurological:  Negative for light-headedness and headaches.   Psychiatric/Behavioral:  Negative for sleep disturbance.       "    /78 (BP Location: Left arm, Patient Position: Sitting, BP Method: Large (Manual))   Pulse 61   Resp 20   Ht 4' 11" (1.499 m)   Wt 106.2 kg (234 lb 2.1 oz)   SpO2 100%   BMI 47.29 kg/m²   Objective:      Physical Exam  Constitutional:       Appearance: She is well-developed.   HENT:      Head: Normocephalic and atraumatic.   Cardiovascular:      Rate and Rhythm: Normal rate and regular rhythm.      Heart sounds: Normal heart sounds. No murmur heard.  Pulmonary:      Effort: Pulmonary effort is normal.      Breath sounds: Normal breath sounds. No wheezing.   Abdominal:      General: Bowel sounds are normal.      Palpations: Abdomen is soft.      Tenderness: There is no abdominal tenderness. There is no guarding.   Skin:     General: Skin is warm and dry.      Findings: No rash.   Neurological:      Mental Status: She is alert and oriented to person, place, and time.   Psychiatric:         Mood and Affect: Mood normal.           Assessment/Plan:   1. Essential hypertension  -     Comprehensive Metabolic Panel; Future; Expected date: 11/15/2023  -     TSH; Future; Expected date: 11/15/2023  Blood pressure is stable currently on hydrochlorothiazide 25 mg and losartan 50 mg daily    2. Need for vaccination  -     Influenza - Quadrivalent (Adjuvanted)  Flu shot given today    3. Mixed hyperlipidemia  Currently taking simvastatin 20 mg daily    4. Hiatal hernia with GERD  Stable on pantoprazole 40 mg daily    5. Primary osteoarthritis of both knees  Graded exercise regimen recommended    6. Prediabetes  -     Hemoglobin A1C; Future; Expected date: 11/15/2023  -     semaglutide (OZEMPIC) 0.25 mg or 0.5 mg (2 mg/3 mL) pen injector; Inject 0.25 mg into the skin every 7 days.  Dispense: 3 mL; Refill: 3  Will do a trial of Ozempic, patient would like to get started  Lifestyle modifications discussed    7. Moderate episode of recurrent major depressive disorder  Stable without taking any medication    8. [place] " (obstructive sleep apnea)    9. Chronic seasonal allergic rhinitis  Clinically doing well    10. Atherosclerosis of aorta  Continue aspirin and statins    11. Morbid obesity with BMI of 45.0-49.9, adult  Lifestyle modifications discussed to lose weight with BMI 47    12. Screening mammogram for high-risk patient  -     Mammo Digital Screening Bilat w/ Neo; Future; Expected date: 12/18/2023  Due for mammogram    13. Hematochezia  -     CBC Auto Differential; Future; Expected date: 11/15/2023  14. Abdominal cramping  -     X-Ray Abdomen Flat And Erect; Future; Expected date: 11/15/2023  Patient reports abdominal cramping but denies any diarrhea and occasional, blood in the stool  Will get x-ray of the abdomen to rule out constipation  And will check further labs  Encouraged to eat fiber rich diet and drink adequate fluids

## 2023-11-15 NOTE — TELEPHONE ENCOUNTER
Orders Placed This Encounter   Procedures    CPAP/BIPAP SUPPLIES     Order Specific Question:   Length of need (1-99 months):     Answer:   99     Order Specific Question:   Choose ONE mask type and its corresponding cushions and/or pillows:     Answer:    Nasal Mask, 1 per 90 days:  Nasal Cushions, (6 per 90 days):  Nasal Pillows, (6 per 90 days)     Order Specific Question:   Choose EITHER Heated or Non-Heated Tubjing     Answer:    Non-Heated Tubing, 1 per 90 days     Order Specific Question:   All other supplies as needed as listed below:     Answer:    Headgear, 1 per 180 days     Order Specific Question:   All other supplies as needed as listed below:     Answer:    Chin Strap, 1 per 180 days     Order Specific Question:   All other supplies as needed as listed below:     Answer:    Disposable Filter, 6 per 90 days     Order Specific Question:   All other supplies as needed as listed below:     Answer:    Non-Disposable Filter, 1 per 180 days     Order Specific Question:   All other supplies as needed as listed below:     Answer:    Humidifier Chamber, 1 per 180 days

## 2023-11-15 NOTE — PROCEDURES
Clinical Guide to Interpretation or FeNO Levels :    FeNO  (ppb) LOW INTERMEDIATE HIGH   ADULT VALUES < 25 25-50          > 50   Th2-driven Inflammation Unlikely Likely Significant     Patients FeNO level at this visit : __21__ (ppb)    Interpretation of FeNO measurement in adults:  [x] FENO is less than 25 ppb implies non eosinophilic airway inflammation or the absence of airway inflammation.    Comment: Low FENO (<25 ppb) in adult asthmatics with persistent symptoms suggests other etiologies for these symptoms and a lower likelihood of benefit from adding or increasing inhaled glucocorticoids.    [] FENO between 25 and 50 ppb in adults should be interpreted cautiously with reference to the clinical situation (eg, symptomatic, on or off therapy, current smoking).    [] FENO greater than 50 ppb in adults  suggests eosinophilic airway inflammation   Comment: High FENO (>50 ppb) in adult asthmatics even with atypical symptoms suggests glucocorticoid responsiveness. High FENO (>50 ppb) can help identify poor adherence or uncontrolled inflammation in asthma patients with otherwise seemingly controlled asthma.    Discussion:  A FENO less than 25 ppb in adults and less than 20 ppb in children younger than 12 years of age implies noneosinophilic airway inflammation or the absence of airway inflammation.  A FENO greater than 50 ppb in adults or greater than 35 ppb in children suggests eosinophilic airway inflammation.   Values of FENO between 25 and 50 ppb in adults (20 to 35 ppb in children) should be interpreted cautiously with reference to the clinical situation (eg, symptomatic, on or off therapy, current smoking).  A rising FENO with a greater than 20 percent change and more than 25 ppb (20 ppb in children) from a previously stable level suggests increasing eosinophilic airway inflammation, but there are wide inter-individual differences.  A decrease in FENO greater than 20 percent for values over 50 ppb or more than  10 ppb for values less than 50 ppb may be clinically important.  ?FENO in other respiratory diseases - Several other diseases are associated with altered levels of exhaled NO: low levels of FENO have been noted in cystic fibrosis, current smoking, pulmonary hypertension, hypothermia, primary ciliary dyskinesia, and bronchopulmonary dysplasia. Elevated FENO has been noted in atopy, nonasthmatic eosinophilic bronchitis, COPD exacerbations, noncystic fibrosis bronchiectasis, and viral upper respiratory infections.    REFERENCE:  ATS Board of Directors, December 2004, and by the ERS Executive Committee, June 2004. ATS/ERS Recommendations for Standardized Procedures for the Online and Offline Measurement of Exhaled Lower Respiratory Nitric Oxide and Nasal Nitric Oxide. Guideline 2005

## 2023-11-16 NOTE — TELEPHONE ENCOUNTER
Care Due:                  Date            Visit Type   Department     Provider  --------------------------------------------------------------------------------                                EP -                              PRIMARY      [facility]  Lynn Mainampati  Last Visit: 11-      CARE ([facility])   MEDICINE       Wiggins                              EP -                              PRIMARY      [facility]  Lynn Mainampati  Next Visit: 05-      CARE ([facility])   MEDICINE       Wiggins                                                            Last  Test          Frequency    Reason                     Performed    Due Date  --------------------------------------------------------------------------------    HBA1C.......  6 months...  semaglutide..............  05- 11-    [facility] Embedded Care Due Messages. Reference number: [id number].   11/15/2023 6:13:18 PM CST

## 2023-11-16 NOTE — TELEPHONE ENCOUNTER
Provider Staff:  Action required for this patient    Requires labs      Please see care gap opportunities below in Care Due Message.    Thanks!  [facility]     Appointments      Date Provider   Last Visit   11/15/2023 Lynn Wiggins MD   Next Visit   Visit date not found Lynn Wiggins MD     Refill Decision Note   Yoana Pardo  is requesting a refill authorization.  Brief Assessment and Rationale for Refill:  Approve     Medication Therapy Plan:         Comments:     Note composed:7:57 PM 11/15/2023

## 2023-12-22 DIAGNOSIS — I27.20 PULMONARY HYPERTENSION: ICD-10-CM

## 2023-12-27 RX ORDER — SILDENAFIL CITRATE 20 MG/1
20 TABLET ORAL 3 TIMES DAILY
Qty: 90 TABLET | Refills: 2 | Status: SHIPPED | OUTPATIENT
Start: 2023-12-27 | End: 2024-03-01 | Stop reason: SDUPTHER

## 2024-01-03 ENCOUNTER — HOSPITAL ENCOUNTER (OUTPATIENT)
Dept: RADIOLOGY | Facility: HOSPITAL | Age: 74
Discharge: HOME | End: 2024-01-03
Attending: FAMILY MEDICINE
Payer: MEDICARE

## 2024-01-03 DIAGNOSIS — Z12.31 SCREENING MAMMOGRAM FOR HIGH-RISK PATIENT: ICD-10-CM

## 2024-01-03 PROCEDURE — 77063 BREAST TOMOSYNTHESIS BI: CPT | Mod: 26,,, | Performed by: RADIOLOGY

## 2024-01-03 PROCEDURE — 77067 SCR MAMMO BI INCL CAD: CPT | Mod: 26,,, | Performed by: RADIOLOGY

## 2024-01-03 PROCEDURE — 77067 SCR MAMMO BI INCL CAD: CPT | Mod: TC

## 2024-03-01 ENCOUNTER — OFFICE VISIT (OUTPATIENT)
Dept: PULMONOLOGY | Facility: CLINIC | Age: 74
End: 2024-03-01
Payer: MEDICARE

## 2024-03-01 VITALS
WEIGHT: 237.44 LBS | OXYGEN SATURATION: 97 % | SYSTOLIC BLOOD PRESSURE: 136 MMHG | RESPIRATION RATE: 17 BRPM | HEIGHT: 59 IN | DIASTOLIC BLOOD PRESSURE: 74 MMHG | HEART RATE: 109 BPM | BODY MASS INDEX: 47.87 KG/M2

## 2024-03-01 DIAGNOSIS — R94.2 DIFFUSION CAPACITY OF LUNG (DL), DECREASED: ICD-10-CM

## 2024-03-01 DIAGNOSIS — E78.2 MIXED HYPERLIPIDEMIA: ICD-10-CM

## 2024-03-01 DIAGNOSIS — J30.2 CHRONIC SEASONAL ALLERGIC RHINITIS: Primary | ICD-10-CM

## 2024-03-01 DIAGNOSIS — I10 ESSENTIAL HYPERTENSION: Chronic | ICD-10-CM

## 2024-03-01 DIAGNOSIS — G47.33 OSA (OBSTRUCTIVE SLEEP APNEA): ICD-10-CM

## 2024-03-01 DIAGNOSIS — E66.01 MORBID OBESITY WITH BMI OF 45.0-49.9, ADULT: ICD-10-CM

## 2024-03-01 DIAGNOSIS — I27.20 PULMONARY HYPERTENSION: ICD-10-CM

## 2024-03-01 PROCEDURE — 99214 OFFICE O/P EST MOD 30 MIN: CPT | Mod: S$PBB,,, | Performed by: INTERNAL MEDICINE

## 2024-03-01 PROCEDURE — 99214 OFFICE O/P EST MOD 30 MIN: CPT | Mod: PBBFAC | Performed by: INTERNAL MEDICINE

## 2024-03-01 PROCEDURE — 99999 PR PBB SHADOW E&M-EST. PATIENT-LVL IV: CPT | Mod: PBBFAC,,, | Performed by: INTERNAL MEDICINE

## 2024-03-01 RX ORDER — SILDENAFIL CITRATE 20 MG/1
20 TABLET ORAL 3 TIMES DAILY
Qty: 90 TABLET | Refills: 2 | Status: ACTIVE | OUTPATIENT
Start: 2024-03-01

## 2024-03-01 NOTE — ASSESSMENT & PLAN NOTE
3/1/2024     2:13 PM   EPWORTH SLEEPINESS SCALE   Sitting and reading 2   Watching TV 2   Sitting, inactive in a public place (e.g. a theatre or a meeting) 1   As a passenger in a car for an hour without a break 0   Lying down to rest in the afternoon when circumstances permit 1   Sitting and talking to someone 0   Sitting quietly after a lunch without alcohol 2   In a car, while stopped for a few minutes in traffic 0   Total score 8      CPAP 16 cmwp  Nasal pillows    Bed time 1am to 3 am  Waketime: 10 am    Data 12/02/2023 to 02/29/2024   Usage > 4 hrs : was 83%    AHI 2.8/hr

## 2024-03-01 NOTE — PROGRESS NOTES
Subjective:       Patient ID: Yoana Pardo is a 73 y.o. female.  Patient Active Problem List   Diagnosis    Mixed hyperlipidemia    Pulmonary hypertension    MVP (mitral valve prolapse)    Hiatal hernia with GERD    History of positive PPD, untreated    Hemorrhoids    Essential hypertension    Peripheral neuropathy    Primary osteoarthritis of both knees    Morbid obesity with BMI of 45.0-49.9, adult    [place] (obstructive sleep apnea)    Behaviorally induced insufficient sleep syndrome    Chronic seasonal allergic rhinitis    Atherosclerosis of aorta    Diffusion capacity of lung (dl), decreased    Memory loss    Moderate episode of recurrent major depressive disorder    Prediabetes     Immunization History   Administered Date(s) Administered    COVID-19, MRNA, LN-S, PF (MODERNA FULL 0.5 ML DOSE) 03/03/2021, 03/31/2021    Influenza (FLUAD) - Quadrivalent - Adjuvanted - PF *Preferred* (65+) 10/30/2020, 11/21/2022, 11/15/2023    Influenza - High Dose - PF (65 years and older) 09/25/2018, 10/25/2019    Influenza - Quadrivalent 10/29/2014    Influenza - Quadrivalent - PF *Preferred* (6 months and older) 10/27/2015, 10/03/2016    Influenza Split 11/22/2006, 11/06/2008, 10/27/2009, 10/19/2012    Pneumococcal Conjugate - 13 Valent 10/15/2018    Pneumococcal Polysaccharide - 23 Valent 10/28/2019    Tdap 08/29/2016               3/1/2024     2:13 PM   EPWORTH SLEEPINESS SCALE   Sitting and reading 2   Watching TV 2   Sitting, inactive in a public place (e.g. a theatre or a meeting) 1   As a passenger in a car for an hour without a break 0   Lying down to rest in the afternoon when circumstances permit 1   Sitting and talking to someone 0   Sitting quietly after a lunch without alcohol 2   In a car, while stopped for a few minutes in traffic 0   Total score 8         mRC  []  0: Dyspnea only with strenuous exercise  [x] +1:Dyspnea when hurrying or walking up a slight hill  [] +2:Walks slower than people of the same age  because of dyspnea or has to stop for breath when walking at own pace  [] +3:Stops for breath after walking 100 yards (91 m) or after a few minutes  [] +4:Too dyspneic to leave house or breathless when dressing      NYHA    [] Class I: Patients with no limitation of activities; they suffer no symptoms from ordinary activities  [x] Class II: Patients with slight, mild limitation of activity; they are comfortable with rest or with mild exertion.  [] Class III:Patients with marked limitation of activity; they are comfortable only at rest.  [] Class IV: Patients who should be at complete rest, confined to bed or chair; any physical activity brings on discomfort and symptoms occur at rest.          Chief Complaint: No chief complaint on file.    Ms. Yoana Pardo is 70 y.o.    Last visit 10/30/2020  Follow up after ECHO: PA pressure down to 51 from 57  Sirometry: Normal, FEv1 and FVC imprived  On REVATIO  Last RHC:   6MWD distance improved  Hypoxemia and [place] treated with CPAP  Not using consistently, irregular sleep routine   Morse score is 8  Weight stable Now 230lb  Wake time 6-7 am  Occasional naps  Bariatric surgery cancelled due to hernia  I have reviewed the patient's medical history in detail and updated the computerized patient record.       07/07/2023  Last visit 06/10/2021  Since retired  Had COVID  Has been off Revatio, out of refills  Also not currently using CPAP  Says gave her cough  Last echo 2020  Morse 13  Had oxygen in past after gall bladder surgery  Options for [place]: INSPIRE  BMI is contraindication  Weight loss optione  Will explore GLP 1  options with PCP  A1C was 5.8      09/29/2023  Followup  Reveiwed results  ONSAT: SpO2 hadley 77%  PFT: low MVV: 54.8%, FVC 1.38L( 68.7%), DLCo 8.58( 47.5%)  TLC was normal 3.45L( 83.9%)  6MWD: No desaturation: 335.28m( 104.3%)    EKG reveiwed  ECHO PASP 62  Adherence with REVATIO   PSG showed high sleep effuecncy  Diagnostic AHI 22.2/hr   CPAP 16 cm was  "well tolerated      03/01/2024   Here with her daughter   Download reviewed   Using CPAP greater than 4 hours 83%   Sleep routine is still irregular goes to bed late wake up late.    She is to work night shift   Echo shows pressure has dropped to 62  Will repeat echo next visit   No daytime sleepiness.    No shortness of breath   Medications refilled      Review of Systems   Constitutional:  Negative for fatigue.   HENT:  Negative for postnasal drip, rhinorrhea and congestion.    Respiratory:  Positive for apnea (CPAP : Not using). Negative for snoring, sputum production, shortness of breath, wheezing, pleurisy and use of rescue inhaler.             Cardiovascular: Negative.    Genitourinary: Negative.    Endocrine: endocrine negative    Musculoskeletal: Negative.    Skin:  Negative for rash.   Neurological: Negative.    Psychiatric/Behavioral:  Negative for sleep disturbance.    All other systems reviewed and are negative.      Objective:       Vitals:    03/01/24 1410   BP: 136/74   Pulse: 109   Resp: 17   SpO2: 97%   Weight: 107.7 kg (237 lb 7 oz)   Height: 4' 11" (1.499 m)           Physical Exam   Constitutional: She is oriented to person, place, and time. She appears well-developed and well-nourished.     She is obese.   HENT:   Head: Normocephalic.   Nose: No mucosal edema. No polyps.   Mouth/Throat: Oropharynx is clear and moist. No oropharyngeal exudate. Mallampati Score: IV.   Neck: No tracheal deviation present. No thyromegaly present.   Cardiovascular: Normal rate and regular rhythm.   No murmur heard.  Pulmonary/Chest: Normal expansion, symmetric chest wall expansion, effort normal and breath sounds normal.   Abdominal: Soft. Bowel sounds are normal.   Musculoskeletal:         General: No edema. Normal range of motion.      Cervical back: Normal range of motion and neck supple.   Lymphadenopathy:     She has no cervical adenopathy.   Neurological: She is alert and oriented to person, place, and time. " Gait normal.   Skin: Skin is warm and dry. No rash noted. No erythema. Nails show no clubbing.   Psychiatric: She has a normal mood and affect.   Nursing note and vitals reviewed.    Personal Diagnostic Review  Mammo Digital Screening Bilat w/ Neo  Narrative: Result:  Mammo Digital Screening Bilat w/ Neo    History:  Patient is 73 y.o. and is seen for a screening mammogram.    Films Compared:  Prior images (if available) were compared.     Findings:  This procedure was performed using tomosynthesis.   Computer-aided detection was utilized in the interpretation of this   examination.    The breasts have scattered areas of fibroglandular density. There is no   evidence of suspicious masses, microcalcifications or architectural   distortion.  Impression:    No mammographic evidence of malignancy.    BI-RADS Category 1: Negative    Recommendation:  Routine screening mammogram in 1 year is recommended.    Your estimated lifetime risk of breast cancer (to age 85) based on   Tyrer-Cuzick risk assessment model is 1.88 %.  According to the American   Cancer Society, patients with a lifetime breast cancer risk of 20% or   higher might benefit from supplemental screening tests, such as screening   breast MRI.         2023 - 2024  : 1950  Age: 73 years  Gender: Female  [facility]  [address] [place]  [place], [address]  Compliance Report  Compliance  Payor Standard  Usage 2023 - 2024  Usage days 75/90 days (83%)  >= 4 hours 75 days (83%)  < 4 hours 0 days (0%)  Usage hours 580 hours 28 minutes  Average usage (total days) 6 hours 27 minutes  Average usage (days used) 7 hours 44 minutes  Median usage (days used) 7 hours 47 minutes  Total used hours (value since last reset - 2024) 668 hours  AirSense 10 AutoSet  Serial number [id number]  Mode CPAP  Set pressure 16 cmH2O  EPR Fulltime  EPR level 3  Therapy  Leaks - L/min Median: 5.5 95th percentile: 43.6 Maximum:  68.1  Events per hour AI: 2.2 HI: 0.6 AHI: 2.8  Apnea Index Central: 0.4 Obstructive: 1.4 Unknown: 0.3  RERA Index 0.1       Assessment:       Problem List Items Addressed This Visit       [place] (obstructive sleep apnea)         3/1/2024     2:13 PM   EPWORTH SLEEPINESS SCALE   Sitting and reading 2   Watching TV 2   Sitting, inactive in a public place (e.g. a theatre or a meeting) 1   As a passenger in a car for an hour without a break 0   Lying down to rest in the afternoon when circumstances permit 1   Sitting and talking to someone 0   Sitting quietly after a lunch without alcohol 2   In a car, while stopped for a few minutes in traffic 0   Total score 8      CPAP 16 cmwp  Nasal pillows    Bed time 1am to 3 am  Waketime: 10 am    Data 12/02/2023 to 02/29/2024   Usage > 4 hrs : was 83%    AHI 2.8/hr             Relevant Orders    CPAP/BIPAP SUPPLIES    RSVPreF Recombinant (Arexvy)    Echo    Stress test, pulmonary    Pulmonary hypertension      Minimal dyspnea   Functional class 1   WHO group 2 and 3   PA pressure dropped from 67-62     Refill sildenafil         Relevant Medications    sildenafil (REVATIO) 20 mg Tab    Other Relevant Orders    CPAP/BIPAP SUPPLIES    RSVPreF Recombinant (Arexvy)    Echo    Stress test, pulmonary    Essential hypertension (Chronic)      On hydrochlorothiazide and Cozaar  Adherence to digital hypertension program         Chronic seasonal allergic rhinitis - Primary    Diffusion capacity of lung (dl), decreased    Mixed hyperlipidemia      Stable on simvastatin         Morbid obesity with BMI of 45.0-49.9, adult     Patient would benefit from weight loss and has tried to set realistic goals to achieve success. Lifestyle changes were discussed on eating healthy, exercising at least 150 minutes weekly, and reducing sedentary behavior.   Discussed the risk factors associated with obesity: Arthritis/[place]/Diabetes/Fatty Liver/Cardiovascular disease/GERD/HTN/HLP.            Plan:        Continue REVATIO  Discussed the importance of treatment of the obstructive sleep apnea will improve her situation regarding the pulmonary hypertension   New CPAP machine ordered   Fractional exhaled nitric oxide and Tessalon Perles ordered             Follow up in about 6 months (around 9/1/2024), or echo, 6mwd, download.    This note was prepared using voice recognition system and is likely to have sound alike errors that may have been overlooked even after proof reading.  Please call me with any questions    Discussed diagnosis, its evaluation, treatment and usual course. All questions answered.    Thank you for the courtesy of participating in the care of this patient    Ernie Mcintosh MD      Complications of untreated sleep apnea discussed with pateint in detail including but not limited to  high blood pressure, heart attack, stroke, obesity,  diabetes and worsening heart failure. Patient voiced understanding.

## 2024-03-01 NOTE — ASSESSMENT & PLAN NOTE
Minimal dyspnea   Functional class 1   WHO group 2 and 3   PA pressure dropped from 67-62     Refill sildenafil

## 2024-03-19 ENCOUNTER — OFFICE VISIT (OUTPATIENT)
Dept: URGENT CARE | Facility: CLINIC | Age: 74
End: 2024-03-19
Payer: MEDICARE

## 2024-03-19 VITALS
TEMPERATURE: 101 F | SYSTOLIC BLOOD PRESSURE: 120 MMHG | OXYGEN SATURATION: 95 % | RESPIRATION RATE: 18 BRPM | WEIGHT: 237 LBS | HEIGHT: 59 IN | DIASTOLIC BLOOD PRESSURE: 66 MMHG | HEART RATE: 79 BPM | BODY MASS INDEX: 47.78 KG/M2

## 2024-03-19 DIAGNOSIS — R05.9 COUGH, UNSPECIFIED TYPE: ICD-10-CM

## 2024-03-19 DIAGNOSIS — U07.1 COVID-19 VIRUS DETECTED: ICD-10-CM

## 2024-03-19 DIAGNOSIS — U07.1 COVID-19 VIRUS INFECTION: Primary | ICD-10-CM

## 2024-03-19 DIAGNOSIS — R50.9 FEVER, UNSPECIFIED FEVER CAUSE: ICD-10-CM

## 2024-03-19 LAB
CTP QC/QA: YES
CTP QC/QA: YES
POC MOLECULAR INFLUENZA A AGN: NEGATIVE
POC MOLECULAR INFLUENZA B AGN: NEGATIVE
SARS-COV-2 AG RESP QL IA.RAPID: POSITIVE

## 2024-03-19 PROCEDURE — 87811 SARS-COV-2 COVID19 W/OPTIC: CPT | Mod: QW,S$GLB,, | Performed by: PHYSICIAN ASSISTANT

## 2024-03-19 PROCEDURE — 87502 INFLUENZA DNA AMP PROBE: CPT | Mod: QW,S$GLB,, | Performed by: PHYSICIAN ASSISTANT

## 2024-03-19 PROCEDURE — 99214 OFFICE O/P EST MOD 30 MIN: CPT | Mod: S$GLB,,, | Performed by: PHYSICIAN ASSISTANT

## 2024-03-19 RX ORDER — BENZONATATE 200 MG/1
200 CAPSULE ORAL 3 TIMES DAILY PRN
Qty: 21 CAPSULE | Refills: 0 | Status: SHIPPED | OUTPATIENT
Start: 2024-03-19 | End: 2024-03-26

## 2024-03-19 NOTE — PROGRESS NOTES
"Subjective:      Patient ID: Yoana Pardo is a 73 y.o. female.    Vitals:  height is 4' 11" (1.499 m) and weight is 107.5 kg (237 lb). Her tympanic temperature is 100.6 °F (38.1 °C) (abnormal). Her blood pressure is 120/66 and her pulse is 130 (abnormal). Her respiration is 18 and oxygen saturation is 95%.     Chief Complaint: Nasal Congestion    Patient presents with fever of 102.3 at the highest with associated body aches and cough.  Symptoms started Sunday.  No known sick contacts.      Sinus Problem  This is a new problem. The current episode started in the past 7 days. The problem is unchanged. The maximum temperature recorded prior to her arrival was 102 - 102.9 F. The fever has been present for Less than 1 day. Her pain is at a severity of 6/10. The pain is mild. Associated symptoms include chills, congestion, coughing, headaches, sinus pressure and a sore throat. Pertinent negatives include no diaphoresis, ear pain, hoarse voice, neck pain, shortness of breath, sneezing or swollen glands. Past treatments include acetaminophen. The treatment provided mild relief.       Constitution: Positive for chills. Negative for sweating.   HENT:  Positive for congestion, sinus pressure and sore throat. Negative for ear pain.    Neck: Negative for neck pain.   Respiratory:  Positive for cough. Negative for shortness of breath.    Allergic/Immunologic: Negative for sneezing.   Neurological:  Positive for headaches.      Objective:     Physical Exam   Constitutional: She is oriented to person, place, and time. She appears well-developed.   HENT:   Head: Normocephalic and atraumatic.   Ears:   Right Ear: External ear normal.   Left Ear: External ear normal.   Nose: Nose normal.   Mouth/Throat: Oropharynx is clear and moist. Mucous membranes are moist.   Eyes: Conjunctivae and EOM are normal. Pupils are equal, round, and reactive to light.   Neck: Neck supple.   Cardiovascular: Regular rhythm, normal heart sounds and " normal pulses. Tachycardia present.   Pulmonary/Chest: Effort normal and breath sounds normal.   Musculoskeletal: Normal range of motion.         General: Normal range of motion.   Neurological: She is alert and oriented to person, place, and time.   Skin: Skin is warm and dry.   Vitals reviewed.      Assessment:     1. COVID-19 virus infection    2. Fever, unspecified fever cause    3. Cough, unspecified type        Plan:       COVID-19 virus infection  -     molnupiravir 200 mg capsule (EUA); Take 4 capsules (800 mg total) by mouth every 12 (twelve) hours. for 5 days  Dispense: 40 capsule; Refill: 0  -     benzonatate (TESSALON) 200 MG capsule; Take 1 capsule (200 mg total) by mouth 3 (three) times daily as needed for Cough.  Dispense: 21 capsule; Refill: 0    Fever, unspecified fever cause  -     POCT Influenza A/B MOLECULAR  -     SARS Coronavirus 2 Antigen, POCT Manual Read    Cough, unspecified type  -     benzonatate (TESSALON) 200 MG capsule; Take 1 capsule (200 mg total) by mouth 3 (three) times daily as needed for Cough.  Dispense: 21 capsule; Refill: 0      Covid risk score of 4.    Results for orders placed or performed in visit on 03/19/24   POCT Influenza A/B MOLECULAR   Result Value Ref Range    POC Molecular Influenza A Ag Negative Negative, Not Reported    POC Molecular Influenza B Ag Negative Negative, Not Reported     Acceptable Yes    SARS Coronavirus 2 Antigen, POCT Manual Read   Result Value Ref Range    SARS Coronavirus 2 Antigen Positive (A) Negative     Acceptable Yes             If you test positive for COVID-19 you may return to normal activities when, for at least 24 hours, both are true:     Your symptoms are getting better overall, and:  You have not had a fever AND are not using fever reducing medication     The CDC also recommends added precautions in the 5 days after return to normal activity including frequent hand washing, mask wearing, physical  distancing.      They also recommend should the patient develop a fever or starts to feel worse after they have returned to normal activities, they should return home and away from others for at least another 24 hours. The link below is a direct link to the CDC with all this information.     https://www.cdc.gov/respiratory-viruses/prevention/precautions-when-sick.html    Molnupiravir sent to pharmacy due to multiple drug drug interactions with Paxlovid.  Increase fluids.  Get plenty of rest.   Normal saline nasal wash to irrigate sinuses and for congestion/runny nose.  Cool mist humidifier/vaporizer.  Practice good handwashing.  Mucinex for cough and chest congestion.  Tylenol or Ibuprofen for fever, headache and body aches.  Warm salt water gargles for throat comfort.  Chloraseptic spray or lozenges for throat comfort.  See PCP or go to ER if symptoms worsen or fail to improve with treatment.

## 2024-03-19 NOTE — PATIENT INSTRUCTIONS
If you test positive for COVID-19 you may return to normal activities when, for at least 24 hours, both are true:     Your symptoms are getting better overall, and:  You have not had a fever AND are not using fever reducing medication     The CDC also recommends added precautions in the 5 days after return to normal activity including frequent hand washing, mask wearing, physical distancing.      They also recommend should the patient develop a fever or starts to feel worse after they have returned to normal activities, they should return home and away from others for at least another 24 hours. The link below is a direct link to the CDC with all this information.     https://www.cdc.gov/respiratory-viruses/prevention/precautions-when-sick.html      Increase fluids.  Get plenty of rest.   Normal saline nasal wash to irrigate sinuses and for congestion/runny nose.  Cool mist humidifier/vaporizer.  Practice good handwashing.  Mucinex for cough and chest congestion.  Tylenol or Ibuprofen for fever, headache and body aches.  Warm salt water gargles for throat comfort.  Chloraseptic spray or lozenges for throat comfort.  See PCP or go to ER if symptoms worsen or fail to improve with treatment.

## 2024-03-20 ENCOUNTER — TELEPHONE (OUTPATIENT)
Dept: INTERNAL MEDICINE | Facility: CLINIC | Age: 74
End: 2024-03-20
Payer: MEDICARE

## 2024-03-20 NOTE — TELEPHONE ENCOUNTER
Spoke with pt.'s daughter again and informed her that I sent the message to Dr. Wiggins but Dr. Wiggins is not in clinic and as soon as I get a response I will give her a call back.

## 2024-03-20 NOTE — TELEPHONE ENCOUNTER
----- Message from Harry Mireles sent at 3/20/2024  2:12 PM CDT -----  Contact: donte/ daughter  Jovany is calling regarding her mother being diagnosed with covid.  Please give her a call back at [phone]

## 2024-03-20 NOTE — TELEPHONE ENCOUNTER
Called pt.'s daughter back to inform her of what Dr. Wiggins said in regards to her mom.   If patient can hold off taking sildenafil as per pulmonologist she can take paxlovid , as molpunavir is very expensive and non affordable  Please have her contact Dr Mcintosh's office  If her symptoms are mild , she can take over the counter medications like tylenol as needed for fever and mucinex for cough      Pt's states that she will wait to hear from the pulmonologist and see what he recommends as well.

## 2024-03-20 NOTE — TELEPHONE ENCOUNTER
Pt's daughter (Stacy) called in regarding her mom Yoana Pardo. The daughter states that he mom went to the urgent care on yesterday and was dx with COVID. Pt states that her mother is still running a fever. The urgent care was unable to prescribe Ms. Edvin huitron due to the other medications that she is taking. Instead they prescribed her Molnupiradir and the pt is unable to afford the mediation, the daughter states that insurance is not covering the medication and it cost about $1,000. Daughter would like to know if there is another medication that she could take?

## 2024-03-21 ENCOUNTER — PATIENT MESSAGE (OUTPATIENT)
Dept: PULMONOLOGY | Facility: CLINIC | Age: 74
End: 2024-03-21
Payer: MEDICARE

## 2024-03-28 DIAGNOSIS — I70.0 ATHEROSCLEROSIS OF AORTA: ICD-10-CM

## 2024-03-28 DIAGNOSIS — I10 ESSENTIAL HYPERTENSION: Primary | Chronic | ICD-10-CM

## 2024-04-01 ENCOUNTER — HOSPITAL ENCOUNTER (OUTPATIENT)
Dept: CARDIOLOGY | Facility: HOSPITAL | Age: 74
Discharge: HOME | End: 2024-04-01
Attending: STUDENT IN AN ORGANIZED HEALTH CARE EDUCATION/TRAINING PROGRAM
Payer: MEDICARE

## 2024-04-01 ENCOUNTER — OFFICE VISIT (OUTPATIENT)
Dept: CARDIOLOGY | Facility: CLINIC | Age: 74
End: 2024-04-01
Payer: MEDICARE

## 2024-04-01 VITALS
DIASTOLIC BLOOD PRESSURE: 82 MMHG | SYSTOLIC BLOOD PRESSURE: 134 MMHG | WEIGHT: 233.69 LBS | BODY MASS INDEX: 47.2 KG/M2 | OXYGEN SATURATION: 96 % | HEART RATE: 77 BPM | WEIGHT: 233.69 LBS | BODY MASS INDEX: 47.2 KG/M2

## 2024-04-01 DIAGNOSIS — I20.89 STABLE ANGINA PECTORIS: ICD-10-CM

## 2024-04-01 DIAGNOSIS — I70.0 ATHEROSCLEROSIS OF AORTA: ICD-10-CM

## 2024-04-01 DIAGNOSIS — K44.9 HIATAL HERNIA WITH GERD: ICD-10-CM

## 2024-04-01 DIAGNOSIS — R00.2 PALPITATIONS: ICD-10-CM

## 2024-04-01 DIAGNOSIS — E78.2 MIXED HYPERLIPIDEMIA: ICD-10-CM

## 2024-04-01 DIAGNOSIS — I10 ESSENTIAL HYPERTENSION: Primary | ICD-10-CM

## 2024-04-01 DIAGNOSIS — K21.9 HIATAL HERNIA WITH GERD: ICD-10-CM

## 2024-04-01 DIAGNOSIS — E66.01 MORBID OBESITY WITH BMI OF 45.0-49.9, ADULT: ICD-10-CM

## 2024-04-01 DIAGNOSIS — R73.03 PREDIABETES: ICD-10-CM

## 2024-04-01 DIAGNOSIS — M47.26 OSTEOARTHRITIS OF SPINE WITH RADICULOPATHY, LUMBAR REGION: ICD-10-CM

## 2024-04-01 DIAGNOSIS — G47.33 OSA ON CPAP: ICD-10-CM

## 2024-04-01 DIAGNOSIS — I49.9 IRREGULAR HEARTBEAT: ICD-10-CM

## 2024-04-01 DIAGNOSIS — I27.20 PULMONARY HYPERTENSION: ICD-10-CM

## 2024-04-01 DIAGNOSIS — I10 ESSENTIAL HYPERTENSION: Chronic | ICD-10-CM

## 2024-04-01 DIAGNOSIS — I34.1 MVP (MITRAL VALVE PROLAPSE): ICD-10-CM

## 2024-04-01 LAB
OHS QRS DURATION: 80 MS
OHS QTC CALCULATION: 466 MS

## 2024-04-01 PROCEDURE — 99213 OFFICE O/P EST LOW 20 MIN: CPT | Mod: PBBFAC,25 | Performed by: STUDENT IN AN ORGANIZED HEALTH CARE EDUCATION/TRAINING PROGRAM

## 2024-04-01 PROCEDURE — 99999 PR PBB SHADOW E&M-EST. PATIENT-LVL III: CPT | Mod: PBBFAC,,, | Performed by: STUDENT IN AN ORGANIZED HEALTH CARE EDUCATION/TRAINING PROGRAM

## 2024-04-01 PROCEDURE — 99214 OFFICE O/P EST MOD 30 MIN: CPT | Mod: S$PBB,,, | Performed by: STUDENT IN AN ORGANIZED HEALTH CARE EDUCATION/TRAINING PROGRAM

## 2024-04-01 PROCEDURE — 93005 ELECTROCARDIOGRAM TRACING: CPT

## 2024-04-01 PROCEDURE — 93010 ELECTROCARDIOGRAM REPORT: CPT | Mod: ,,, | Performed by: INTERNAL MEDICINE

## 2024-04-01 RX ORDER — SIMVASTATIN 40 MG/1
40 TABLET, FILM COATED ORAL NIGHTLY
Qty: 90 TABLET | Refills: 1 | Status: SHIPPED | OUTPATIENT
Start: 2024-04-01 | End: 2025-04-01

## 2024-04-01 RX ORDER — LOSARTAN POTASSIUM AND HYDROCHLOROTHIAZIDE 12.5; 1 MG/1; MG/1
1 TABLET ORAL DAILY
Qty: 90 TABLET | Refills: 1 | Status: SHIPPED | OUTPATIENT
Start: 2024-04-01 | End: 2025-04-01

## 2024-04-01 RX ORDER — FUROSEMIDE 20 MG/1
20 TABLET ORAL DAILY
Qty: 60 TABLET | Refills: 1 | Status: SHIPPED | OUTPATIENT
Start: 2024-04-01

## 2024-04-01 RX ORDER — POTASSIUM CHLORIDE 20 MEQ/1
20 TABLET, EXTENDED RELEASE ORAL DAILY
Qty: 30 TABLET | Refills: 6 | Status: SHIPPED | OUTPATIENT
Start: 2024-04-01

## 2024-04-01 NOTE — PROGRESS NOTES
Section of Cardiology                  Cardiac Clinic Note        HPI:   Yoana Pardo is a 73 y.o. female       4/1/24  Recently had COVID, still getting over it  Has some SOB and cough  No chest pain  Palpitations at times  Bp stable  Has not been taking her zocor - ran out (was not taking consistently)  Not on ozempic  No smoking      EKG 4/1/24 NSR, PACs, nonspecific T wave abn    ECHO  Results for orders placed during the hospital encounter of 09/25/23    Echo    Interpretation Summary    Left Ventricle: The left ventricle is normal in size. Normal wall thickness. There is mild concentric hypertrophy. Normal wall motion. There is normal systolic function with a visually estimated ejection fraction of 60 - 65%. There is normal diastolic function.    Left Atrium: Left atrium is severely dilated.    Right Ventricle: Normal right ventricular cavity size. Wall thickness is normal. Right ventricle wall motion  is normal. Systolic function is normal.    Tricuspid Valve: There is moderate regurgitation.    Pulmonary Artery: There is moderate pulmonary hypertension. The estimated pulmonary artery systolic pressure is 62 mmHg.    IVC/SVC: Normal venous pressure at 3 mmHg.       STRESS TEST Results for orders placed during the hospital encounter of 08/03/22    Nuclear Stress - Cardiology Interpreted    Interpretation Summary    Normal myocardial perfusion scan. There is no evidence of myocardial ischemia or infarction.    The gated perfusion images showed an ejection fraction of > 70% at rest. The gated perfusion images showed an ejection fraction of > 70% post stress.    The EKG portion of this study is negative for ischemia.    The patient reported no chest pain during the stress test.    During stress, occasional PACs are noted.       [facility] Results for orders placed during the hospital encounter of 06/15/20    Cardiac catheterization    Conclusion  · Estimated blood loss: none  · Filling pressures  on the right are mildly elevated. Pulmonary HTN is mild to moderate.    I certify that I was present for catheter insertion, catheter manipulation, angiography, and angiographic interpretation of this patient.    Procedure Log documented by Documenter: Apurva Ríos RN and verified by Shakeel Belle MD.    Date: 6/15/2020  Time: 12:41 PM            ROS: All 10 systems reviewed. Please refer to the HPI for pertinent positives. All other systems negative.     Past Medical History  Past Medical History:   Diagnosis Date    GERD (gastroesophageal reflux disease)     Hemorrhoids     History of positive PPD, untreated     Hx of cold sores     Hyperlipidemia     Hypertension     MVP (mitral valve prolapse)     Peripheral neuropathy     Pulmonary HTN     Dr Bailon    Sleep apnea     [place]-CPAP  uses intermittently       Surgical History  Past Surgical History:   Procedure Laterality Date    CHOLECYSTECTOMY      COLONOSCOPY N/A 11/11/2021    Procedure: COLONOSCOPY;  Surgeon: Deloris Galicia MD;  Location: [facility];  Service: Endoscopy;  Laterality: N/A;    DILATION AND CURETTAGE OF UTERUS      ESOPHAGEAL MOTILITY STUDY USING HIGH RESOLUTION MANOMETRY N/A 11/03/2021    Procedure: MOTILITY STUDY, ESOPHAGUS, USING HIGH RESOLUTION MANOMETRY;  Surgeon: Yaw Manuel RN;  Location: [facility];  Service: Endoscopy;  Laterality: N/A;    ESOPHAGOGASTRODUODENOSCOPY N/A 12/02/2020    Procedure: ESOPHAGOGASTRODUODENOSCOPY (EGD);  Surgeon: Evangelista Erickson MD;  Location: [facility];  Service: Endoscopy;  Laterality: N/A;    ESOPHAGOGASTRODUODENOSCOPY N/A 11/11/2021    Procedure: EGD (ESOPHAGOGASTRODUODENOSCOPY);  Surgeon: Deloris Galicia MD;  Location: [facility];  Service: Endoscopy;  Laterality: N/A;    ESOPHAGOGASTRODUODENOSCOPY N/A 01/26/2023    Procedure: EGD (ESOPHAGOGASTRODUODENOSCOPY);  Surgeon: Jasvir Robert MD;  Location: [facility];  Service: Endoscopy;  Laterality: N/A;  EGD with BRAVO (ON PPI); Hiatial  hernia, GERD    RIGHT HEART CATHETERIZATION Right 06/15/2020    Procedure: INSERTION, CATHETER, RIGHT HEART;  Surgeon: Ashlie Belle MD;  Location: [facility] CATH LAB;  Service: Cardiology;  Laterality: Right;          Allergies:   Review of patient's allergies indicates:   Allergen Reactions    Lisinopril Other (See Comments)     Cough      Bactrim [sulfamethoxazole-trimethoprim] Itching    Zosyn [piperacillin-tazobactam] Hives     Pt received second dose of Zosyn and had hives on both arms. Benadryl given and pt continued to receive zosyn with no issues. Hydralazine also given around the same time and discontinued.        Social History:  Social History     Socioeconomic History    Marital status:     Number of children: 3   Occupational History    Occupation: [employer] occupational      Employer: [employer]   Tobacco Use    Smoking status: Never    Smokeless tobacco: Never   Substance and Sexual Activity    Alcohol use: Yes     Alcohol/week: 5.0 standard drinks of alcohol     Types: 5 Shots of liquor per week     Comment: occasional    Drug use: No    Sexual activity: Not Currently     Partners: Male     Birth control/protection: None     Social Determinants of Health     Financial Resource Strain: Medium Risk (9/12/2023)    Overall Financial Resource Strain (CARDIA)     Difficulty of Paying Living Expenses: Somewhat hard   Food Insecurity: No Food Insecurity (9/12/2023)    Hunger Vital Sign     Worried About Running Out of Food in the Last Year: Never true     Ran Out of Food in the Last Year: Never true   Transportation Needs: No Transportation Needs (9/12/2023)    PRAPARE - Transportation     Lack of Transportation (Medical): No     Lack of Transportation (Non-Medical): No   Physical Activity: Insufficiently Active (9/12/2023)    Exercise Vital Sign     Days of Exercise per Week: 2 days     Minutes of Exercise per Session: 10 min   Stress: Stress Concern Present (9/12/2023)    [place]  [place] [employer] - Occupational Stress Questionnaire     Feeling of Stress : To some extent   Social Connections: Moderately Integrated (9/12/2023)    Social Connection and Isolation Panel [NHANES]     Frequency of Communication with Friends and Family: More than three times a week     Frequency of Social Gatherings with Friends and Family: Once a week     Attends Adventism Services: 1 to 4 times per year     Active Member of Clubs or Organizations: No     Attends Club or Organization Meetings: Never     Marital Status:    Housing Stability: Low Risk  (9/12/2023)    Housing Stability Vital Sign     Unable to Pay for Housing in the Last Year: No     Number of Places Lived in the Last Year: 1     Unstable Housing in the Last Year: No       Family History:  family history includes Aneurysm in her sister; Asthma in her son; COPD in her sister; Cancer in her sister; Depression in her son; Diabetes in her sister; Heart disease in her father and mother; Hypertension in her father and mother; Kidney disease in her father and sister; Mental illness in her sister; Miscarriages / Stillbirths in her sister; Obesity in her father and mother.    Home Medications:  Current Outpatient Medications on File Prior to Visit   Medication Sig Dispense Refill    acetaminophen (TYLENOL) 500 MG tablet Take 500 mg by mouth every 6 (six) hours as needed for Pain.      albuterol (VENTOLIN HFA) 90 mcg/actuation inhaler Inhale 2 puffs into the lungs every 6 (six) hours as needed for Wheezing or Shortness of Breath. Rescue 6.7 g 1    aspirin (ECOTRIN) 81 MG EC tablet Take 81 mg by mouth once daily.      benzonatate (TESSALON) 200 MG capsule Take 1 capsule (200 mg total) by mouth 3 (three) times daily as needed for Cough. 90 capsule 11    furosemide (LASIX) 20 MG tablet Take 1 tablet (20 mg total) by mouth 2 (two) times daily as needed (as needed). 60 tablet 3    hydroCHLOROthiazide (HYDRODIURIL) 25 MG tablet Take 1 tablet  (25 mg total) by mouth once daily. 90 tablet 3    ipratropium (ATROVENT) 21 mcg (0.03 %) nasal spray 2 sprays by Each Nostril route every 8 (eight) hours as needed for Rhinitis. 30 mL 4    losartan (COZAAR) 50 MG tablet Take 1 tablet (50 mg total) by mouth once daily. 90 tablet 3    pantoprazole (PROTONIX) 40 MG tablet Take 1 tablet by mouth once daily 90 tablet 1    semaglutide (OZEMPIC) 0.25 mg or 0.5 mg (2 mg/3 mL) pen injector Inject 0.25 mg into the skin every 7 days. 3 mL 3    sildenafil (REVATIO) 20 mg Tab Take 1 tablet (20 mg total) by mouth 3 (three) times daily. 90 tablet 2    simvastatin (ZOCOR) 20 MG tablet Take 1 tablet (20 mg total) by mouth every evening. 90 tablet 3    [DISCONTINUED] gabapentin (NEURONTIN) 100 MG capsule TAKE 1 CAPSULE BY MOUTH NIGHTLY AS NEEDED 30 capsule 3     Current Facility-Administered Medications on File Prior to Visit   Medication Dose Route Frequency Provider Last Rate Last Admin    lactated ringers infusion   Intravenous Continuous Deloris Galicia MD           Physical exam:  /82 (BP Location: Right arm)   Pulse 77   Wt 106 kg (233 lb 11 oz)   SpO2 96%   BMI 47.20 kg/m²         General: Pt is a 73 y.o. year old female who is AAOx3, in NAD, is pleasant, well nourished, looks stated age  HEENT: PERRL, EOMI, Oral mucosa pink & moist  CVS  No abnormal cardiac pulsations noted on inspection. JVP not raised. The apical impulse is normal on palpation, and is located in the left 5th intercostal space in the mid - clavicular line. No palpable thrills or abnormal pulsations noted. RR, S1 - S2 heard, no murmurs, rubs or gallops appreciated.   PUL : CTA B/L. No wheezes/crackles heard   ABD : BS +, soft. No tenderness elicited   LE : No C/C/E. Distal Pulses palpable B/L         LABS:    Chemistry:   Lab Results   Component Value Date     11/15/2023    K 3.4 (L) 11/15/2023    CL 98 11/15/2023    CO2 33 (H) 11/15/2023    BUN 20 11/15/2023    CREATININE 1.0  11/15/2023    CALCIUM 9.5 11/15/2023     Cardiac Markers:   Lab Results   Component Value Date    TROPONINI 0.006 03/29/2018     Cardiac Markers (Last 3):   Lab Results   Component Value Date    TROPONINI 0.006 03/29/2018    TROPONINI 0.015 03/26/2018    TROPONINI 0.010 03/26/2018     CBC:   Lab Results   Component Value Date    WBC 6.27 11/15/2023    HGB 12.8 11/15/2023    HCT 39.8 11/15/2023    MCV 89 11/15/2023     11/15/2023     Lipids:   Lab Results   Component Value Date    CHOL 185 05/15/2023    TRIG 142 05/15/2023    HDL 45 05/15/2023     Coagulation:   Lab Results   Component Value Date    INR 1.0 06/15/2020    APTT 27.2 06/15/2020           Assessment        1. Essential hypertension    2. Atherosclerosis of aorta    3. Palpitations    4. Pulmonary hypertension    5. Mixed hyperlipidemia    6. MVP (mitral valve prolapse)    7. [place] on CPAP    8. Stable angina pectoris    9. Hiatal hernia with GERD    10. Morbid obesity with BMI of 45.0-49.9, adult    11. Irregular heartbeat    12. Prediabetes         Plan:      HTN  Stable  Continue losartan, hctz> switch to combo pill   Takes lasix as needed for swelling  K supp for low K     Atherosclerosis  Continue ASA     GERD  Stable  Continue Protonix     HLD   as of 5/23  Continue zocor   10 year ASCVD 12.3%   Lipid panel 3 m    Prediabetes  A1c 6.2  Not on ozempic    [place]  Continue CPAP- difficult after covid infection     Obesity, Body mass index is 47.2 kg/m².   Low salt, low fat diet  Exercise as tolerated, at least 30 min daily         This note was prepared using voice recognition system and is likely to have sound alike errors that may have been overlooked even after proofreading.     I have reviewed all pertinent chart information.  Plans and recommendations have been formulated under my direct supervision. All questions answered and patient voiced understanding.   If symptoms persist go to the ED.    RTC in 3 m        Pascale Whitaker  MD  Cardiology

## 2024-06-18 DIAGNOSIS — K21.9 HIATAL HERNIA WITH GERD: ICD-10-CM

## 2024-06-18 DIAGNOSIS — K44.9 HIATAL HERNIA WITH GERD: ICD-10-CM

## 2024-06-18 RX ORDER — PANTOPRAZOLE SODIUM 40 MG/1
TABLET, DELAYED RELEASE ORAL
Qty: 90 TABLET | Refills: 1 | Status: SHIPPED | OUTPATIENT
Start: 2024-06-18

## 2024-06-18 NOTE — TELEPHONE ENCOUNTER
Care Due:                  Date            Visit Type   Department     Provider  --------------------------------------------------------------------------------                                EP -                              PRIMARY      [facility] INTERNAL  Lynn Mainampati  Last Visit: 11-      CARE ([facility])   MEDICINE       Feliciano  Next Visit: None Scheduled  None         None Found                                                            Last  Test          Frequency    Reason                     Performed    Due Date  --------------------------------------------------------------------------------    HBA1C.......  6 months...  semaglutide..............  11- 05-    [facility] Embedded Care Due Messages. Reference number: [id number].   6/18/2024 5:31:29 AM CDT

## 2024-06-18 NOTE — TELEPHONE ENCOUNTER
Provider Staff:  Action required for this patient    Requires labs      Please see care gap opportunities below in Care Due Message.    Thanks!  [facility]     Appointments      Date Provider   Last Visit   11/15/2023 Lynn Wiggins MD   Next Visit   Visit date not found Lynn Wiggins MD     Refill Decision Note   Yoana Pardo  is requesting a refill authorization.  Brief Assessment and Rationale for Refill:  Approve     Medication Therapy Plan:         Comments:     Note composed:7:31 AM 06/18/2024

## 2024-06-24 ENCOUNTER — OFFICE VISIT (OUTPATIENT)
Dept: INTERNAL MEDICINE | Facility: CLINIC | Age: 74
End: 2024-06-24
Payer: MEDICARE

## 2024-06-24 VITALS
DIASTOLIC BLOOD PRESSURE: 82 MMHG | SYSTOLIC BLOOD PRESSURE: 128 MMHG | HEIGHT: 59 IN | OXYGEN SATURATION: 96 % | WEIGHT: 231.94 LBS | BODY MASS INDEX: 46.76 KG/M2 | HEART RATE: 72 BPM | TEMPERATURE: 97 F

## 2024-06-24 DIAGNOSIS — E66.01 MORBID OBESITY WITH BMI OF 45.0-49.9, ADULT: ICD-10-CM

## 2024-06-24 DIAGNOSIS — R73.03 PREDIABETES: ICD-10-CM

## 2024-06-24 DIAGNOSIS — I27.20 PULMONARY HYPERTENSION: ICD-10-CM

## 2024-06-24 DIAGNOSIS — Z12.11 COLON CANCER SCREENING: ICD-10-CM

## 2024-06-24 DIAGNOSIS — K21.9 HIATAL HERNIA WITH GERD: ICD-10-CM

## 2024-06-24 DIAGNOSIS — J30.2 CHRONIC SEASONAL ALLERGIC RHINITIS: ICD-10-CM

## 2024-06-24 DIAGNOSIS — G62.9 PERIPHERAL POLYNEUROPATHY: ICD-10-CM

## 2024-06-24 DIAGNOSIS — M17.0 PRIMARY OSTEOARTHRITIS OF BOTH KNEES: ICD-10-CM

## 2024-06-24 DIAGNOSIS — G47.33 OSA (OBSTRUCTIVE SLEEP APNEA): ICD-10-CM

## 2024-06-24 DIAGNOSIS — I10 ESSENTIAL HYPERTENSION: Primary | Chronic | ICD-10-CM

## 2024-06-24 DIAGNOSIS — Z79.899 OTHER LONG TERM (CURRENT) DRUG THERAPY: ICD-10-CM

## 2024-06-24 DIAGNOSIS — F33.1 MODERATE EPISODE OF RECURRENT MAJOR DEPRESSIVE DISORDER: ICD-10-CM

## 2024-06-24 DIAGNOSIS — Z78.0 ASYMPTOMATIC MENOPAUSE: ICD-10-CM

## 2024-06-24 DIAGNOSIS — M79.604 BILATERAL LEG PAIN: ICD-10-CM

## 2024-06-24 DIAGNOSIS — K92.1 HEMATOCHEZIA: ICD-10-CM

## 2024-06-24 DIAGNOSIS — I70.0 ATHEROSCLEROSIS OF AORTA: ICD-10-CM

## 2024-06-24 DIAGNOSIS — M79.605 BILATERAL LEG PAIN: ICD-10-CM

## 2024-06-24 DIAGNOSIS — E78.2 MIXED HYPERLIPIDEMIA: ICD-10-CM

## 2024-06-24 DIAGNOSIS — K44.9 HIATAL HERNIA WITH GERD: ICD-10-CM

## 2024-06-24 LAB
ALBUMIN SERPL BCP-MCNC: 3.8 G/DL (ref 3.5–5.2)
ALP SERPL-CCNC: 75 U/L (ref 55–135)
ALT SERPL W/O P-5'-P-CCNC: 9 U/L (ref 10–44)
ANION GAP SERPL CALC-SCNC: 13 MMOL/L (ref 8–16)
AST SERPL-CCNC: 14 U/L (ref 10–40)
BASOPHILS # BLD AUTO: 0.04 K/UL (ref 0–0.2)
BASOPHILS NFR BLD: 0.8 % (ref 0–1.9)
BILIRUB SERPL-MCNC: 0.6 MG/DL (ref 0.1–1)
BUN SERPL-MCNC: 21 MG/DL (ref 8–23)
CALCIUM SERPL-MCNC: 9.8 MG/DL (ref 8.7–10.5)
CHLORIDE SERPL-SCNC: 98 MMOL/L (ref 95–110)
CHOLEST SERPL-MCNC: 222 MG/DL (ref 120–199)
CHOLEST SERPL-MCNC: 222 MG/DL (ref 120–199)
CHOLEST/HDLC SERPL: 4.6 {RATIO} (ref 2–5)
CHOLEST/HDLC SERPL: 4.6 {RATIO} (ref 2–5)
CO2 SERPL-SCNC: 28 MMOL/L (ref 23–29)
CREAT SERPL-MCNC: 1 MG/DL (ref 0.5–1.4)
DIFFERENTIAL METHOD BLD: ABNORMAL
EOSINOPHIL # BLD AUTO: 0.1 K/UL (ref 0–0.5)
EOSINOPHIL NFR BLD: 2 % (ref 0–8)
ERYTHROCYTE [DISTWIDTH] IN BLOOD BY AUTOMATED COUNT: 13.7 % (ref 11.5–14.5)
EST. GFR  (NO RACE VARIABLE): >60 ML/MIN/1.73 M^2
ESTIMATED AVG GLUCOSE: 117 MG/DL (ref 68–131)
FERRITIN SERPL-MCNC: 46 NG/ML (ref 20–300)
GLUCOSE SERPL-MCNC: 78 MG/DL (ref 70–110)
HBA1C MFR BLD: 5.7 % (ref 4–5.6)
HCT VFR BLD AUTO: 40.9 % (ref 37–48.5)
HDLC SERPL-MCNC: 48 MG/DL (ref 40–75)
HDLC SERPL-MCNC: 48 MG/DL (ref 40–75)
HDLC SERPL: 21.6 % (ref 20–50)
HDLC SERPL: 21.6 % (ref 20–50)
HGB BLD-MCNC: 12.9 G/DL (ref 12–16)
IMM GRANULOCYTES # BLD AUTO: 0.01 K/UL (ref 0–0.04)
IMM GRANULOCYTES NFR BLD AUTO: 0.2 % (ref 0–0.5)
LDLC SERPL CALC-MCNC: 140.6 MG/DL (ref 63–159)
LDLC SERPL CALC-MCNC: 140.6 MG/DL (ref 63–159)
LYMPHOCYTES # BLD AUTO: 2.5 K/UL (ref 1–4.8)
LYMPHOCYTES NFR BLD: 50.8 % (ref 18–48)
MAGNESIUM SERPL-MCNC: 2 MG/DL (ref 1.6–2.6)
MCH RBC QN AUTO: 29.2 PG (ref 27–31)
MCHC RBC AUTO-ENTMCNC: 31.5 G/DL (ref 32–36)
MCV RBC AUTO: 93 FL (ref 82–98)
MONOCYTES # BLD AUTO: 0.5 K/UL (ref 0.3–1)
MONOCYTES NFR BLD: 9.3 % (ref 4–15)
NEUTROPHILS # BLD AUTO: 1.8 K/UL (ref 1.8–7.7)
NEUTROPHILS NFR BLD: 36.9 % (ref 38–73)
NONHDLC SERPL-MCNC: 174 MG/DL
NONHDLC SERPL-MCNC: 174 MG/DL
NRBC BLD-RTO: 0 /100 WBC
PLATELET # BLD AUTO: 306 K/UL (ref 150–450)
PMV BLD AUTO: 10.1 FL (ref 9.2–12.9)
POTASSIUM SERPL-SCNC: 4.3 MMOL/L (ref 3.5–5.1)
PROT SERPL-MCNC: 8.2 G/DL (ref 6–8.4)
RBC # BLD AUTO: 4.42 M/UL (ref 4–5.4)
SODIUM SERPL-SCNC: 139 MMOL/L (ref 136–145)
TRIGL SERPL-MCNC: 167 MG/DL (ref 30–150)
TRIGL SERPL-MCNC: 167 MG/DL (ref 30–150)
TSH SERPL DL<=0.005 MIU/L-ACNC: 0.77 UIU/ML (ref 0.4–4)
VIT B12 SERPL-MCNC: 368 PG/ML (ref 210–950)
WBC # BLD AUTO: 4.94 K/UL (ref 3.9–12.7)

## 2024-06-24 PROCEDURE — 83735 ASSAY OF MAGNESIUM: CPT | Performed by: FAMILY MEDICINE

## 2024-06-24 PROCEDURE — 84443 ASSAY THYROID STIM HORMONE: CPT | Performed by: FAMILY MEDICINE

## 2024-06-24 PROCEDURE — 80053 COMPREHEN METABOLIC PANEL: CPT | Performed by: FAMILY MEDICINE

## 2024-06-24 PROCEDURE — 99215 OFFICE O/P EST HI 40 MIN: CPT | Mod: PBBFAC | Performed by: FAMILY MEDICINE

## 2024-06-24 PROCEDURE — 82607 VITAMIN B-12: CPT | Mod: GA | Performed by: FAMILY MEDICINE

## 2024-06-24 PROCEDURE — 85025 COMPLETE CBC W/AUTO DIFF WBC: CPT | Performed by: FAMILY MEDICINE

## 2024-06-24 PROCEDURE — 99215 OFFICE O/P EST HI 40 MIN: CPT | Mod: S$PBB,,, | Performed by: FAMILY MEDICINE

## 2024-06-24 PROCEDURE — 80061 LIPID PANEL: CPT | Performed by: FAMILY MEDICINE

## 2024-06-24 PROCEDURE — 99999 PR PBB SHADOW E&M-EST. PATIENT-LVL V: CPT | Mod: PBBFAC,,, | Performed by: FAMILY MEDICINE

## 2024-06-24 PROCEDURE — 82728 ASSAY OF FERRITIN: CPT | Mod: GA | Performed by: FAMILY MEDICINE

## 2024-06-24 PROCEDURE — 83036 HEMOGLOBIN GLYCOSYLATED A1C: CPT | Performed by: FAMILY MEDICINE

## 2024-06-24 RX ORDER — FUROSEMIDE 20 MG/1
20 TABLET ORAL DAILY PRN
Qty: 60 TABLET | Refills: 1 | Status: SHIPPED | OUTPATIENT
Start: 2024-06-24

## 2024-06-24 RX ORDER — GABAPENTIN 100 MG/1
100 CAPSULE ORAL NIGHTLY
Qty: 30 CAPSULE | Refills: 1 | Status: SHIPPED | OUTPATIENT
Start: 2024-06-24 | End: 2025-06-24

## 2024-06-24 NOTE — PROGRESS NOTES
Subjective:       Patient ID: Yoana Pardo is a 73 y.o. female.    Chief Complaint: Annual Exam (Pt present today for an annual exam with c/o pain at the back of her legs and defecating blood during bowel movements )    73-year-old  female patient with Patient Active Problem List:     Mixed hyperlipidemia     Pulmonary hypertension     MVP (mitral valve prolapse)     Hiatal hernia with GERD     History of positive PPD, untreated     Hemorrhoids     Essential hypertension     Peripheral neuropathy     Primary osteoarthritis of both knees     Morbid obesity with BMI of 45.0-49.9, adult     [place] (obstructive sleep apnea)     Behaviorally induced insufficient sleep syndrome     Chronic seasonal allergic rhinitis     Atherosclerosis of aorta     Diffusion capacity of lung (dl), decreased     Memory loss     Moderate episode of recurrent major depressive disorder     Prediabetes  Here with complaint of bilateral leg pains to her calves as well as thighs, off and on lately, gets worse with sitting for prolonged periods of time and reports 6/10  Patient has been taking her medications regularly and trying to drink adequate fluids, requesting refill on Lasix  Patient has been having rash to the cheeks and made appointment with Dermatology  Occasionally has been noticing blood in the stools, had colonoscopy done 2 years ago which was showing hemorrhoids, denies any constipation      Review of Systems   Constitutional:  Negative for fatigue.   Eyes:  Negative for visual disturbance.   Respiratory:  Negative for shortness of breath.    Cardiovascular:  Negative for chest pain and leg swelling.   Gastrointestinal:  Positive for blood in stool. Negative for abdominal pain, constipation, nausea and vomiting.   Musculoskeletal:  Positive for arthralgias and myalgias. Negative for back pain.   Skin:  Negative for rash.   Neurological:  Positive for numbness. Negative for weakness, light-headedness and  "headaches.   Psychiatric/Behavioral:  Negative for sleep disturbance.          /82 (BP Location: Right arm, Patient Position: Sitting, BP Method: Large (Manual))   Pulse 72   Temp 97.2 °F (36.2 °C) (Tympanic)   Ht 4' 11" (1.499 m)   Wt 105.2 kg (231 lb 14.8 oz)   SpO2 96%   BMI 46.84 kg/m²   Objective:      Physical Exam  Constitutional:       Appearance: She is well-developed.   HENT:      Head: Normocephalic and atraumatic.   Cardiovascular:      Rate and Rhythm: Normal rate and regular rhythm.      Heart sounds: Normal heart sounds. No murmur heard.  Pulmonary:      Effort: Pulmonary effort is normal.      Breath sounds: Normal breath sounds. No wheezing.   Abdominal:      General: Bowel sounds are normal.      Palpations: Abdomen is soft.      Tenderness: There is no abdominal tenderness.   Musculoskeletal:         General: No tenderness.      Comments: No significant paraspinal lumbar muscle tenderness noted in the midline for bilaterally  Straight leg raise test negative bilaterally   Skin:     General: Skin is warm and dry.      Findings: No rash.   Neurological:      Mental Status: She is alert and oriented to person, place, and time.   Psychiatric:         Mood and Affect: Mood normal.           Assessment/Plan:   1. Essential hypertension  -     Comprehensive Metabolic Panel; Future; Expected date: 06/24/2024  -     Lipid Panel; Future; Expected date: 06/24/2024  -     TSH; Future; Expected date: 06/24/2024  Blood pressure is stable currently on losartan hydrochlorothiazide 100/12.5 mg daily    2. Mixed hyperlipidemia  -     Lipid Panel; Future; Expected date: 06/24/2024  -     Hemoglobin A1C; Future; Expected date: 06/24/2024  Currently taking simvastatin 40 mg daily    3. Prediabetes  Diet controlled    4. Hiatal hernia with GERD  Stable on pantoprazole 40 mg daily    5. Primary osteoarthritis of both knees  Graded exercise regimen recommended    6. [place] (obstructive sleep apnea)  Stable    7. " Chronic seasonal allergic rhinitis  Stable    8. Atherosclerosis of aorta  Encouraged to discontinue aspirin at this time and continue statins    9. Moderate episode of recurrent major depressive disorder  Stable without taking medication    10. Pulmonary hypertension  Overview:  RA: 16/ 15/ 14 RV: 55/ 8/ 14 PA: 55/ 23/ 37 PWP: 20/ 18/ 18 .   Cardiac output was 3.9. Cardiac index is 1.9 L/min/m2.    Orders:  -     furosemide (LASIX) 20 MG tablet; Take 1 tablet (20 mg total) by mouth daily as needed (edema).  Dispense: 60 tablet; Refill: 1  Clinically doing well on Lasix as needed    11. Morbid obesity with BMI of 45.0-49.9, adult  Lifestyle modifications recommended to lose weight with BMI 46    12. Hematochezia  -     CBC Auto Differential; Future; Expected date: 06/24/2024  -     Cologuard Screening (Multitarget Stool DNA); Future; Expected date: 06/24/2024  Patient not due for colonoscopy but secondary to hemorrhoids encouraged to continue using hemorrhoidal cream  Will recheck further labs and will get Cologuard    13. Colon cancer screening  -     Cologuard Screening (Multitarget Stool DNA); Future; Expected date: 06/24/2024    14. Bilateral leg pain  -     Magnesium; Future; Expected date: 06/24/2024  -     FERRITIN; Future; Expected date: 06/24/2024  -     X-Ray Lumbar Spine 5 View; Future; Expected date: 06/24/2024  Could be secondary to underlying neuropathy  Dorsal pedal pulses 2+ bilaterally  Will check further labs including x-ray of the lumbar spine    15. Other long term (current) drug therapy  -     Hemoglobin A1C; Future; Expected date: 06/24/2024  -     FERRITIN; Future; Expected date: 06/24/2024    16. Asymptomatic menopause  -     DXA Bone Density Axial Skeleton 1 or more sites; Future; Expected date: 06/24/2024  Due for DEXA scan    17. Peripheral polyneuropathy  -     Vitamin B12; Future; Expected date: 06/24/2024  -     gabapentin (NEURONTIN) 100 MG capsule; Take 1 capsule (100 mg total) by  mouth every evening.  Dispense: 30 capsule; Refill: 1  -     X-Ray Lumbar Spine 5 View; Future; Expected date: 06/24/2024  Gabapentin 100 mg to be taken daily at bedtime has been prescribed today to see if there is any improvement    I spent a total of 40 minutes on the day of the visit.This includes face to face time and non-face to face time preparing to see the patient (eg, review of tests), obtaining and/or reviewing separately obtained history, documenting clinical information in the electronic or other health record, independently interpreting results and communicating results to the patient/family/caregiver, or care coordinator.

## 2024-06-25 ENCOUNTER — HOSPITAL ENCOUNTER (OUTPATIENT)
Dept: RADIOLOGY | Facility: HOSPITAL | Age: 74
Discharge: HOME | End: 2024-06-25
Attending: FAMILY MEDICINE
Payer: MEDICARE

## 2024-06-25 DIAGNOSIS — M79.604 BILATERAL LEG PAIN: ICD-10-CM

## 2024-06-25 DIAGNOSIS — G62.9 PERIPHERAL POLYNEUROPATHY: ICD-10-CM

## 2024-06-25 DIAGNOSIS — M79.605 BILATERAL LEG PAIN: ICD-10-CM

## 2024-06-25 PROCEDURE — 72110 X-RAY EXAM L-2 SPINE 4/>VWS: CPT | Mod: 26,,, | Performed by: RADIOLOGY

## 2024-06-25 PROCEDURE — 72110 X-RAY EXAM L-2 SPINE 4/>VWS: CPT | Mod: TC

## 2024-07-08 ENCOUNTER — OFFICE VISIT (OUTPATIENT)
Dept: OPHTHALMOLOGY | Facility: CLINIC | Age: 74
End: 2024-07-08
Payer: MEDICARE

## 2024-07-08 DIAGNOSIS — H25.13 NUCLEAR SCLEROSIS OF BOTH EYES: Primary | ICD-10-CM

## 2024-07-08 PROCEDURE — 92015 DETERMINE REFRACTIVE STATE: CPT | Mod: ,,, | Performed by: OPHTHALMOLOGY

## 2024-07-08 PROCEDURE — 99999 PR PBB SHADOW E&M-EST. PATIENT-LVL II: CPT | Mod: PBBFAC,,, | Performed by: OPHTHALMOLOGY

## 2024-07-08 PROCEDURE — 92004 COMPRE OPH EXAM NEW PT 1/>: CPT | Mod: S$PBB,,, | Performed by: OPHTHALMOLOGY

## 2024-07-08 PROCEDURE — 99212 OFFICE O/P EST SF 10 MIN: CPT | Mod: PBBFAC | Performed by: OPHTHALMOLOGY

## 2024-07-08 NOTE — PROGRESS NOTES
HPI    Pt c/o blurred vision that comes and goes.  Spends a lot of time on the   computer.    Gets stabbing pain that comes and goes.  Can't remember which eye    Cataracts OU  Last edited by Charity Mayfield MA on 7/8/2024 10:29 AM.            Assessment /Plan     For exam results, see Encounter Report.    Nuclear sclerosis of both eyes  Cataracts are present but not visually significant. Will continue to monitor. Mrx provided.      RTC 1 year, sooner prn

## 2024-07-10 ENCOUNTER — OFFICE VISIT (OUTPATIENT)
Dept: DERMATOLOGY | Facility: CLINIC | Age: 74
End: 2024-07-10
Payer: MEDICARE

## 2024-07-10 DIAGNOSIS — L21.9 SEBORRHEIC DERMATITIS: Primary | ICD-10-CM

## 2024-07-10 DIAGNOSIS — L70.8 OTHER ACNE: ICD-10-CM

## 2024-07-10 DIAGNOSIS — L81.9 DYSCHROMIA: ICD-10-CM

## 2024-07-10 PROCEDURE — 99213 OFFICE O/P EST LOW 20 MIN: CPT | Mod: PBBFAC | Performed by: DERMATOLOGY

## 2024-07-10 PROCEDURE — 99203 OFFICE O/P NEW LOW 30 MIN: CPT | Mod: S$PBB,,, | Performed by: DERMATOLOGY

## 2024-07-10 PROCEDURE — 99999 PR PBB SHADOW E&M-EST. PATIENT-LVL III: CPT | Mod: PBBFAC,,, | Performed by: DERMATOLOGY

## 2024-07-10 RX ORDER — TRIAMCINOLONE ACETONIDE 0.25 MG/G
CREAM TOPICAL 2 TIMES DAILY PRN
Qty: 30 G | Refills: 1 | Status: SHIPPED | OUTPATIENT
Start: 2024-07-10

## 2024-07-10 NOTE — PATIENT INSTRUCTIONS
SHEER SUNSCREENS    Cetaphil Sheer Mineral Face Sunscreen SPF 50  CeraVe Sheer Hydrating Sunscreen SPF 30  Dipti Brightening Moisturizer SPF 30  Umbra Sheer Physical Daily Defense SPF 30  Shiseido Ultimate Sun Protection Lotion WetForce SPF 50+  Supergoop! Unseen Sunscreen Broad Spectrum SPF 40  Neutrogena HydroBoost Water Gel Lotion SPF 50  Neutrogena Ultrasheer Face & Body Stick SPF 70    Over-the-counter Retinol Products    Neutrogena Rapid Wrinkle Repair with retinol     - OR -    Oil of [place] Regenerist Intensive Repair with retinol    Apply a small amount to the entire face at bedtime    Use a facial sunscreen with SPF of at least 30 daily

## 2024-07-10 NOTE — PROGRESS NOTES
Subjective:      Patient ID:  Yoana Pardo is a 73 y.o. female who presents for No chief complaint on file.    History of Present Illness: The patient presents with chief complaint of rash.  Location: face, upper chest,   Duration: 3 months  Signs/Symptoms:  dryness, redness, discoloration     Prior treatments: vitamin e/non effective, witch hazel/non effective, otc products         Review of Systems   Constitutional:  Negative for fever and chills.   Gastrointestinal:  Negative for nausea and vomiting.   Skin:  Positive for activity-related sunscreen use. Negative for daily sunscreen use and recent sunburn.   Hematologic/Lymphatic: Does not bruise/bleed easily.       Objective:   Physical Exam   Constitutional: She appears well-developed and well-nourished. No distress.   Neurological: She is alert and oriented to person, place, and time. She is not disoriented.   Psychiatric: She has a normal mood and affect.   Skin:   Areas Examined (abnormalities noted in diagram):   Head / Face Inspection Performed  Neck Inspection Performed  RUE Inspected  LUE Inspection Performed  Nails and Digits Inspection Performed             Assessment / Plan:        Seborrheic dermatitis  -     triamcinolone acetonide 0.025% (KENALOG) 0.025 % cream; Apply topically 2 (two) times daily as needed (for dry patches on face.). Mild steroid. Use sparingly for 1-2 weeks if needed then stop.  Dispense: 30 g; Refill: 1  -     of the glabella and nasal ala. Will start above med prn.     Other acne  Dyschromia  Discussed OTC retinol, daily sunscreen and relationship of HCTZ to skin darkening/photosenstivity. The patient acknowledged understanding. Offered tretinoin, pt defers.              Follow up in about 6 months (around 1/10/2025).

## 2024-07-16 ENCOUNTER — PATIENT MESSAGE (OUTPATIENT)
Dept: INTERNAL MEDICINE | Facility: CLINIC | Age: 74
End: 2024-07-16
Payer: MEDICARE

## 2024-07-16 ENCOUNTER — TELEPHONE (OUTPATIENT)
Dept: INTERNAL MEDICINE | Facility: CLINIC | Age: 74
End: 2024-07-16
Payer: MEDICARE

## 2024-07-16 DIAGNOSIS — R00.2 PALPITATIONS: ICD-10-CM

## 2024-07-16 DIAGNOSIS — Z12.11 COLON CANCER SCREENING: Primary | ICD-10-CM

## 2024-07-16 DIAGNOSIS — I70.0 ATHEROSCLEROSIS OF AORTA: ICD-10-CM

## 2024-07-16 DIAGNOSIS — I27.20 PULMONARY HYPERTENSION: ICD-10-CM

## 2024-07-16 DIAGNOSIS — I10 ESSENTIAL HYPERTENSION: Primary | ICD-10-CM

## 2024-07-16 NOTE — TELEPHONE ENCOUNTER
----- Message from Herlinda Bills MA sent at 7/16/2024  3:42 PM CDT -----  Regarding: [facility]Guard Orders  Please place orders again for Colonguard with diagnosis code included on order. Please Advise.  ----- Message -----  From: Shalini Shirley MA  Sent: 7/16/2024   2:32 PM CDT  To: [facility] Staff    Who called:  Fabian with [employer]   What is the request in detail:  Called yesterday and 7/12 needs dx colonoscopy dx codes   Can the clinic reply by [facility]? No    Would the patient rather a call back or a response via [facility]? Call back    Best call back number:  [phone]  Case # [id number]    Additional Information:    Thank you.

## 2024-07-17 ENCOUNTER — HOSPITAL ENCOUNTER (OUTPATIENT)
Dept: CARDIOLOGY | Facility: HOSPITAL | Age: 74
Discharge: HOME | End: 2024-07-17
Attending: STUDENT IN AN ORGANIZED HEALTH CARE EDUCATION/TRAINING PROGRAM
Payer: MEDICARE

## 2024-07-17 ENCOUNTER — OFFICE VISIT (OUTPATIENT)
Dept: CARDIOLOGY | Facility: CLINIC | Age: 74
End: 2024-07-17
Payer: MEDICARE

## 2024-07-17 VITALS
HEIGHT: 59 IN | WEIGHT: 231.69 LBS | SYSTOLIC BLOOD PRESSURE: 130 MMHG | BODY MASS INDEX: 46.8 KG/M2 | BODY MASS INDEX: 46.71 KG/M2 | WEIGHT: 231.69 LBS | DIASTOLIC BLOOD PRESSURE: 80 MMHG | HEART RATE: 74 BPM

## 2024-07-17 DIAGNOSIS — I70.0 ATHEROSCLEROSIS OF AORTA: ICD-10-CM

## 2024-07-17 DIAGNOSIS — E66.01 MORBID OBESITY WITH BMI OF 45.0-49.9, ADULT: ICD-10-CM

## 2024-07-17 DIAGNOSIS — I10 ESSENTIAL HYPERTENSION: ICD-10-CM

## 2024-07-17 DIAGNOSIS — I27.20 PULMONARY HYPERTENSION: ICD-10-CM

## 2024-07-17 DIAGNOSIS — R73.03 PREDIABETES: ICD-10-CM

## 2024-07-17 DIAGNOSIS — I34.1 MVP (MITRAL VALVE PROLAPSE): ICD-10-CM

## 2024-07-17 DIAGNOSIS — I70.0 ATHEROSCLEROSIS OF AORTA: Primary | ICD-10-CM

## 2024-07-17 DIAGNOSIS — I49.9 IRREGULAR HEARTBEAT: ICD-10-CM

## 2024-07-17 DIAGNOSIS — G47.33 OSA ON CPAP: ICD-10-CM

## 2024-07-17 DIAGNOSIS — R00.2 PALPITATIONS: ICD-10-CM

## 2024-07-17 DIAGNOSIS — E78.2 MIXED HYPERLIPIDEMIA: ICD-10-CM

## 2024-07-17 DIAGNOSIS — I20.89 STABLE ANGINA PECTORIS: ICD-10-CM

## 2024-07-17 DIAGNOSIS — M47.26 OSTEOARTHRITIS OF SPINE WITH RADICULOPATHY, LUMBAR REGION: ICD-10-CM

## 2024-07-17 LAB
OHS QRS DURATION: 80 MS
OHS QTC CALCULATION: 430 MS

## 2024-07-17 PROCEDURE — 99214 OFFICE O/P EST MOD 30 MIN: CPT | Mod: S$PBB,,, | Performed by: STUDENT IN AN ORGANIZED HEALTH CARE EDUCATION/TRAINING PROGRAM

## 2024-07-17 PROCEDURE — 93005 ELECTROCARDIOGRAM TRACING: CPT

## 2024-07-17 PROCEDURE — 93010 ELECTROCARDIOGRAM REPORT: CPT | Mod: ,,, | Performed by: INTERNAL MEDICINE

## 2024-07-17 PROCEDURE — 99999 PR PBB SHADOW E&M-EST. PATIENT-LVL III: CPT | Mod: PBBFAC,,, | Performed by: STUDENT IN AN ORGANIZED HEALTH CARE EDUCATION/TRAINING PROGRAM

## 2024-07-17 PROCEDURE — 99213 OFFICE O/P EST LOW 20 MIN: CPT | Mod: PBBFAC,25 | Performed by: STUDENT IN AN ORGANIZED HEALTH CARE EDUCATION/TRAINING PROGRAM

## 2024-07-17 RX ORDER — FUROSEMIDE 20 MG/1
20 TABLET ORAL DAILY PRN
Qty: 30 TABLET | Refills: 1 | Status: SHIPPED | OUTPATIENT
Start: 2024-07-17

## 2024-07-17 NOTE — PROGRESS NOTES
Section of Cardiology                  Cardiac Clinic Note        HPI:   Yoana Pardo is a 73 y.o. female       4/1/24  Recently had COVID, still getting over it  Has some SOB and cough  No chest pain  Palpitations at times  Bp stable  Has not been taking her zocor - ran out (was not taking consistently)  Not on ozempic  No smoking    7/17/24  Doing well  BP stable  Has been trying to walk in the house  Tries not to walk outside due to balancing issues   Weight stable in 230 lbs  Tries to be compliant with CPAP  Hctz may be causing hypopigmentation of the face, may want to switch later     Denies chest pain, SOB, dizziness      EKG 4/1/24 NSR, PACs, nonspecific T wave abn    ECHO  Results for orders placed during the hospital encounter of 09/25/23    Echo    Interpretation Summary    Left Ventricle: The left ventricle is normal in size. Normal wall thickness. There is mild concentric hypertrophy. Normal wall motion. There is normal systolic function with a visually estimated ejection fraction of 60 - 65%. There is normal diastolic function.    Left Atrium: Left atrium is severely dilated.    Right Ventricle: Normal right ventricular cavity size. Wall thickness is normal. Right ventricle wall motion  is normal. Systolic function is normal.    Tricuspid Valve: There is moderate regurgitation.    Pulmonary Artery: There is moderate pulmonary hypertension. The estimated pulmonary artery systolic pressure is 62 mmHg.    IVC/SVC: Normal venous pressure at 3 mmHg.       STRESS TEST Results for orders placed during the hospital encounter of 08/03/22    Nuclear Stress - Cardiology Interpreted    Interpretation Summary    Normal myocardial perfusion scan. There is no evidence of myocardial ischemia or infarction.    The gated perfusion images showed an ejection fraction of > 70% at rest. The gated perfusion images showed an ejection fraction of > 70% post stress.    The EKG portion of this study is  negative for ischemia.    The patient reported no chest pain during the stress test.    During stress, occasional PACs are noted.       [facility] Results for orders placed during the hospital encounter of 06/15/20    Cardiac catheterization    Conclusion  · Estimated blood loss: none  · Filling pressures on the right are mildly elevated. Pulmonary HTN is mild to moderate.    I certify that I was present for catheter insertion, catheter manipulation, angiography, and angiographic interpretation of this patient.    Procedure Log documented by Documenter: Apurva Ríos RN and verified by Shakeel Belle MD.    Date: 6/15/2020  Time: 12:41 PM            ROS: All 10 systems reviewed. Please refer to the HPI for pertinent positives. All other systems negative.     Past Medical History  Past Medical History:   Diagnosis Date    GERD (gastroesophageal reflux disease)     Hemorrhoids     History of positive PPD, untreated     Hx of cold sores     Hyperlipidemia     Hypertension     MVP (mitral valve prolapse)     Peripheral neuropathy     Pulmonary HTN     Dr Bailon    Sleep apnea     [place]-CPAP  uses intermittently       Surgical History  Past Surgical History:   Procedure Laterality Date    CHOLECYSTECTOMY      COLONOSCOPY N/A 11/11/2021    Procedure: COLONOSCOPY;  Surgeon: Deloris Galicia MD;  Location: [facility];  Service: Endoscopy;  Laterality: N/A;    DILATION AND CURETTAGE OF UTERUS      ESOPHAGEAL MOTILITY STUDY USING HIGH RESOLUTION MANOMETRY N/A 11/03/2021    Procedure: MOTILITY STUDY, ESOPHAGUS, USING HIGH RESOLUTION MANOMETRY;  Surgeon: Yaw Manuel RN;  Location: [facility];  Service: Endoscopy;  Laterality: N/A;    ESOPHAGOGASTRODUODENOSCOPY N/A 12/02/2020    Procedure: ESOPHAGOGASTRODUODENOSCOPY (EGD);  Surgeon: Evangelista Erickson MD;  Location: [facility];  Service: Endoscopy;  Laterality: N/A;    ESOPHAGOGASTRODUODENOSCOPY N/A 11/11/2021    Procedure: EGD (ESOPHAGOGASTRODUODENOSCOPY);  Surgeon: Deloris CHAUDHARY  MD Frida;  Location: [facility] ENDO;  Service: Endoscopy;  Laterality: N/A;    ESOPHAGOGASTRODUODENOSCOPY N/A 01/26/2023    Procedure: EGD (ESOPHAGOGASTRODUODENOSCOPY);  Surgeon: Jasvir Robert MD;  Location: [facility] ENDO;  Service: Endoscopy;  Laterality: N/A;  EGD with BRAVO (ON PPI); Hiatial hernia, GERD    RIGHT HEART CATHETERIZATION Right 06/15/2020    Procedure: INSERTION, CATHETER, RIGHT HEART;  Surgeon: Ashlie Belle MD;  Location: [facility] CATH LAB;  Service: Cardiology;  Laterality: Right;          Allergies:   Review of patient's allergies indicates:   Allergen Reactions    Lisinopril Other (See Comments)     Cough      Bactrim [sulfamethoxazole-trimethoprim] Itching    Zosyn [piperacillin-tazobactam] Hives     Pt received second dose of Zosyn and had hives on both arms. Benadryl given and pt continued to receive zosyn with no issues. Hydralazine also given around the same time and discontinued.        Social History:  Social History     Socioeconomic History    Marital status:     Number of children: 3   Occupational History    Occupation: Sensorly occupational      Employer: [employer]   Tobacco Use    Smoking status: Never    Smokeless tobacco: Never   Substance and Sexual Activity    Alcohol use: Yes     Alcohol/week: 5.0 standard drinks of alcohol     Types: 5 Shots of liquor per week     Comment: occasional    Drug use: No    Sexual activity: Not Currently     Partners: Male     Birth control/protection: None     Social Determinants of Health     Financial Resource Strain: Medium Risk (9/12/2023)    Overall Financial Resource Strain (CARDIA)     Difficulty of Paying Living Expenses: Somewhat hard   Food Insecurity: No Food Insecurity (9/12/2023)    Hunger Vital Sign     Worried About Running Out of Food in the Last Year: Never true     Ran Out of Food in the Last Year: Never true   Transportation Needs: No Transportation Needs (9/12/2023)    PRAPARE - Transportation      Lack of Transportation (Medical): No     Lack of Transportation (Non-Medical): No   Physical Activity: Insufficiently Active (9/12/2023)    Exercise Vital Sign     Days of Exercise per Week: 2 days     Minutes of Exercise per Session: 10 min   Stress: Stress Concern Present (9/12/2023)    [employer] - Occupational Stress Questionnaire     Feeling of Stress : To some extent   Housing Stability: Low Risk  (9/12/2023)    Housing Stability Vital Sign     Unable to Pay for Housing in the Last Year: No     Number of Places Lived in the Last Year: 1     Unstable Housing in the Last Year: No       Family History:  family history includes Aneurysm in her sister; Asthma in her son; COPD in her sister; Cancer in her sister; Depression in her son; Diabetes in her sister; Heart disease in her father and mother; Hypertension in her father and mother; Kidney disease in her father and sister; Mental illness in her sister; Miscarriages / Stillbirths in her sister; Obesity in her father and mother.    Home Medications:  Current Outpatient Medications on File Prior to Visit   Medication Sig Dispense Refill    acetaminophen (TYLENOL) 500 MG tablet Take 500 mg by mouth every 6 (six) hours as needed for Pain.      albuterol (VENTOLIN HFA) 90 mcg/actuation inhaler Inhale 2 puffs into the lungs every 6 (six) hours as needed for Wheezing or Shortness of Breath. Rescue 6.7 g 1    aspirin (ECOTRIN) 81 MG EC tablet Take 81 mg by mouth once daily.      benzonatate (TESSALON) 200 MG capsule Take 1 capsule (200 mg total) by mouth 3 (three) times daily as needed for Cough. 90 capsule 11    gabapentin (NEURONTIN) 100 MG capsule Take 1 capsule (100 mg total) by mouth every evening. 30 capsule 1    losartan-hydrochlorothiazide 100-12.5 mg (HYZAAR) 100-12.5 mg Tab Take 1 tablet by mouth once daily. 90 tablet 1    pantoprazole (PROTONIX) 40 MG tablet Take 1 tablet by mouth once daily 90 tablet 1    potassium chloride SA  "(K-DUR,KLOR-CON) 20 MEQ tablet Take 1 tablet (20 mEq total) by mouth once daily. 30 tablet 6    sildenafil (REVATIO) 20 mg Tab Take 1 tablet (20 mg total) by mouth 3 (three) times daily. 90 tablet 2    simvastatin (ZOCOR) 40 MG tablet Take 1 tablet (40 mg total) by mouth every evening. 90 tablet 1    triamcinolone acetonide 0.025% (KENALOG) 0.025 % cream Apply topically 2 (two) times daily as needed (for dry patches on face.). Mild steroid. Use sparingly for 1-2 weeks if needed then stop. 30 g 1    [DISCONTINUED] furosemide (LASIX) 20 MG tablet Take 1 tablet (20 mg total) by mouth daily as needed (edema). 60 tablet 1     Current Facility-Administered Medications on File Prior to Visit   Medication Dose Route Frequency Provider Last Rate Last Admin    lactated ringers infusion   Intravenous Continuous Deloris Galicia MD           Physical exam:  /80 (BP Location: Left arm, Patient Position: Sitting, BP Method: Large (Manual))   Pulse 74   Ht 4' 11" (1.499 m)   Wt 105.1 kg (231 lb 11.3 oz)   BMI 46.80 kg/m²         General: Pt is a 73 y.o. year old female who is AAOx3, in NAD, is pleasant, well nourished, looks stated age  HEENT: PERRL, EOMI, Oral mucosa pink & moist  CVS  No abnormal cardiac pulsations noted on inspection. JVP not raised. The apical impulse is normal on palpation, and is located in the left 5th intercostal space in the mid - clavicular line. No palpable thrills or abnormal pulsations noted. RR, S1 - S2 heard, no murmurs, rubs or gallops appreciated.   PUL : CTA B/L. No wheezes/crackles heard   ABD : BS +, soft. No tenderness elicited   LE : No C/C/E. Distal Pulses palpable B/L         LABS:    Chemistry:   Lab Results   Component Value Date     06/24/2024    K 4.3 06/24/2024    CL 98 06/24/2024    CO2 28 06/24/2024    BUN 21 06/24/2024    CREATININE 1.0 06/24/2024    CALCIUM 9.8 06/24/2024     Cardiac Markers:   Lab Results   Component Value Date    TROPONINI 0.006 " 03/29/2018     Cardiac Markers (Last 3):   Lab Results   Component Value Date    TROPONINI 0.006 03/29/2018    TROPONINI 0.015 03/26/2018    TROPONINI 0.010 03/26/2018     CBC:   Lab Results   Component Value Date    WBC 4.94 06/24/2024    HGB 12.9 06/24/2024    HCT 40.9 06/24/2024    MCV 93 06/24/2024     06/24/2024     Lipids:   Lab Results   Component Value Date    CHOL 222 (H) 06/24/2024    CHOL 222 (H) 06/24/2024    TRIG 167 (H) 06/24/2024    TRIG 167 (H) 06/24/2024    HDL 48 06/24/2024    HDL 48 06/24/2024     Coagulation:   Lab Results   Component Value Date    INR 1.0 06/15/2020    APTT 27.2 06/15/2020           Assessment        1. Atherosclerosis of aorta    2. Essential hypertension    3. [place] on CPAP    4. Palpitations    5. Pulmonary hypertension    6. Mixed hyperlipidemia    7. MVP (mitral valve prolapse)    8. Stable angina pectoris    9. Morbid obesity with BMI of 45.0-49.9, adult    10. Irregular heartbeat    11. Prediabetes    12. Osteoarthritis of spine with radiculopathy, lumbar region           Plan:      HTN  Stable  Continue losartan/hctz  Takes lasix as needed for swelling  K supp for low K   Hctz may be causing hypopigmentation of the face, may want to switch later     Atherosclerosis  Continue ASA     GERD  Stable  Continue Protonix     HLD   as of 5/23  Continue zocor - forgets to take at night->will try in the AM   10 year ASCVD 12.3%     Prediabetes  A1c 5.7  Not on tx     [place]  Continue CPAP  F/u with sleep clinic/pulmonary     Obesity, Body mass index is 46.8 kg/m².   Low salt, low fat diet  Exercise as tolerated, at least 30 min daily         This note was prepared using voice recognition system and is likely to have sound alike errors that may have been overlooked even after proofreading.     I have reviewed all pertinent chart information.  Plans and recommendations have been formulated under my direct supervision. All questions answered and patient voiced understanding.    If symptoms persist go to the ED.    RTC in 6 m        Pascale Whitaker MD  Cardiology

## 2024-08-13 ENCOUNTER — PATIENT MESSAGE (OUTPATIENT)
Dept: PULMONOLOGY | Facility: CLINIC | Age: 74
End: 2024-08-13
Payer: MEDICARE

## 2024-08-19 DIAGNOSIS — G62.9 PERIPHERAL POLYNEUROPATHY: ICD-10-CM

## 2024-08-19 RX ORDER — GABAPENTIN 100 MG/1
100 CAPSULE ORAL
Qty: 30 CAPSULE | Refills: 0 | Status: SHIPPED | OUTPATIENT
Start: 2024-08-19

## 2024-08-19 NOTE — TELEPHONE ENCOUNTER
No care due was identified.  [facility] Embedded Care Due Messages. Reference number: [id number].   8/19/2024 12:29:12 PM CDT

## 2024-08-20 NOTE — ASSESSMENT & PLAN NOTE
General weight loss/lifestyle modification strategies discussed (elicit support from others; identify saboteurs; non-food rewards).  Diet interventions: low calorie (1000 kCal/d) deficit diet  referal to bariatric medicine   [No evidence of disease] : No evidence of disease

## 2024-08-27 DIAGNOSIS — I27.20 PULMONARY HYPERTENSION: ICD-10-CM

## 2024-08-28 RX ORDER — SILDENAFIL CITRATE 20 MG/1
20 TABLET ORAL 3 TIMES DAILY
Qty: 90 TABLET | Refills: 2 | Status: SHIPPED | OUTPATIENT
Start: 2024-08-28

## 2024-09-13 ENCOUNTER — CLINICAL SUPPORT (OUTPATIENT)
Dept: PULMONOLOGY | Facility: CLINIC | Age: 74
End: 2024-09-13
Attending: INTERNAL MEDICINE
Payer: MEDICARE

## 2024-09-13 ENCOUNTER — HOSPITAL ENCOUNTER (OUTPATIENT)
Dept: CARDIOLOGY | Facility: HOSPITAL | Age: 74
Discharge: HOME | End: 2024-09-13
Attending: INTERNAL MEDICINE
Payer: MEDICARE

## 2024-09-13 VITALS
BODY MASS INDEX: 46.57 KG/M2 | BODY MASS INDEX: 46.49 KG/M2 | WEIGHT: 231 LBS | RESPIRATION RATE: 17 BRPM | DIASTOLIC BLOOD PRESSURE: 80 MMHG | SYSTOLIC BLOOD PRESSURE: 130 MMHG | OXYGEN SATURATION: 99 % | DIASTOLIC BLOOD PRESSURE: 74 MMHG | BODY MASS INDEX: 46.49 KG/M2 | HEIGHT: 59 IN | WEIGHT: 230.63 LBS | WEIGHT: 230.63 LBS | HEIGHT: 59 IN | HEIGHT: 59 IN | HEART RATE: 70 BPM | SYSTOLIC BLOOD PRESSURE: 120 MMHG

## 2024-09-13 DIAGNOSIS — G47.33 OSA (OBSTRUCTIVE SLEEP APNEA): ICD-10-CM

## 2024-09-13 DIAGNOSIS — E66.01 MORBID OBESITY WITH BMI OF 45.0-49.9, ADULT: ICD-10-CM

## 2024-09-13 DIAGNOSIS — I10 ESSENTIAL HYPERTENSION: Chronic | ICD-10-CM

## 2024-09-13 DIAGNOSIS — I27.20 PULMONARY HYPERTENSION: Primary | ICD-10-CM

## 2024-09-13 DIAGNOSIS — I27.20 PULMONARY HYPERTENSION: ICD-10-CM

## 2024-09-13 DIAGNOSIS — R73.03 PREDIABETES: ICD-10-CM

## 2024-09-13 DIAGNOSIS — E78.2 MIXED HYPERLIPIDEMIA: ICD-10-CM

## 2024-09-13 LAB
AORTIC ROOT ANNULUS: 2.71 CM
ASCENDING AORTA: 2.56 CM
AV INDEX (PROSTH): 0.78
AV MEAN GRADIENT: 5 MMHG
AV PEAK GRADIENT: 8 MMHG
AV VALVE AREA BY VELOCITY RATIO: 2.26 CM²
AV VALVE AREA: 2.41 CM²
AV VELOCITY RATIO: 0.73
BSA FOR ECHO PROCEDURE: 2.09 M2
CV ECHO LV RWT: 0.47 CM
DOP CALC AO PEAK VEL: 1.39 M/S
DOP CALC AO VTI: 32 CM
DOP CALC LVOT AREA: 3.1 CM2
DOP CALC LVOT DIAMETER: 1.98 CM
DOP CALC LVOT PEAK VEL: 1.02 M/S
DOP CALC LVOT STROKE VOLUME: 77.25 CM3
DOP CALC RVOT PEAK VEL: 0.77 M/S
DOP CALC RVOT VTI: 17.3 CM
DOP CALCLVOT PEAK VEL VTI: 25.1 CM
E WAVE DECELERATION TIME: 164.31 MSEC
E/A RATIO: 1.24
E/E' RATIO: 9.76 M/S
ECHO LV POSTERIOR WALL: 1.05 CM (ref 0.6–1.1)
EJECTION FRACTION: 60 %
FRACTIONAL SHORTENING: 35 % (ref 28–44)
INTERVENTRICULAR SEPTUM: 1.12 CM (ref 0.6–1.1)
IVC DIAMETER: 2.02 CM
IVRT: 72.31 MSEC
LA MAJOR: 5.87 CM
LA MINOR: 6.2 CM
LA WIDTH: 5.2 CM
LEFT ATRIUM AREA SYSTOLIC (APICAL 2 CHAMBER): 29.14 CM2
LEFT ATRIUM AREA SYSTOLIC (APICAL 4 CHAMBER): 27.79 CM2
LEFT ATRIUM SIZE: 4.26 CM
LEFT ATRIUM VOLUME INDEX MOD: 54 ML/M2
LEFT ATRIUM VOLUME INDEX: 57.9 ML/M2
LEFT ATRIUM VOLUME MOD: 105.79 CM3
LEFT ATRIUM VOLUME: 113.55 CM3
LEFT INTERNAL DIMENSION IN SYSTOLE: 2.86 CM (ref 2.1–4)
LEFT VENTRICLE DIASTOLIC VOLUME INDEX: 45.37 ML/M2
LEFT VENTRICLE DIASTOLIC VOLUME: 88.92 ML
LEFT VENTRICLE END SYSTOLIC VOLUME APICAL 2 CHAMBER: 108.36 ML
LEFT VENTRICLE END SYSTOLIC VOLUME APICAL 4 CHAMBER: 101.82 ML
LEFT VENTRICLE MASS INDEX: 85 G/M2
LEFT VENTRICLE SYSTOLIC VOLUME INDEX: 15.8 ML/M2
LEFT VENTRICLE SYSTOLIC VOLUME: 31.03 ML
LEFT VENTRICULAR INTERNAL DIMENSION IN DIASTOLE: 4.43 CM (ref 3.5–6)
LEFT VENTRICULAR MASS: 167.46 G
LV LATERAL E/E' RATIO: 7.55 M/S
LV SEPTAL E/E' RATIO: 13.83 M/S
LVED V (TEICH): 88.92 ML
LVES V (TEICH): 31.03 ML
LVOT MG: 2.55 MMHG
LVOT MV: 0.75 CM/S
MV PEAK A VEL: 0.67 M/S
MV PEAK E VEL: 0.83 M/S
OHS CV RV/LV RATIO: 0.68 CM
PISA TR MAX VEL: 3.54 M/S
PULM VEIN S/D RATIO: 0.86
PV MEAN GRADIENT: 1 MMHG
PV PEAK D VEL: 0.42 M/S
PV PEAK GRADIENT: 5 MMHG
PV PEAK S VEL: 0.36 M/S
PV PEAK VELOCITY: 1.07 M/S
RA MAJOR: 5.25 CM
RA PRESSURE ESTIMATED: 3 MMHG
RA WIDTH: 3.97 CM
RIGHT VENTRICULAR END-DIASTOLIC DIMENSION: 3.01 CM
RV TB RVSP: 7 MMHG
SINUS: 3.01 CM
STJ: 2.67 CM
TDI LATERAL: 0.11 M/S
TDI SEPTAL: 0.06 M/S
TDI: 0.09 M/S
TR MAX PG: 50 MMHG
TRICUSPID ANNULAR PLANE SYSTOLIC EXCURSION: 2.69 CM
TV REST PULMONARY ARTERY PRESSURE: 53 MMHG
Z-SCORE OF LEFT VENTRICULAR DIMENSION IN END DIASTOLE: -2.36
Z-SCORE OF LEFT VENTRICULAR DIMENSION IN END SYSTOLE: -1.48

## 2024-09-13 PROCEDURE — 99214 OFFICE O/P EST MOD 30 MIN: CPT | Mod: PBBFAC,25 | Performed by: INTERNAL MEDICINE

## 2024-09-13 PROCEDURE — 99999 PR PBB SHADOW E&M-EST. PATIENT-LVL IV: CPT | Mod: PBBFAC,,, | Performed by: INTERNAL MEDICINE

## 2024-09-13 PROCEDURE — 99214 OFFICE O/P EST MOD 30 MIN: CPT | Mod: 25,S$PBB,, | Performed by: INTERNAL MEDICINE

## 2024-09-13 PROCEDURE — 93306 TTE W/DOPPLER COMPLETE: CPT | Mod: 26,,, | Performed by: STUDENT IN AN ORGANIZED HEALTH CARE EDUCATION/TRAINING PROGRAM

## 2024-09-13 PROCEDURE — 93306 TTE W/DOPPLER COMPLETE: CPT

## 2024-09-13 NOTE — PROCEDURES
"[facility]  [facility]     SUMMARY     Ordering Provider:    Interpreting Provider:   Performing nurse/tech/RT: RAINA Zhou RRT  Diagnosis: Pulmonary Hypertension (INDIANA)  Height: 4' 11" (149.9 cm)  Weight: 104.6 kg (230 lb 9.6 oz)  BMI (Calculated): 46.6                Phase Oxygen Assessment Supplemental O2 Heart   Rate Blood Pressure Marcelle Dyspnea Scale Rating   Resting 97 % Room Air 73 bpm 142/72 0   Exercise        Minute        1 95 % Room Air 104 bpm     2 94 % Room Air 109 bpm     3 94 % Room Air 112 bpm     4 92 % Room Air 112 bpm     5 92 % Room Air 112 bpm     6  94 % Room Air 121 bpm 144/52 1   Recovery        Minute        1 97 % Room Air 104 bpm     2 100 % Room Air 71 bpm     3 100 % Room Air 71 bpm     4 100 % Room Air 75 bpm 123/72 1     Six Minute Walk Summary  6MWT Status: completed without stopping  Patient Reported: Dyspnea, Lightheadedness     Interpretation:  Did the patient stop or pause?: No                                         Total Time Walked (Calculated): 360 seconds  Final Partial Lap Distance (feet): 175 feet  Total Distance Meters (Calculated): 358.14 meters  Predicted Distance Meters (Calculated): 327.6 meters  Percentage of Predicted (Calculated): 109.32  Peak VO2 (Calculated): 14.72  Mets: 4.21  Has The Patient Had a Previous Six Minute Walk Test?: Yes       Previous 6MWT Results  Has The Patient Had a Previous Six Minute Walk Test?: Yes  Date of Previous Test: 09/25/23  Total Time Walked: 360 seconds  Total Distance (meters): 335.28  Predicted Distance (meters): 321.47 meters  Percentage of Predicted: 104.3  Percent Change (Calculated): -0.07    "

## 2024-09-13 NOTE — ASSESSMENT & PLAN NOTE
9/13/2024     1:47 PM   EPWORTH SLEEPINESS SCALE   Sitting and reading 2   Watching TV 2   Sitting, inactive in a public place (e.g. a theatre or a meeting) 1   As a passenger in a car for an hour without a break 1   Lying down to rest in the afternoon when circumstances permit 3   Sitting and talking to someone 0   Sitting quietly after a lunch without alcohol 0   In a car, while stopped for a few minutes in traffic 0   Total score 9        Bed time 9pm to 2 am  Wake time : 10 am    Data   05/27/2024 to 08/24/2024    Usage > 4 hrs was 79%    CPAP 16 cm    AHI 3.3    USING AND BENEFITS

## 2024-09-13 NOTE — ASSESSMENT & PLAN NOTE
Minimal dyspnea   Functional class 1   WHO group 2 and 3    Distance improved from 335.28m to 358.14m

## 2024-09-13 NOTE — PROGRESS NOTES
Subjective:       Patient ID: Yoana Pardo is a 74 y.o. female.  Patient Active Problem List   Diagnosis    Mixed hyperlipidemia    Pulmonary hypertension    MVP (mitral valve prolapse)    Hiatal hernia with GERD    History of positive PPD, untreated    Hemorrhoids    Essential hypertension    Peripheral neuropathy    Primary osteoarthritis of both knees    Morbid obesity with BMI of 45.0-49.9, adult    INDIANA (obstructive sleep apnea)    Behaviorally induced insufficient sleep syndrome    Chronic seasonal allergic rhinitis    Atherosclerosis of aorta    Diffusion capacity of lung (dl), decreased    Memory loss    Moderate episode of recurrent major depressive disorder    Prediabetes     Immunization History   Administered Date(s) Administered    COVID-19, MRNA, LN-S, PF (MODERNA FULL 0.5 ML DOSE) 03/03/2021, 03/31/2021    Influenza (FLUAD) - Quadrivalent - Adjuvanted - PF *Preferred* (65+) 10/30/2020, 11/21/2022, 11/15/2023    Influenza - High Dose - PF (65 years and older) 09/25/2018, 10/25/2019    Influenza - Quadrivalent 10/29/2014    Influenza - Quadrivalent - PF *Preferred* (6 months and older) 10/27/2015, 10/03/2016    Influenza - Trivalent (ADULT) 11/22/2006, 11/06/2008, 10/19/2012    Influenza Split 11/22/2006, 11/06/2008, 10/27/2009, 10/19/2012    Pneumococcal Conjugate - 13 Valent 10/15/2018    Pneumococcal Polysaccharide - 23 Valent 10/28/2019    Tdap 08/29/2016 9/13/2024     1:47 PM   EPWORTH SLEEPINESS SCALE   Sitting and reading 2   Watching TV 2   Sitting, inactive in a public place (e.g. a theatre or a meeting) 1   As a passenger in a car for an hour without a break 1   Lying down to rest in the afternoon when circumstances permit 3   Sitting and talking to someone 0   Sitting quietly after a lunch without alcohol 0   In a car, while stopped for a few minutes in traffic 0   Total score 9         mRC  []  0: Dyspnea only with strenuous exercise  [x] +1:Dyspnea when hurrying or  walking up a slight hill  [] +2:Walks slower than people of the same age because of dyspnea or has to stop for breath when walking at own pace  [] +3:Stops for breath after walking 100 yards (91 m) or after a few minutes  [] +4:Too dyspneic to leave house or breathless when dressing      NYHA    [] Class I: Patients with no limitation of activities; they suffer no symptoms from ordinary activities  [x] Class II: Patients with slight, mild limitation of activity; they are comfortable with rest or with mild exertion.  [] Class III:Patients with marked limitation of activity; they are comfortable only at rest.  [] Class IV: Patients who should be at complete rest, confined to bed or chair; any physical activity brings on discomfort and symptoms occur at rest.          Chief Complaint: Pulmonary Hypertension    Ms. Yoana Pardo is 70 y.o.    Last visit 10/30/2020  Follow up after ECHO: PA pressure down to 51 from 57  Sirometry: Normal, FEv1 and FVC imprived  On REVATIO  Last RHC:   6MWD distance improved  Hypoxemia and INDIANA treated with CPAP  Not using consistently, irregular sleep routine   Estancia score is 8  Weight stable Now 230lb  Wake time 6-7 am  Occasional naps  Bariatric surgery cancelled due to hernia  I have reviewed the patient's medical history in detail and updated the computerized patient record.       07/07/2023  Last visit 06/10/2021  Since retired  Had COVID  Has been off Revatio, out of refills  Also not currently using CPAP  Says gave her cough  Last echo 2020  Estancia 13  Had oxygen in past after gall bladder surgery  Options for INDIANA: INSPIRE  BMI is contraindication  Weight loss optione  Will explore GLP 1  options with PCP  A1C was 5.8      09/29/2023  Followup  Reveiwed results  ONSAT: SpO2 hadley 77%  PFT: low MVV: 54.8%, FVC 1.38L( 68.7%), DLCo 8.58( 47.5%)  TLC was normal 3.45L( 83.9%)  6MWD: No desaturation: 335.28m( 104.3%)    EKG reveiwed  ECHO PASP 62  Adherence with REVATIO   PSG  "showed high sleep effuecncy  Diagnostic AHI 22.2/hr   CPAP 16 cm was well tolerated      03/01/2024   Here with her daughter   Download reviewed   Using CPAP greater than 4 hours 83%   Sleep routine is still irregular goes to bed late wake up late.    She is to work night shift   Echo shows pressure has dropped to 62  Will repeat echo next visit   No daytime sleepiness.    No shortness of breath   Medications refilled    09/13/2024  Followup  Doing well  6MWD distance improved  Download reveiwed  Night owl  No respiratory issues  Stable on sildenafil      Review of Systems   Constitutional:  Negative for fatigue.   HENT:  Negative for postnasal drip, rhinorrhea and congestion.    Respiratory:  Negative for apnea (CPAP : Not using), snoring, sputum production, shortness of breath, wheezing, pleurisy and use of rescue inhaler.             Cardiovascular: Negative.    Genitourinary: Negative.    Endocrine: endocrine negative    Musculoskeletal: Negative.    Skin:  Negative for rash.   Neurological: Negative.    Psychiatric/Behavioral:  Negative for sleep disturbance.    All other systems reviewed and are negative.      Objective:       Vitals:    09/13/24 1346   BP: 120/74   Pulse: 70   Resp: 17   SpO2: 99%   Weight: 104.6 kg (230 lb 9.6 oz)   Height: 4' 11" (1.499 m)             Physical Exam   Constitutional: She is oriented to person, place, and time. She appears well-developed and well-nourished.     She is obese.   HENT:   Head: Normocephalic.   Nose: No mucosal edema. No polyps.   Mouth/Throat: Oropharynx is clear and moist. No oropharyngeal exudate. Mallampati Score: IV.   Neck: No tracheal deviation present. No thyromegaly present.   Cardiovascular: Normal rate and regular rhythm.   No murmur heard.  Pulmonary/Chest: Normal expansion, symmetric chest wall expansion, effort normal and breath sounds normal.   Abdominal: Soft. Bowel sounds are normal.   Musculoskeletal:         General: No edema. Normal range of " "motion.      Cervical back: Normal range of motion and neck supple.   Lymphadenopathy:     She has no cervical adenopathy.   Neurological: She is alert and oriented to person, place, and time. Gait normal.   Skin: Skin is warm and dry. No rash noted. No erythema. Nails show no clubbing.   Psychiatric: She has a normal mood and affect.   Nursing note and vitals reviewed.    Personal Diagnostic Review  Ordering Provider:            Interpreting Provider:   Performing nurse/tech/RT: RAINA Zhou RRT  Diagnosis: Pulmonary Hypertension (INDIANA)  Height: 4' 11" (149.9 cm)  Weight: 104.6 kg (230 lb 9.6 oz)  BMI (Calculated): 46.6                                                                                 Phase Oxygen Assessment Supplemental O2 Heart   Rate Blood Pressure Marcelle Dyspnea Scale Rating   Resting 97 % Room Air 73 bpm 142/72 0   Exercise             Minute             1 95 % Room Air 104 bpm       2 94 % Room Air 109 bpm       3 94 % Room Air 112 bpm       4 92 % Room Air 112 bpm       5 92 % Room Air 112 bpm       6  94 % Room Air 121 bpm 144/52 1   Recovery             Minute             1 97 % Room Air 104 bpm       2 100 % Room Air 71 bpm       3 100 % Room Air 71 bpm       4 100 % Room Air 75 bpm 123/72 1      Six Minute Walk Summary  6MWT Status: completed without stopping  Patient Reported: Dyspnea, Lightheadedness               Interpretation:  Did the patient stop or pause?: No  Total Time Walked (Calculated): 360 seconds  Final Partial Lap Distance (feet): 175 feet  Total Distance Meters (Calculated): 358.14 meters  Predicted Distance Meters (Calculated): 327.6 meters  Percentage of Predicted (Calculated): 109.32  Peak VO2 (Calculated): 14.72  Mets: 4.21  Has The Patient Had a Previous Six Minute Walk Test?: Yes     Previous 6MWT Results  Has The Patient Had a Previous Six Minute Walk Test?: Yes  Date of Previous Test: 09/25/23  Total Time Walked: 360 seconds  Total Distance (meters): " 335.28  Predicted Distance (meters): 321.47 meters  Percentage of Predicted: 104.3  Percent Change (Calculated): -0.07       Echo    Left Ventricle: The left ventricle is normal in size. Normal wall   thickness. There is concentric remodeling. Normal wall motion. There is   normal systolic function with a visually estimated ejection fraction of 60   - 65%. Ejection fraction by visual approximation is 60%. Grade II   diastolic dysfunction.    Right Ventricle: Normal right ventricular cavity size. Systolic   function is normal.    Left Atrium: Left atrium is severely dilated.    Mitral Valve: There is mild regurgitation.    Tricuspid Valve: There is mild to moderate regurgitation.    Pulmonary Artery: The estimated pulmonary artery systolic pressure is   53 mmHg.    IVC/SVC: Normal venous pressure at 3 mmHg.         2024 - 2024  : 1950  Age: 74 years  Gender: Female  [facility]  [id number] [place] [address] Dr Jaren Tafoya  [place], [address]  Compliance Report  Compliance  Payor Standard  Usage 2024 - 2024  Usage days 71/90 days (79%)  >= 4 hours 71 days (79%)  < 4 hours 0 days (0%)  Usage hours 485 hours 4 minutes  Average usage (total days) 5 hours 23 minutes  Average usage (days used) 6 hours 50 minutes  Median usage (days used) 6 hours 45 minutes  Total used hours (value since last reset - 2024) 1,469 hours  AirSense 10 AutoSet  Serial number [id number]  Mode CPAP  Set pressure 16 cmH2O  EPR Fulltime  EPR level 3  Therapy  Leaks - L/min Median: 7.6 95th percentile: 37.8 Maximum: 50.9  Events per hour AI: 2.5 HI: 0.8 AHI: 3.3  Apnea Index Central: 0.5 Obstructive: 1.6 Unknown: 0.3  RERA Index 0.1  Cheyne-Sosa respiration (average duration per night) 0 minutes (0%)    Assessment:       Problem List Items Addressed This Visit       INDIANA (obstructive sleep apnea)         2024     1:47 PM   EPWORTH SLEEPINESS SCALE   Sitting and reading 2   Watching TV 2   Sitting, inactive in a  public place (e.g. a theatre or a meeting) 1   As a passenger in a car for an hour without a break 1   Lying down to rest in the afternoon when circumstances permit 3   Sitting and talking to someone 0   Sitting quietly after a lunch without alcohol 0   In a car, while stopped for a few minutes in traffic 0   Total score 9        Bed time 9pm to 2 am  Wake time : 10 am    Data   05/27/2024 to 08/24/2024    Usage > 4 hrs was 79%    CPAP 16 cm    AHI 3.3    USING AND BENEFITS         Pulmonary hypertension - Primary      Minimal dyspnea   Functional class 1   WHO group 2 and 3    Distance improved from 335.28m to 358.14m            Relevant Orders    Stress test, pulmonary    X-Ray Chest PA And Lateral    Essential hypertension (Chronic)    Prediabetes    Mixed hyperlipidemia    Morbid obesity with BMI of 45.0-49.9, adult     Patient would benefit from weight loss and has tried to set realistic goals to achieve success. Lifestyle changes were discussed on eating healthy, exercising at least 150 minutes weekly, and reducing sedentary behavior.   Discussed the risk factors associated with obesity: Arthritis/INDIANA/Diabetes/Fatty Liver/Cardiovascular disease/GERD/HTN/HLP.            Plan:                    Follow up in about 6 months (around 3/13/2025), or 6mwd, cxr.    This note was prepared using voice recognition system and is likely to have sound alike errors that may have been overlooked even after proof reading.  Please call me with any questions    Discussed diagnosis, its evaluation, treatment and usual course. All questions answered.    Thank you for the courtesy of participating in the care of this patient    Ernie Mcintosh MD      Complications of untreated sleep apnea discussed with pateint in detail including but not limited to  high blood pressure, heart attack, stroke, obesity,  diabetes and worsening heart failure. Patient voiced understanding.

## 2024-09-23 ENCOUNTER — PATIENT OUTREACH (OUTPATIENT)
Dept: ADMINISTRATIVE | Facility: HOSPITAL | Age: 74
End: 2024-09-23
Payer: MEDICARE

## 2024-09-23 NOTE — PROGRESS NOTES
[facility] to patient in reference to Depression Remission at Twelve Months.        RE:  Follow up on a CMS Patient Health Questionnaire.      Message sent to patient's portal.

## 2024-09-25 DIAGNOSIS — Z00.00 ENCOUNTER FOR MEDICARE ANNUAL WELLNESS EXAM: ICD-10-CM

## 2024-10-06 DIAGNOSIS — E78.2 MIXED HYPERLIPIDEMIA: ICD-10-CM

## 2024-10-07 RX ORDER — LOSARTAN POTASSIUM AND HYDROCHLOROTHIAZIDE 12.5; 1 MG/1; MG/1
1 TABLET ORAL DAILY
Qty: 90 TABLET | Refills: 0 | Status: SHIPPED | OUTPATIENT
Start: 2024-10-07 | End: 2025-10-07

## 2024-10-07 RX ORDER — SIMVASTATIN 40 MG/1
40 TABLET, FILM COATED ORAL NIGHTLY
Qty: 90 TABLET | Refills: 0 | Status: SHIPPED | OUTPATIENT
Start: 2024-10-07

## 2024-10-09 DIAGNOSIS — G62.9 PERIPHERAL POLYNEUROPATHY: ICD-10-CM

## 2024-10-10 RX ORDER — GABAPENTIN 100 MG/1
100 CAPSULE ORAL NIGHTLY
Qty: 30 CAPSULE | Refills: 1 | Status: SHIPPED | OUTPATIENT
Start: 2024-10-10

## 2024-10-10 NOTE — TELEPHONE ENCOUNTER
No care due was identified.  [facility] Embedded Care Due Messages. Reference number: [id number].   10/09/2024 10:09:50 PM CDT

## 2024-10-30 ENCOUNTER — HOSPITAL ENCOUNTER (OUTPATIENT)
Dept: RADIOLOGY | Facility: CLINIC | Age: 74
Discharge: HOME | End: 2024-10-30
Attending: NURSE PRACTITIONER
Payer: MEDICARE

## 2024-10-30 ENCOUNTER — OFFICE VISIT (OUTPATIENT)
Dept: URGENT CARE | Facility: CLINIC | Age: 74
End: 2024-10-30
Payer: MEDICARE

## 2024-10-30 VITALS
WEIGHT: 227.63 LBS | OXYGEN SATURATION: 97 % | TEMPERATURE: 98 F | DIASTOLIC BLOOD PRESSURE: 65 MMHG | HEART RATE: 80 BPM | SYSTOLIC BLOOD PRESSURE: 142 MMHG | RESPIRATION RATE: 20 BRPM | BODY MASS INDEX: 45.89 KG/M2 | HEIGHT: 59 IN

## 2024-10-30 DIAGNOSIS — M77.8 HAND TENDINITIS: ICD-10-CM

## 2024-10-30 DIAGNOSIS — M77.8 RIGHT WRIST TENDINITIS: Primary | ICD-10-CM

## 2024-10-30 DIAGNOSIS — R52 PAIN: ICD-10-CM

## 2024-10-30 PROCEDURE — 99214 OFFICE O/P EST MOD 30 MIN: CPT | Mod: S$GLB,,, | Performed by: NURSE PRACTITIONER

## 2024-10-30 PROCEDURE — 73130 X-RAY EXAM OF HAND: CPT | Mod: RT,S$GLB,, | Performed by: RADIOLOGY

## 2024-10-30 RX ORDER — MELOXICAM 7.5 MG/1
7.5 TABLET ORAL DAILY PRN
Qty: 30 TABLET | Refills: 1 | Status: SHIPPED | OUTPATIENT
Start: 2024-10-30

## 2024-11-01 ENCOUNTER — OFFICE VISIT (OUTPATIENT)
Dept: ORTHOPEDICS | Facility: CLINIC | Age: 74
End: 2024-11-01
Payer: MEDICARE

## 2024-11-01 VITALS — BODY MASS INDEX: 45.92 KG/M2 | WEIGHT: 227.75 LBS | HEIGHT: 59 IN

## 2024-11-01 DIAGNOSIS — M65.331 TRIGGER FINGER, RIGHT MIDDLE FINGER: ICD-10-CM

## 2024-11-01 DIAGNOSIS — M65.4 DE QUERVAIN'S TENOSYNOVITIS, RIGHT: Primary | ICD-10-CM

## 2024-11-01 PROCEDURE — 99999PBSHW PR PBB SHADOW TECHNICAL ONLY FILED TO HB: Mod: PBBFAC,,,

## 2024-11-01 PROCEDURE — 20550 NJX 1 TENDON SHEATH/LIGAMENT: CPT | Mod: PBBFAC,RT | Performed by: STUDENT IN AN ORGANIZED HEALTH CARE EDUCATION/TRAINING PROGRAM

## 2024-11-01 PROCEDURE — 20550 NJX 1 TENDON SHEATH/LIGAMENT: CPT | Mod: PBBFAC | Performed by: STUDENT IN AN ORGANIZED HEALTH CARE EDUCATION/TRAINING PROGRAM

## 2024-11-01 PROCEDURE — 99999 PR PBB SHADOW E&M-EST. PATIENT-LVL III: CPT | Mod: PBBFAC,,, | Performed by: STUDENT IN AN ORGANIZED HEALTH CARE EDUCATION/TRAINING PROGRAM

## 2024-11-01 PROCEDURE — 99204 OFFICE O/P NEW MOD 45 MIN: CPT | Mod: 25,S$PBB,, | Performed by: STUDENT IN AN ORGANIZED HEALTH CARE EDUCATION/TRAINING PROGRAM

## 2024-11-01 PROCEDURE — 97760 ORTHOTIC MGMT&TRAING 1ST ENC: CPT | Mod: PBBFAC | Performed by: STUDENT IN AN ORGANIZED HEALTH CARE EDUCATION/TRAINING PROGRAM

## 2024-11-01 PROCEDURE — 99213 OFFICE O/P EST LOW 20 MIN: CPT | Mod: PBBFAC | Performed by: STUDENT IN AN ORGANIZED HEALTH CARE EDUCATION/TRAINING PROGRAM

## 2024-11-01 RX ADMIN — TRIAMCINOLONE ACETONIDE 40 MG: 40 INJECTION, SUSPENSION INTRA-ARTICULAR; INTRAMUSCULAR at 01:11

## 2024-11-01 RX ADMIN — BETAMETHASONE ACETATE AND BETAMETHASONE SODIUM PHOSPHATE 6 MG: 3; 3 INJECTION, SUSPENSION INTRA-ARTICULAR; INTRALESIONAL; INTRAMUSCULAR; SOFT TISSUE at 01:11

## 2024-11-01 NOTE — PROGRESS NOTES
Hand Surgery Clinic Note    Chief Complaint  Chief Complaint   Patient presents with    Right Hand - Pain, Swelling, Numbness       History of Present Illness  74 year old RHD female who is retired presents today for evaluation of pain in right wrist, thumb, and forearm.  Pain level is a 10/10.  No history of diabetes. No history of injury.  Patient takes aspirin 81 mg daily.  She has had symptoms for several weeks.  Patient experiences swelling, sharp throbbing pain in the wrist.  Pain in the thumb and wrist is worse with activities.    Additionally, patient has been experiencing catching and locking symptoms of the right middle finger.  On a few occasions, the middle finger has gotten stuck down and she was had to pull it straight.  She experiences stiffness in the mornings in his finger.  She has never been treated for this before.    Review of Systems  Review of systems negative for chest pain, shortness of breath, fevers, chills, nausea/vomiting.    Past Medical History  Past Medical History:   Diagnosis Date    GERD (gastroesophageal reflux disease)     Hemorrhoids     History of positive PPD, untreated     Hx of cold sores     Hyperlipidemia     Hypertension     MVP (mitral valve prolapse)     Peripheral neuropathy     Pulmonary HTN     Dr Bailon    Sleep apnea     INDIANA-CPAP  uses intermittently       Past Surgical History  Past Surgical History:   Procedure Laterality Date    CHOLECYSTECTOMY      COLONOSCOPY N/A 11/11/2021    Procedure: COLONOSCOPY;  Surgeon: Deloris Galicia MD;  Location: [facility];  Service: Endoscopy;  Laterality: N/A;    DILATION AND CURETTAGE OF UTERUS      ESOPHAGEAL MOTILITY STUDY USING HIGH RESOLUTION MANOMETRY N/A 11/03/2021    Procedure: MOTILITY STUDY, ESOPHAGUS, USING HIGH RESOLUTION MANOMETRY;  Surgeon: Yaw Manuel RN;  [facility];  Service: Endoscopy;  Laterality: N/A;    ESOPHAGOGASTRODUODENOSCOPY N/A 12/02/2020    Procedure: ESOPHAGOGASTRODUODENOSCOPY  (EGD);  Surgeon: Evangelista Erickson MD;  Location: [facility] ENDO;  Service: Endoscopy;  Laterality: N/A;    ESOPHAGOGASTRODUODENOSCOPY N/A 11/11/2021    Procedure: EGD (ESOPHAGOGASTRODUODENOSCOPY);  Surgeon: Deloris Galicia MD;  Location: [facility] ENDO;  Service: Endoscopy;  Laterality: N/A;    ESOPHAGOGASTRODUODENOSCOPY N/A 01/26/2023    Procedure: EGD (ESOPHAGOGASTRODUODENOSCOPY);  Surgeon: Jasvir Robert MD;  Location: [facility] ENDO;  Service: Endoscopy;  Laterality: N/A;  EGD with BRAVO (ON PPI); Hiatial hernia, GERD    RIGHT HEART CATHETERIZATION Right 06/15/2020    Procedure: INSERTION, CATHETER, RIGHT HEART;  Surgeon: Ashlie Belle MD;  Location: [facility] CATH LAB;  Service: Cardiology;  Laterality: Right;       Allergies  Review of patient's allergies indicates:   Allergen Reactions    Lisinopril Other (See Comments)     Cough      Bactrim [sulfamethoxazole-trimethoprim] Itching    Zosyn [piperacillin-tazobactam] Hives     Pt received second dose of Zosyn and had hives on both arms. Benadryl given and pt continued to receive zosyn with no issues. Hydralazine also given around the same time and discontinued.        Family History  Family History   Problem Relation Name Age of Onset    Heart disease Mother Estefani     Obesity Mother Estefani     Hypertension Mother Estefani     Kidney disease Father Adeel     Hypertension Father Adeel     Obesity Father Adeel     Heart disease Father Adeel     Diabetes Sister 12A 2D     Aneurysm Sister 12A 2D         AAA    Asthma Son Thomas Pardo     Depression Son Thomas Pardo     Cancer Sister Nella     COPD Sister Yelitza     Kidney disease Sister Jocelyn     Mental illness Sister Cheryl     Miscarriages / Stillbirths Sister Blanca        Social History  Social History     Socioeconomic History    Marital status:     Number of children: 3   Occupational History    Occupation: [employer] occupational      Employer: [employer]   Tobacco Use    Smoking  status: Never     Passive exposure: Never    Smokeless tobacco: Never   Substance and Sexual Activity    Alcohol use: Yes     Alcohol/week: 5.0 standard drinks of alcohol     Types: 5 Shots of liquor per week     Comment: occasional    Drug use: No    Sexual activity: Not Currently     Partners: Male     Birth control/protection: None     Social Drivers of Health     Financial Resource Strain: Medium Risk (9/12/2023)    Overall Financial Resource Strain (CARDIA)     Difficulty of Paying Living Expenses: Somewhat hard   Food Insecurity: No Food Insecurity (9/12/2023)    Hunger Vital Sign     Worried About Running Out of Food in the Last Year: Never true     Ran Out of Food in the Last Year: Never true   Transportation Needs: No Transportation Needs (9/12/2023)    PRAPARE - Transportation     Lack of Transportation (Medical): No     Lack of Transportation (Non-Medical): No   Physical Activity: Insufficiently Active (9/12/2023)    Exercise Vital Sign     Days of Exercise per Week: 2 days     Minutes of Exercise per Session: 10 min   Stress: Stress Concern Present (9/12/2023)    [place] [employer] - Occupational Stress Questionnaire     Feeling of Stress : To some extent   Housing Stability: Low Risk  (9/12/2023)    Housing Stability Vital Sign     Unable to Pay for Housing in the Last Year: No     Number of Places Lived in the Last Year: 1     Unstable Housing in the Last Year: No       Vital Signs  There were no vitals filed for this visit.    Physical Exam  Constitutional: Appears well-developed and well-nourished. No distress.   HENT:   Head: Normocephalic.   Eyes: EOM are normal.   Pulmonary/Chest: Effort normal.   Neurological: Oriented to person, place, and time.   Psychiatric: Normal mood and affect.     Right Upper Extremity:  No abrasions, lacerations, wounds.  No swelling.  No erythema.  Patient has tenderness over the 1st dorsal extensor compartment.  Positive Finkelstein's.  No  tenderness over the thumb CMC joint.  No pain or crepitus with CMC grind.  Negative thumb adduction and extension tests.  Patient is tender over the middle finger A1 pulley.  No tenderness over the A1 pulleys of the other 4 fingers.  There is a palpable click at the level of the middle finger A1 pulley with range of motion.  There is no triggering with range of motion of the other 4 fingers.  Patient has full active motion at the middle finger MCP, PIP, and DIPJ joints.  Patient is able to make a fist and extend all her fingers.  Sensation is intact in the median, radial, ulnar nerve distributions.  Palpable radial pulse.      Imaging  Right-hand x-rays three views were obtained on 10/30/2024 and independently reviewed by myself.  Mild-to-moderate arthritic changes are noted at the thumb CMC joint with a associated loss of joint space.  Moderate to severe arthritic changes are noted at the middle finger PIPJ joint with a associated ulnar deviation of the finger.    Assessment and Plan   74 year old RHD female who is retired presents to the clinic today with right DeQuervain's tenosynovitis.  She also has a right middle finger trigger finger.  I recommend a right dequervain's and middle trigger finger corticosteroid injections.  Patient agreed to proceed.  Patient tolerated the procedure well.  See procedure note.  I also recommend NSAIDs if the injection starts wearing off.  A right thumb spica brace was given to the patient today.  At least 10 minutes were spent sizing, fitting, and educating for durable medical equipment application today.  This service was performed under the direction of Laurence Jo MD.  CPT 75911.  Handouts were provided for the patient to read about trigger finger and de Quervain tenosynovitis.      Follow up in clinic as needed if symptoms recur or do not resolve.      Laurence Jo MD  Orthopaedic Hand Surgery

## 2024-11-01 NOTE — PROCEDURES
Right De Quervains Tenosynovitis Injection    Date/Time: 11/1/2024 1:00 PM    Performed by: Laurence Jo MD  Authorized by: Laurence Jo MD    Consent Done?:  Yes (Verbal)  Indications:  Pain  Timeout: prior to procedure the correct patient, procedure, and site was verified    Local anesthesia used?: Yes    Anesthesia:  Local infiltration  Local anesthetic:  Lidocaine 1% without epinephrine  Anesthetic total (ml):  1    Location:  Wrist  : Right first dorsal compartment.  Ultrasonic guidance for needle placement?: No    Needle size:  25 G  Approach:  Radial  Medications:  6 mg betamethasone acetate-betamethasone sodium phosphate 6 mg/mL  Patient tolerance:  Patient tolerated the procedure well with no immediate complications  Right Middle Trigger Finger Injection    Date/Time: 11/1/2024 1:00 PM    Performed by: Laurence Jo MD  Authorized by: Laurence Jo MD    Consent Done?:  Yes (Verbal)  Indications:  Pain  Timeout: prior to procedure the correct patient, procedure, and site was verified    Local anesthesia used?: Yes    Anesthesia:  Local infiltration  Local anesthetic:  Lidocaine 1% without epinephrine  Anesthetic total (ml):  1    Location:  Long finger  Site:  R long flexor tendon sheath  Ultrasonic guidance for needle placement?: No    Needle size:  25 G  Approach:  Volar  Medications:  40 mg triamcinolone acetonide 40 mg/mL  Patient tolerance:  Patient tolerated the procedure well with no immediate complications

## 2024-11-04 RX ORDER — BETAMETHASONE SODIUM PHOSPHATE AND BETAMETHASONE ACETATE 3; 3 MG/ML; MG/ML
6 INJECTION, SUSPENSION INTRA-ARTICULAR; INTRALESIONAL; INTRAMUSCULAR; SOFT TISSUE
Status: DISCONTINUED | OUTPATIENT
Start: 2024-11-01 | End: 2024-11-04 | Stop reason: HOSPADM

## 2024-11-04 RX ORDER — TRIAMCINOLONE ACETONIDE 40 MG/ML
40 INJECTION, SUSPENSION INTRA-ARTICULAR; INTRAMUSCULAR
Status: DISCONTINUED | OUTPATIENT
Start: 2024-11-01 | End: 2024-11-04 | Stop reason: HOSPADM

## 2024-11-19 DIAGNOSIS — I27.20 PULMONARY HYPERTENSION: ICD-10-CM

## 2024-11-19 RX ORDER — SILDENAFIL CITRATE 20 MG/1
20 TABLET ORAL 3 TIMES DAILY
Qty: 270 TABLET | Refills: 0 | Status: SHIPPED | OUTPATIENT
Start: 2024-11-19

## 2024-11-26 RX ORDER — POTASSIUM CHLORIDE 1500 MG/1
20 TABLET, EXTENDED RELEASE ORAL
Qty: 30 TABLET | Refills: 6 | Status: SHIPPED | OUTPATIENT
Start: 2024-11-26

## 2024-12-14 DIAGNOSIS — I27.20 PULMONARY HYPERTENSION: ICD-10-CM

## 2024-12-16 RX ORDER — FUROSEMIDE 20 MG/1
TABLET ORAL
Qty: 30 TABLET | Refills: 1 | Status: SHIPPED | OUTPATIENT
Start: 2024-12-16

## 2024-12-31 DIAGNOSIS — K21.9 HIATAL HERNIA WITH GERD: ICD-10-CM

## 2024-12-31 DIAGNOSIS — K44.9 HIATAL HERNIA WITH GERD: ICD-10-CM

## 2024-12-31 RX ORDER — PANTOPRAZOLE SODIUM 40 MG/1
TABLET, DELAYED RELEASE ORAL
Qty: 90 TABLET | Refills: 1 | Status: SHIPPED | OUTPATIENT
Start: 2024-12-31

## 2024-12-31 NOTE — TELEPHONE ENCOUNTER
No care due was identified.  [facility] Embedded Care Due Messages. Reference number: [id number].   12/31/2024 5:31:27 AM CST

## 2024-12-31 NOTE — TELEPHONE ENCOUNTER
Refill Decision Note   Yoana Pardo  is requesting a refill authorization.  Brief Assessment and Rationale for Refill:  Approve     Medication Therapy Plan:        Comments:     Note composed:1:27 PM 12/31/2024

## 2025-01-06 RX ORDER — LOSARTAN POTASSIUM AND HYDROCHLOROTHIAZIDE 12.5; 1 MG/1; MG/1
1 TABLET ORAL DAILY
Qty: 90 TABLET | Refills: 0 | Status: SHIPPED | OUTPATIENT
Start: 2025-01-06 | End: 2026-01-06

## 2025-01-24 ENCOUNTER — HOSPITAL ENCOUNTER (OUTPATIENT)
Dept: CARDIOLOGY | Facility: HOSPITAL | Age: 75
Discharge: HOME | End: 2025-01-24
Attending: INTERNAL MEDICINE
Payer: MEDICARE

## 2025-01-24 ENCOUNTER — OFFICE VISIT (OUTPATIENT)
Dept: CARDIOLOGY | Facility: CLINIC | Age: 75
End: 2025-01-24
Payer: MEDICARE

## 2025-01-24 VITALS
OXYGEN SATURATION: 99 % | HEART RATE: 62 BPM | DIASTOLIC BLOOD PRESSURE: 82 MMHG | WEIGHT: 235 LBS | SYSTOLIC BLOOD PRESSURE: 131 MMHG | BODY MASS INDEX: 47.47 KG/M2

## 2025-01-24 DIAGNOSIS — E66.01 MORBID OBESITY WITH BMI OF 45.0-49.9, ADULT: ICD-10-CM

## 2025-01-24 DIAGNOSIS — K44.9 HIATAL HERNIA: ICD-10-CM

## 2025-01-24 DIAGNOSIS — I70.0 ATHEROSCLEROSIS OF AORTA: ICD-10-CM

## 2025-01-24 DIAGNOSIS — I10 ESSENTIAL HYPERTENSION: Chronic | ICD-10-CM

## 2025-01-24 DIAGNOSIS — I10 ESSENTIAL HYPERTENSION: Primary | Chronic | ICD-10-CM

## 2025-01-24 DIAGNOSIS — G47.33 OSA (OBSTRUCTIVE SLEEP APNEA): ICD-10-CM

## 2025-01-24 DIAGNOSIS — I20.89 STABLE ANGINA PECTORIS: Primary | ICD-10-CM

## 2025-01-24 DIAGNOSIS — E78.2 MIXED HYPERLIPIDEMIA: ICD-10-CM

## 2025-01-24 DIAGNOSIS — R00.2 PALPITATIONS: ICD-10-CM

## 2025-01-24 DIAGNOSIS — I27.20 PULMONARY HYPERTENSION: ICD-10-CM

## 2025-01-24 DIAGNOSIS — R94.31 NONSPECIFIC ABNORMAL ELECTROCARDIOGRAM (ECG) (EKG): ICD-10-CM

## 2025-01-24 DIAGNOSIS — R07.9 CHEST PAIN, UNSPECIFIED TYPE: ICD-10-CM

## 2025-01-24 DIAGNOSIS — G47.33 OSA ON CPAP: ICD-10-CM

## 2025-01-24 DIAGNOSIS — I10 ESSENTIAL HYPERTENSION: ICD-10-CM

## 2025-01-24 PROCEDURE — 93010 ELECTROCARDIOGRAM REPORT: CPT | Mod: ,,, | Performed by: STUDENT IN AN ORGANIZED HEALTH CARE EDUCATION/TRAINING PROGRAM

## 2025-01-24 PROCEDURE — 93005 ELECTROCARDIOGRAM TRACING: CPT

## 2025-01-24 PROCEDURE — 99215 OFFICE O/P EST HI 40 MIN: CPT | Mod: S$PBB,,, | Performed by: INTERNAL MEDICINE

## 2025-01-24 PROCEDURE — G2211 COMPLEX E/M VISIT ADD ON: HCPCS | Mod: S$PBB,,, | Performed by: INTERNAL MEDICINE

## 2025-01-24 PROCEDURE — 99999 PR PBB SHADOW E&M-EST. PATIENT-LVL III: CPT | Mod: PBBFAC,,, | Performed by: INTERNAL MEDICINE

## 2025-01-24 PROCEDURE — 99213 OFFICE O/P EST LOW 20 MIN: CPT | Mod: PBBFAC,25 | Performed by: INTERNAL MEDICINE

## 2025-01-24 RX ORDER — POTASSIUM CHLORIDE 20 MEQ/1
20 TABLET, EXTENDED RELEASE ORAL DAILY
Qty: 90 TABLET | Refills: 6 | Status: SHIPPED | OUTPATIENT
Start: 2025-01-24

## 2025-01-24 RX ORDER — FUROSEMIDE 40 MG/1
40 TABLET ORAL DAILY PRN
Qty: 90 TABLET | Refills: 1 | Status: SHIPPED | OUTPATIENT
Start: 2025-01-24

## 2025-01-24 NOTE — PROGRESS NOTES
Subjective:   Patient ID:  Yoana Pardo is a 74 y.o. female who presents for cardiac consult of Chest Pain and Fatigue      Referral by: No referring provider defined for this encounter.     Reason for consult:       HPI  The patient came in today for cardiac consult of Chest Pain and Fatigue      Yoana Pardo is a 74 y.o. female pt with HTN, HLD, pulm HTN, MVP, aortic atherosc, [place], obesity , OA here for CV follow up.     4/1/24  Recently had COVID, still getting over it  Has some SOB and cough  No chest pain  Palpitations at times  Bp stable  Has not been taking her zocor - ran out (was not taking consistently)  Not on ozempic  No smoking     7/17/24  Doing well  BP stable  Has been trying to walk in the house  Tries not to walk outside due to balancing issues   Weight stable in 230 lbs  Tries to be compliant with CPAP  Hctz may be causing hypopigmentation of the face, may want to switch later   Denies chest pain, SOB, dizziness     1/24/25   Pt seen by Dr. Whitaker in past here for follow up.     Bp and HR stable. BMI 47 - 235 lbs.   Pt has been having substernal chest pain concern for reflux but also hiatal hernia.   Also has HERNÁNDEZ. Has gained some weight over holidays.     Results for orders placed during the hospital encounter of 09/13/24    Echo    Interpretation Summary    Left Ventricle: The left ventricle is normal in size. Normal wall thickness. There is concentric remodeling. Normal wall motion. There is normal systolic function with a visually estimated ejection fraction of 60 - 65%. Ejection fraction by visual approximation is 60%. Grade II diastolic dysfunction.    Right Ventricle: Normal right ventricular cavity size. Systolic function is normal.    Left Atrium: Left atrium is severely dilated.    Mitral Valve: There is mild regurgitation.    Tricuspid Valve: There is mild to moderate regurgitation.    Pulmonary Artery: The estimated pulmonary artery systolic pressure is 53 mmHg.     IVC/SVC: Normal venous pressure at 3 mmHg.      Results for orders placed during the hospital encounter of 08/03/22    Nuclear Stress - Cardiology Interpreted    Interpretation Summary    Normal myocardial perfusion scan. There is no evidence of myocardial ischemia or infarction.    The gated perfusion images showed an ejection fraction of > 70% at rest. The gated perfusion images showed an ejection fraction of > 70% post stress.    The EKG portion of this study is negative for ischemia.    The patient reported no chest pain during the stress test.    During stress, occasional PACs are noted.      Results for orders placed during the hospital encounter of 06/15/20    Cardiac catheterization    Conclusion  · Estimated blood loss: none  · Filling pressures on the right are mildly elevated. Pulmonary HTN is mild to moderate.    I certify that I was present for catheter insertion, catheter manipulation, angiography, and angiographic interpretation of this patient.    Procedure Log documented by Documenter: Apurva Ríos RN and verified by Shakeel Belle MD.    Date: 6/15/2020  Time: 12:41 PM      No cardiac monitor results found for the past 12 months         Past Medical History:   Diagnosis Date    GERD (gastroesophageal reflux disease)     Hemorrhoids     History of positive PPD, untreated     Hx of cold sores     Hyperlipidemia     Hypertension     MVP (mitral valve prolapse)     Peripheral neuropathy     Pulmonary HTN     Dr Bailon    Sleep apnea     [place]-CPAP  uses intermittently       Past Surgical History:   Procedure Laterality Date    CHOLECYSTECTOMY      COLONOSCOPY N/A 11/11/2021    Procedure: COLONOSCOPY;  Surgeon: Deloris Galicia MD;  Location: [facility];  Service: Endoscopy;  Laterality: N/A;    DILATION AND CURETTAGE OF UTERUS      ESOPHAGEAL MOTILITY STUDY USING HIGH RESOLUTION MANOMETRY N/A 11/03/2021    Procedure: MOTILITY STUDY, ESOPHAGUS, USING HIGH RESOLUTION MANOMETRY;  Surgeon:  Yaw Manuel RN;  Location: [facility];  Service: Endoscopy;  Laterality: N/A;    ESOPHAGOGASTRODUODENOSCOPY N/A 12/02/2020    Procedure: ESOPHAGOGASTRODUODENOSCOPY (EGD);  Surgeon: Evangelista Erickson MD;  Location: [facility];  Service: Endoscopy;  Laterality: N/A;    ESOPHAGOGASTRODUODENOSCOPY N/A 11/11/2021    Procedure: EGD (ESOPHAGOGASTRODUODENOSCOPY);  Surgeon: Deloris Galicia MD;  Location: [facility];  Service: Endoscopy;  Laterality: N/A;    ESOPHAGOGASTRODUODENOSCOPY N/A 01/26/2023    Procedure: EGD (ESOPHAGOGASTRODUODENOSCOPY);  Surgeon: Jasvir Robert MD;  Location: [facility];  Service: Endoscopy;  Laterality: N/A;  EGD with BRAVO (ON PPI); Hiatial hernia, GERD    RIGHT HEART CATHETERIZATION Right 06/15/2020    Procedure: INSERTION, CATHETER, RIGHT HEART;  Surgeon: Ashlie Belle MD;  Location: [facility] C[facility];  Service: Cardiology;  Laterality: Right;       Social History     Tobacco Use    Smoking status: Never     Passive exposure: Never    Smokeless tobacco: Never   Substance Use Topics    Alcohol use: Yes     Alcohol/week: 5.0 standard drinks of alcohol     Types: 5 Shots of liquor per week     Comment: occasional    Drug use: No       Family History   Problem Relation Name Age of Onset    Heart disease Mother Estefani     Obesity Mother Estefani     Hypertension Mother Estefani     Kidney disease Father Adeel     Hypertension Father Adeel     Obesity Father Adeel     Heart disease Father Adeel     Diabetes Sister 12A 2D     Aneurysm Sister 12A 2D         AAA    Asthma Son Thomas Pardo     Depression Son Thomas Pardo     Cancer Sister Nella     COPD Sister Yelitza     Kidney disease Sister Jocelyn     Mental illness Sister Cheryl     Miscarriages / Stillbirths Sister Blanca        Patient's Medications   New Prescriptions    No medications on file   Previous Medications    ACETAMINOPHEN (TYLENOL) 500 MG TABLET    Take 500 mg by mouth every 6 (six) hours as needed for Pain.    ALBUTEROL  (VENTOLIN HFA) 90 MCG/ACTUATION INHALER    Inhale 2 puffs into the lungs every 6 (six) hours as needed for Wheezing or Shortness of Breath. Rescue    ASPIRIN (ECOTRIN) 81 MG EC TABLET    Take 81 mg by mouth once daily.    GABAPENTIN (NEURONTIN) 100 MG CAPSULE    Take 1 capsule (100 mg total) by mouth every evening.    LOSARTAN-HYDROCHLOROTHIAZIDE 100-12.5 MG (HYZAAR) 100-12.5 MG TAB    Take 1 tablet by mouth once daily    MELOXICAM (MOBIC) 7.5 MG TABLET    Take 1 tablet (7.5 mg total) by mouth daily as needed for Pain. Take with food    PANTOPRAZOLE (PROTONIX) 40 MG TABLET    Take 1 tablet by mouth once daily    SILDENAFIL (REVATIO) 20 MG TAB    TAKE 1 TABLET BY MOUTH THREE TIMES DAILY    SIMVASTATIN (ZOCOR) 40 MG TABLET    TAKE 1 TABLET BY MOUTH ONCE DAILY IN THE EVENING    TRIAMCINOLONE ACETONIDE 0.025% (KENALOG) 0.025 % CREAM    Apply topically 2 (two) times daily as needed (for dry patches on face.). Mild steroid. Use sparingly for 1-2 weeks if needed then stop.   Modified Medications    Modified Medication Previous Medication    FUROSEMIDE (LASIX) 40 MG TABLET furosemide (LASIX) 20 MG tablet       Take 1 tablet (40 mg total) by mouth daily as needed (edema, swelling, fluid).    TAKE 1 TABLET BY MOUTH ONCE DAILY AS NEEDED FOR  EDEMA    POTASSIUM CHLORIDE SA (KLOR-CON M20) 20 MEQ TABLET potassium chloride SA (KLOR-CON M20) 20 MEQ tablet       Take 1 tablet (20 mEq total) by mouth once daily. Take extra potassium with higher lasix dose    TAKE 1  BY MOUTH ONCE DAILY   Discontinued Medications    No medications on file       Review of Systems   Constitutional:  Positive for malaise/fatigue.   HENT: Negative.     Eyes: Negative.    Respiratory:  Positive for shortness of breath.    Cardiovascular:  Positive for chest pain and leg swelling.   Gastrointestinal:  Positive for heartburn.   Genitourinary: Negative.    Musculoskeletal: Negative.    Skin: Negative.    Neurological: Negative.    Endo/Heme/Allergies:  Negative.    Psychiatric/Behavioral: Negative.     All 12 systems otherwise negative.      Wt Readings from Last 3 Encounters:   01/24/25 106.6 kg (235 lb 0.2 oz)   11/01/24 103.3 kg (227 lb 11.8 oz)   10/30/24 103.3 kg (227 lb 10 oz)     Temp Readings from Last 3 Encounters:   10/30/24 97.8 °F (36.6 °C) (Tympanic)   06/24/24 97.2 °F (36.2 °C) (Tympanic)   03/19/24 (!) 100.6 °F (38.1 °C) (Tympanic)     BP Readings from Last 3 Encounters:   01/24/25 131/82   10/30/24 (!) 142/65   09/13/24 130/80     Pulse Readings from Last 3 Encounters:   01/24/25 62   10/30/24 80   09/13/24 70       /82 (BP Location: Left arm, Patient Position: Sitting)   Pulse 62   Wt 106.6 kg (235 lb 0.2 oz)   SpO2 99%   BMI 47.47 kg/m²     Objective:   Physical Exam  Vitals and nursing note reviewed.   Constitutional:       General: She is not in acute distress.     Appearance: She is well-developed. She is obese. She is not diaphoretic.   HENT:      Head: Normocephalic and atraumatic.      Nose: Nose normal.   Eyes:      General: No scleral icterus.     Conjunctiva/sclera: Conjunctivae normal.   Neck:      Thyroid: No thyromegaly.      Vascular: No JVD.   Cardiovascular:      Rate and Rhythm: Normal rate and regular rhythm.      Heart sounds: S1 normal and S2 normal. Murmur heard.      No friction rub. No gallop. No S3 or S4 sounds.   Pulmonary:      Effort: Pulmonary effort is normal. No respiratory distress.      Breath sounds: Normal breath sounds. No stridor. No wheezing or rales.   Chest:      Chest wall: No tenderness.   Abdominal:      General: Bowel sounds are normal. There is no distension.      Palpations: Abdomen is soft. There is no mass.      Tenderness: There is no abdominal tenderness. There is no rebound.   Genitourinary:     Comments: Deferred  Musculoskeletal:         General: No tenderness or deformity. Normal range of motion.      Cervical back: Normal range of motion and neck supple.   Lymphadenopathy:       Cervical: No cervical adenopathy.   Skin:     General: Skin is warm and dry.      Coloration: Skin is not pale.      Findings: No erythema or rash.   Neurological:      Mental Status: She is alert and oriented to person, place, and time.      Motor: No abnormal muscle tone.      Coordination: Coordination normal.   Psychiatric:         Behavior: Behavior normal.         Thought Content: Thought content normal.         Judgment: Judgment normal.         Lab Results   Component Value Date     06/24/2024    K 4.3 06/24/2024    CL 98 06/24/2024    CO2 28 06/24/2024    BUN 21 06/24/2024    CREATININE 1.0 06/24/2024    GLU 78 06/24/2024    HGBA1C 5.7 (H) 06/24/2024    MG 2.0 06/24/2024    AST 14 06/24/2024    ALT 9 (L) 06/24/2024    ALBUMIN 3.8 06/24/2024    PROT 8.2 06/24/2024    BILITOT 0.6 06/24/2024    WBC 4.94 06/24/2024    HGB 12.9 06/24/2024    HCT 40.9 06/24/2024    MCV 93 06/24/2024     06/24/2024    INR 1.0 06/15/2020    TSH 0.766 06/24/2024    CHOL 222 (H) 06/24/2024    CHOL 222 (H) 06/24/2024    HDL 48 06/24/2024    HDL 48 06/24/2024    LDLCALC 140.6 06/24/2024    LDLCALC 140.6 06/24/2024    TRIG 167 (H) 06/24/2024    TRIG 167 (H) 06/24/2024    BNP 48 08/18/2020         BNP (pg/mL)   Date Value   08/18/2020 48   03/29/2018 110 (H)   03/26/2018 57     INR (no units)   Date Value   06/15/2020 1.0   01/18/2006 1.1          Assessment:      1. Stable angina pectoris    2. Pulmonary hypertension    3. Morbid obesity with BMI of 45.0-49.9, adult    4. Atherosclerosis of aorta    5. Essential hypertension    6. [place] (obstructive sleep apnea)    7. Mixed hyperlipidemia    8. [place] on CPAP    9. Palpitations    10. Nonspecific abnormal electrocardiogram (ECG) (EKG)    11. Chest pain, unspecified type    12. Hiatal hernia        Plan:     Chest pain, HERNÁNDEZ , pulm HTN   -  order pharm nuclear stress test, pt cannot walk on treadmill  - will discuss further tx if needed    2. HTN with edema  - titrate meds  -  increase lasix to 40mg PRN     3. GERD, large hiatal hernia   - cont PPI, f/u GI; will discuss preop once stress test complete    4. HLD, aortic atheros  - cont statin    5. [place]  - cont CPAP    6. Obesity, morbid. BMI 47 - 235   - cont weight loss    Visit today included increased complexity associated with the care of the episodic problem chest pain addressed and managing the longitudinal care of the patient due to the serious and/or complex managed problem(s) .      Thank you for allowing me to participate in this patient's care. Please do not hesitate to contact me with any questions or concerns. Consult note has been forwarded to the referral physician.

## 2025-01-25 LAB
OHS QRS DURATION: 80 MS
OHS QTC CALCULATION: 414 MS

## 2025-01-28 ENCOUNTER — TELEPHONE (OUTPATIENT)
Dept: CARDIOLOGY | Facility: HOSPITAL | Age: 75
End: 2025-01-28
Payer: MEDICARE

## 2025-01-29 ENCOUNTER — HOSPITAL ENCOUNTER (OUTPATIENT)
Dept: RADIOLOGY | Facility: HOSPITAL | Age: 75
Discharge: HOME | End: 2025-01-29
Attending: INTERNAL MEDICINE
Payer: MEDICARE

## 2025-01-29 ENCOUNTER — HOSPITAL ENCOUNTER (OUTPATIENT)
Dept: CARDIOLOGY | Facility: HOSPITAL | Age: 75
Discharge: HOME | End: 2025-01-29
Attending: INTERNAL MEDICINE
Payer: MEDICARE

## 2025-01-29 DIAGNOSIS — I70.0 ATHEROSCLEROSIS OF AORTA: ICD-10-CM

## 2025-01-29 DIAGNOSIS — R94.31 NONSPECIFIC ABNORMAL ELECTROCARDIOGRAM (ECG) (EKG): ICD-10-CM

## 2025-01-29 DIAGNOSIS — E78.2 MIXED HYPERLIPIDEMIA: ICD-10-CM

## 2025-01-29 DIAGNOSIS — G47.33 OSA ON CPAP: ICD-10-CM

## 2025-01-29 DIAGNOSIS — E66.01 MORBID OBESITY WITH BMI OF 45.0-49.9, ADULT: ICD-10-CM

## 2025-01-29 DIAGNOSIS — I10 ESSENTIAL HYPERTENSION: ICD-10-CM

## 2025-01-29 DIAGNOSIS — G47.33 OSA (OBSTRUCTIVE SLEEP APNEA): ICD-10-CM

## 2025-01-29 DIAGNOSIS — I20.89 STABLE ANGINA PECTORIS: ICD-10-CM

## 2025-01-29 DIAGNOSIS — R07.9 CHEST PAIN, UNSPECIFIED TYPE: ICD-10-CM

## 2025-01-29 DIAGNOSIS — I27.20 PULMONARY HYPERTENSION: ICD-10-CM

## 2025-01-29 DIAGNOSIS — R00.2 PALPITATIONS: ICD-10-CM

## 2025-01-29 LAB
CV STRESS BASE HR: 70 BPM
DIASTOLIC BLOOD PRESSURE: 79 MMHG
NUC REST EJECTION FRACTION: 73
NUC STRESS EJECTION FRACTION: 80 %
OHS CV CPX 85 PERCENT MAX PREDICTED HEART RATE MALE: 126
OHS CV CPX ESTIMATED METS: 1
OHS CV CPX MAX PREDICTED HEART RATE: 148
OHS CV CPX PATIENT IS FEMALE: 1
OHS CV CPX PATIENT IS MALE: 0
OHS CV CPX PEAK DIASTOLIC BLOOD PRESSURE: 66 MMHG
OHS CV CPX PEAK HEAR RATE: 89 BPM
OHS CV CPX PEAK RATE PRESSURE PRODUCT: NORMAL
OHS CV CPX PEAK SYSTOLIC BLOOD PRESSURE: 147 MMHG
OHS CV CPX PERCENT MAX PREDICTED HEART RATE ACHIEVED: 62
OHS CV CPX RATE PRESSURE PRODUCT PRESENTING: 9660
OHS CV INITIAL DOSE: 9.8 MCG/KG/MIN
OHS CV PEAK DOSE: 32 MCG/KG/MIN
STRESS ECHO POST EXERCISE DUR MIN: 1 MINUTES
STRESS ECHO POST EXERCISE DUR SEC: 5 SECONDS
SYSTOLIC BLOOD PRESSURE: 138 MMHG

## 2025-01-29 PROCEDURE — 93017 CV STRESS TEST TRACING ONLY: CPT

## 2025-01-29 PROCEDURE — 63600175 PHARM REV CODE 636 W HCPCS: Performed by: INTERNAL MEDICINE

## 2025-01-29 PROCEDURE — 78452 HT MUSCLE IMAGE SPECT MULT: CPT

## 2025-01-29 PROCEDURE — A9502 TC99M TETROFOSMIN: HCPCS | Performed by: INTERNAL MEDICINE

## 2025-01-29 RX ORDER — REGADENOSON 0.08 MG/ML
0.4 INJECTION, SOLUTION INTRAVENOUS
Status: COMPLETED | OUTPATIENT
Start: 2025-01-29 | End: 2025-01-29

## 2025-01-29 RX ADMIN — TETROFOSMIN 9.8 MILLICURIE: 1.38 INJECTION, POWDER, LYOPHILIZED, FOR SOLUTION INTRAVENOUS at 12:01

## 2025-01-29 RX ADMIN — REGADENOSON 0.4 MG: 0.08 INJECTION, SOLUTION INTRAVENOUS at 01:01

## 2025-01-29 RX ADMIN — TETROFOSMIN 32 MILLICURIE: 1.38 INJECTION, POWDER, LYOPHILIZED, FOR SOLUTION INTRAVENOUS at 01:01

## 2025-02-11 ENCOUNTER — OFFICE VISIT (OUTPATIENT)
Dept: HOME HEALTH SERVICES | Facility: CLINIC | Age: 75
End: 2025-02-11
Payer: MEDICARE

## 2025-02-11 VITALS
OXYGEN SATURATION: 98 % | HEIGHT: 59 IN | TEMPERATURE: 98 F | HEART RATE: 74 BPM | WEIGHT: 235 LBS | SYSTOLIC BLOOD PRESSURE: 107 MMHG | DIASTOLIC BLOOD PRESSURE: 59 MMHG | BODY MASS INDEX: 47.37 KG/M2

## 2025-02-11 DIAGNOSIS — R94.2 DIFFUSION CAPACITY OF LUNG (DL), DECREASED: ICD-10-CM

## 2025-02-11 DIAGNOSIS — I70.0 ATHEROSCLEROSIS OF AORTA: ICD-10-CM

## 2025-02-11 DIAGNOSIS — E78.2 MIXED HYPERLIPIDEMIA: ICD-10-CM

## 2025-02-11 DIAGNOSIS — Z00.00 ENCOUNTER FOR PREVENTIVE HEALTH EXAMINATION: Primary | ICD-10-CM

## 2025-02-11 DIAGNOSIS — E66.01 MORBID OBESITY WITH BMI OF 45.0-49.9, ADULT: ICD-10-CM

## 2025-02-11 DIAGNOSIS — I10 ESSENTIAL HYPERTENSION: Chronic | ICD-10-CM

## 2025-02-11 DIAGNOSIS — F51.12 BEHAVIORALLY INDUCED INSUFFICIENT SLEEP SYNDROME: ICD-10-CM

## 2025-02-11 DIAGNOSIS — J30.2 CHRONIC SEASONAL ALLERGIC RHINITIS: ICD-10-CM

## 2025-02-11 DIAGNOSIS — I27.20 PULMONARY HYPERTENSION: ICD-10-CM

## 2025-02-11 DIAGNOSIS — G62.9 PERIPHERAL POLYNEUROPATHY: ICD-10-CM

## 2025-02-11 DIAGNOSIS — I34.1 MVP (MITRAL VALVE PROLAPSE): ICD-10-CM

## 2025-02-11 DIAGNOSIS — R73.03 PREDIABETES: ICD-10-CM

## 2025-02-11 DIAGNOSIS — M65.4 DE QUERVAIN'S TENOSYNOVITIS, RIGHT: ICD-10-CM

## 2025-02-11 DIAGNOSIS — M65.331 TRIGGER FINGER, RIGHT MIDDLE FINGER: ICD-10-CM

## 2025-02-11 DIAGNOSIS — F33.42 RECURRENT MAJOR DEPRESSIVE DISORDER, IN FULL REMISSION: ICD-10-CM

## 2025-02-11 DIAGNOSIS — G47.33 OSA (OBSTRUCTIVE SLEEP APNEA): ICD-10-CM

## 2025-02-11 DIAGNOSIS — R42 DIZZINESS: ICD-10-CM

## 2025-02-11 DIAGNOSIS — R26.9 ABNORMALITY OF GAIT AND MOBILITY: ICD-10-CM

## 2025-02-11 DIAGNOSIS — M17.0 PRIMARY OSTEOARTHRITIS OF BOTH KNEES: ICD-10-CM

## 2025-02-11 DIAGNOSIS — I20.89 STABLE ANGINA PECTORIS: ICD-10-CM

## 2025-02-11 DIAGNOSIS — K44.9 HIATAL HERNIA WITH GERD: ICD-10-CM

## 2025-02-11 DIAGNOSIS — R41.3 MEMORY LOSS: ICD-10-CM

## 2025-02-11 DIAGNOSIS — K21.9 HIATAL HERNIA WITH GERD: ICD-10-CM

## 2025-02-11 DIAGNOSIS — Z12.31 ENCOUNTER FOR SCREENING MAMMOGRAM FOR MALIGNANT NEOPLASM OF BREAST: ICD-10-CM

## 2025-02-11 NOTE — PROGRESS NOTES
"Yoana Pardo presented for a follow-up Medicare AWV today. The following components were reviewed and updated:    Medical history  Family History  Social history  Allergies and Current Medications  Health Risk Assessment  Health Maintenance  Care Team    **See Completed Assessments for Annual Wellness visit with in the encounter summary    The following assessments were completed:  Depression Screening  Cognitive function Screening    Timed Get Up Test  Whisper Test      Opioid documentation:      Patient does not have a current opioid prescription.          Vitals:    02/11/25 0949   BP: (!) 107/59   Pulse: 74   Temp: 97.5 °F (36.4 °C)   TempSrc: Temporal   SpO2: 98%   Weight: 106.6 kg (235 lb)   Height: 4' 11" (1.499 m)     Body mass index is 47.46 kg/m².       Physical Exam  Constitutional:       Appearance: She is obese.   HENT:      Ears:      Comments: slightly decreased on the right. Fluid in both ears.      Mouth/Throat:      Mouth: Mucous membranes are moist.   Cardiovascular:      Rate and Rhythm: Normal rate and regular rhythm.      Pulses: Normal pulses.      Heart sounds: Normal heart sounds.   Pulmonary:      Effort: Pulmonary effort is normal.      Breath sounds: Normal breath sounds.   Skin:     General: Skin is warm and dry.   Neurological:      General: No focal deficit present.      Mental Status: She is alert and oriented to person, place, and time.   Psychiatric:         Mood and Affect: Mood normal.         Behavior: Behavior normal.           Diagnoses and health risks identified today and associated recommendations/orders:  1. Encounter for preventive health examination; Encounter for screening mammogram for malignant neoplasm of breast  Medicare awv complete. Health maintenance:  rsv, shingles, flu, updated COVID-19 vaccines due-encouraged pt to obtain at a pharmacy.  Mammogram ordered and message sent to staff call patient to schedule.  - Mammo Digital Screening Bilat w/ Neo; " Future    3. Pulmonary hypertension  Diffusion capacity of lung (dl), decreased  BP stable. Continue losartan-HCTZ, sildenafil . Managed by PCP and pulmonology.      4. Morbid obesity with BMI of 45.0-49.9, adult  Chronic. Recommend diet and exercise to lose weight. Follow up with your PCP as planned to discuss adjustments to your treatment plan.      5. Atherosclerosis of aorta  Labs stable. Continue aspirin and simvastatin . Continue routine monitoring. Managed by PCP.       6. Stable angina pectoris  Patient denies any chest pain or pressure.  Continue aspirin. Follow up with pcp.      7. Recurrent major depressive disorder, in full remission  Symptoms stable.  Patient denies any depression symptoms at this time.  She states in the past she was grieving.  Patient denies any thoughts.  Not on any medications.  Follow up with pcp.      8. Essential hypertension  BP stable. Continue losartan HCTZ and furosemide . Managed by PCP.       9. Mixed hyperlipidemia  Lab Results   Component Value Date    CHOL 222 (H) 06/24/2024    CHOL 222 (H) 06/24/2024    CHOL 185 05/15/2023     Lab Results   Component Value Date    HDL 48 06/24/2024    HDL 48 06/24/2024    HDL 45 05/15/2023     Lab Results   Component Value Date    LDLCALC 140.6 06/24/2024    LDLCALC 140.6 06/24/2024    LDLCALC 111.6 05/15/2023     Lab Results   Component Value Date    TRIG 167 (H) 06/24/2024    TRIG 167 (H) 06/24/2024    TRIG 142 05/15/2023       Lab Results   Component Value Date    CHOLHDL 21.6 06/24/2024    CHOLHDL 21.6 06/24/2024    CHOLHDL 24.3 05/15/2023    Chronic and stable on statin medication simvastatin Continue current. F/u with pcp.      10. MVP (mitral valve prolapse)  Symptoms stable.  Follow up with cardiology.     11. Memory loss  Cognitive screening score of 5 today.  Symptoms stable. No safety issues identified.  Follow up with pcp.      12. Peripheral polyneuropathy  Symptoms stable. Continue gabapentin. Follow up with pcp.      13.  Chronic seasonal allergic rhinitis  Symptoms stable. Continue  . Follow up with pcp.      15. Prediabetes   Latest Reference Range & Units 09/17/04 08:54 01/18/06 09:08 05/15/23 15:22 11/15/23 16:10 06/24/24 14:23   Hemoglobin A1C External 4.0 - 5.6 % 5.6 5.6 5.8 (H) 6.2 (H) 5.7 (H)   (H): Data is abnormally high  Controlled.  Not on any medications. Reviewed diabetic diet, diabetic foot care, preferred BS, and HgbA1C levels. Follow up with your PCP as instructed, podiatry and ophthalmology yearly.      16. Hiatal hernia with GERD  Pt states she will see surgeon 2/17/25 for reflux due to hiatal hernia. Continue protonix.     17. Primary osteoarthritis of both knees  Symptoms stable. Continue meloxicam. Follow up with pcp.      18. [place] (obstructive sleep apnea)  Symptoms stable. Continue Cpap for sleep apnea. F/u with pulmonology.     19. Behaviorally induced insufficient sleep syndrome  Symptoms stable . Follow up with pcp.      20. Dizziness  right ear slightly decreased hearing. Fluid in both ears. No cerumen. ringing in the ears/white noise; hearing loss   - Ambulatory referral/consult to ENT; Future    21. De Quervain's tenosynovitis, right  Symptoms stable. Follow up with hand doctor.     22. Trigger finger, right middle finger  Symptoms stable. Follow up with hand doctor.     23. Abnormality of gait and mobility  Chronic. Fall precautions recommended and discussed. Follow up with PCP.            Provided Yoana with a 5-10 year written screening schedule and personal prevention plan. Recommendations were developed using the USPSTF age appropriate recommendations. Education, counseling, and referrals were provided as needed.  After Visit Summary printed and given to patient which includes a list of additional screenings\tests needed.    Follow up in about 1 year (around 2/11/2026) for annual wellness visit.      BRIAN Joya      I offered to discuss advanced care planning, including how to pick a  person who would make decisions for you if you were unable to make them for yourself, called a health care power of , and what kind of decisions you might make such as use of life sustaining treatments such as ventilators and tube feeding when faced with a life limiting illness recorded on a living will that they will need to know. (How you want to be cared for as you near the end of your natural life)     X Patient is interested in learning more about how to make advanced directives.  I provided them paperwork and offered to discuss this with them.

## 2025-02-11 NOTE — Clinical Note
Medicare awv complete. Health maintenance:  rsv, shingles, flu, updated COVID-19 vaccines due-encouraged pt to obtain at a pharmacy.  Mammogram ordered and message sent to staff call patient to schedule.

## 2025-02-11 NOTE — PATIENT INSTRUCTIONS
Counseling and Referral of Other Preventative  (Italic type indicates deductible and co-insurance are waived)    Patient Name: Yoana Pardo  Today's Date: 2/11/2025    Health Maintenance       Date Due Completion Date    Shingles Vaccine (1 of 2) Never done ---    RSV Vaccine (Age 60+ and Pregnant patients) (1 - Risk 60-74 years 1-dose series) Never done ---    Influenza Vaccine (1) 09/01/2024 11/15/2023    COVID-19 Vaccine (3 - 2024-25 season) 09/01/2024 3/31/2021    Mammogram 01/03/2025 1/3/2024    Override on 10/25/2012: Done    Hemoglobin A1c (Prediabetes) 06/24/2025 6/24/2024    Lipid Panel 06/24/2025 6/24/2024    High Dose Statin 02/11/2026 2/11/2025    Aspirin/Antiplatelet Therapy 02/11/2026 2/11/2025    TETANUS VACCINE 08/29/2026 8/29/2016    Colorectal Cancer Screening 11/11/2026 8/7/2024    Override on 10/21/2004: Done    DEXA Scan 06/25/2029 6/25/2024    Override on 1/14/2008: Done (REPEAT 5 YRS)    Override on 9/23/2004: Done        Orders Placed This Encounter   Procedures    Mammo Digital Screening Bilat w/ Neo    Ambulatory referral/consult to ENT     The following information is provided to all patients.  This information is to help you find resources for any of the problems found today that may be affecting your health:                  Living healthy guide: www.[facility].[place].gov      Understanding Diabetes: www.diabetes.org      Eating healthy: www.cdc.gov/healthyweight      CDC home safety checklist: www.cdc.gov/steadi/patient.html      Agency on Aging: www.goea.[place].gov      Alcoholics anonymous (AA): www.aa.org      Physical Activity: www.moody.nih.gov/da2qxef      Tobacco use: www.quitwithusla.org

## 2025-02-12 ENCOUNTER — TELEPHONE (OUTPATIENT)
Dept: ADMINISTRATIVE | Facility: CLINIC | Age: 75
End: 2025-02-12
Payer: MEDICARE

## 2025-02-12 ENCOUNTER — PATIENT MESSAGE (OUTPATIENT)
Dept: INTERNAL MEDICINE | Facility: CLINIC | Age: 75
End: 2025-02-12
Payer: MEDICARE

## 2025-02-12 NOTE — TELEPHONE ENCOUNTER
----- Message from BRIAN Garcia sent at 2/11/2025 10:37 AM CST -----  Please call pt to schedule her mammogram.

## 2025-02-17 ENCOUNTER — OFFICE VISIT (OUTPATIENT)
Dept: SURGERY | Facility: CLINIC | Age: 75
End: 2025-02-17
Payer: MEDICARE

## 2025-02-17 VITALS — WEIGHT: 229.38 LBS | BODY MASS INDEX: 46.33 KG/M2

## 2025-02-17 DIAGNOSIS — K44.9 HIATAL HERNIA: Primary | ICD-10-CM

## 2025-02-17 PROCEDURE — 99214 OFFICE O/P EST MOD 30 MIN: CPT | Mod: PBBFAC

## 2025-02-17 NOTE — PROGRESS NOTES
History & Physical    SUBJECTIVE:     History of Present Illness:  Patient is a 74 y.o. female presents to follow-up Bravo pH study.  She recently underwent EGD once again showing moderate size hiatal hernia.  PH study reported as normal however patient did not breast button to record her symptoms during the testing.  She reports being unclear on what she was supposed to do during this.  She also states she did not eat or drink a lot after the probe was placed but did have some of the event she normally does.  Her biggest complaints or food sticking in her lower chest regurgitation and heartburn.  She reports this happens with solids or liquids.    Initially presents for evaluation of a hiatal hernia that has been present for several years. She recently visited with a bariatric surgeon in [facility] and was told she could not undergo bariatric surgery until it was fixed. She reports burning chest pain consistent with indigestion, worse when lying down at night. She takes Protonix 40 mg once daily. She also reports dysphagia to both solids and liquids. Her past abdominal surgical history includes cholecystectomy. She underwent an EGD last year showing a hiatal hernia but no evidence of esophagitis as well as manometry with normal esophageal motility. Of note, the patient also has a medical history significant for atrial enlargement and pulmonary hypertension. She is otherwise feeling well and denies fevers, chills, nausea, and vomiting.       Interval History:  Since last clinic visit, the patient has had ongoing progression of symptoms.  She endorses frequent choking and dysphagia as well as significant indigestion/reflux.  She is not eager to undergo bariatric surgery at this time.  She would like to further discuss hiatal hernia repair again.    Chief Complaint   Patient presents with    Hernia     Hiatal hernia        Review of patient's allergies indicates:   Allergen Reactions    Lisinopril Other (See Comments)      Cough      Bactrim [sulfamethoxazole-trimethoprim] Itching    Zosyn [piperacillin-tazobactam] Hives     Pt received second dose of Zosyn and had hives on both arms. Benadryl given and pt continued to receive zosyn with no issues. Hydralazine also given around the same time and discontinued.        Current Outpatient Medications   Medication Sig Dispense Refill    acetaminophen (TYLENOL) 500 MG tablet Take 500 mg by mouth every 6 (six) hours as needed for Pain.      aspirin (ECOTRIN) 81 MG EC tablet Take 81 mg by mouth once daily.      furosemide (LASIX) 40 MG tablet Take 1 tablet (40 mg total) by mouth daily as needed (edema, swelling, fluid). 90 tablet 1    losartan-hydrochlorothiazide 100-12.5 mg (HYZAAR) 100-12.5 mg Tab Take 1 tablet by mouth once daily 90 tablet 0    meloxicam (MOBIC) 7.5 MG tablet Take 1 tablet (7.5 mg total) by mouth daily as needed for Pain. Take with food 30 tablet 1    pantoprazole (PROTONIX) 40 MG tablet Take 1 tablet by mouth once daily 90 tablet 1    potassium chloride SA (KLOR-CON M20) 20 MEQ tablet Take 1 tablet (20 mEq total) by mouth once daily. Take extra potassium with higher lasix dose 90 tablet 6    sildenafil (REVATIO) 20 mg Tab TAKE 1 TABLET BY MOUTH THREE TIMES DAILY 270 tablet 0    simvastatin (ZOCOR) 40 MG tablet TAKE 1 TABLET BY MOUTH ONCE DAILY IN THE EVENING 90 tablet 0    triamcinolone acetonide 0.025% (KENALOG) 0.025 % cream Apply topically 2 (two) times daily as needed (for dry patches on face.). Mild steroid. Use sparingly for 1-2 weeks if needed then stop. 30 g 1    gabapentin (NEURONTIN) 100 MG capsule Take 1 capsule (100 mg total) by mouth every evening. (Patient not taking: Reported on 2/17/2025) 30 capsule 1     No current facility-administered medications for this visit.     Facility-Administered Medications Ordered in Other Visits   Medication Dose Route Frequency Provider Last Rate Last Admin    lactated ringers infusion   Intravenous Continuous Frida,  Deloris CHAUDHARY MD           Past Medical History:   Diagnosis Date    GERD (gastroesophageal reflux disease)     Hemorrhoids     History of positive PPD, untreated     Hx of cold sores     Hyperlipidemia     Hypertension     MVP (mitral valve prolapse)     Peripheral neuropathy     Pulmonary HTN     Dr Bailon    Sleep apnea     [place]-CPAP  uses intermittently     Past Surgical History:   Procedure Laterality Date    CHOLECYSTECTOMY      COLONOSCOPY N/A 11/11/2021    Procedure: COLONOSCOPY;  Surgeon: Deloris Galicia MD;  Location: [facility];  Service: Endoscopy;  Laterality: N/A;    DILATION AND CURETTAGE OF UTERUS      ESOPHAGEAL MOTILITY STUDY USING HIGH RESOLUTION MANOMETRY N/A 11/03/2021    Procedure: MOTILITY STUDY, ESOPHAGUS, USING HIGH RESOLUTION MANOMETRY;  Surgeon: Yaw Manuel RN;  Location: [facility];  Service: Endoscopy;  Laterality: N/A;    ESOPHAGOGASTRODUODENOSCOPY N/A 12/02/2020    Procedure: ESOPHAGOGASTRODUODENOSCOPY (EGD);  Surgeon: Evangelista Erickson MD;  Location: [facility];  Service: Endoscopy;  Laterality: N/A;    ESOPHAGOGASTRODUODENOSCOPY N/A 11/11/2021    Procedure: EGD (ESOPHAGOGASTRODUODENOSCOPY);  Surgeon: Deloris Galicia MD;  Location: [facility];  Service: Endoscopy;  Laterality: N/A;    ESOPHAGOGASTRODUODENOSCOPY N/A 01/26/2023    Procedure: EGD (ESOPHAGOGASTRODUODENOSCOPY);  Surgeon: Jasvir Robert MD;  Location: [facility];  Service: Endoscopy;  Laterality: N/A;  EGD with BRAVO (ON PPI); Hiatial hernia, GERD    RIGHT HEART CATHETERIZATION Right 06/15/2020    Procedure: INSERTION, CATHETER, RIGHT HEART;  Surgeon: Ashlie Belle MD;  Location[facility];  Service: Cardiology;  Laterality: Right;     Family History   Problem Relation Name Age of Onset    Heart disease Mother Estefani     Obesity Mother Estefani     Hypertension Mother Estefani     Kidney disease Father Adeel     Hypertension Father Adeel     Obesity Father Adeel     Heart disease Father Adeel      Diabetes Sister 12A 2D     Aneurysm Sister 12A 2D         AAA    Asthma Son Thomas Pardo     Depression Son Thomas Pardo     Cancer Sister Nella     COPD Sister Yelitza     Kidney disease Sister Jocelyn     Mental illness Sister Cheryl     Miscarriages / Stillbirths Sister Blanca      Social History     Tobacco Use    Smoking status: Never     Passive exposure: Never    Smokeless tobacco: Never   Substance Use Topics    Alcohol use: Yes     Alcohol/week: 5.0 standard drinks of alcohol     Types: 5 Shots of liquor per week     Comment: 1-2 shots every other month    Drug use: No        Review of Systems:  Review of Systems   Constitutional:  Negative for chills, fatigue, fever and unexpected weight change.   Respiratory:  Positive for choking. Negative for cough, shortness of breath, wheezing and stridor.    Cardiovascular:  Positive for chest pain (Related to reflux, follows with cardiology). Negative for palpitations and leg swelling.   Gastrointestinal:  Negative for abdominal distention, abdominal pain, anal bleeding, blood in stool, constipation, diarrhea, nausea, rectal pain and vomiting.        Dysphagia, Indigestion   Genitourinary:  Negative for difficulty urinating, dysuria, frequency, hematuria and urgency.   Skin:  Negative for color change, pallor, rash and wound.   Hematological:  Does not bruise/bleed easily.       OBJECTIVE:     Vital Signs (Most Recent)        104.1 kg (229 lb 6.4 oz)     Physical Exam:  Physical Exam  Vitals reviewed.   Constitutional:       General: She is not in acute distress.     Appearance: She is well-developed. She is obese. She is not ill-appearing.   HENT:      Head: Normocephalic and atraumatic.      Right Ear: External ear normal.      Left Ear: External ear normal.      Mouth/Throat:      Mouth: Mucous membranes are moist.   Eyes:      Extraocular Movements: Extraocular movements intact.      Conjunctiva/sclera: Conjunctivae normal.   Cardiovascular:       Rate and Rhythm: Normal rate.   Pulmonary:      Effort: Pulmonary effort is normal. No respiratory distress.   Abdominal:      General: There is no distension.      Palpations: Abdomen is soft.      Tenderness: There is no abdominal tenderness.      Comments: No TTP. Scars from previous cholecystectomy   Musculoskeletal:      Cervical back: Neck supple.   Skin:     General: Skin is warm and dry.   Neurological:      Mental Status: She is alert and oriented to person, place, and time.   Psychiatric:         Behavior: Behavior normal.         Laboratory  CMP  Sodium   Date Value Ref Range Status   06/24/2024 139 136 - 145 mmol/L Final     Potassium   Date Value Ref Range Status   06/24/2024 4.3 3.5 - 5.1 mmol/L Final     Chloride   Date Value Ref Range Status   06/24/2024 98 95 - 110 mmol/L Final     CO2   Date Value Ref Range Status   06/24/2024 28 23 - 29 mmol/L Final     Glucose   Date Value Ref Range Status   06/24/2024 78 70 - 110 mg/dL Final     BUN   Date Value Ref Range Status   06/24/2024 21 8 - 23 mg/dL Final     Creatinine   Date Value Ref Range Status   06/24/2024 1.0 0.5 - 1.4 mg/dL Final     Calcium   Date Value Ref Range Status   06/24/2024 9.8 8.7 - 10.5 mg/dL Final     Total Protein   Date Value Ref Range Status   06/24/2024 8.2 6.0 - 8.4 g/dL Final     Albumin   Date Value Ref Range Status   06/24/2024 3.8 3.5 - 5.2 g/dL Final     Total Bilirubin   Date Value Ref Range Status   06/24/2024 0.6 0.1 - 1.0 mg/dL Final     Comment:     For infants and newborns, interpretation of results should be based  on gestational age, weight and in agreement with clinical  observations.    Premature Infant recommended reference ranges:  Up to 24 hours.............<8.0 mg/dL  Up to 48 hours............<12.0 mg/dL  3-5 days..................<15.0 mg/dL  6-29 days.................<15.0 mg/dL       Alkaline Phosphatase   Date Value Ref Range Status   06/24/2024 75 55 - 135 U/L Final     AST   Date Value Ref Range Status    06/24/2024 14 10 - 40 U/L Final     ALT   Date Value Ref Range Status   06/24/2024 9 (L) 10 - 44 U/L Final     Anion Gap   Date Value Ref Range Status   06/24/2024 13 8 - 16 mmol/L Final     eGFR   Date Value Ref Range Status   06/24/2024 >60.0 >60 mL/min/1.73 m^2 Final        Diagnostic Results:  EGD 2023:  Impression:            - Large hiatal hernia.                          - Z-line regular, 31 cm from the incisors.                          - Erythematous mucosa in the prepyloric region of                          the stomach. Biopsied.                          - Normal examined duodenum. Bravo capsule placed                          for PH metry     Manometry:  Impression:            - Normal esophageal manometry.     Bravo pH:  Findings:        DAY 1 ACID REFLUX ANALYSIS:        - Acid Exposure with pH < 4.0:        Total Percent Time: 1.1 %.        - Number of Reflux Episodes:        Total Refluxes: 11        Number of reflux episodes > 5 minutes: 1.        - Longest reflux episode: 5 minutes        - Symptom Correlation: Reflux episodes did not correlate with        symptoms noted during the study.        - See the [employer] BRAVO data report for further details.        DAY 2 ACID REFLUX ANALYSIS:        - Acid Exposure Time(s) for pH < 4.0:        Total Percent Time: 0.9 %.        - Number of Reflux Episodes:        Total Refluxes: 23        Number of reflux episodes > 5 minutes: 0.        - Longest reflux episode: 2 minutes        - Symptom Correlation: Reflux episodes did not correlate with        symptoms noted during the study.        - See the [employer] BRAVO data report for further details.   Impression:            - Normal ambulatory esophageal pH study.                          - Reflux episodes did not correlate with                          regurgitation, heartburn, chest pain, or belching                          symptoms noted during the study.  ASSESSMENT/PLAN:     75 y/o female with large  hiatal hernia with dysphagia/GERD who would like to discuss repair again.    - long discussion about anatomy related to hiatal hernias.  Discussed surgical approach of robotic hiatal hernia repair with fundoplication.  - discussed implications of different types of bariatric surgeries in patients with hiatal hernias with significant reflux.  Discussed that ultimately if she has significant reflux and desired bariatric surgery, she would need to discuss with the surgeons and Reagan for potential bypass.  She is not interested in undergoing bypass, and I do not think she is a great candidate for sleeve gastrectomy.  She will not further pursue bariatric surgery at this time.  - we will need repeat EGD preoperatively.  Given worsening of dysphagia/choking symptoms, we will repeat manometry at the time as well.  - long discussion about postoperative recovery and restrictions.  All questions answered.  -return to clinic after above testing to further discuss surgical planning.      Latoya Colin PA-C  [facility]      I spent a total of 45 minutes of the day on this visit.  This includes face to face time and non-face to face time preparing to see the patient (eg, review of tests), obtaining and/or reviewing separately obtained history, documenting clinical information in the electronic or other health record, independently interpreting results and communicating results to the patient/family/caregiver, or care coordinator.

## 2025-02-17 NOTE — PATIENT INSTRUCTIONS
The endoscopy department will contact you directly to schedule the EGD and manometry. Once I have the results, we will get you back in to see Dr Romero to further discuss surgery.     Please send a [employer] message or call us directly at [phone] with any questions or concerns.

## 2025-02-18 ENCOUNTER — PATIENT MESSAGE (OUTPATIENT)
Dept: ENDOSCOPY | Facility: HOSPITAL | Age: 75
End: 2025-02-18
Payer: MEDICARE

## 2025-02-27 ENCOUNTER — HOSPITAL ENCOUNTER (OUTPATIENT)
Dept: RADIOLOGY | Facility: HOSPITAL | Age: 75
Discharge: HOME | End: 2025-02-27
Attending: NURSE PRACTITIONER
Payer: MEDICARE

## 2025-02-27 DIAGNOSIS — Z00.00 ENCOUNTER FOR PREVENTIVE HEALTH EXAMINATION: ICD-10-CM

## 2025-02-27 DIAGNOSIS — Z12.31 ENCOUNTER FOR SCREENING MAMMOGRAM FOR MALIGNANT NEOPLASM OF BREAST: ICD-10-CM

## 2025-02-27 PROCEDURE — 77063 BREAST TOMOSYNTHESIS BI: CPT | Mod: TC

## 2025-02-27 PROCEDURE — 77063 BREAST TOMOSYNTHESIS BI: CPT | Mod: 26,,, | Performed by: RADIOLOGY

## 2025-02-27 PROCEDURE — 77067 SCR MAMMO BI INCL CAD: CPT | Mod: 26,,, | Performed by: RADIOLOGY

## 2025-02-28 ENCOUNTER — RESULTS FOLLOW-UP (OUTPATIENT)
Dept: HOME HEALTH SERVICES | Facility: CLINIC | Age: 75
End: 2025-02-28

## 2025-03-03 ENCOUNTER — HOSPITAL ENCOUNTER (OUTPATIENT)
Dept: PREADMISSION TESTING | Facility: HOSPITAL | Age: 75
Discharge: HOME | End: 2025-03-03
Attending: INTERNAL MEDICINE
Payer: MEDICARE

## 2025-03-03 DIAGNOSIS — K44.9 HIATAL HERNIA: ICD-10-CM

## 2025-03-05 ENCOUNTER — E-CONSULT (OUTPATIENT)
Dept: CARDIOLOGY | Facility: CLINIC | Age: 75
End: 2025-03-05
Payer: MEDICARE

## 2025-03-05 DIAGNOSIS — Z01.810 PREOP CARDIOVASCULAR EXAM: Primary | ICD-10-CM

## 2025-03-05 NOTE — CONSULTS
[facility] [place] - Cardiology [facility]  Response for E-Consult     Patient Name: Yoana Pardo  MRN: [id number]  Primary Care Provider: Lynn Wiggins MD   Requesting Provider: Patricia Donnelly NP  E-Consult to General Cardiology  Consult performed by: Villa Michele MD  Consult ordered by: Patricia Donnelly NP  Reason for consult: preop eval        Laurence Lemons, RN  to P ECON GEN CARDIOLOGY 3/3/2025  2:24 PM   This patient is being scheduled for one of the following procedures: Endoscopy  EGD/Manometry, Esophagus    Cardiac PMHx: Essential hypertension  Mixed hyperlipidemia  Pulmonary hypertension  MVP (mitral valve prolapse)  Atherosclerosis of aorta  Stable angina pector       Yoana Pardo is a 74 y.o. female pt with HTN, HLD, pulm HTN, MVP, aortic atherosc, [place], obesity , OA will need an EGD with manometry.     Recommendation: Moderately elevated CV risk to proceed for EGD, recent stress test neg, ECHO with pulm HTN.     Additional future steps to consider: follow up in CV clinic as scheduled     Results for orders placed during the hospital encounter of 09/13/24    Echo    Interpretation Summary    Left Ventricle: The left ventricle is normal in size. Normal wall thickness. There is concentric remodeling. Normal wall motion. There is normal systolic function with a visually estimated ejection fraction of 60 - 65%. Ejection fraction by visual approximation is 60%. Grade II diastolic dysfunction.    Right Ventricle: Normal right ventricular cavity size. Systolic function is normal.    Left Atrium: Left atrium is severely dilated.    Mitral Valve: There is mild regurgitation.    Tricuspid Valve: There is mild to moderate regurgitation.    Pulmonary Artery: The estimated pulmonary artery systolic pressure is 53 mmHg.    IVC/SVC: Normal venous pressure at 3 mmHg.    Results for orders placed during the hospital encounter of 01/29/25    Nuclear Stress - Cardiology Interpreted    Interpretation Summary    Normal  myocardial perfusion scan. There is no evidence of myocardial ischemia or infarction.    The gated perfusion images showed an ejection fraction of 73% at rest. The gated perfusion images showed an ejection fraction of 80% post stress.    The ECG portion of the study is negative for ischemia.    There were no arrhythmias during stress.      Total time of Consultation: 15 minute    I did not speak to the requesting provider verbally about this.     *This eConsult is based on the clinical data available to me and is furnished without benefit of a physical examination. The eConsult will need to be interpreted in light of any clinical issues or changes in patient status not available to me at the time of filing this eConsults. Significant changes in patient condition or level of acuity should result in immediate formal consultation and reevaluation. Please alert me if you have further questions.    Thank you for this eConsult referral.     Villa Michele Md, MD  [facility]

## 2025-03-07 ENCOUNTER — HOSPITAL ENCOUNTER (OUTPATIENT)
Dept: ENDOSCOPY | Facility: HOSPITAL | Age: 75
Discharge: HOME | End: 2025-03-07
Payer: MEDICARE

## 2025-03-07 VITALS
RESPIRATION RATE: 18 BRPM | TEMPERATURE: 97 F | OXYGEN SATURATION: 98 % | DIASTOLIC BLOOD PRESSURE: 72 MMHG | SYSTOLIC BLOOD PRESSURE: 129 MMHG | HEART RATE: 66 BPM | BODY MASS INDEX: 45.6 KG/M2 | WEIGHT: 225.75 LBS

## 2025-03-07 DIAGNOSIS — K44.9 HIATAL HERNIA: Primary | ICD-10-CM

## 2025-03-07 DIAGNOSIS — I34.1 MVP (MITRAL VALVE PROLAPSE): Primary | ICD-10-CM

## 2025-03-07 DIAGNOSIS — K44.9 HIATAL HERNIA: ICD-10-CM

## 2025-03-07 DIAGNOSIS — K21.9 GASTROESOPHAGEAL REFLUX DISEASE WITHOUT ESOPHAGITIS: ICD-10-CM

## 2025-03-07 PROCEDURE — 25000003 PHARM REV CODE 250

## 2025-03-07 RX ORDER — LIDOCAINE HYDROCHLORIDE 20 MG/ML
2 SOLUTION OROPHARYNGEAL ONCE
Status: COMPLETED | OUTPATIENT
Start: 2025-03-07 | End: 2025-03-07

## 2025-03-07 RX ADMIN — LIDOCAINE HYDROCHLORIDE 2 ML: 20 SOLUTION ORAL at 07:03

## 2025-03-07 NOTE — PROCEDURE NOTE ADDENDUM
Unable to successfully place manometry catheter. Discussed with Dr. Robert. Will place the manometry catheter endoscopically and perform esophageal manometry following recovery.

## 2025-03-07 NOTE — PROCEDURE NOTE ADDENDUM
EGD cancelled per anesthesia. Will notify the ordering provider. Patient will be rescheduled for an EGD at the [facility] location.

## 2025-03-11 ENCOUNTER — TELEPHONE (OUTPATIENT)
Dept: GASTROENTEROLOGY | Facility: CLINIC | Age: 75
End: 2025-03-11
Payer: MEDICARE

## 2025-03-11 ENCOUNTER — PATIENT MESSAGE (OUTPATIENT)
Dept: GASTROENTEROLOGY | Facility: CLINIC | Age: 75
End: 2025-03-11
Payer: MEDICARE

## 2025-03-11 NOTE — TELEPHONE ENCOUNTER
----- Message from Dolores sent at 3/11/2025 10:56 AM CDT -----  Contact: Yoana  Type:  Patient Returning CallWho Called: Aide Left Message for Patient:Does the patient know what this is regarding?:unsureWould the patient rather a call back or a response via [facility]? Call [facility] Call Back Number:[phone] Information: Pt would like to know what does the visit on 03/21/2025 intel.

## 2025-03-12 ENCOUNTER — CLINICAL SUPPORT (OUTPATIENT)
Dept: AUDIOLOGY | Facility: CLINIC | Age: 75
End: 2025-03-12
Payer: MEDICARE

## 2025-03-12 ENCOUNTER — OFFICE VISIT (OUTPATIENT)
Dept: OTOLARYNGOLOGY | Facility: CLINIC | Age: 75
End: 2025-03-12
Payer: MEDICARE

## 2025-03-12 DIAGNOSIS — H90.3 SENSORINEURAL HEARING LOSS (SNHL) OF BOTH EARS: ICD-10-CM

## 2025-03-12 DIAGNOSIS — R26.89 IMBALANCE: Primary | ICD-10-CM

## 2025-03-12 DIAGNOSIS — H93.13 SUBJECTIVE TINNITUS OF BOTH EARS: ICD-10-CM

## 2025-03-12 DIAGNOSIS — H90.3 HEARING LOSS, SENSORINEURAL, ASYMMETRICAL: Primary | ICD-10-CM

## 2025-03-12 DIAGNOSIS — H93.13 TINNITUS OF BOTH EARS: ICD-10-CM

## 2025-03-12 PROCEDURE — 99213 OFFICE O/P EST LOW 20 MIN: CPT | Mod: S$PBB,,, | Performed by: PHYSICIAN ASSISTANT

## 2025-03-12 PROCEDURE — 92557 COMPREHENSIVE HEARING TEST: CPT | Mod: PBBFAC | Performed by: AUDIOLOGIST

## 2025-03-12 PROCEDURE — 99213 OFFICE O/P EST LOW 20 MIN: CPT | Mod: PBBFAC | Performed by: PHYSICIAN ASSISTANT

## 2025-03-12 PROCEDURE — 99999 PR PBB SHADOW E&M-EST. PATIENT-LVL III: CPT | Mod: PBBFAC,,, | Performed by: PHYSICIAN ASSISTANT

## 2025-03-12 PROCEDURE — 92567 TYMPANOMETRY: CPT | Mod: PBBFAC | Performed by: AUDIOLOGIST

## 2025-03-12 NOTE — PROGRESS NOTES
Yoana Pardo was seen 03/12/2025 for an audiological evaluation. Patient complains of bilateral gradual hearing loss and constant tinnitus for years. She reports feeling off balance when blinking or turning head (no spinning). She denies any noise exposure history.    Results reveal a mild-to-moderate sensorineural hearing loss 250-8000 Hz for the right ear, and  mild sensorineural hearing loss 5039-9399 Hz for the left ear.   Speech Reception Thresholds were  25 dBHL for the right ear and 20 dBHL for the left ear.   Word recognition scores were excellent for the right ear and excellent for the left ear.  Tympanograms were Type A, normal for the right ear and Type A for the left ear.    Patient was counseled on the above findings.    Recommendations:  Follow-up with ENT, as scheduled.   Repeat audiological evaluation in 1-2 years to monitor hearing, or sooner if needed.  Consider a hearing aid consultation. Patient provided with clinic hearing aid information packet.   Wear hearing protection devices when around loud noise.

## 2025-03-12 NOTE — PROGRESS NOTES
Subjective     Patient ID: Yoana Pardo is a 74 y.o. female.    Chief Complaint: Dizziness (Pt states that she had some disorientation and lightheadedness  )    Patient is a pleasant 74 year old female here to see me today for the first time for evaluation of hearing loss and imbalance.  She complains of bilateral gradual hearing loss and constant tinnitus AU (static noise) for years. She feels like her hearing is worse in the right ear.  No recent ear pain or drainage.  She reports feeling off balance or disoriented when blinking or with sudden changes in position.  States she sometimes stumbles when walking and has had few falls.  No spinning sensations; no dizziness when lying in bed.  She denies any noise exposure history.  Denies previous otologic surgery.   She has family history of hearing loss (sister).      Review of Systems   Constitutional:  Negative for fever.   HENT:  Positive for hearing loss and tinnitus. Negative for ear discharge, ear pain, nosebleeds, postnasal drip and rhinorrhea.    Neurological:  Positive for dizziness. Negative for vertigo and light-headedness.   Psychiatric/Behavioral:  Negative for confusion.           Objective     Physical Exam  Constitutional:       Appearance: Normal appearance.   HENT:      Head: Normocephalic and atraumatic.      Jaw: No trismus.      Right Ear: Tympanic membrane, ear canal and external ear normal. No middle ear effusion. There is no impacted cerumen. Tympanic membrane is not injected or erythematous.      Left Ear: Tympanic membrane, ear canal and external ear normal.  No middle ear effusion. There is no impacted cerumen. Tympanic membrane is not injected or erythematous.      Nose: Nose normal. No congestion or rhinorrhea.      Mouth/Throat:      Mouth: Mucous membranes are moist.      Pharynx: Oropharynx is clear. No oropharyngeal exudate or posterior oropharyngeal erythema.   Eyes:      Pupils: Pupils are equal, round, and reactive to light.    Pulmonary:      Effort: Pulmonary effort is normal.   Lymphadenopathy:      Cervical: No cervical adenopathy.   Neurological:      General: No focal deficit present.      Mental Status: She is alert and oriented to person, place, and time.      Cranial Nerves: Cranial nerves 2-12 are intact. No dysarthria.      Comments: Ambulates to/from exam room   Psychiatric:         Behavior: Behavior is cooperative.       Ramon-Hallpike:  Negative AU          AUDIOGRAM:  Results reveal a mild-to-moderate sensorineural hearing loss 250-8000 Hz for the right ear, and  mild sensorineural hearing loss 3077-0721 Hz for the left ear.   Speech Reception Thresholds were  25 dBHL for the right ear and 20 dBHL for the left ear.   Word recognition scores were excellent for the right ear and excellent for the left ear.  Tympanograms were Type A, normal for the right ear and Type A for the left ear.          Assessment and Plan     1. Imbalance  -     Ambulatory referral/consult to ENT    2. Sensorineural hearing loss (SNHL) of both ears    3. Tinnitus of both ears        Imbalance:  Discussed that her testing today is negative for BPPV.  I had a long discussion with the patient regarding their symptoms.  Dizziness, vertigo and disequilibrium are common symptoms reported by adults during visits to their doctors. They are all symptoms that can result from a peripheral vestibular disorder (a dysfunction of the balance organs of the inner ear) or central vestibular disorder (a dysfunction of one or more parts of the central nervous system that help process balance and spatial information).  There are also non-vestibular causes of dizziness, and dizziness can be linked to a wide array of problems such as blood-flow irregularities from cardiovascular problems and blood pressure fluctuations.  She could consider trial of VRT (balance therapy) to help reduce her risk of falls.  She is not interested in VRT at this time and will call back if she  changes her mind.    We reviewed the patient's recent audiogram and hearing loss in detail.  Recommend audiogram in 1-2 years to monitor hearing, or sooner if needed.  She could consider a hearing aid consultation when motivated. Patient provided with clinic hearing aid information packet.  Recommend the use of hearing protection devices when around loud noise, including lawn equipment.     We reviewed her audiogram together in detail.  We also discussed that tinnitus is most often caused by a hearing loss, and that as the hair cells are damaged, either genetic or as a result of loud noise exposure, they then cause tinnitus.  Some patients find that restricting the salt or caffeine in their diet helps, and there is also an OTC supplement, lipoflavinoids, that some people find to be effective though their benefit is not fully proven.  Tinnitus tends to be louder in times of stress and fatigue, and may decrease with time.  Sound machines may also be an effective masking technique if needed at night.             No follow-ups on file.

## 2025-03-14 ENCOUNTER — OFFICE VISIT (OUTPATIENT)
Dept: CARDIOLOGY | Facility: CLINIC | Age: 75
End: 2025-03-14
Payer: MEDICARE

## 2025-03-14 ENCOUNTER — TELEPHONE (OUTPATIENT)
Dept: PREADMISSION TESTING | Facility: HOSPITAL | Age: 75
End: 2025-03-14
Payer: MEDICARE

## 2025-03-14 ENCOUNTER — CLINICAL SUPPORT (OUTPATIENT)
Dept: PULMONOLOGY | Facility: CLINIC | Age: 75
End: 2025-03-14
Attending: INTERNAL MEDICINE
Payer: MEDICARE

## 2025-03-14 ENCOUNTER — HOSPITAL ENCOUNTER (OUTPATIENT)
Dept: RADIOLOGY | Facility: HOSPITAL | Age: 75
Discharge: HOME | End: 2025-03-14
Attending: INTERNAL MEDICINE
Payer: MEDICARE

## 2025-03-14 ENCOUNTER — TELEPHONE (OUTPATIENT)
Dept: CARDIOLOGY | Facility: CLINIC | Age: 75
End: 2025-03-14
Payer: MEDICARE

## 2025-03-14 VITALS
DIASTOLIC BLOOD PRESSURE: 60 MMHG | WEIGHT: 229.75 LBS | SYSTOLIC BLOOD PRESSURE: 118 MMHG | DIASTOLIC BLOOD PRESSURE: 76 MMHG | RESPIRATION RATE: 16 BRPM | HEART RATE: 93 BPM | BODY MASS INDEX: 46.32 KG/M2 | OXYGEN SATURATION: 98 % | HEART RATE: 60 BPM | OXYGEN SATURATION: 96 % | SYSTOLIC BLOOD PRESSURE: 131 MMHG | BODY MASS INDEX: 46.4 KG/M2 | HEIGHT: 59 IN | WEIGHT: 229.75 LBS

## 2025-03-14 VITALS — HEIGHT: 59 IN | WEIGHT: 229.75 LBS | BODY MASS INDEX: 46.32 KG/M2

## 2025-03-14 DIAGNOSIS — I10 ESSENTIAL HYPERTENSION: Chronic | ICD-10-CM

## 2025-03-14 DIAGNOSIS — G47.33 OSA (OBSTRUCTIVE SLEEP APNEA): ICD-10-CM

## 2025-03-14 DIAGNOSIS — E78.2 MIXED HYPERLIPIDEMIA: ICD-10-CM

## 2025-03-14 DIAGNOSIS — I27.20 PULMONARY HYPERTENSION: Primary | ICD-10-CM

## 2025-03-14 DIAGNOSIS — E66.01 MORBID OBESITY WITH BMI OF 45.0-49.9, ADULT: ICD-10-CM

## 2025-03-14 DIAGNOSIS — R00.2 PALPITATIONS: ICD-10-CM

## 2025-03-14 DIAGNOSIS — I34.1 MVP (MITRAL VALVE PROLAPSE): ICD-10-CM

## 2025-03-14 DIAGNOSIS — R73.03 PREDIABETES: ICD-10-CM

## 2025-03-14 DIAGNOSIS — I70.0 ATHEROSCLEROSIS OF AORTA: ICD-10-CM

## 2025-03-14 DIAGNOSIS — R94.31 NONSPECIFIC ABNORMAL ELECTROCARDIOGRAM (ECG) (EKG): ICD-10-CM

## 2025-03-14 DIAGNOSIS — I27.20 PULMONARY HYPERTENSION: ICD-10-CM

## 2025-03-14 DIAGNOSIS — G47.33 OSA ON CPAP: ICD-10-CM

## 2025-03-14 DIAGNOSIS — I10 ESSENTIAL HYPERTENSION: Primary | ICD-10-CM

## 2025-03-14 DIAGNOSIS — G47.33 MODERATE OBSTRUCTIVE SLEEP APNEA: ICD-10-CM

## 2025-03-14 DIAGNOSIS — I20.89 STABLE ANGINA PECTORIS: ICD-10-CM

## 2025-03-14 PROCEDURE — 99211 OFF/OP EST MAY X REQ PHY/QHP: CPT | Mod: PBBFAC

## 2025-03-14 PROCEDURE — 71046 X-RAY EXAM CHEST 2 VIEWS: CPT | Mod: TC

## 2025-03-14 PROCEDURE — 99999 PR PBB SHADOW E&M-EST. PATIENT-LVL I: CPT | Mod: PBBFAC,,,

## 2025-03-14 PROCEDURE — 99214 OFFICE O/P EST MOD 30 MIN: CPT | Mod: PBBFAC,25,27 | Performed by: INTERNAL MEDICINE

## 2025-03-14 PROCEDURE — 99999 PR PBB SHADOW E&M-EST. PATIENT-LVL IV: CPT | Mod: PBBFAC,,, | Performed by: INTERNAL MEDICINE

## 2025-03-14 PROCEDURE — 71046 X-RAY EXAM CHEST 2 VIEWS: CPT | Mod: 26,,, | Performed by: STUDENT IN AN ORGANIZED HEALTH CARE EDUCATION/TRAINING PROGRAM

## 2025-03-14 PROCEDURE — 99999 PR PBB SHADOW E&M-EST. PATIENT-LVL III: CPT | Mod: PBBFAC,,, | Performed by: INTERNAL MEDICINE

## 2025-03-14 PROCEDURE — 99213 OFFICE O/P EST LOW 20 MIN: CPT | Mod: PBBFAC,25,27 | Performed by: INTERNAL MEDICINE

## 2025-03-14 RX ORDER — POTASSIUM CHLORIDE 20 MEQ/1
20 TABLET, EXTENDED RELEASE ORAL DAILY
Qty: 90 TABLET | Refills: 6 | Status: SHIPPED | OUTPATIENT
Start: 2025-03-14

## 2025-03-14 RX ORDER — TIRZEPATIDE 2.5 MG/.5ML
2.5 INJECTION, SOLUTION SUBCUTANEOUS
Qty: 4 PEN | Refills: 1 | Status: SHIPPED | OUTPATIENT
Start: 2025-03-14

## 2025-03-14 RX ORDER — LANOLIN ALCOHOL/MO/W.PET/CERES
400 CREAM (GRAM) TOPICAL DAILY
Qty: 90 TABLET | Refills: 1 | Status: SHIPPED | OUTPATIENT
Start: 2025-03-14

## 2025-03-14 RX ORDER — LOSARTAN POTASSIUM AND HYDROCHLOROTHIAZIDE 12.5; 1 MG/1; MG/1
1 TABLET ORAL DAILY
Qty: 90 TABLET | Refills: 0 | Status: SHIPPED | OUTPATIENT
Start: 2025-03-14 | End: 2026-03-14

## 2025-03-14 RX ORDER — SILDENAFIL CITRATE 20 MG/1
20 TABLET ORAL 3 TIMES DAILY
Qty: 270 TABLET | Refills: 3 | Status: SHIPPED | OUTPATIENT
Start: 2025-03-14

## 2025-03-14 NOTE — PROGRESS NOTES
Subjective:       Patient ID: Yoana Pardo is a 74 y.o. female.  Patient Active Problem List   Diagnosis    Mixed hyperlipidemia    Pulmonary hypertension    MVP (mitral valve prolapse)    Hiatal hernia with GERD    History of positive PPD, untreated    Hemorrhoids    Essential hypertension    Peripheral neuropathy    Primary osteoarthritis of both knees    Morbid obesity with BMI of 45.0-49.9, adult    [place] (obstructive sleep apnea)    Behaviorally induced insufficient sleep syndrome    Chronic seasonal allergic rhinitis    Atherosclerosis of aorta    Diffusion capacity of lung (dl), decreased    Memory loss    Recurrent major depressive disorder, in full remission    Prediabetes    Stable angina pectoris    De Quervain's tenosynovitis, right    Trigger finger, right middle finger     Immunization History   Administered Date(s) Administered    COVID-19, MRNA, LN-S, PF (MODERNA FULL 0.5 ML DOSE) 03/03/2021, 03/31/2021    Influenza (FLUAD) - Quadrivalent - Adjuvanted - PF *Preferred* (65+) 10/30/2020, 11/21/2022, 11/15/2023    Influenza - Quadrivalent 10/29/2014    Influenza - Quadrivalent - PF *Preferred* (6 months and older) 10/27/2015, 10/03/2016    Influenza - Trivalent - Afluria, Fluzone MDV 11/22/2006, 11/06/2008, 10/19/2012    Influenza - Trivalent - Fluzone High Dose - PF (65 years and older) 09/25/2018, 10/25/2019    Influenza Split 11/22/2006, 11/06/2008, 10/27/2009, 10/19/2012    Pneumococcal Conjugate - 13 Valent 10/15/2018    Pneumococcal Polysaccharide - 23 Valent 10/28/2019    Tdap 08/29/2016                  3/14/2025   EPWORTH SLEEPINESS SCALE   Sitting and reading 3   Watching TV 3   Sitting, inactive in a public place (e.g. a theatre or a meeting) 0   As a passenger in a car for an hour without a break 1   Lying down to rest in the afternoon when circumstances permit 3   Sitting and talking to someone 0   Sitting quietly after a lunch without alcohol 3   In a car, while stopped for a few  minutes in traffic 0   Total score 13         mRC  []  0: Dyspnea only with strenuous exercise  [x] +1:Dyspnea when hurrying or walking up a slight hill  [] +2:Walks slower than people of the same age because of dyspnea or has to stop for breath when walking at own pace  [] +3:Stops for breath after walking 100 yards (91 m) or after a few minutes  [] +4:Too dyspneic to leave house or breathless when dressing      NYHA    [] Class I: Patients with no limitation of activities; they suffer no symptoms from ordinary activities  [x] Class II: Patients with slight, mild limitation of activity; they are comfortable with rest or with mild exertion.  [] Class III:Patients with marked limitation of activity; they are comfortable only at rest.  [] Class IV: Patients who should be at complete rest, confined to bed or chair; any physical activity brings on discomfort and symptoms occur at rest.          Chief Complaint: Pulmonary Hypertension    Ms. Yoana Pardo is 70 y.o.    Last visit 10/30/2020  Follow up after ECHO: PA pressure down to 51 from 57  Sirometry: Normal, FEv1 and FVC imprived  On REVATIO  Last RHC:   6MWD distance improved  Hypoxemia and [place] treated with CPAP  Not using consistently, irregular sleep routine   [place] score is 8  Weight stable Now 230lb  Wake time 6-7 am  Occasional naps  Bariatric surgery cancelled due to hernia  I have reviewed the patient's medical history in detail and updated the computerized patient record.       07/07/2023  Last visit 06/10/2021  Since retired  Had COVID  Has been off Revatio, out of refills  Also not currently using CPAP  Says gave her cough  Last echo 2020  [place] 13  Had oxygen in past after gall bladder surgery  Options for IND[place]: INSPIRE  BMI is contraindication  Weight loss optione  Will explore GLP 1  options with PCP  A1C was 5.8      09/29/2023  Followup  Reveiwed results  ONSAT: SpO2 hadley 77%  PFT: low MVV: 54.8%, FVC 1.38L( 68.7%), DLCo 8.58( 47.5%)  TLC  "was normal 3.45L( 83.9%)  6MWD: No desaturation: 335.28m( 104.3%)    EKG reveiwed  ECHO PASP 62  Adherence with REVATIO   PSG showed high sleep effuecncy  Diagnostic AHI 22.2/hr   CPAP 16 cm was well tolerated      03/01/2024   Here with her daughter   Download reviewed   Using CPAP greater than 4 hours 83%   Sleep routine is still irregular goes to bed late wake up late.    She is to work night shift   Echo shows pressure has dropped to 62  Will repeat echo next visit   No daytime sleepiness.    No shortness of breath   Medications refilled    09/13/2024  Followup  Doing well  6MWD distance improved  Download reveiwed  Night owl  No respiratory issues  Stable on sildenafil    03/14/2025   Follow-up visit   Doing well  No HERNÁNDEZ, No dizzy spells  6MWD reveiwed  Using cpap and benefits        Review of Systems   Constitutional:  Negative for fatigue.   HENT:  Negative for postnasal drip, rhinorrhea and congestion.    Respiratory:  Negative for apnea (CPAP : Not using), snoring, sputum production, shortness of breath, wheezing, pleurisy and use of rescue inhaler.             Cardiovascular: Negative.    Genitourinary: Negative.    Endocrine: endocrine negative    Musculoskeletal: Negative.    Skin:  Negative for rash.   Neurological: Negative.    Psychiatric/Behavioral:  Negative for sleep disturbance.    All other systems reviewed and are negative.      Objective:       Vitals:    03/14/25 1314   BP: 131/60   Pulse: 93   Resp: 16   SpO2: 98%   Weight: 104.2 kg (229 lb 11.5 oz)   Height: 4' 11" (1.499 m)               Physical Exam   Constitutional: She is oriented to person, place, and time. She appears well-developed and well-nourished.     She is obese.   HENT:   Head: Normocephalic.   Nose: No mucosal edema. No polyps.   Mouth/Throat: Oropharynx is clear and moist. No oropharyngeal exudate. Mallampati Score: IV.   Neck: No tracheal deviation present. No thyromegaly present.   Cardiovascular: Normal rate and regular " rhythm.   No murmur heard.  Pulmonary/Chest: Normal expansion, symmetric chest wall expansion, effort normal and breath sounds normal.   Abdominal: Soft. Bowel sounds are normal.   Musculoskeletal:         General: No edema. Normal range of motion.      Cervical back: Normal range of motion and neck supple.   Lymphadenopathy:     She has no cervical adenopathy.   Neurological: She is alert and oriented to person, place, and time. Gait normal.   Skin: Skin is warm and dry. No rash noted. No erythema. Nails show no clubbing.   Psychiatric: She has a normal mood and affect.   Nursing note and vitals reviewed.    Personal Diagnostic Review  Yoana Pardo  2025 - 2025  : 1950  Age: 74 years  Gender: Female  [facility]  [id number] [place] [address] Dr Jaren Tafoya  [place], [address]  Compliance Report  Compliance  Payor Standard  Usage 2025 - 2025  Usage days 28/30 days (93%)  >= 4 hours 27 days (90%)  < 4 hours 1 days (3%)  Usage hours 189 hours 2 minutes  Average usage (total days) 6 hours 18 minutes  Average usage (days used) 6 hours 45 minutes  Median usage (days used) 6 hours 59 minutes  Total used hours (value since last reset - 2025) 2,448 hours  AirSense 10 AutoSet  Serial number [id number]  Mode CPAP  Set pressure 16 cmH2O  EPR Fulltime  EPR level 3  Therapy  Leaks - L/min Median: 3.6 95th percentile: 43.4 Maximum: 60.8  Events per hour AI: 1.3 HI: 0.5 AHI: 1.8  Apnea Index Central: 0.4 Obstructive: 0.7 Unknown: 0.2  RERA Index 0.1    X-Ray Chest PA And Lateral  Narrative: EXAMINATION:  XR CHEST PA AND LATERAL    CLINICAL HISTORY:  Pulmonary hypertension, unspecified    TECHNIQUE:  PA and lateral views of the chest were performed.    COMPARISON:  CTA chest from 2018; chest x-ray from 2023    FINDINGS:  Cardiac silhouette is within normal limits.  Large hiatal hernia.  Aortic calcifications..    The lungs are clear and free of infiltrate.  No pleural effusion or  "pneumothorax.    No evidence of acute osseous abnormality.  Impression: As above.    Electronically signed by: Ever Ayan  Date:    03/14/2025  Time:    13:33   Ordering Provider: Ernie Mcintosh MD             Interpreting Provider: Ernie Mcintosh MD  Performing nurse/tech/RT: VT, RT  Diagnosis: Pulmonary Hypertension  Height: 4' 11" (149.9 cm)  Weight: 104.2 kg (229 lb 11.5 oz)  BMI (Calculated): 46.4                                                                                 Phase Oxygen Assessment Supplemental O2 Heart   Rate Blood Pressure Marcelle Dyspnea Scale Rating   Resting 99 % Room Air 88 bpm 145/75 1   Exercise             Minute             1 96 % Room Air 127 bpm       2 96 % Room Air 117 bpm       3 97 % Room Air 125 bpm       4 97 % Room Air 123 bpm       5 97 % Room Air 121 bpm       6  97 % Room Air 124 bpm 137/64 5-6   Recovery             Minute             1 98 % Room Air 102 bpm       2 99 % Room Air 100 bpm       3 98 % Room Air 99 bpm       4 98 % Room Air 93 bpm 131/60 3      Six Minute Walk Summary  6MWT Status: completed without stopping  Patient Reported: Dyspnea         Interpretation:  Did the patient stop or pause?: No  Total Time Walked (Calculated): 360 seconds  Final Partial Lap Distance (feet): 100 feet  Total Distance Meters (Calculated): 335.28 meters  Predicted Distance Meters (Calculated): 328.51 meters  Percentage of Predicted (Calculated): 102.06  Peak VO2 (Calculated): 14.04  Mets: 4.01  Has The Patient Had a Previous Six Minute Walk Test?: Yes     Previous 6MWT Results  Has The Patient Had a Previous Six Minute Walk Test?: Yes  Date of Previous Test: 09/13/24  Total Time Walked: 360 seconds  Total Distance (meters): 358.14  Predicted Distance (meters): 327.6 meters  Percentage of Predicted: 109.32  Percent Change (Calculated): 0.06    Assessment:       Problem List Items Addressed This Visit       [place] (obstructive sleep apnea)           3/14/2025   EPWORTH " SLEEPINESS SCALE   Sitting and reading 3   Watching TV 3   Sitting, inactive in a public place (e.g. a theatre or a meeting) 0   As a passenger in a car for an hour without a break 1   Lying down to rest in the afternoon when circumstances permit 3   Sitting and talking to someone 0   Sitting quietly after a lunch without alcohol 3   In a car, while stopped for a few minutes in traffic 0   Total score 13        Bed time 9pm to 2 am  Wake time : 10 am    Data   02/12/2025 to 03/13/2025    Usage > 4 hrs was 90%    CPAP 16 cm    AHI 1.8    USING AND BENEFITS         Relevant Orders    CPAP/BIPAP SUPPLIES    Pulmonary hypertension - Primary     Minimal dyspnea   Functional class 1   WHO group 2 and 3    Distance stable 335.28m              Relevant Medications    sildenafil (REVATIO) 20 mg Tab    Other Relevant Orders    X-Ray Chest PA And Lateral    Stress test, pulmonary    Essential hypertension (Chronic)     On hydrochlorothiazide and Cozaar  Adherence to digital hypertension program         Morbid obesity with BMI of 45.0-49.9, adult    Mixed hyperlipidemia     Stable on simvastatin          Plan:             Stable reassurance medication refilled       Follow up in about 6 months (around 9/14/2025), or cxr, , 6mwd, meds refill.    This note was prepared using voice recognition system and is likely to have sound alike errors that may have been overlooked even after proof reading.  Please call me with any questions    Discussed diagnosis, its evaluation, treatment and usual course. All questions answered.    Thank you for the courtesy of participating in the care of this patient    Ernie Mcintosh MD

## 2025-03-14 NOTE — PROGRESS NOTES
Subjective:   Patient ID:  Yoana Pardo is a 74 y.o. female who presents for cardiac consult of No chief complaint on file.      Referral by: No referring provider defined for this encounter.     Reason for consult:       HPI  The patient came in today for cardiac consult of No chief complaint on file.      Yoana Pardo is a 74 y.o. female pt with HTN, HLD, pulm HTN, MVP, aortic atherosc, [place], obesity ,PreDM,  OA here for CV follow up.      1/24/25   Pt seen by Dr. Whitaker in past here for follow up.     Bp and HR stable. BMI 47 - 235 lbs.   Pt has been having substernal chest pain concern for reflux but also hiatal hernia.   Also has HERNÁNDEZ. Has gained some weight over holidays.     3/14/25  Nuc stress neg for ischemia 1/2025.   ECHO 9/2024 with normal bi V function, grade 2 DD, mild MR, mild to mod TR, PASP 53 mmHg.   Has upcoming EGD  BP and HR stable. BMI 46 - 229 lbs   She will have manometry on Monday.   She is taking lasix every other day almost.   Wants to lose weight, will start zepbound    Benefits of Glp1 agonist like medication prescribed reviewed with patient including improvement of insulin resistance which is the underlying hormonal dysfunction causing and leading to diabetes. This improves hormonal balance and can lower blood sugars if patient sugars are elevated. The patient was explained how the medicine works. I also reviewed side effects of medication including loss of appetite , heartburn/GERD, constipation, diarrhea, nausea (usually low blood sugar if before or between meals ), vomiting bloating , headache and risk of cancer if patient has personal or family history of MEN syndrome.         Results for orders placed during the hospital encounter of 01/29/25    Nuclear Stress - Cardiology Interpreted    Interpretation Summary    Normal myocardial perfusion scan. There is no evidence of myocardial ischemia or infarction.    The gated perfusion images showed an ejection fraction of 73%  at rest. The gated perfusion images showed an ejection fraction of 80% post stress.    The ECG portion of the study is negative for ischemia.    There were no arrhythmias during stress.      Results for orders placed during the hospital encounter of 09/13/24    Echo    Interpretation Summary    Left Ventricle: The left ventricle is normal in size. Normal wall thickness. There is concentric remodeling. Normal wall motion. There is normal systolic function with a visually estimated ejection fraction of 60 - 65%. Ejection fraction by visual approximation is 60%. Grade II diastolic dysfunction.    Right Ventricle: Normal right ventricular cavity size. Systolic function is normal.    Left Atrium: Left atrium is severely dilated.    Mitral Valve: There is mild regurgitation.    Tricuspid Valve: There is mild to moderate regurgitation.    Pulmonary Artery: The estimated pulmonary artery systolic pressure is 53 mmHg.    IVC/SVC: Normal venous pressure at 3 mmHg.      Results for orders placed during the hospital encounter of 08/03/22    Nuclear Stress - Cardiology Interpreted    Interpretation Summary    Normal myocardial perfusion scan. There is no evidence of myocardial ischemia or infarction.    The gated perfusion images showed an ejection fraction of > 70% at rest. The gated perfusion images showed an ejection fraction of > 70% post stress.    The EKG portion of this study is negative for ischemia.    The patient reported no chest pain during the stress test.    During stress, occasional PACs are noted.      Results for orders placed during the hospital encounter of 06/15/20    Cardiac catheterization    Conclusion  · Estimated blood loss: none  · Filling pressures on the right are mildly elevated. Pulmonary HTN is mild to moderate.    I certify that I was present for catheter insertion, catheter manipulation, angiography, and angiographic interpretation of this patient.    Procedure Log documented by Documenter:  Apurva Ríos RN and verified by Shakeel Belle MD.    Date: 6/15/2020  Time: 12:41 PM      No cardiac monitor results found for the past 12 months         Past Medical History:   Diagnosis Date    GERD (gastroesophageal reflux disease)     Hemorrhoids     History of positive PPD, untreated     Hx of cold sores     Hyperlipidemia     Hypertension     MVP (mitral valve prolapse)     Peripheral neuropathy     Pulmonary HTN     Dr Bailon    Sleep apnea     [place]-CPAP  uses intermittently       Past Surgical History:   Procedure Laterality Date    CHOLECYSTECTOMY      COLONOSCOPY N/A 11/11/2021    Procedure: COLONOSCOPY;  Surgeon: Deloris Galicia MD;  Location: [facility];  Service: Endoscopy;  Laterality: N/A;    DILATION AND CURETTAGE OF UTERUS      ESOPHAGEAL MOTILITY STUDY USING HIGH RESOLUTION MANOMETRY N/A 11/03/2021    Procedure: MOTILITY STUDY, ESOPHAGUS, USING HIGH RESOLUTION MANOMETRY;  Surgeon: Yaw Manuel RN;  Location: [facility];  Service: Endoscopy;  Laterality: N/A;    ESOPHAGOGASTRODUODENOSCOPY N/A 12/02/2020    Procedure: ESOPHAGOGASTRODUODENOSCOPY (EGD);  Surgeon: Evangelista Erickson MD;  Location: [facility];  Service: Endoscopy;  Laterality: N/A;    ESOPHAGOGASTRODUODENOSCOPY N/A 11/11/2021    Procedure: EGD (ESOPHAGOGASTRODUODENOSCOPY);  Surgeon: Deloris Galicia MD;  Location: [facility];  Service: Endoscopy;  Laterality: N/A;    ESOPHAGOGASTRODUODENOSCOPY N/A 01/26/2023    Procedure: EGD (ESOPHAGOGASTRODUODENOSCOPY);  Surgeon: Jasvir Robert MD;  Location: [facility];  Service: Endoscopy;  Laterality: N/A;  EGD with BRAVO (ON PPI); Hiatial hernia, GERD    RIGHT HEART CATHETERIZATION Right 06/15/2020    Procedure: INSERTION, CATHETER, RIGHT HEART;  Surgeon: Ashlie Belle MD;  Location: [facility] C[facility];  Service: Cardiology;  Laterality: Right;       Social History     Tobacco Use    Smoking status: Never     Passive exposure: Never    Smokeless tobacco: Never   Substance Use  Topics    Alcohol use: Yes     Alcohol/week: 5.0 standard drinks of alcohol     Types: 5 Shots of liquor per week     Comment: 1-2 shots every other month    Drug use: No       Family History   Problem Relation Name Age of Onset    Heart disease Mother Estefani     Obesity Mother Estefani     Hypertension Mother Estefani     Kidney disease Father Adeel     Hypertension Father Adeel     Obesity Father Adeel     Heart disease Father Adeel     Diabetes Sister 12A 2D     Aneurysm Sister 12A 2D         AAA    Asthma Son Thomas Pardo     Depression Son Thomas Pardo     Cancer Sister Nella     COPD Sister Yelitza     Kidney disease Sister Jocelyn     Mental illness Sister Cheryl     Miscarriages / Stillbirths Sister Blanca        Patient's Medications   New Prescriptions    No medications on file   Previous Medications    ACETAMINOPHEN (TYLENOL) 500 MG TABLET    Take 500 mg by mouth every 6 (six) hours as needed for Pain.    ASPIRIN (ECOTRIN) 81 MG EC TABLET    Take 81 mg by mouth once daily.    FUROSEMIDE (LASIX) 40 MG TABLET    Take 1 tablet (40 mg total) by mouth daily as needed (edema, swelling, fluid).    GABAPENTIN (NEURONTIN) 100 MG CAPSULE    Take 1 capsule (100 mg total) by mouth every evening.    LOSARTAN-HYDROCHLOROTHIAZIDE 100-12.5 MG (HYZAAR) 100-12.5 MG TAB    Take 1 tablet by mouth once daily    MELOXICAM (MOBIC) 7.5 MG TABLET    Take 1 tablet (7.5 mg total) by mouth daily as needed for Pain. Take with food    PANTOPRAZOLE (PROTONIX) 40 MG TABLET    Take 1 tablet by mouth once daily    POTASSIUM CHLORIDE SA (KLOR-CON M20) 20 MEQ TABLET    Take 1 tablet (20 mEq total) by mouth once daily. Take extra potassium with higher lasix dose    SILDENAFIL (REVATIO) 20 MG TAB    Take 1 tablet (20 mg total) by mouth 3 (three) times daily.    SIMVASTATIN (ZOCOR) 40 MG TABLET    TAKE 1 TABLET BY MOUTH ONCE DAILY IN THE EVENING    TRIAMCINOLONE ACETONIDE 0.025% (KENALOG) 0.025 % CREAM    Apply topically 2 (two) times  daily as needed (for dry patches on face.). Mild steroid. Use sparingly for 1-2 weeks if needed then stop.   Modified Medications    No medications on file   Discontinued Medications    No medications on file       Review of Systems   Constitutional:  Positive for malaise/fatigue.   HENT: Negative.     Eyes: Negative.    Respiratory:  Positive for shortness of breath.    Cardiovascular:  Positive for chest pain and leg swelling.   Gastrointestinal:  Positive for heartburn.   Genitourinary: Negative.    Musculoskeletal: Negative.    Skin: Negative.    Neurological: Negative.    Endo/Heme/Allergies: Negative.    Psychiatric/Behavioral: Negative.     All 12 systems otherwise negative.      Wt Readings from Last 3 Encounters:   03/14/25 104.2 kg (229 lb 11.5 oz)   03/14/25 104.2 kg (229 lb 11.5 oz)   03/14/25 104.2 kg (229 lb 11.5 oz)     Temp Readings from Last 3 Encounters:   03/07/25 97.4 °F (36.3 °C) (Temporal)   02/11/25 97.5 °F (36.4 °C) (Temporal)   10/30/24 97.8 °F (36.6 °C) (Tympanic)     BP Readings from Last 3 Encounters:   03/14/25 118/76   03/14/25 131/60   03/07/25 129/72     Pulse Readings from Last 3 Encounters:   03/14/25 60   03/14/25 93   03/07/25 66       /76 (BP Location: Left arm, Patient Position: Sitting)   Pulse 60   Wt 104.2 kg (229 lb 11.5 oz)   SpO2 96%   BMI 46.40 kg/m²     Objective:   Physical Exam  Vitals and nursing note reviewed.   Constitutional:       General: She is not in acute distress.     Appearance: She is well-developed. She is obese. She is not diaphoretic.   HENT:      Head: Normocephalic and atraumatic.      Nose: Nose normal.   Eyes:      General: No scleral icterus.     Conjunctiva/sclera: Conjunctivae normal.   Neck:      Thyroid: No thyromegaly.      Vascular: No JVD.   Cardiovascular:      Rate and Rhythm: Normal rate and regular rhythm.      Heart sounds: S1 normal and S2 normal. Murmur heard.      No friction rub. No gallop. No S3 or S4 sounds.   Pulmonary:       Effort: Pulmonary effort is normal. No respiratory distress.      Breath sounds: Normal breath sounds. No stridor. No wheezing or rales.   Chest:      Chest wall: No tenderness.   Abdominal:      General: Bowel sounds are normal. There is no distension.      Palpations: Abdomen is soft. There is no mass.      Tenderness: There is no abdominal tenderness. There is no rebound.   Genitourinary:     Comments: Deferred  Musculoskeletal:         General: No tenderness or deformity. Normal range of motion.      Cervical back: Normal range of motion and neck supple.   Lymphadenopathy:      Cervical: No cervical adenopathy.   Skin:     General: Skin is warm and dry.      Coloration: Skin is not pale.      Findings: No erythema or rash.   Neurological:      Mental Status: She is alert and oriented to person, place, and time.      Motor: No abnormal muscle tone.      Coordination: Coordination normal.   Psychiatric:         Behavior: Behavior normal.         Thought Content: Thought content normal.         Judgment: Judgment normal.         Lab Results   Component Value Date     06/24/2024    K 4.3 06/24/2024    CL 98 06/24/2024    CO2 28 06/24/2024    BUN 21 06/24/2024    CREATININE 1.0 06/24/2024    GLU 78 06/24/2024    HGBA1C 5.7 (H) 06/24/2024    MG 2.0 06/24/2024    AST 14 06/24/2024    ALT 9 (L) 06/24/2024    ALBUMIN 3.8 06/24/2024    PROT 8.2 06/24/2024    BILITOT 0.6 06/24/2024    WBC 4.94 06/24/2024    HGB 12.9 06/24/2024    HCT 40.9 06/24/2024    MCV 93 06/24/2024     06/24/2024    INR 1.0 06/15/2020    TSH 0.766 06/24/2024    CHOL 222 (H) 06/24/2024    CHOL 222 (H) 06/24/2024    HDL 48 06/24/2024    HDL 48 06/24/2024    LDLCALC 140.6 06/24/2024    LDLCALC 140.6 06/24/2024    TRIG 167 (H) 06/24/2024    TRIG 167 (H) 06/24/2024    BNP 48 08/18/2020         BNP (pg/mL)   Date Value   08/18/2020 48   03/29/2018 110 (H)   03/26/2018 57     INR (no units)   Date Value   06/15/2020 1.0   01/18/2006 1.1           Assessment:      1. [place] (obstructive sleep apnea)    2. Pulmonary hypertension    3. Morbid obesity with BMI of 45.0-49.9, adult    4. Atherosclerosis of aorta    5. Mixed hyperlipidemia    6. [place] on CPAP    7. Essential hypertension    8. Nonspecific abnormal electrocardiogram (ECG) (EKG)    9. Palpitations    10. Stable angina pectoris    11. MVP (mitral valve prolapse)    12. Prediabetes          Plan:     Chest pain, HERNÁNDEZ , pulm HTN   - Nuc stress neg for ischemia 1/2025.  - will discuss further tx if needed  - ECHO 9/2024 with normal bi V function, grade 2 DD, mild MR, mild to mod TR, PASP 53 mmHg.     2. HTN with edema, HFPEF  - titrate meds  - increased lasix to 40mg PRN  - improving    3. GERD, large hiatal hernia   - cont PPI, f/u GI; has upcoming tests     4. HLD, aortic atheros  - cont statin    5. [place] - Moderate obstructive sleep apnea during diagnostic with SpO2 hadley 68%   - cont CPAP  - start Zepbound    6. PreDM, Obesity, morbid. BMI 47 - 235-->   BMI 46 - 229 lbs   - cont weight loss  - start Zepbound    7. Preop CV EVAL - EGD  - low CV risk, proceed   - do not start Zepbound until after procedures    Visit today included increased complexity associated with the care of the episodic problem chest pain addressed and managing the longitudinal care of the patient due to the serious and/or complex managed problem(s) .      Thank you for allowing me to participate in this patient's care. Please do not hesitate to contact me with any questions or concerns. Consult note has been forwarded to the referral physician.

## 2025-03-14 NOTE — PROCEDURES
"[facility] ER[facility]ulmonary Function 3rdFl  Six Minute Walk     SUMMARY     Ordering Provider: Ernie Mcintosh MD   Interpreting Provider: Ernie Mcintosh MD  Performing nurse/tech/RT: VT, RT  Diagnosis: Pulmonary Hypertension  Height: 4' 11" (149.9 cm)  Weight: 104.2 kg (229 lb 11.5 oz)  BMI (Calculated): 46.4                Phase Oxygen Assessment Supplemental O2 Heart   Rate Blood Pressure Marcelle Dyspnea Scale Rating   Resting 99 % Room Air 88 bpm 145/75 1   Exercise        Minute        1 96 % Room Air 127 bpm     2 96 % Room Air 117 bpm     3 97 % Room Air 125 bpm     4 97 % Room Air 123 bpm     5 97 % Room Air 121 bpm     6  97 % Room Air 124 bpm 137/64 5-6   Recovery        Minute        1 98 % Room Air 102 bpm     2 99 % Room Air 100 bpm     3 98 % Room Air 99 bpm     4 98 % Room Air 93 bpm 131/60 3     Six Minute Walk Summary  6MWT Status: completed without stopping  Patient Reported: Dyspnea     Interpretation:  Did the patient stop or pause?: No                                         Total Time Walked (Calculated): 360 seconds  Final Partial Lap Distance (feet): 100 feet  Total Distance Meters (Calculated): 335.28 meters  Predicted Distance Meters (Calculated): 328.51 meters  Percentage of Predicted (Calculated): 102.06  Peak VO2 (Calculated): 14.04  Mets: 4.01  Has The Patient Had a Previous Six Minute Walk Test?: Yes       Previous 6MWT Results  Has The Patient Had a Previous Six Minute Walk Test?: Yes  Date of Previous Test: 09/13/24  Total Time Walked: 360 seconds  Total Distance (meters): 358.14  Predicted Distance (meters): 327.6 meters  Percentage of Predicted: 109.32  Percent Change (Calculated): 0.06      "

## 2025-03-14 NOTE — TELEPHONE ENCOUNTER
PA initiated for [employer] through CoverMyMeds.        Your information has been submitted to [employer]. [employer] will review the request and will issue a decision, typically within 1-3 days from your submission. You can check the updated outcome later by reopening this request.  If [employer] has not responded in 1-3 days or if you have any questions about your ePA request, please contact [employer] at [phone]. If you think there may be a problem with your PA request, use our live chat feature at the bottom right.

## 2025-03-14 NOTE — ASSESSMENT & PLAN NOTE
3/14/2025   EPWORTH SLEEPINESS SCALE   Sitting and reading 3   Watching TV 3   Sitting, inactive in a public place (e.g. a theatre or a meeting) 0   As a passenger in a car for an hour without a break 1   Lying down to rest in the afternoon when circumstances permit 3   Sitting and talking to someone 0   Sitting quietly after a lunch without alcohol 3   In a car, while stopped for a few minutes in traffic 0   Total score 13        Bed time 9pm to 2 am  Wake time : 10 am    Data   02/12/2025 to 03/13/2025    Usage > 4 hrs was 90%    CPAP 16 cm    AHI 1.8    USING AND BENEFITS

## 2025-03-17 ENCOUNTER — RESULTS FOLLOW-UP (OUTPATIENT)
Dept: SURGERY | Facility: CLINIC | Age: 75
End: 2025-03-17

## 2025-03-17 ENCOUNTER — HOSPITAL ENCOUNTER (OUTPATIENT)
Dept: RADIOLOGY | Facility: HOSPITAL | Age: 75
Discharge: HOME | End: 2025-03-17
Payer: MEDICARE

## 2025-03-17 DIAGNOSIS — K44.9 HIATAL HERNIA: ICD-10-CM

## 2025-03-17 PROCEDURE — A9698 NON-RAD CONTRAST MATERIALNOC: HCPCS

## 2025-03-17 PROCEDURE — 74240 X-RAY XM UPR GI TRC 1CNTRST: CPT | Mod: 26,,, | Performed by: RADIOLOGY

## 2025-03-17 PROCEDURE — 25500020 PHARM REV CODE 255

## 2025-03-17 PROCEDURE — 74240 X-RAY XM UPR GI TRC 1CNTRST: CPT | Mod: TC

## 2025-03-17 RX ADMIN — BARIUM SULFATE 137 ML: 980 POWDER, FOR SUSPENSION ORAL at 09:03

## 2025-03-17 RX ADMIN — BARIUM SULFATE 176 G: 960 POWDER, FOR SUSPENSION ORAL at 09:03

## 2025-03-19 ENCOUNTER — RESULTS FOLLOW-UP (OUTPATIENT)
Dept: SLEEP MEDICINE | Facility: CLINIC | Age: 75
End: 2025-03-19

## 2025-03-21 ENCOUNTER — ANESTHESIA EVENT (OUTPATIENT)
Dept: ENDOSCOPY | Facility: HOSPITAL | Age: 75
End: 2025-03-21
Payer: MEDICARE

## 2025-03-21 ENCOUNTER — ANESTHESIA (OUTPATIENT)
Dept: ENDOSCOPY | Facility: HOSPITAL | Age: 75
End: 2025-03-21
Payer: MEDICARE

## 2025-03-21 ENCOUNTER — HOSPITAL ENCOUNTER (OUTPATIENT)
Dept: ENDOSCOPY | Facility: HOSPITAL | Age: 75
Discharge: HOME | End: 2025-03-21
Admitting: INTERNAL MEDICINE
Payer: MEDICARE

## 2025-03-21 VITALS
TEMPERATURE: 98 F | DIASTOLIC BLOOD PRESSURE: 56 MMHG | RESPIRATION RATE: 18 BRPM | BODY MASS INDEX: 45.56 KG/M2 | SYSTOLIC BLOOD PRESSURE: 109 MMHG | OXYGEN SATURATION: 100 % | HEIGHT: 59 IN | HEART RATE: 60 BPM | WEIGHT: 226 LBS

## 2025-03-21 DIAGNOSIS — K21.9 HIATAL HERNIA WITH GERD: Primary | ICD-10-CM

## 2025-03-21 DIAGNOSIS — R13.12 OROPHARYNGEAL DYSPHAGIA: ICD-10-CM

## 2025-03-21 DIAGNOSIS — K44.9 HIATAL HERNIA WITH GERD: Primary | ICD-10-CM

## 2025-03-21 PROCEDURE — 37000008 HC ANESTHESIA 1ST 15 MINUTES

## 2025-03-21 PROCEDURE — 63600175 PHARM REV CODE 636 W HCPCS: Performed by: FAMILY MEDICINE

## 2025-03-21 RX ORDER — LIDOCAINE HYDROCHLORIDE 10 MG/ML
INJECTION, SOLUTION EPIDURAL; INFILTRATION; INTRACAUDAL; PERINEURAL
Status: DISCONTINUED | OUTPATIENT
Start: 2025-03-21 | End: 2025-03-21

## 2025-03-21 RX ORDER — PROPOFOL 10 MG/ML
VIAL (ML) INTRAVENOUS
Status: DISCONTINUED | OUTPATIENT
Start: 2025-03-21 | End: 2025-03-21

## 2025-03-21 RX ADMIN — PROPOFOL 50 MG: 10 INJECTION, EMULSION INTRAVENOUS at 09:03

## 2025-03-21 RX ADMIN — LIDOCAINE HYDROCHLORIDE 50 MG: 10 SOLUTION INTRAVENOUS at 09:03

## 2025-03-21 RX ADMIN — PROPOFOL 100 MG: 10 INJECTION, EMULSION INTRAVENOUS at 09:03

## 2025-03-21 NOTE — ANESTHESIA POSTPROCEDURE EVALUATION
Anesthesia Post Evaluation    Patient: Yoana Pardo    Procedure(s) Performed: * No procedures listed *    Final Anesthesia Type: MAC      Patient location during evaluation: GI PACU  Patient participation: Yes- Able to Participate  Level of consciousness: awake and alert  Post-procedure vital signs: reviewed and stable  Pain management: adequate  Airway patency: patent    PONV status at discharge: No PONV  Anesthetic complications: no      Cardiovascular status: stable  Respiratory status: unassisted and spontaneous ventilation  Hydration status: euvolemic  Follow-up not needed.              Vitals Value Taken Time   BP  03/21/25 09:28   Temp  03/21/25 09:28   Pulse  03/21/25 09:28   Resp  03/21/25 09:28   SpO2  03/21/25 09:28         No case tracking events are documented in the log.      Pain/Fredi Score: No data recorded

## 2025-03-21 NOTE — H&P
"PRE PROCEDURE H&P    Patient Name: Yoana Pardo  MRN: [id number]  : 1950  Date of Procedure:  3/21/2025  Referring Physician: Latoya Colin PA-C  Primary Physician: Lynn Wiggins MD  Procedure Physician: Angie Gordon MD       Planned Procedure: EGD  Diagnosis:  HH and Dysphagia    Chief Complaint: Same as above    HPI: Patient is an 74 y.o. female is here for the above. Referred by Latoya Colin PA-C for dysphagia. She was initially seen in the surgery clinic for HH and possible repair. HH seen on previous EGD 2023. Noted to be "large" with no esophagitis. States she has dysphagia to solids such as meats and liquids such as water. Esophageal manometry study from  was normal. It has recently been repeated and results are pending. Denies weight loss. No blood thinners. No family at bedside. Gastric biopsies from  showed antral focal IM however repeat EGD in  with biopsies were negative. She is overdue for her colonoscopy. On Protonix 40mg daily.     Last colonoscopy: , 3 TA removed, recall 3 years  Family history: sister in her 70's  Anticoagulation: none    Past Medical History:   Past Medical History:   Diagnosis Date    GERD (gastroesophageal reflux disease)     Hemorrhoids     History of positive PPD, untreated     Hx of cold sores     Hyperlipidemia     Hypertension     MVP (mitral valve prolapse)     Peripheral neuropathy     Pulmonary HTN     Dr Bailon    Sleep apnea     [place]-CPAP  uses intermittently        Past Surgical History:  Past Surgical History:   Procedure Laterality Date    CHOLECYSTECTOMY      COLONOSCOPY N/A 2021    Procedure: COLONOSCOPY;  Surgeon: Deloris Galicia MD;  Location: [facility];  Service: Endoscopy;  Laterality: N/A;    DILATION AND CURETTAGE OF UTERUS      ESOPHAGEAL MOTILITY STUDY USING HIGH RESOLUTION MANOMETRY N/A 2021    Procedure: MOTILITY STUDY, ESOPHAGUS, USING HIGH RESOLUTION MANOMETRY;  Surgeon: Yaw Manuel" RN;  Location: [facility] ENDO;  Service: Endoscopy;  Laterality: N/A;    ESOPHAGOGASTRODUODENOSCOPY N/A 12/02/2020    Procedure: ESOPHAGOGASTRODUODENOSCOPY (EGD);  Surgeon: Evangelista Erickson MD;  Location: [facility] ENDO;  Service: Endoscopy;  Laterality: N/A;    ESOPHAGOGASTRODUODENOSCOPY N/A 11/11/2021    Procedure: EGD (ESOPHAGOGASTRODUODENOSCOPY);  Surgeon: Deloris Galicia MD;  Location: [facility] ENDO;  Service: Endoscopy;  Laterality: N/A;    ESOPHAGOGASTRODUODENOSCOPY N/A 01/26/2023    Procedure: EGD (ESOPHAGOGASTRODUODENOSCOPY);  Surgeon: Jasvir Robert MD;  Location: [facility];  Service: Endoscopy;  Laterality: N/A;  EGD with BRAVO (ON PPI); Hiatial hernia, GERD    RIGHT HEART CATHETERIZATION Right 06/15/2020    Procedure: INSERTION, CATHETER, RIGHT HEART;  Surgeon: Ashlie Belle MD;  Location: [facility] CATH LAB;  Service: Cardiology;  Laterality: Right;        Home Medications:  Prior to Admission medications    Medication Sig Start Date End Date Taking? Authorizing Provider   aspirin (ECOTRIN) 81 MG EC tablet Take 81 mg by mouth once daily.   Yes Provider, Historical   furosemide (LASIX) 40 MG tablet Take 1 tablet (40 mg total) by mouth daily as needed (edema, swelling, fluid). 1/24/25  Yes Villa Michele MD   losartan-hydrochlorothiazide 100-12.5 mg (HYZAAR) 100-12.5 mg Tab Take 1 tablet by mouth once daily. 3/14/25 3/14/26 Yes Villa Michele MD   pantoprazole (PROTONIX) 40 MG tablet Take 1 tablet by mouth once daily 12/31/24  Yes Lynn Wiggins MD   potassium chloride SA (KLOR-CON M20) 20 MEQ tablet Take 1 tablet (20 mEq total) by mouth once daily. Take extra potassium with higher lasix dose 3/14/25  Yes Villa Michele MD   sildenafil (REVATIO) 20 mg Tab Take 1 tablet (20 mg total) by mouth 3 (three) times daily. 3/14/25  Yes Ernie Mcintosh MD   simvastatin (ZOCOR) 40 MG tablet TAKE 1 TABLET BY MOUTH ONCE DAILY IN THE EVENING 10/7/24  Yes Pascale Whitaker MD   triamcinolone acetonide 0.025% (KENALOG)  0.025 % cream Apply topically 2 (two) times daily as needed (for dry patches on face.). Mild steroid. Use sparingly for 1-2 weeks if needed then stop. 7/10/24  Yes Monica Gómez MD   acetaminophen (TYLENOL) 500 MG tablet Take 500 mg by mouth every 6 (six) hours as needed for Pain.    Provider, Historical   gabapentin (NEURONTIN) 100 MG capsule Take 1 capsule (100 mg total) by mouth every evening. 10/10/24   Lynn Wiggins MD   magnesium oxide (MAG-OX) 400 mg (241.3 mg magnesium) tablet Take 1 tablet (400 mg total) by mouth once daily. 3/14/25   Villa Michele MD   meloxicam (MOBIC) 7.5 MG tablet Take 1 tablet (7.5 mg total) by mouth daily as needed for Pain. Take with food 10/30/24   Devan Yu, NP   tirzepatide, weight loss, (ZEPBOUND) 2.5 mg/0.5 mL PnIj Inject 2.5 mg into the skin every 7 days. Call office in 6 weeks to increase dose 3/14/25   Villa Michele MD        Allergies:  Review of patient's allergies indicates:   Allergen Reactions    Lisinopril Other (See Comments)     Cough      Bactrim [sulfamethoxazole-trimethoprim] Itching    Zosyn [piperacillin-tazobactam] Hives     Pt received second dose of Zosyn and had hives on both arms. Benadryl given and pt continued to receive zosyn with no issues. Hydralazine also given around the same time and discontinued.         Social History:   Social History[1]    Family History:  Family History   Problem Relation Name Age of Onset    Heart disease Mother Estefani     Obesity Mother Estefani     Hypertension Mother Estefani     Kidney disease Father Adeel     Hypertension Father Adeel     Obesity Father Adeel     Heart disease Father Adeel     Diabetes Sister 12A 2D     Aneurysm Sister 12A 2D         AAA    Asthma Son Thomas Pardo     Depression Son Thomas Pardo     Cancer Sister Nella     COPD Sister Yelitza     Kidney disease Sister Jocelyn     Mental illness Sister Cheryl     Miscarriages / Stillbirths Sister Blanca        ROS: No acute cardiac events,  "no acute respiratory complaints.     Physical Exam (all patients):    BP (!) 129/59 (BP Location: Left arm, Patient Position: Sitting)   Pulse 68   Temp 97.3 °F (36.3 °C) (Temporal)   Resp 15   Ht 4' 11" (1.499 m)   Wt 102.5 kg (226 lb)   SpO2 97%   Breastfeeding No   BMI 45.65 kg/m²   Lungs: Clear to auscultation bilaterally, respirations unlabored  Heart: Regular rate and rhythm, S1 and S2 normal, no obvious murmurs  Abdomen:         Soft, non-tender, bowel sounds normal, no masses, no organomegaly    Lab Results   Component Value Date    WBC 4.94 06/24/2024    MCV 93 06/24/2024    RDW 13.7 06/24/2024     06/24/2024    INR 1.0 06/15/2020    GLU 78 06/24/2024    HGBA1C 5.7 (H) 06/24/2024    BUN 21 06/24/2024     06/24/2024    K 4.3 06/24/2024    CL 98 06/24/2024        SEDATION PLAN: per anesthesia      History reviewed, vital signs satisfactory, cardiopulmonary status satisfactory, sedation options, risks and plans have been discussed with the patient  All their questions were answered and the patient agrees to the sedation procedures as planned and the patient is deemed an appropriate candidate for the sedation as planned.    The risks, benefits and alternatives of the procedure were discussed with the patient in detail. This discussion was had in the presence of endoscopy staff. The risks include, risks of adverse reaction to sedation requiring the use of reversal agents, bleeding requiring blood transfusion, perforation requiring surgical intervention and technical failure. Other risks include aspiration leading to respiratory distress and respiratory failure resulting in endotracheal intubation and mechanical ventilation including death. If anesthesia is being utilized for this procedure, it is up to the anesthesiologist to determine airway safety including elective endotracheal intubation. Questions were answered, they agree to proceed. There was no language barriers.      Procedure " explained to patient, informed consent obtained and placed in chart.    Angie Gordon  3/21/2025  9:07 AM          [1]   Social History  Socioeconomic History    Marital status:     Number of children: 3   Occupational History    Occupation: [employer]      Employer: [employer]   Tobacco Use    Smoking status: Never     Passive exposure: Never    Smokeless tobacco: Never   Substance and Sexual Activity    Alcohol use: Yes     Alcohol/week: 5.0 standard drinks of alcohol     Types: 5 Shots of liquor per week     Comment: 1-2 shots every other month    Drug use: No    Sexual activity: Not Currently     Partners: Male     Birth control/protection: None     Social Drivers of Health     Financial Resource Strain: Medium Risk (2/11/2025)    Overall Financial Resource Strain (CARDIA)     Difficulty of Paying Living Expenses: Somewhat hard   Food Insecurity: No Food Insecurity (2/11/2025)    Hunger Vital Sign     Worried About Running Out of Food in the Last Year: Never true     Ran Out of Food in the Last Year: Never true   Transportation Needs: No Transportation Needs (2/11/2025)    PRAPARE - Transportation     Lack of Transportation (Medical): No     Lack of Transportation (Non-Medical): No   Physical Activity: Insufficiently Active (2/11/2025)    Exercise Vital Sign     Days of Exercise per Week: 2 days     Minutes of Exercise per Session: 10 min   Stress: Stress Concern Present (2/11/2025)    [employer] - Occupational Stress Questionnaire     Feeling of Stress : To some extent   Housing Stability: Unknown (2/11/2025)    Housing Stability Vital Sign     Unable to Pay for Housing in the Last Year: No     Homeless in the Last Year: No

## 2025-03-21 NOTE — TRANSFER OF CARE
"Anesthesia Transfer of Care Note    Patient: Yoana Pardo    Procedure(s) Performed: * No procedures listed *    Patient location: GI    Anesthesia Type: MAC    Transport from OR: Transported from OR on room air with adequate spontaneous ventilation    Post pain: adequate analgesia    Post assessment: no apparent anesthetic complications    Post vital signs: stable    Level of consciousness: awake and responds to stimulation    Nausea/Vomiting: no nausea/vomiting    Complications: none    Transfer of care protocol was followed      Last vitals: Visit Vitals  BP (!) 129/59 (BP Location: Left arm, Patient Position: Sitting)   Pulse 68   Temp 36.3 °C (97.3 °F) (Temporal)   Resp 15   Ht 4' 11" (1.499 m)   Wt 102.5 kg (226 lb)   SpO2 97%   Breastfeeding No   BMI 45.65 kg/m²     "

## 2025-03-21 NOTE — ANESTHESIA PREPROCEDURE EVALUATION
03/21/2025  Yoana Pardo is a 74 y.o., female.      Pre-op Assessment    I have reviewed the Patient Summary Reports.     I have reviewed the Nursing Notes. I have reviewed the NPO Status.   I have reviewed the Medications.     Review of Systems  Anesthesia Hx:  No problems with previous Anesthesia             Denies Family Hx of Anesthesia complications.    Denies Personal Hx of Anesthesia complications.                    Social:  Social Alcohol Use, Non-Smoker       Hematology/Oncology:  Hematology Normal   Oncology Normal                                   Cardiovascular:     Hypertension Valvular problems/Murmurs, MVP  Denies MI.     Denies CABG/stent.     Denies CHF.    hyperlipidemia   ECG has been reviewed. ekg 7/2022:  Normal sinus rhythm 64  Nonspecific T wave abnormality   Abnormal ECG   When compared with ECG of 10-ANDREA-2022 15:43,   Premature ventricular complexes are no longer Present   Premature supraventricular complexes are no longer Present    Stress 8/2022:    Normal myocardial perfusion scan. There is no evidence of myocardial ischemia or infarction.    The gated perfusion images showed an ejection fraction of > 70% at rest. The gated perfusion images showed an ejection fraction of > 70% post stress.    The EKG portion of this study is negative for ischemia.    The patient reported no chest pain during the stress test.    During stress, occasional PACs are noted                             Pulmonary:    Denies COPD.  Denies Asthma.    Sleep Apnea pulm htn               Renal/:  Renal/ Normal                 Hepatic/GI:    Hiatal Hernia, GERD Denies Liver Disease.  Denies Hepatitis.              Musculoskeletal:  Arthritis               Neurological:    Denies CVA.    Denies Seizures.    Memory loss      Peripheral Neuropathy                          Endocrine:  Denies Diabetes.  Denies Hypothyroidism.  Denies Hyperthyroidism.       Morbid Obesity / BMI > 40  Psych:     Behaviorally induced insufficient sleep syndrome               Physical Exam  General: Well nourished, Cooperative, Alert and Oriented    Airway:  Mallampati: II   Mouth Opening: Normal  TM Distance: Normal  Tongue: Normal  Neck ROM: Normal ROM    Dental:  Intact    Chest/Lungs:  Clear to auscultation    Heart:  Rhythm: Regular Rhythm  Sounds: Normal    Abdomen:  Normal        Anesthesia Plan  Type of Anesthesia, risks & benefits discussed:    Anesthesia Type: MAC  Intra-op Monitoring Plan: Standard ASA Monitors  Induction:  IV  Informed Consent: Informed consent signed with the Patient and all parties understand the risks and agree with anesthesia plan.  All questions answered.   ASA Score: 3  Day of Surgery Review of History & Physical: H&P Update referred to the surgeon/provider.I have interviewed and examined the patient. I have reviewed the patient's H&P dated:     Ready For Surgery From Anesthesia Perspective.     .

## 2025-03-25 VITALS
RESPIRATION RATE: 18 BRPM | SYSTOLIC BLOOD PRESSURE: 109 MMHG | HEART RATE: 60 BPM | OXYGEN SATURATION: 100 % | BODY MASS INDEX: 45.56 KG/M2 | TEMPERATURE: 98 F | HEIGHT: 59 IN | DIASTOLIC BLOOD PRESSURE: 56 MMHG | WEIGHT: 226 LBS

## 2025-06-18 DIAGNOSIS — E78.2 MIXED HYPERLIPIDEMIA: ICD-10-CM

## 2025-06-18 RX ORDER — SIMVASTATIN 40 MG/1
40 TABLET, FILM COATED ORAL NIGHTLY
Qty: 90 TABLET | Refills: 1 | Status: SHIPPED | OUTPATIENT
Start: 2025-06-18

## 2025-06-25 ENCOUNTER — OFFICE VISIT (OUTPATIENT)
Dept: CARDIOLOGY | Facility: CLINIC | Age: 75
End: 2025-06-25
Payer: MEDICARE

## 2025-06-25 VITALS
OXYGEN SATURATION: 98 % | HEART RATE: 68 BPM | SYSTOLIC BLOOD PRESSURE: 118 MMHG | DIASTOLIC BLOOD PRESSURE: 72 MMHG | BODY MASS INDEX: 47.2 KG/M2 | WEIGHT: 233.69 LBS

## 2025-06-25 DIAGNOSIS — I34.1 MVP (MITRAL VALVE PROLAPSE): ICD-10-CM

## 2025-06-25 DIAGNOSIS — I10 ESSENTIAL HYPERTENSION: ICD-10-CM

## 2025-06-25 DIAGNOSIS — G47.33 MODERATE OBSTRUCTIVE SLEEP APNEA: ICD-10-CM

## 2025-06-25 DIAGNOSIS — R73.03 PREDIABETES: ICD-10-CM

## 2025-06-25 DIAGNOSIS — I27.20 PULMONARY HYPERTENSION: ICD-10-CM

## 2025-06-25 DIAGNOSIS — I20.89 STABLE ANGINA PECTORIS: ICD-10-CM

## 2025-06-25 DIAGNOSIS — G47.33 OSA ON CPAP: ICD-10-CM

## 2025-06-25 DIAGNOSIS — R94.31 NONSPECIFIC ABNORMAL ELECTROCARDIOGRAM (ECG) (EKG): ICD-10-CM

## 2025-06-25 DIAGNOSIS — R00.2 PALPITATIONS: ICD-10-CM

## 2025-06-25 DIAGNOSIS — E66.01 MORBID OBESITY WITH BMI OF 45.0-49.9, ADULT: ICD-10-CM

## 2025-06-25 DIAGNOSIS — G47.33 OSA (OBSTRUCTIVE SLEEP APNEA): ICD-10-CM

## 2025-06-25 DIAGNOSIS — I10 ESSENTIAL HYPERTENSION: Primary | ICD-10-CM

## 2025-06-25 DIAGNOSIS — E78.2 MIXED HYPERLIPIDEMIA: ICD-10-CM

## 2025-06-25 DIAGNOSIS — I70.0 ATHEROSCLEROSIS OF AORTA: ICD-10-CM

## 2025-06-25 PROCEDURE — 99214 OFFICE O/P EST MOD 30 MIN: CPT | Mod: S$PBB,,, | Performed by: INTERNAL MEDICINE

## 2025-06-25 PROCEDURE — G2211 COMPLEX E/M VISIT ADD ON: HCPCS | Mod: ,,, | Performed by: INTERNAL MEDICINE

## 2025-06-25 PROCEDURE — 99213 OFFICE O/P EST LOW 20 MIN: CPT | Mod: PBBFAC | Performed by: INTERNAL MEDICINE

## 2025-06-25 PROCEDURE — 99999 PR PBB SHADOW E&M-EST. PATIENT-LVL III: CPT | Mod: PBBFAC,,, | Performed by: INTERNAL MEDICINE

## 2025-06-25 RX ORDER — TIRZEPATIDE 2.5 MG/.5ML
2.5 INJECTION, SOLUTION SUBCUTANEOUS
Qty: 4 PEN | Refills: 1 | Status: SHIPPED | OUTPATIENT
Start: 2025-06-25

## 2025-06-25 NOTE — PROGRESS NOTES
Subjective:   Patient ID:  Yoana Pardo is a 74 y.o. female who presents for cardiac consult of Follow-up and Numbness      Referral by: No referring provider defined for this encounter.     Reason for consult:       HPI  The patient came in today for cardiac consult of Follow-up and Numbness      Yoana Pardo is a 74 y.o. female pt with HTN, HLD, pulm HTN, MVP, aortic atherosc, [place], obesity ,PreDM,  OA here for CV follow up.      1/24/25   Pt seen by Dr. Whitaker in past here for follow up.     Bp and HR stable. BMI 47 - 235 lbs.   Pt has been having substernal chest pain concern for reflux but also hiatal hernia.   Also has HERNÁNDEZ. Has gained some weight over holidays.     3/14/25  Nuc stress neg for ischemia 1/2025.   ECHO 9/2024 with normal bi V function, grade 2 DD, mild MR, mild to mod TR, PASP 53 mmHg.   Has upcoming EGD  BP and HR stable. BMI 46 - 229 lbs   She will have manometry on Monday.   She is taking lasix every other day almost.   Wants to lose weight, will start zepbound    Benefits of Glp1 agonist like medication prescribed reviewed with patient including improvement of insulin resistance which is the underlying hormonal dysfunction causing and leading to diabetes. This improves hormonal balance and can lower blood sugars if patient sugars are elevated. The patient was explained how the medicine works. I also reviewed side effects of medication including loss of appetite , heartburn/GERD, constipation, diarrhea, nausea (usually low blood sugar if before or between meals ), vomiting bloating , headache and risk of cancer if patient has personal or family history of MEN syndrome.     6/25/25  BP and HR stable. BMI 47 - 233 lbs   Zepbound was not approved.    Has completed EGD needs to get hiatal hernia surgery.   Needs to follow up with gen surg.   GI told pt to wait on getting on Zepbound until after surgery.       Results for orders placed during the hospital encounter of  01/29/25    Nuclear Stress - Cardiology Interpreted    Interpretation Summary    Normal myocardial perfusion scan. There is no evidence of myocardial ischemia or infarction.    The gated perfusion images showed an ejection fraction of 73% at rest. The gated perfusion images showed an ejection fraction of 80% post stress.    The ECG portion of the study is negative for ischemia.    There were no arrhythmias during stress.      Results for orders placed during the hospital encounter of 09/13/24    Echo    Interpretation Summary    Left Ventricle: The left ventricle is normal in size. Normal wall thickness. There is concentric remodeling. Normal wall motion. There is normal systolic function with a visually estimated ejection fraction of 60 - 65%. Ejection fraction by visual approximation is 60%. Grade II diastolic dysfunction.    Right Ventricle: Normal right ventricular cavity size. Systolic function is normal.    Left Atrium: Left atrium is severely dilated.    Mitral Valve: There is mild regurgitation.    Tricuspid Valve: There is mild to moderate regurgitation.    Pulmonary Artery: The estimated pulmonary artery systolic pressure is 53 mmHg.    IVC/SVC: Normal venous pressure at 3 mmHg.      Results for orders placed during the hospital encounter of 08/03/22    Nuclear Stress - Cardiology Interpreted    Interpretation Summary    Normal myocardial perfusion scan. There is no evidence of myocardial ischemia or infarction.    The gated perfusion images showed an ejection fraction of > 70% at rest. The gated perfusion images showed an ejection fraction of > 70% post stress.    The EKG portion of this study is negative for ischemia.    The patient reported no chest pain during the stress test.    During stress, occasional PACs are noted.      Results for orders placed during the hospital encounter of 06/15/20    Cardiac catheterization    Conclusion  · Estimated blood loss: none  · Filling pressures on the right are  mildly elevated. Pulmonary HTN is mild to moderate.    I certify that I was present for catheter insertion, catheter manipulation, angiography, and angiographic interpretation of this patient.    Procedure Log documented by Documenter: Apurva Ríos RN and verified by Shakeel Belle MD.    Date: 6/15/2020  Time: 12:41 PM      No cardiac monitor results found for the past 12 months         Past Medical History:   Diagnosis Date    GERD (gastroesophageal reflux disease)     Hemorrhoids     History of positive PPD, untreated     Hx of cold sores     Hyperlipidemia     Hypertension     MVP (mitral valve prolapse)     Peripheral neuropathy     Pulmonary HTN     Dr Bailon    Sleep apnea     [place]-CPAP  uses intermittently       Past Surgical History:   Procedure Laterality Date    CHOLECYSTECTOMY      COLONOSCOPY N/A 11/11/2021    Procedure: COLONOSCOPY;  Surgeon: Deloris Galicia MD;  Location: [facility];  Service: Endoscopy;  Laterality: N/A;    DILATION AND CURETTAGE OF UTERUS      ESOPHAGEAL MOTILITY STUDY USING HIGH RESOLUTION MANOMETRY N/A 11/03/2021    Procedure: MOTILITY STUDY, ESOPHAGUS, USING HIGH RESOLUTION MANOMETRY;  Surgeon: Yaw Manuel RN;  Location: [facility];  Service: Endoscopy;  Laterality: N/A;    ESOPHAGOGASTRODUODENOSCOPY N/A 12/02/2020    Procedure: ESOPHAGOGASTRODUODENOSCOPY (EGD);  Surgeon: Evangelista Erickson MD;  Location: [facility];  Service: Endoscopy;  Laterality: N/A;    ESOPHAGOGASTRODUODENOSCOPY N/A 11/11/2021    Procedure: EGD (ESOPHAGOGASTRODUODENOSCOPY);  Surgeon: Deloris Galicia MD;  Location: [facility];  Service: Endoscopy;  Laterality: N/A;    ESOPHAGOGASTRODUODENOSCOPY N/A 01/26/2023    Procedure: EGD (ESOPHAGOGASTRODUODENOSCOPY);  Surgeon: Jasvir Robert MD;  Location: [facility];  Service: Endoscopy;  Laterality: N/A;  EGD with BRAVO (ON PPI); Hiatial hernia, GERD    RIGHT HEART CATHETERIZATION Right 06/15/2020    Procedure: INSERTION, CATHETER, RIGHT HEART;   Surgeon: Ashlie Belle MD;  Location: [facility] CATH LAB;  Service: Cardiology;  Laterality: Right;       Social History     Tobacco Use    Smoking status: Never     Passive exposure: Never    Smokeless tobacco: Never   Substance Use Topics    Alcohol use: Yes     Alcohol/week: 5.0 standard drinks of alcohol     Types: 5 Shots of liquor per week     Comment: 1-2 shots every other month    Drug use: No       Family History   Problem Relation Name Age of Onset    Heart disease Mother Estefani     Obesity Mother Estefani     Hypertension Mother Estefani     Kidney disease Father Adeel     Hypertension Father Adeel     Obesity Father Adeel     Heart disease Father Adeel     Diabetes Sister 12A 2D     Aneurysm Sister 12A 2D         AAA    Asthma Son Thomas Pardo     Depression Son Thomas Pardo     Cancer Sister Nella     COPD Sister Yelitza     Kidney disease Sister Jocelyn     Mental illness Sister Cheryl     Miscarriages / Stillbirths Sister Blanca        Patient's Medications   New Prescriptions    No medications on file   Previous Medications    ACETAMINOPHEN (TYLENOL) 500 MG TABLET    Take 500 mg by mouth every 6 (six) hours as needed for Pain.    ASPIRIN (ECOTRIN) 81 MG EC TABLET    Take 81 mg by mouth once daily.    FUROSEMIDE (LASIX) 40 MG TABLET    Take 1 tablet (40 mg total) by mouth daily as needed (edema, swelling, fluid).    GABAPENTIN (NEURONTIN) 100 MG CAPSULE    Take 1 capsule (100 mg total) by mouth every evening.    LOSARTAN-HYDROCHLOROTHIAZIDE 100-12.5 MG (HYZAAR) 100-12.5 MG TAB    Take 1 tablet by mouth once daily.    MAGNESIUM OXIDE (MAG-OX) 400 MG (241.3 MG MAGNESIUM) TABLET    Take 1 tablet (400 mg total) by mouth once daily.    MELOXICAM (MOBIC) 7.5 MG TABLET    Take 1 tablet (7.5 mg total) by mouth daily as needed for Pain. Take with food    PANTOPRAZOLE (PROTONIX) 40 MG TABLET    Take 1 tablet by mouth once daily    POTASSIUM CHLORIDE SA (KLOR-CON M20) 20 MEQ TABLET    Take 1 tablet (20 mEq  total) by mouth once daily. Take extra potassium with higher lasix dose    SILDENAFIL (REVATIO) 20 MG TAB    Take 1 tablet (20 mg total) by mouth 3 (three) times daily.    SIMVASTATIN (ZOCOR) 40 MG TABLET    Take 1 tablet (40 mg total) by mouth every evening.    TIRZEPATIDE, WEIGHT LOSS, (ZEPBOUND) 2.5 MG/0.5 ML PNIJ    Inject 2.5 mg into the skin every 7 days. Call office in 6 weeks to increase dose    TRIAMCINOLONE ACETONIDE 0.025% (KENALOG) 0.025 % CREAM    Apply topically 2 (two) times daily as needed (for dry patches on face.). Mild steroid. Use sparingly for 1-2 weeks if needed then stop.   Modified Medications    No medications on file   Discontinued Medications    No medications on file       Review of Systems   Constitutional:  Positive for malaise/fatigue.   HENT: Negative.     Eyes: Negative.    Respiratory:  Positive for shortness of breath.    Cardiovascular:  Positive for chest pain and leg swelling.   Gastrointestinal:  Positive for heartburn.   Genitourinary: Negative.    Musculoskeletal: Negative.    Skin: Negative.    Neurological: Negative.    Endo/Heme/Allergies: Negative.    Psychiatric/Behavioral: Negative.     All 12 systems otherwise negative.      Wt Readings from Last 3 Encounters:   06/25/25 106 kg (233 lb 11 oz)   03/21/25 102.5 kg (226 lb)   03/14/25 104.2 kg (229 lb 11.5 oz)     Temp Readings from Last 3 Encounters:   03/21/25 98 °F (36.7 °C)   03/07/25 97.4 °F (36.3 °C) (Temporal)   02/11/25 97.5 °F (36.4 °C) (Temporal)     BP Readings from Last 3 Encounters:   06/25/25 118/72   03/21/25 (!) 109/56   03/14/25 118/76     Pulse Readings from Last 3 Encounters:   06/25/25 68   03/21/25 60   03/14/25 60       /72 (BP Location: Right arm, Patient Position: Sitting)   Pulse 68   Wt 106 kg (233 lb 11 oz)   SpO2 98%   BMI 47.20 kg/m²     Objective:   Physical Exam  Vitals and nursing note reviewed.   Constitutional:       General: She is not in acute distress.     Appearance: She is  well-developed. She is obese. She is not diaphoretic.   HENT:      Head: Normocephalic and atraumatic.      Nose: Nose normal.   Eyes:      General: No scleral icterus.     Conjunctiva/sclera: Conjunctivae normal.   Neck:      Thyroid: No thyromegaly.      Vascular: No JVD.   Cardiovascular:      Rate and Rhythm: Normal rate and regular rhythm.      Heart sounds: S1 normal and S2 normal. Murmur heard.      No friction rub. No gallop. No S3 or S4 sounds.   Pulmonary:      Effort: Pulmonary effort is normal. No respiratory distress.      Breath sounds: Normal breath sounds. No stridor. No wheezing or rales.   Chest:      Chest wall: No tenderness.   Abdominal:      General: Bowel sounds are normal. There is no distension.      Palpations: Abdomen is soft. There is no mass.      Tenderness: There is no abdominal tenderness. There is no rebound.   Genitourinary:     Comments: Deferred  Musculoskeletal:         General: No tenderness or deformity. Normal range of motion.      Cervical back: Normal range of motion and neck supple.   Lymphadenopathy:      Cervical: No cervical adenopathy.   Skin:     General: Skin is warm and dry.      Coloration: Skin is not pale.      Findings: No erythema or rash.   Neurological:      Mental Status: She is alert and oriented to person, place, and time.      Motor: No abnormal muscle tone.      Coordination: Coordination normal.   Psychiatric:         Behavior: Behavior normal.         Thought Content: Thought content normal.         Judgment: Judgment normal.         Lab Results   Component Value Date     06/24/2024    K 4.3 06/24/2024    CL 98 06/24/2024    CO2 28 06/24/2024    BUN 21 06/24/2024    CREATININE 1.0 06/24/2024    GLU 78 06/24/2024    HGBA1C 5.7 (H) 06/24/2024    MG 2.0 06/24/2024    AST 14 06/24/2024    ALT 9 (L) 06/24/2024    ALBUMIN 3.8 06/24/2024    PROT 8.2 06/24/2024    BILITOT 0.6 06/24/2024    WBC 4.94 06/24/2024    HGB 12.9 06/24/2024    HCT 40.9 06/24/2024     MCV 93 06/24/2024     06/24/2024    INR 1.0 06/15/2020    TSH 0.766 06/24/2024    CHOL 222 (H) 06/24/2024    CHOL 222 (H) 06/24/2024    HDL 48 06/24/2024    HDL 48 06/24/2024    LDLCALC 140.6 06/24/2024    LDLCALC 140.6 06/24/2024    TRIG 167 (H) 06/24/2024    TRIG 167 (H) 06/24/2024    BNP 48 08/18/2020         BNP (pg/mL)   Date Value   08/18/2020 48   03/29/2018 110 (H)   03/26/2018 57     INR (no units)   Date Value   06/15/2020 1.0   01/18/2006 1.1          Assessment:      1. Essential hypertension    2. Morbid obesity with BMI of 45.0-49.9, adult    3. Mixed hyperlipidemia    4. [place] (obstructive sleep apnea)    5. Pulmonary hypertension    6. Atherosclerosis of aorta    7. [place] on CPAP    8. Nonspecific abnormal electrocardiogram (ECG) (EKG)    9. Palpitations    10. Stable angina pectoris    11. MVP (mitral valve prolapse)    12. Prediabetes    13. Moderate obstructive sleep apnea            Plan:     Chest pain, HERNÁNDEZ , pulm HTN   - Nuc stress neg for ischemia 1/2025.  - will discuss further tx if needed  - ECHO 9/2024 with normal bi V function, grade 2 DD, mild MR, mild to mod TR, PASP 53 mmHg.     2. HTN with edema, HFPEF  - titrate meds  - increased lasix to 40mg PRN  - improving    3. GERD, large hiatal hernia   - cont PPI, f/u GI   4. HLD, aortic atheros  - cont statin    5. [place] - Moderate obstructive sleep apnea during diagnostic with SpO2 [place] 68%   - cont CPAP  - start Zepbound - not approved /covered     6. PreDM, Obesity, morbid. BMI 47 - 235-->   BMI 46 - 229 lbs  BMI 47 - 233 lbs   - cont weight loss    7. Preop CV EVAL - hiatal hernia surgery   Low periop risk of CV events for moderate risk procedure.  No chest pain, active arrhythmia and CHF symptoms.  Ok to proceed to the scheduled surgery without further cardiac study.  OK to hold Aspirin an Zepbound 7 days before the procedure and resume ASAP postop.  Continue Statin periop.        Visit today included increased complexity  associated with the care of the episodic problem chest pain addressed and managing the longitudinal care of the patient due to the serious and/or complex managed problem(s) .      Thank you for allowing me to participate in this patient's care. Please do not hesitate to contact me with any questions or concerns. Consult note has been forwarded to the referral physician.

## 2025-06-26 ENCOUNTER — TELEPHONE (OUTPATIENT)
Dept: CARDIOLOGY | Facility: CLINIC | Age: 75
End: 2025-06-26
Payer: MEDICARE

## 2025-06-26 NOTE — TELEPHONE ENCOUNTER
PA for Zepbound initiated through CoverMyMeds.      Your information has been submitted to [employer]. [facility] will review the request and will issue a decision, typically within 1-3 days from your submission. You can check the updated outcome later by reopening this request.  If [employer] has not responded in 1-3 days or if you have any questions about your ePA request, please contact [facility] at [phone]. If you think there may be a problem with your PA request, use our live chat feature at the bottom right.

## 2025-06-30 ENCOUNTER — TELEPHONE (OUTPATIENT)
Dept: CARDIOLOGY | Facility: CLINIC | Age: 75
End: 2025-06-30
Payer: MEDICARE

## 2025-06-30 ENCOUNTER — PATIENT MESSAGE (OUTPATIENT)
Dept: CARDIOLOGY | Facility: CLINIC | Age: 75
End: 2025-06-30
Payer: MEDICARE

## 2025-06-30 NOTE — TELEPHONE ENCOUNTER
Attempted to reach patient regarding status of Zepbound prior authorization.    Her insurance plan denied the medication as a plan exclusion.

## 2025-07-10 DIAGNOSIS — K21.9 HIATAL HERNIA WITH GERD: ICD-10-CM

## 2025-07-10 DIAGNOSIS — K44.9 HIATAL HERNIA WITH GERD: ICD-10-CM

## 2025-07-10 RX ORDER — PANTOPRAZOLE SODIUM 40 MG/1
40 TABLET, DELAYED RELEASE ORAL
Qty: 90 TABLET | Refills: 0 | Status: SHIPPED | OUTPATIENT
Start: 2025-07-10

## 2025-07-10 NOTE — TELEPHONE ENCOUNTER
Refill Decision Note   Yoana Pardo  is requesting a refill authorization.  Brief Assessment and Rationale for Refill:  Approve     Medication Therapy Plan:         Comments:     Note composed:10:09 AM 07/10/2025

## 2025-07-10 NOTE — TELEPHONE ENCOUNTER
No care due was identified.  [facility] Embedded Care Due Messages. Reference number: [id number].   7/10/2025 5:31:43 AM CDT

## 2025-09-03 DIAGNOSIS — G62.9 PERIPHERAL POLYNEUROPATHY: ICD-10-CM

## 2025-09-03 RX ORDER — LANOLIN ALCOHOL/MO/W.PET/CERES
400 CREAM (GRAM) TOPICAL DAILY
Qty: 90 TABLET | Refills: 1 | Status: SHIPPED | OUTPATIENT
Start: 2025-09-03

## 2025-09-03 RX ORDER — LOSARTAN POTASSIUM AND HYDROCHLOROTHIAZIDE 12.5; 1 MG/1; MG/1
1 TABLET ORAL DAILY
Qty: 90 TABLET | Refills: 1 | Status: SHIPPED | OUTPATIENT
Start: 2025-09-03 | End: 2026-09-03

## 2025-09-03 RX ORDER — GABAPENTIN 100 MG/1
100 CAPSULE ORAL NIGHTLY
Qty: 30 CAPSULE | Refills: 0 | Status: SHIPPED | OUTPATIENT
Start: 2025-09-03

## (undated) DEVICE — WIRE X-SUP CHOICE PT .014X182

## (undated) DEVICE — SEE MEDLINE ITEM 152680

## (undated) DEVICE — POSITIONER HEAD DONUT 9IN FOAM

## (undated) DEVICE — SEE MEDLINE ITEM 157131

## (undated) DEVICE — KIT MANIFOLD LOW PRESS TUBING

## (undated) DEVICE — DRAPE COLUMN DAVINCI XI

## (undated) DEVICE — SEAL UNIVERSAL 5MM-8MM XI

## (undated) DEVICE — CLOSURE SKIN STERI STRIP 1/2X4

## (undated) DEVICE — SEE MEDLINE ITEM 152622

## (undated) DEVICE — SEE MEDLINE ITEM 157117

## (undated) DEVICE — NDL SAFETY 22G X 1.5 ECLIPSE

## (undated) DEVICE — SEE MEDLINE ITEM 157187

## (undated) DEVICE — SUT VICRYL PLUS 4-0 P3 18IN

## (undated) DEVICE — APPLICATOR CHLORAPREP ORN 26ML

## (undated) DEVICE — KIT ANTIFOG

## (undated) DEVICE — LINER GLOVE POWDERFREE SZ 7

## (undated) DEVICE — ANGIOTOUCH KIT

## (undated) DEVICE — KIT INTRODUCER STIFFEN MICRO

## (undated) DEVICE — EVACUATOR WOUND BULB 100CC

## (undated) DEVICE — BAG TISSUE RETRIEVAL 5MM

## (undated) DEVICE — KIT GLIDESHEATH SLEND 6FR 10CM

## (undated) DEVICE — OBTURATOR BLADELESS 8MM XI CLR

## (undated) DEVICE — ELECTRODE REM PLYHSV RETURN 9

## (undated) DEVICE — COVER TIP CURVED SCISSORS XI

## (undated) DEVICE — CLIP HEMO-LOK ML

## (undated) DEVICE — GLOVE PROTEXIS HYDROGEL SZ7

## (undated) DEVICE — NDL ASPIR/INJ 5 MM

## (undated) DEVICE — DRAPE ABDOMINAL TIBURON 14X11

## (undated) DEVICE — CATH CV QD LUMN 6FRX110CM

## (undated) DEVICE — GLOVE 7.0 PROTEXIS PI BLUE

## (undated) DEVICE — MANIFOLD 4 PORT

## (undated) DEVICE — SYR 30CC LUER LOCK

## (undated) DEVICE — ADHESIVE MASTISOL VIAL 48/BX

## (undated) DEVICE — ENDOWRIST SUCTION IRRIGATOR

## (undated) DEVICE — DRAPE ARM DAVINCI XI

## (undated) DEVICE — TUBING HEATED INSUFFLATOR

## (undated) DEVICE — DECANTER VIAL ASEPTIC TRANSFER

## (undated) DEVICE — SOL STRL WATER INJ 1000ML BG

## (undated) DEVICE — SYR 3CC LUER LOC

## (undated) DEVICE — Device

## (undated) DEVICE — EVACUATOR KIT SMOKE PLUME AWAY

## (undated) DEVICE — BAG TISS RETRV MONARCH 10MM

## (undated) DEVICE — NDL INSUF ULTRA VERESS 120MM

## (undated) DEVICE — NDL PNEUMO INSUFFLATI 120MM

## (undated) DEVICE — KIT SYR REUSABLE

## (undated) DEVICE — COVER OVERHEAD SURG LT BLUE

## (undated) DEVICE — SEE MEDLINE ITEM 157027

## (undated) DEVICE — SOL NS 1000CC

## (undated) DEVICE — SUPPORT ULNA NERVE PROTECTOR